# Patient Record
Sex: FEMALE | Race: WHITE | NOT HISPANIC OR LATINO | Employment: OTHER | URBAN - METROPOLITAN AREA
[De-identification: names, ages, dates, MRNs, and addresses within clinical notes are randomized per-mention and may not be internally consistent; named-entity substitution may affect disease eponyms.]

---

## 2018-07-19 ENCOUNTER — HOSPITAL ENCOUNTER (INPATIENT)
Facility: HOSPITAL | Age: 83
LOS: 9 days | Discharge: NURSING, REHAB OR LONG-TERM CARE | DRG: 602 | End: 2018-07-28
Attending: EMERGENCY MEDICINE | Admitting: INTERNAL MEDICINE
Payer: COMMERCIAL

## 2018-07-19 ENCOUNTER — APPOINTMENT (EMERGENCY)
Dept: NON INVASIVE DIAGNOSTICS | Facility: HOSPITAL | Age: 83
DRG: 602 | End: 2018-07-19
Payer: COMMERCIAL

## 2018-07-19 ENCOUNTER — APPOINTMENT (EMERGENCY)
Dept: RADIOLOGY | Facility: HOSPITAL | Age: 83
DRG: 602 | End: 2018-07-19
Payer: COMMERCIAL

## 2018-07-19 ENCOUNTER — HOSPITAL ENCOUNTER (EMERGENCY)
Facility: HOSPITAL | Age: 83
Discharge: HOME | DRG: 602 | End: 2018-07-19
Attending: EMERGENCY MEDICINE | Admitting: EMERGENCY MEDICINE
Payer: COMMERCIAL

## 2018-07-19 VITALS
DIASTOLIC BLOOD PRESSURE: 70 MMHG | HEART RATE: 72 BPM | RESPIRATION RATE: 16 BRPM | TEMPERATURE: 98.7 F | OXYGEN SATURATION: 96 % | SYSTOLIC BLOOD PRESSURE: 157 MMHG | WEIGHT: 265.21 LBS

## 2018-07-19 DIAGNOSIS — L03.90 CELLULITIS: Primary | ICD-10-CM

## 2018-07-19 DIAGNOSIS — R06.03 ACUTE RESPIRATORY DISTRESS: ICD-10-CM

## 2018-07-19 DIAGNOSIS — M16.9 DEGENERATIVE JOINT DISEASE (DJD) OF HIP: ICD-10-CM

## 2018-07-19 DIAGNOSIS — M17.12 ARTHRITIS OF LEFT KNEE: Primary | ICD-10-CM

## 2018-07-19 DIAGNOSIS — M16.12 ARTHRITIS OF LEFT HIP: ICD-10-CM

## 2018-07-19 DIAGNOSIS — M25.552 PAIN OF LEFT HIP JOINT: ICD-10-CM

## 2018-07-19 DIAGNOSIS — K59.00 CONSTIPATION: ICD-10-CM

## 2018-07-19 DIAGNOSIS — M17.11 ARTHRITIS OF RIGHT KNEE: ICD-10-CM

## 2018-07-19 DIAGNOSIS — G47.00 INSOMNIA: ICD-10-CM

## 2018-07-19 PROBLEM — M25.559 HIP PAIN: Status: ACTIVE | Noted: 2018-07-19

## 2018-07-19 PROBLEM — I10 HYPERTENSION: Status: ACTIVE | Noted: 2018-07-19

## 2018-07-19 LAB
ALBUMIN SERPL BCP-MCNC: 3.7 G/DL (ref 3.5–5)
ALP SERPL-CCNC: 70 U/L (ref 46–116)
ALT SERPL W P-5'-P-CCNC: 26 U/L (ref 12–78)
ANION GAP SERPL CALCULATED.3IONS-SCNC: 7 MMOL/L (ref 4–13)
APTT PPP: 26 SECONDS (ref 24–36)
AST SERPL W P-5'-P-CCNC: 24 U/L (ref 5–45)
BASOPHILS # BLD MANUAL: 0 THOUSAND/UL (ref 0–0.1)
BASOPHILS NFR MAR MANUAL: 0 % (ref 0–1)
BILIRUB SERPL-MCNC: 0.44 MG/DL (ref 0.2–1)
BUN SERPL-MCNC: 35 MG/DL (ref 5–25)
CALCIUM SERPL-MCNC: 9.6 MG/DL (ref 8.3–10.1)
CHLORIDE SERPL-SCNC: 101 MMOL/L (ref 100–108)
CO2 SERPL-SCNC: 29 MMOL/L (ref 21–32)
CREAT SERPL-MCNC: 1.23 MG/DL (ref 0.6–1.3)
EOSINOPHIL # BLD MANUAL: 0 THOUSAND/UL (ref 0–0.4)
EOSINOPHIL NFR BLD MANUAL: 0 % (ref 0–6)
ERYTHROCYTE [DISTWIDTH] IN BLOOD BY AUTOMATED COUNT: 12.8 % (ref 11.6–15.1)
GFR SERPL CREATININE-BSD FRML MDRD: 40 ML/MIN/1.73SQ M
GLUCOSE SERPL-MCNC: 114 MG/DL (ref 65–140)
HCT VFR BLD AUTO: 40.1 % (ref 34.8–46.1)
HGB BLD-MCNC: 13.3 G/DL (ref 11.5–15.4)
INR PPP: 1.01 (ref 0.86–1.17)
LACTATE SERPL-SCNC: 2 MMOL/L (ref 0.5–2)
LYMPHOCYTES # BLD AUTO: 0.51 THOUSAND/UL (ref 0.6–4.47)
LYMPHOCYTES # BLD AUTO: 4 % (ref 14–44)
MCH RBC QN AUTO: 31.7 PG (ref 26.8–34.3)
MCHC RBC AUTO-ENTMCNC: 33.2 G/DL (ref 31.4–37.4)
MCV RBC AUTO: 96 FL (ref 82–98)
MONOCYTES # BLD AUTO: 0.38 THOUSAND/UL (ref 0–1.22)
MONOCYTES NFR BLD: 3 % (ref 4–12)
NEUTROPHILS # BLD MANUAL: 11.74 THOUSAND/UL (ref 1.85–7.62)
NEUTS BAND NFR BLD MANUAL: 15 % (ref 0–8)
NEUTS SEG NFR BLD AUTO: 77 % (ref 43–75)
NRBC BLD AUTO-RTO: 0 /100 WBCS
PLATELET # BLD AUTO: 238 THOUSANDS/UL (ref 149–390)
PLATELET BLD QL SMEAR: ADEQUATE
PMV BLD AUTO: 9.5 FL (ref 8.9–12.7)
POTASSIUM SERPL-SCNC: 4.1 MMOL/L (ref 3.5–5.3)
PROT SERPL-MCNC: 6.9 G/DL (ref 6.4–8.2)
PROTHROMBIN TIME: 13.4 SECONDS (ref 11.8–14.2)
RBC # BLD AUTO: 4.2 MILLION/UL (ref 3.81–5.12)
RBC MORPH BLD: NORMAL
SODIUM SERPL-SCNC: 137 MMOL/L (ref 136–145)
TOTAL CELLS COUNTED SPEC: 100
VARIANT LYMPHS # BLD AUTO: 1 %
WBC # BLD AUTO: 12.76 THOUSAND/UL (ref 4.31–10.16)

## 2018-07-19 PROCEDURE — 85027 COMPLETE CBC AUTOMATED: CPT | Performed by: EMERGENCY MEDICINE

## 2018-07-19 PROCEDURE — 96374 THER/PROPH/DIAG INJ IV PUSH: CPT

## 2018-07-19 PROCEDURE — 36415 COLL VENOUS BLD VENIPUNCTURE: CPT | Performed by: EMERGENCY MEDICINE

## 2018-07-19 PROCEDURE — 99223 1ST HOSP IP/OBS HIGH 75: CPT | Performed by: PHYSICIAN ASSISTANT

## 2018-07-19 PROCEDURE — 73502 X-RAY EXAM HIP UNI 2-3 VIEWS: CPT

## 2018-07-19 PROCEDURE — 80053 COMPREHEN METABOLIC PANEL: CPT | Performed by: EMERGENCY MEDICINE

## 2018-07-19 PROCEDURE — 96361 HYDRATE IV INFUSION ADD-ON: CPT

## 2018-07-19 PROCEDURE — 96375 TX/PRO/DX INJ NEW DRUG ADDON: CPT

## 2018-07-19 PROCEDURE — 93971 EXTREMITY STUDY: CPT

## 2018-07-19 PROCEDURE — 99283 EMERGENCY DEPT VISIT LOW MDM: CPT

## 2018-07-19 PROCEDURE — 85730 THROMBOPLASTIN TIME PARTIAL: CPT | Performed by: EMERGENCY MEDICINE

## 2018-07-19 PROCEDURE — 71046 X-RAY EXAM CHEST 2 VIEWS: CPT

## 2018-07-19 PROCEDURE — 85007 BL SMEAR W/DIFF WBC COUNT: CPT | Performed by: EMERGENCY MEDICINE

## 2018-07-19 PROCEDURE — 93971 EXTREMITY STUDY: CPT | Performed by: SURGERY

## 2018-07-19 PROCEDURE — 85610 PROTHROMBIN TIME: CPT | Performed by: EMERGENCY MEDICINE

## 2018-07-19 PROCEDURE — 83605 ASSAY OF LACTIC ACID: CPT | Performed by: EMERGENCY MEDICINE

## 2018-07-19 PROCEDURE — 73560 X-RAY EXAM OF KNEE 1 OR 2: CPT

## 2018-07-19 PROCEDURE — 99285 EMERGENCY DEPT VISIT HI MDM: CPT

## 2018-07-19 RX ORDER — HYDROCODONE BITARTRATE AND ACETAMINOPHEN 5; 325 MG/1; MG/1
1 TABLET ORAL EVERY 6 HOURS PRN
Status: DISCONTINUED | OUTPATIENT
Start: 2018-07-19 | End: 2018-07-20

## 2018-07-19 RX ORDER — KETOROLAC TROMETHAMINE 30 MG/ML
15 INJECTION, SOLUTION INTRAMUSCULAR; INTRAVENOUS EVERY 6 HOURS PRN
Status: DISCONTINUED | OUTPATIENT
Start: 2018-07-19 | End: 2018-07-20

## 2018-07-19 RX ORDER — ONDANSETRON 2 MG/ML
4 INJECTION INTRAMUSCULAR; INTRAVENOUS EVERY 6 HOURS PRN
Status: DISCONTINUED | OUTPATIENT
Start: 2018-07-19 | End: 2018-07-28

## 2018-07-19 RX ORDER — HYDROCODONE BITARTRATE AND ACETAMINOPHEN 5; 325 MG/1; MG/1
1 TABLET ORAL EVERY 6 HOURS PRN
Qty: 8 TABLET | Refills: 0 | Status: SHIPPED | OUTPATIENT
Start: 2018-07-19 | End: 2018-07-28 | Stop reason: HOSPADM

## 2018-07-19 RX ORDER — LANOLIN ALCOHOL/MO/W.PET/CERES
3 CREAM (GRAM) TOPICAL
Status: DISCONTINUED | OUTPATIENT
Start: 2018-07-19 | End: 2018-07-28 | Stop reason: HOSPADM

## 2018-07-19 RX ORDER — LISINOPRIL AND HYDROCHLOROTHIAZIDE 20; 12.5 MG/1; MG/1
1 TABLET ORAL DAILY
COMMUNITY
End: 2018-08-08

## 2018-07-19 RX ORDER — ACETAMINOPHEN 325 MG/1
650 TABLET ORAL EVERY 6 HOURS PRN
Status: DISCONTINUED | OUTPATIENT
Start: 2018-07-19 | End: 2018-07-20

## 2018-07-19 RX ORDER — MAGNESIUM HYDROXIDE/ALUMINUM HYDROXICE/SIMETHICONE 120; 1200; 1200 MG/30ML; MG/30ML; MG/30ML
30 SUSPENSION ORAL EVERY 6 HOURS PRN
Status: DISCONTINUED | OUTPATIENT
Start: 2018-07-19 | End: 2018-07-28 | Stop reason: HOSPADM

## 2018-07-19 RX ORDER — ACETAMINOPHEN 325 MG/1
650 TABLET ORAL ONCE
Status: COMPLETED | OUTPATIENT
Start: 2018-07-19 | End: 2018-07-19

## 2018-07-19 RX ORDER — OXYCODONE HYDROCHLORIDE 5 MG/1
2.5 TABLET ORAL ONCE
Status: COMPLETED | OUTPATIENT
Start: 2018-07-19 | End: 2018-07-19

## 2018-07-19 RX ORDER — CLONIDINE HYDROCHLORIDE 0.2 MG/1
0.1 TABLET ORAL 3 TIMES DAILY
COMMUNITY
End: 2022-01-01 | Stop reason: HOSPADM

## 2018-07-19 RX ORDER — AMLODIPINE BESYLATE 5 MG/1
5 TABLET ORAL 2 TIMES DAILY
Status: DISCONTINUED | OUTPATIENT
Start: 2018-07-19 | End: 2018-07-22

## 2018-07-19 RX ORDER — LIDOCAINE 50 MG/G
2 PATCH TOPICAL DAILY
Status: DISCONTINUED | OUTPATIENT
Start: 2018-07-19 | End: 2018-07-28 | Stop reason: HOSPADM

## 2018-07-19 RX ORDER — MORPHINE SULFATE 4 MG/ML
4 INJECTION, SOLUTION INTRAMUSCULAR; INTRAVENOUS ONCE
Status: COMPLETED | OUTPATIENT
Start: 2018-07-19 | End: 2018-07-19

## 2018-07-19 RX ORDER — IBUPROFEN 400 MG/1
400 TABLET ORAL EVERY 6 HOURS PRN
Status: DISCONTINUED | OUTPATIENT
Start: 2018-07-19 | End: 2018-07-19

## 2018-07-19 RX ORDER — CLONIDINE HYDROCHLORIDE 0.1 MG/1
0.2 TABLET ORAL 3 TIMES DAILY
Status: DISCONTINUED | OUTPATIENT
Start: 2018-07-19 | End: 2018-07-28 | Stop reason: HOSPADM

## 2018-07-19 RX ORDER — AMLODIPINE BESYLATE 5 MG/1
5 TABLET ORAL EVERY 12 HOURS
COMMUNITY
End: 2018-09-27 | Stop reason: CLARIF

## 2018-07-19 RX ADMIN — MORPHINE SULFATE 4 MG: 4 INJECTION, SOLUTION INTRAMUSCULAR; INTRAVENOUS at 19:32

## 2018-07-19 RX ADMIN — CEFEPIME HYDROCHLORIDE 2000 MG: 2 INJECTION, POWDER, FOR SOLUTION INTRAVENOUS at 21:33

## 2018-07-19 RX ADMIN — OXYCODONE HYDROCHLORIDE 2.5 MG: 5 TABLET ORAL at 09:00

## 2018-07-19 RX ADMIN — LIDOCAINE 2 PATCH: 50 PATCH TOPICAL at 22:56

## 2018-07-19 RX ADMIN — SODIUM CHLORIDE 1000 ML: 0.9 INJECTION, SOLUTION INTRAVENOUS at 19:39

## 2018-07-19 RX ADMIN — HYDROMORPHONE HYDROCHLORIDE 0.5 MG: 1 INJECTION, SOLUTION INTRAMUSCULAR; INTRAVENOUS; SUBCUTANEOUS at 20:19

## 2018-07-19 RX ADMIN — VANCOMYCIN HYDROCHLORIDE 2000 MG: 1 INJECTION, POWDER, LYOPHILIZED, FOR SOLUTION INTRAVENOUS at 22:51

## 2018-07-19 RX ADMIN — CLONIDINE HYDROCHLORIDE 0.2 MG: 0.1 TABLET ORAL at 22:55

## 2018-07-19 RX ADMIN — SODIUM CHLORIDE 1000 ML: 0.9 INJECTION, SOLUTION INTRAVENOUS at 22:52

## 2018-07-19 RX ADMIN — ACETAMINOPHEN 650 MG: 325 TABLET, FILM COATED ORAL at 07:32

## 2018-07-19 RX ADMIN — MELATONIN TAB 3 MG 3 MG: 3 TAB at 22:56

## 2018-07-19 RX ADMIN — AMLODIPINE BESYLATE 5 MG: 5 TABLET ORAL at 22:53

## 2018-07-19 NOTE — ED PROVIDER NOTES
History  Chief Complaint   Patient presents with    Knee Pain     Pt c/o b/l knee pain x3 weeks worsening today  No injuries/falls  Pt states using walker to ambulate short distances and wheelchair for longer distances   Hip Pain     Pt c/o left hip x3 weeks  No injuries/fall  An 59-year-old female with past history of hyperlipidemia and hypertension; presents with bilateral knee and left hip pain which has gotten progressively worse for the past 3 weeks  Patient denies falling or inciting injury at the onset of pain  Patient states the pain reached its maximum overnight, which prompted ED evaluation  Patient has noticed swelling to the bilateral knees, and at baseline has swelling to her lower extremities  Patient did take 2 Aleve at 1:00 am this morning with minimal relief of symptoms  Patient was found to have bruising along the medial aspect of the knees, and states it is secondary to sleeping with a pillow between them  Patient otherwise denies fever, chills, chest pain, shortness of breath, abdominal pain, nausea, vomiting, diarrhea and rashes  Patient was seen by [address] [place] [address] Rheumatology for the symptoms approximately 1 month ago  Symptoms were felt to be secondary to arthritis at that time, patient did have steroid injections  Patient states she did not gain relief with the injections  Of note, at baseline patient uses a wheelchair to get around  Patient is able to ambulate with a walker for short distances  A/P:  Bilateral knee and left hip pain, atraumatic  Mild swelling appreciated to the bilateral knees  Full range of motion to the bilateral lower extremities, although patient does complain of pain  No overlying erythema or warmth to suggest septic arthritis  Suspect symptoms are secondary to osteoarthritis  Will obtain x-rays for further evaluation of occult fractures  Will give Tylenol for pain control          History provided by:  Patient and medical records  Knee Pain   Hip Pain None       Past Medical History:   Diagnosis Date    Hyperlipidemia     Hypertension        Past Surgical History:   Procedure Laterality Date    ANKLE FRACTURE SURGERY Left     CHOLECYSTECTOMY      REPLACEMENT TOTAL KNEE BILATERAL Bilateral        History reviewed  No pertinent family history  I have reviewed and agree with the history as documented  Social History   Substance Use Topics    Smoking status: Never Smoker    Smokeless tobacco: Never Used    Alcohol use No        Review of Systems   Cardiovascular: Positive for leg swelling ( bilateral)  Musculoskeletal: Positive for arthralgias ( bilateral knee and left hip) and joint swelling ( bilateral knee)  All other systems reviewed and are negative  Physical Exam  Physical Exam  General Appearance: alert and oriented, nad, non toxic appearing  Skin:  Warm, dry, intact  HEENT: atraumatic, normocephalic  Neck: Supple, trachea midline  Cardiac: RRR; no murmurs, rub, gallops  Pulmonary: lungs CTAB; no wheezes, rales, rhonchi  Gastrointestinal: abdomen soft, nontender, nondistended; no guarding or rebound tenderness; good bowel sounds, no mass or bruits  Extremities:  Swelling appreciated to the bilateral knees  Knees and hips are nontender  Full range of motion to the bilateral lower extremities, although patient complains of increased pain with passive range of motion  Ecchymosis appreciated to the medial knees bilaterally  No erythema or warmth appreciated  3+ pitting edema to the bilateral lower extremities    2+ pulses; no calf tenderness, no clubbing, no cyanosis  Neuro:  no focal motor or sensory deficits, CN 2-12 grossly intact  Psych:  Normal mood and affect, normal judgement and insight      Vital Signs  ED Triage Vitals [07/19/18 0721]   Temperature Pulse Respirations Blood Pressure SpO2   98 7 °F (37 1 °C) 72 18 (!) 177/92 96 %      Temp Source Heart Rate Source Patient Position - Orthostatic VS BP Location FiO2 (%)   Oral Monitor Lying Left arm --      Pain Score       Worst Possible Pain           Vitals:    07/19/18 0721 07/19/18 0832   BP: (!) 177/92 157/70   Pulse: 72    Patient Position - Orthostatic VS: Lying Sitting       Visual Acuity      ED Medications  Medications   acetaminophen (TYLENOL) tablet 650 mg (650 mg Oral Given 7/19/18 0732)   oxyCODONE (ROXICODONE) IR tablet 2 5 mg (2 5 mg Oral Given 7/19/18 0900)       Diagnostic Studies  Results Reviewed     None                 XR hip/pelv 2-3 vws left   ED Interpretation by Cynthia Llanes DO (07/19 0818)   Significant bilateral osteoarthritis      XR knee 1 or 2 vw left   ED Interpretation by Cynthia Llanes DO (07/19 9383)   No acute fracture or dislocation  Hardware in place from prior knee replacement, although with minimal joint space  XR knee 1 or 2 vw right   ED Interpretation by Cynthia Llanes DO (07/19 2792)   No acute fracture or dislocation  Hardware in place from prior knee replacement, although with minimal joint space  Procedures  Procedures       Phone Contacts  ED Phone Contact    ED Course  ED Course as of Jul 19 1005   Thu Jul 19, 2018   0840 Pt complains of persistent pain, regardless of tylenol  Given extent of patient's arthritis, will give low dose oxycodone with plans to discharge on vicodin    Reviewed [place] PMED, no recent prescriptions                                MDM  CritCare Time    Disposition  Final diagnoses:   Arthritis of left knee   Arthritis of right knee   Arthritis of left hip     Time reflects when diagnosis was documented in both MDM as applicable and the Disposition within this note     Time User Action Codes Description Comment    7/19/2018  9:03 AM Srinath Espinal Add [M17 12] Arthritis of left knee     7/19/2018  9:03 AM Miriam Espinal Add [M17 11] Arthritis of right knee     7/19/2018  9:04 AM Miriam Espinal Add [M16 12] Arthritis of left hip       ED Disposition     ED Disposition Condition Comment Discharge  Vickysreedhar Archuleta discharge to home/self care  Condition at discharge: Good        Follow-up Information     Follow up With Specialties Details Why [address], [address]  Internal Medicine, Nurse Practitioner Schedule an appointment as soon as possible for a visit in 1 day For re-evaluation [address] [facility] Schedule an appointment as soon as possible for a visit in 1 week For re-evaluation [place] [id number]  [phone]          Discharge Medication List as of 7/19/2018  9:04 AM      START taking these medications    Details   HYDROcodone-acetaminophen (NORCO) 5-325 mg per tablet Take 1 tablet by mouth every 6 (six) hours as needed for pain for up to 10 days Max Daily Amount: 4 tablets, Starting Thu 7/19/2018, Until Sun 7/29/2018, Print           No discharge procedures on file      ED Provider  Electronically Signed by           Dixie Matt DO  07/19/18 0545

## 2018-07-19 NOTE — ED RE-EVALUATION NOTE
Received phone call from patient's pharmacy stating pt received a prescription for vicodin, 60 tabs with instruction to take BID, on 6/27/2018  This prescription [place] not listed on [place] PMED when queried  I instructed the pharmacist not to fill the prescription as the patient should still have vicodin from recent prescription  Pharmacist to discard of prescription       [id number] Julio, DO  07/19/18 2531

## 2018-07-19 NOTE — DISCHARGE INSTRUCTIONS
Osteoarthritis   WHAT YOU NEED TO KNOW:   Osteoarthritis (OA) occurs when cartilage (tissue that cushions a joint) wears away slowly and causes the bones to rub together  OA is a long-term condition that often affects the hands, neck, lower back, knees, and hips  OA is also called arthrosis or degenerative joint disease  DISCHARGE INSTRUCTIONS:   Return to the emergency department if:   · You have severe pain  · You cannot move your joint  Contact your healthcare provider if:   · You have a fever  · Your joint is red and tender  · You have questions or concerns about your condition or care  Medicines:   · Acetaminophen  is used to decrease pain  It is available without a doctor's order  Ask how much to take and how often to take it  Follow directions  Acetaminophen can cause liver damage if not taken correctly  · NSAIDs , such as ibuprofen, help decrease swelling, pain, and fever  This medicine is available with or without a doctor's order  NSAIDs can cause stomach bleeding or kidney problems in certain people  If you take blood thinner medicine, always ask your healthcare provider if NSAIDs are safe for you  Always read the medicine label and follow directions  · Prescription pain medicine  may be given to decrease severe pain if other medicines do not work  Take the medicine as directed  Do not wait until the pain is severe before you take your medicine  · Take your medicine as directed  Contact your healthcare provider if you think your medicine is not helping or if you have side effects  Tell him or her if you are allergic to any medicine  Keep a list of the medicines, vitamins, and herbs you take  Include the amounts, and when and why you take them  Bring the list or the pill bottles to follow-up visits  Carry your medicine list with you in case of an emergency  Follow up with your healthcare provider as directed:  Write down your questions so you remember to ask them during your visits  Go to physical therapy as directed:  A physical therapist teaches you exercises to help improve movement and strength, and to decrease pain in your joints  The exercises also help lower your risk for loss of function  Manage your OA:   · Stay active  Physical activity may reduce your pain and improve your ability to do daily activities  Avoid activities that cause pain  Ask your healthcare provider what type of exercise would be best for you  · Maintain a healthy weight  This helps decrease the strain on the joints in your back, hips, knees, ankles, and feet  Ask your healthcare provider how much you should weigh  Ask him to help you create a weight loss plan if you are overweight  · Use heat or ice  on your joints as directed  Heat and ice help decrease pain, swelling, and muscle spasms  Use a heating pad on a low setting or take a warm bath  Use an ice pack, or put crushed ice in a plastic bag  Cover it with a towel  · Massage  the muscles around the joint to relieve pain and stiffness  · Use a cane, crutches, or a walker  to protect and relieve pressure on your ankle, knee, and hip joints  You may also be prescribed shoe inserts to decrease pressure in your joints  · Wear flat or low-heeled shoes  This will help decrease pain and reduce pressure on your ankle, knee, and hip joints  © 2017 [employer]  Information is for End User's use only and may not be sold, redistributed or otherwise used for commercial purposes  All illustrations and images included in CareNotes® are the copyrighted property of A D A M , Inc  or Julien Giles  The above information is an  only  It is not intended as medical advice for individual conditions or treatments  Talk to your doctor, nurse or pharmacist before following any medical regimen to see if it is safe and effective for you

## 2018-07-19 NOTE — ED PROVIDER NOTES
History  Chief Complaint   Patient presents with    Pain     patient transported via EMS from home; patient was seen as [facility] for bialteral hip and knee pain; XR were performed and patient was given script for Vicodin but script could not be filled due to patient currently having a prescription for Oxycodone     80year old female with a hx chronic hip and knee pain presents for evaluation of hip and knee pain but also L ankle pain  She states her hip and knee pain has been present for months  She was evaluated earlier today for the hip and knee pain but did not mention the ankle pain as those symptoms just started today  She was discharged with a rx for vicodin but could not get it filled because she had a 30 day supply filled 20 days ago per her pharmacy  I personally spoke with the patients nurse at her assisted living facility who stated she still has her remaining supply left but her BID dosing does not adequately control her pain and she has nothing for breakthrough  Patient states her L ankle pain started today and started to also become warm  Denies fevers, chills, vomiting, coughing, chest pain, SOB  Patient is scheduled to see pain management 8/10 for her chronic pain  Prior to Admission Medications   Prescriptions Last Dose Informant Patient Reported? Taking?    HYDROcodone-acetaminophen (NORCO) 5-325 mg per tablet   No No   Sig: Take 1 tablet by mouth every 6 (six) hours as needed for pain for up to 10 days Max Daily Amount: 4 tablets   amLODIPine (NORVASC) 5 mg tablet   Yes Yes   Sig: Take 5 mg by mouth 2 (two) times a day   cloNIDine (CATAPRES) 0 1 mg tablet   Yes Yes   Sig: Take 0 2 mg by mouth 3 (three) times a day   lisinopril-hydrochlorothiazide (PRINZIDE,ZESTORETIC) 20-12 5 MG per tablet   Yes Yes   Sig: Take 1 tablet by mouth daily      Facility-Administered Medications: None       Past Medical History:   Diagnosis Date    Hyperlipidemia     Hypertension        Past Surgical History: Procedure Laterality Date    ANKLE FRACTURE SURGERY Left     CHOLECYSTECTOMY      REPLACEMENT TOTAL KNEE BILATERAL Bilateral        History reviewed  No pertinent family history  I have reviewed and agree with the history as documented  Social History   Substance Use Topics    Smoking status: Never Smoker    Smokeless tobacco: Never Used    Alcohol use No        Review of Systems   Constitutional: Negative for chills, diaphoresis and fever  HENT: Negative for congestion and rhinorrhea  Eyes: Negative for pain and visual disturbance  Respiratory: Negative for cough, shortness of breath and wheezing  Cardiovascular: Negative for chest pain and leg swelling  Gastrointestinal: Negative for abdominal pain, diarrhea, nausea and vomiting  Genitourinary: Negative for difficulty urinating, dysuria, frequency and urgency  Musculoskeletal: Positive for arthralgias  Negative for back pain and neck pain  B/l hip and knee pain  L ankle pain  Skin: Positive for color change  Negative for rash  Neurological: Negative for syncope, numbness and headaches  All other systems reviewed and are negative  Physical Exam  ED Triage Vitals   Temperature Pulse Respirations Blood Pressure SpO2   07/19/18 1840 07/19/18 1840 07/19/18 1840 07/19/18 1840 07/19/18 1840   99 °F (37 2 °C) 100 16 115/58 94 %      Temp Source Heart Rate Source Patient Position - Orthostatic VS BP Location FiO2 (%)   07/19/18 1840 07/19/18 2139 07/19/18 2139 07/19/18 1840 --   Oral Monitor Lying Right arm       Pain Score       07/19/18 1932       Worst Possible Pain           Orthostatic Vital Signs  Vitals:    07/19/18 2139 07/19/18 2227 07/19/18 2253 07/19/18 2255   BP: 119/56 122/53 122/53 122/53   Pulse: 79 82     Patient Position - Orthostatic VS: Lying Lying         Physical Exam   Constitutional: She is oriented to person, place, and time  She appears well-developed and well-nourished  No distress     HENT:   Head: Normocephalic and atraumatic  Eyes: Conjunctivae and EOM are normal    Neck: Normal range of motion  Neck supple  Cardiovascular: Normal rate and regular rhythm  Pulmonary/Chest: Effort normal and breath sounds normal  No respiratory distress  She has no wheezes  She has no rales  Abdominal: Soft  Bowel sounds are normal  There is no tenderness  There is no guarding  Musculoskeletal: Normal range of motion  She exhibits edema and tenderness  Erythema and warmth over L ankle up to mid calf  Patient unable to flex at hip 2/2 pain  Limited ROM of b/l knees 2/2 pain  Neurological: She is alert and oriented to person, place, and time  No cranial nerve deficit  Skin: Skin is warm  She is not diaphoretic  No erythema  Psychiatric: She has a normal mood and affect  Her behavior is normal    Nursing note and vitals reviewed  ED Medications  Medications   melatonin tablet 3 mg (3 mg Oral Given 7/19/18 2256)   lidocaine (LIDODERM) 5 % patch 2 patch (2 patches Transdermal Medication Applied 7/19/18 2256)   ketorolac (TORADOL) injection 15 mg (not administered)   cefepime (MAXIPIME) 2,000 mg in dextrose 5 % 50 mL IVPB (not administered)   vancomycin (VANCOCIN) 1,250 mg in sodium chloride 0 9 % 250 mL IVPB (not administered)   ondansetron (ZOFRAN) injection 4 mg (not administered)   aluminum-magnesium hydroxide-simethicone (MYLANTA) 200-200-20 mg/5 mL oral suspension 30 mL (not administered)   enoxaparin (LOVENOX) subcutaneous injection 40 mg (not administered)   acetaminophen (TYLENOL) tablet 650 mg (not administered)   amLODIPine (NORVASC) tablet 5 mg (5 mg Oral Given 7/19/18 2253)   cloNIDine (CATAPRES) tablet 0 2 mg (0 2 mg Oral Given 7/19/18 2255)   HYDROcodone-acetaminophen (NORCO) 5-325 mg per tablet 1 tablet (not administered)   lisinopril-hydrochlorothiazide (PRINZIDE 20/12 5) combo dose (not administered)   sodium chloride 0 9 % bolus 1,000 mL (0 mL Intravenous Stopped 7/19/18 2136)   morphine (PF) 4 mg/mL injection 4 mg (4 mg Intravenous Given 7/19/18 1932)   HYDROmorphone (DILAUDID) injection 0 5 mg (0 5 mg Intravenous Given 7/19/18 2019)   cefepime (MAXIPIME) 2 g/50 mL dextrose IVPB (2,000 mg Intravenous New Bag 7/19/18 2133)   vancomycin (VANCOCIN) 2,000 mg in sodium chloride 0 9 % 500 mL IVPB (2,000 mg Intravenous New Bag 7/19/18 2251)   sodium chloride 0 9 % bolus 1,000 mL (1,000 mL Intravenous New Bag 7/19/18 2252)       Diagnostic Studies  Results Reviewed     Procedure Component Value Units Date/Time    Lactic Acid x2 [[id number]] Collected:  07/20/18 0131    Lab Status: In process Specimen:  Blood from Arm, Right Updated:  07/20/18 0136    CBC and differential [[id number]]  (Abnormal) Collected:  07/19/18 1929    Lab Status:  Final result Specimen:  Blood from Arm, Left Updated:  07/19/18 2120     WBC 12 76 (H) Thousand/uL      RBC 4 20 Million/uL      Hemoglobin 13 3 g/dL      Hematocrit 40 1 %      MCV 96 fL      MCH 31 7 pg      MCHC 33 2 g/dL      RDW 12 8 %      MPV 9 5 fL      Platelets 201 Thousands/uL      nRBC 0 /100 WBCs     Narrative: This is an appended report  These results have been appended to a previously verified report      Protime-INR [[id number]]  (Normal) Collected:  07/19/18 1929    Lab Status:  Final result Specimen:  Blood from Arm, Left Updated:  07/19/18 2104     Protime 13 4 seconds      INR 1 01    APTT [[id number]]  (Normal) Collected:  07/19/18 1929    Lab Status:  Final result Specimen:  Blood from Arm, Left Updated:  07/19/18 2104     PTT 26 seconds     Comprehensive metabolic panel [[id number]]  (Abnormal) Collected:  07/19/18 1929    Lab Status:  Final result Specimen:  Blood from Arm, Left Updated:  07/19/18 2100     Sodium 137 mmol/L      Potassium 4 1 mmol/L      Chloride 101 mmol/L      CO2 29 mmol/L      Anion Gap 7 mmol/L      BUN 35 (H) mg/dL      Creatinine 1 23 mg/dL      Glucose 114 mg/dL      Calcium 9 6 mg/dL      AST 24 U/L      ALT 26 U/L Alkaline Phosphatase 70 U/L      Total Protein 6 9 g/dL      Albumin 3 7 g/dL      Total Bilirubin 0 44 mg/dL      eGFR 40 ml/min/1 73sq m     Narrative:         National Kidney Disease Education Program recommendations are as follows:  GFR calculation is accurate only with a steady state creatinine  Chronic Kidney disease less than 60 ml/min/1 73 sq  meters  Kidney failure less than 15 ml/min/1 73 sq  meters  Lactic Acid x2 [[id number]]  (Normal) Collected:  07/19/18 1929    Lab Status:  Final result Specimen:  Blood from Arm, Left Updated:  07/19/18 2049     LACTIC ACID 2 0 mmol/L     Narrative:         Result may be elevated if tourniquet was used during collection  POCT urinalysis dipstick [[id number]]     Lab Status:  No result                  VAS lower limb venous duplex study, unilateral/limited   Final Result by Derrick Sol MD (07/19 2211)      XR chest 2 views   Final Result by Trice Cruz MD (07/19 2040)      No acute cardiopulmonary disease  Workstation performed: [id number]               Procedures  Procedures      Phone Consults  ED Phone Contact    ED Course  ED Course as of Jul 20 0155   u Jul 19, 2018 2032 WBC: (!) 12 76   2032 LE duplex negative for proximal DVT  Possible distal tibial clot but more likely narrowing per US tech  2049 Mid to distal calf LLE posterior tibial vein appeared narrow  Possible clot but more likely narrowing of vessel  2051 LACTIC ACID: 2 0   2129 Bands Relative: (!) 15                         Initial Sepsis Screening     Row Name 07/19/18 2051                Is the patient's history suggestive of a new or worsening infection?  (!)  Yes (Proceed)  -AM        Suspected source of infection soft tissue  -AM        Are two or more of the following signs & symptoms of infection both present and new to the patient?  --        Indicate SIRS criteria Tachycardia > 90 bpm;Leukocytosis (WBC > 07322 IJL)  -AM        If the answer is yes to both questions, suspicion of sepsis is present  --        If severe sepsis is present AND tissue hypoperfusion perists in the hour after fluid resuscitation or lactate > 4, the patient meets criteria for SEPTIC SHOCK  --        Are any of the following organ dysfunction criteria present within 6 hours of suspected infection and SIRS criteria that are NOT considered to be chronic conditions? (!)  Yes  -AM        Organ dysfunction Lactate > 2 0 mmol/L  -AM        Date of presentation of severe sepsis 07/19/18  -AM        Time of presentation of severe sepsis 2052  -AM        Tissue hypoperfusion persists in the hour after crystalloid fluid administration, evidenced, by either:  --        Was hypotension present within one hour of the conclusion of crystalloid fluid administration? No  -AM        Date of presentation of septic shock  --        Time of presentation of septic shock  --          User Key  (r) = Recorded By, (t) = Taken By, (c) = Cosigned By    [address] Name Provider Type    AM Oneda Roman, DO Resident                  MDM  Number of Diagnoses or Management Options  Cellulitis:   Diagnosis management comments: 80year old female presenting with pain of hips, knees and unilateral ankle/calf  Will obtain labs, sepsis evaluation  Pain control  LE doppler to assess for DVT  Likely cellulitis  Broad spec abx  Admit for further evaluation       CritCare Time    Disposition  Final diagnoses:   Cellulitis     Time reflects when diagnosis was documented in both MDM as applicable and the Disposition within this note     Time User Action Codes Description Comment    7/19/2018  9:20 PM [facility] Add [L03 90] Cellulitis     7/19/2018 10:09 PM Linda Greenberg Add [M16 9] Degenerative joint disease (DJD) of hip       ED Disposition     ED Disposition Condition Comment    Admit  Case was discussed with [facility] and the patient's admission status was agreed to be Admission Status: inpatient status to the service of   [facility]   Follow-up Information    None         Current Discharge Medication List      CONTINUE these medications which have NOT CHANGED    Details   amLODIPine (NORVASC) 5 mg tablet Take 5 mg by mouth 2 (two) times a day      cloNIDine (CATAPRES) 0 1 mg tablet Take 0 2 mg by mouth 3 (three) times a day      lisinopril-hydrochlorothiazide (PRINZIDE,ZESTORETIC) 20-12 5 MG per tablet Take 1 tablet by mouth daily      HYDROcodone-acetaminophen (NORCO) 5-325 mg per tablet Take 1 tablet by mouth every 6 (six) hours as needed for pain for up to 10 days Max Daily Amount: 4 tablets  Qty: 8 tablet, Refills: 0    Associated Diagnoses: Arthritis of left knee           No discharge procedures on file  ED Provider  Attending physically available and evaluated Frankie Alexander I managed the patient along with the ED Attending      Electronically Signed by         DO Shannon  07/20/18 0155

## 2018-07-19 NOTE — ED NOTES
Pt requesting pain medications at this time  Pt notified that she must be seen by a physician before medications  Dr Brittney Argueta at pt's bedside at this time        Nii Bliss RN  07/19/18 5612

## 2018-07-20 ENCOUNTER — APPOINTMENT (INPATIENT)
Dept: RADIOLOGY | Facility: HOSPITAL | Age: 83
DRG: 602 | End: 2018-07-20
Payer: COMMERCIAL

## 2018-07-20 PROBLEM — A41.9 SEPSIS (HCC): Status: ACTIVE | Noted: 2018-07-20

## 2018-07-20 LAB
ANION GAP SERPL CALCULATED.3IONS-SCNC: 6 MMOL/L (ref 4–13)
ATRIAL RATE: 74 BPM
BUN SERPL-MCNC: 38 MG/DL (ref 5–25)
CALCIUM SERPL-MCNC: 8.6 MG/DL (ref 8.3–10.1)
CHLORIDE SERPL-SCNC: 104 MMOL/L (ref 100–108)
CO2 SERPL-SCNC: 26 MMOL/L (ref 21–32)
CREAT SERPL-MCNC: 1.15 MG/DL (ref 0.6–1.3)
CRP SERPL QL: >90 MG/L
ERYTHROCYTE [DISTWIDTH] IN BLOOD BY AUTOMATED COUNT: 13 % (ref 11.6–15.1)
ERYTHROCYTE [SEDIMENTATION RATE] IN BLOOD: 15 MM/HOUR (ref 0–20)
GFR SERPL CREATININE-BSD FRML MDRD: 44 ML/MIN/1.73SQ M
GLUCOSE SERPL-MCNC: 140 MG/DL (ref 65–140)
HCT VFR BLD AUTO: 37.2 % (ref 34.8–46.1)
HGB BLD-MCNC: 12 G/DL (ref 11.5–15.4)
LACTATE SERPL-SCNC: 1.8 MMOL/L (ref 0.5–2)
LACTATE SERPL-SCNC: 2.1 MMOL/L (ref 0.5–2)
MCH RBC QN AUTO: 31 PG (ref 26.8–34.3)
MCHC RBC AUTO-ENTMCNC: 32.3 G/DL (ref 31.4–37.4)
MCV RBC AUTO: 96 FL (ref 82–98)
P AXIS: 35 DEGREES
PLATELET # BLD AUTO: 177 THOUSANDS/UL (ref 149–390)
PMV BLD AUTO: 9.1 FL (ref 8.9–12.7)
POTASSIUM SERPL-SCNC: 4.2 MMOL/L (ref 3.5–5.3)
PR INTERVAL: 202 MS
QRS AXIS: -25 DEGREES
QRSD INTERVAL: 84 MS
QT INTERVAL: 380 MS
QTC INTERVAL: 422 MS
RBC # BLD AUTO: 3.87 MILLION/UL (ref 3.81–5.12)
SODIUM SERPL-SCNC: 136 MMOL/L (ref 136–145)
T WAVE AXIS: 14 DEGREES
VENTRICULAR RATE: 74 BPM
WBC # BLD AUTO: 10.18 THOUSAND/UL (ref 4.31–10.16)

## 2018-07-20 PROCEDURE — 99222 1ST HOSP IP/OBS MODERATE 55: CPT | Performed by: ORTHOPAEDIC SURGERY

## 2018-07-20 PROCEDURE — 86140 C-REACTIVE PROTEIN: CPT | Performed by: INTERNAL MEDICINE

## 2018-07-20 PROCEDURE — 73630 X-RAY EXAM OF FOOT: CPT

## 2018-07-20 PROCEDURE — 93005 ELECTROCARDIOGRAM TRACING: CPT

## 2018-07-20 PROCEDURE — 83605 ASSAY OF LACTIC ACID: CPT | Performed by: EMERGENCY MEDICINE

## 2018-07-20 PROCEDURE — 80048 BASIC METABOLIC PNL TOTAL CA: CPT | Performed by: PHYSICIAN ASSISTANT

## 2018-07-20 PROCEDURE — 83605 ASSAY OF LACTIC ACID: CPT | Performed by: INTERNAL MEDICINE

## 2018-07-20 PROCEDURE — 93010 ELECTROCARDIOGRAM REPORT: CPT | Performed by: INTERNAL MEDICINE

## 2018-07-20 PROCEDURE — 85027 COMPLETE CBC AUTOMATED: CPT | Performed by: PHYSICIAN ASSISTANT

## 2018-07-20 PROCEDURE — 85652 RBC SED RATE AUTOMATED: CPT | Performed by: INTERNAL MEDICINE

## 2018-07-20 PROCEDURE — 99232 SBSQ HOSP IP/OBS MODERATE 35: CPT | Performed by: INTERNAL MEDICINE

## 2018-07-20 RX ORDER — HYDRALAZINE HYDROCHLORIDE 20 MG/ML
5 INJECTION INTRAMUSCULAR; INTRAVENOUS EVERY 6 HOURS PRN
Status: DISCONTINUED | OUTPATIENT
Start: 2018-07-20 | End: 2018-07-28

## 2018-07-20 RX ORDER — OXYCODONE HYDROCHLORIDE 5 MG/1
5 TABLET ORAL EVERY 6 HOURS PRN
Status: DISCONTINUED | OUTPATIENT
Start: 2018-07-20 | End: 2018-07-28 | Stop reason: HOSPADM

## 2018-07-20 RX ORDER — DOCUSATE SODIUM 100 MG/1
100 CAPSULE, LIQUID FILLED ORAL 2 TIMES DAILY PRN
Status: DISCONTINUED | OUTPATIENT
Start: 2018-07-20 | End: 2018-07-28 | Stop reason: HOSPADM

## 2018-07-20 RX ORDER — SODIUM CHLORIDE 9 MG/ML
75 INJECTION, SOLUTION INTRAVENOUS CONTINUOUS
Status: DISCONTINUED | OUTPATIENT
Start: 2018-07-20 | End: 2018-07-21

## 2018-07-20 RX ORDER — POLYETHYLENE GLYCOL 3350 17 G/17G
17 POWDER, FOR SOLUTION ORAL DAILY
Status: DISCONTINUED | OUTPATIENT
Start: 2018-07-20 | End: 2018-07-28 | Stop reason: HOSPADM

## 2018-07-20 RX ORDER — ACETAMINOPHEN 325 MG/1
650 TABLET ORAL EVERY 6 HOURS SCHEDULED
Status: DISCONTINUED | OUTPATIENT
Start: 2018-07-20 | End: 2018-07-23

## 2018-07-20 RX ADMIN — ACETAMINOPHEN 650 MG: 325 TABLET ORAL at 17:54

## 2018-07-20 RX ADMIN — ACETAMINOPHEN 650 MG: 325 TABLET, FILM COATED ORAL at 08:23

## 2018-07-20 RX ADMIN — POLYETHYLENE GLYCOL 3350 17 G: 17 POWDER, FOR SOLUTION ORAL at 10:32

## 2018-07-20 RX ADMIN — VANCOMYCIN HYDROCHLORIDE 1250 MG: 1 INJECTION, POWDER, LYOPHILIZED, FOR SOLUTION INTRAVENOUS at 21:01

## 2018-07-20 RX ADMIN — CLONIDINE HYDROCHLORIDE 0.2 MG: 0.1 TABLET ORAL at 21:14

## 2018-07-20 RX ADMIN — ENOXAPARIN SODIUM 40 MG: 40 INJECTION SUBCUTANEOUS at 08:22

## 2018-07-20 RX ADMIN — AMLODIPINE BESYLATE 5 MG: 5 TABLET ORAL at 08:22

## 2018-07-20 RX ADMIN — LIDOCAINE 2 PATCH: 50 PATCH TOPICAL at 08:26

## 2018-07-20 RX ADMIN — SODIUM CHLORIDE 75 ML/HR: 0.9 INJECTION, SOLUTION INTRAVENOUS at 10:31

## 2018-07-20 RX ADMIN — OXYCODONE HYDROCHLORIDE 5 MG: 5 TABLET ORAL at 14:12

## 2018-07-20 RX ADMIN — CLONIDINE HYDROCHLORIDE 0.2 MG: 0.1 TABLET ORAL at 08:22

## 2018-07-20 RX ADMIN — MELATONIN TAB 3 MG 3 MG: 3 TAB at 21:01

## 2018-07-20 RX ADMIN — SODIUM CHLORIDE 500 ML: 0.9 INJECTION, SOLUTION INTRAVENOUS at 11:39

## 2018-07-20 RX ADMIN — CEFEPIME HYDROCHLORIDE 2000 MG: 2 INJECTION, POWDER, FOR SOLUTION INTRAVENOUS at 08:30

## 2018-07-20 RX ADMIN — VANCOMYCIN HYDROCHLORIDE 1250 MG: 1 INJECTION, POWDER, LYOPHILIZED, FOR SOLUTION INTRAVENOUS at 09:48

## 2018-07-20 RX ADMIN — LISINOPRIL: 20 TABLET ORAL at 08:22

## 2018-07-20 NOTE — ED NOTES
Visitor of patient came to nurses stations and states "she is in a lot of pain and she is passing out from the pain"; provider notified at this time      Roland Van RN  07/19/18 2018

## 2018-07-20 NOTE — PROGRESS NOTES
Dr Winifred Hairston aware patient temperature 101 8 and bp dropped after her morning medications nss bolus infusing  Stat labs done will continue to monitor patient

## 2018-07-20 NOTE — CONSULTS
Consultation - Valentinonaliniwes Iveth Cooney 80 y o  female MRN: [id number]  Unit/Bed#: E5 -01 Encounter: [id number]      Assessment/Plan     Assessment:  L hip OA    Plan:  Patient not currently a candidate for a total hip replacement given age, BMI, cellulitis  Recommend conservative treatment at this time  Physical therapy  Pain control p r n  Can follow up in the office when needed, will consider a steroid injection at that time    History of Present Illness   Physician Requesting Consult: Monserrat Cano MD  Reason for Consult / Principal Problem:  Left hip pain  HPI: Lemuel Leon is a 80y o  year old female who presents with worsening left hip pain  Patient states she has had bilateral hip and knee arthritis for quite some time  Over the past few days she has noticed an increase in pain in her left hip  She denies any new falls or trauma  Most her pain is in her groin  She states that she has not seen an orthopedic doctor but has seen a rheumatologist   She recently received bilateral trochanteric bursa steroid injections without relief  Her pain is constant and she describes as dull and achy  Consults    ROS: see HPI, all other systems negative  Historical Information   Past Medical History:   Diagnosis Date    Hyperlipidemia     Hypertension      Past Surgical History:   Procedure Laterality Date    ANKLE FRACTURE SURGERY Left     CHOLECYSTECTOMY      REPLACEMENT TOTAL KNEE BILATERAL Bilateral      Social History   History   Alcohol Use No     History   Drug Use No     History   Smoking Status    Never Smoker   Smokeless Tobacco    Never Used     Family History: History reviewed  No pertinent family history      Meds/Allergies   Prescriptions Prior to Admission   Medication    amLODIPine (NORVASC) 5 mg tablet    cloNIDine (CATAPRES) 0 1 mg tablet    lisinopril-hydrochlorothiazide (PRINZIDE,ZESTORETIC) 20-12 5 MG per tablet    HYDROcodone-acetaminophen (NORCO) 5-325 mg per tablet     No Known Allergies    Objective   Vitals: Blood pressure 118/62, pulse 87, temperature (!) 101 5 °F (38 6 °C), temperature source Tympanic, resp  rate 18, height 5' 3" (1 6 m), weight 121 kg (267 lb 3 2 oz), SpO2 98 %  ,Body mass index is 47 33 kg/m²  Intake/Output Summary (Last 24 hours) at 07/20/18 0906  Last data filed at 07/19/18 2136   Gross per 24 hour   Intake             2000 ml   Output                0 ml   Net             2000 ml     I/O last 24 hours:   In: 2000 [IV Piggyback:2000]  Out: -     Invasive Devices          No matching active lines, drains, or airways          PE:  Gen:  Awake and alert  HEENT:  Hearing intact  Heart:  Regular rate  Lungs: no audible wheezing  GI: no abdominal distension    Ortho Exam   B/L hips:  Pain patches in place, no erythema, limited a ROM secondary to pain, pain with he ROM, for flexion 0 to 70, no internal rotation, external rotation 0-40, left hip more painful than right during range of motion  B/L LE:  EHL/AT/GS intact, sensation grossly intact L4, L5, S1, palpable pedal pulse      Lab Results:   CBC:   Lab Results   Component Value Date    WBC 10 18 (H) 07/20/2018    HGB 12 0 07/20/2018    HCT 37 2 07/20/2018    MCV 96 07/20/2018     07/20/2018    MCH 31 0 07/20/2018    MCHC 32 3 07/20/2018    RDW 13 0 07/20/2018    MPV 9 1 07/20/2018    NRBC 0 07/19/2018     CMP:   Lab Results   Component Value Date     07/20/2018     07/20/2018    CO2 26 07/20/2018    ANIONGAP 6 07/20/2018    BUN 38 (H) 07/20/2018    CREATININE 1 15 07/20/2018    GLUCOSE 140 07/20/2018    CALCIUM 8 6 07/20/2018    AST 24 07/19/2018    ALT 26 07/19/2018    ALKPHOS 70 07/19/2018    PROT 6 9 07/19/2018    BILITOT 0 44 07/19/2018    EGFR 44 07/20/2018     Imaging Studies: I have personally reviewed pertinent films in PACS   X-rays pelvis:  Severe bilateral hip DJD  X-ray left hip:  Severe DJD    Code Status: Level 1 - Full Code  Advance Directive and Living Will: Power of :    POLST:

## 2018-07-20 NOTE — SOCIAL WORK
Cm received a consult regarding discharge planning  Cm met with patient to complete a general SW assessment and discuss discharge needs  Patient reports living at [facility]  Patient identified her daughter Tedra Crigler) as her primary support system  Patient is independent with ADLs  Patient reports using her wheelchair  Patient's daughter is available to transport patient at discharge  Patient receives prescribed medication and received PCP services through [facility] identified as patient's daughter  No additional needs reported  Cm to follow as needed

## 2018-07-20 NOTE — MALNUTRITION/BMI
This medical record reflects one or more clinical indicators suggestive of malnutrition and/or morbid obesity  Malnutrition Findings:              BMI Findings:  BMI Classifications: Morbid Obesity 45-49 9     Body mass index is 47 33 kg/m²  See Nutrition note dated 7/20/18 for additional details  Completed nutrition assessment is viewable in the nutrition documentation

## 2018-07-20 NOTE — PLAN OF CARE
Problem: DISCHARGE PLANNING - CARE MANAGEMENT  Goal: Discharge to post-acute care or home with appropriate resources  INTERVENTIONS:  - Conduct assessment to determine patient/family and health care team treatment goals, and need for post-acute services based on payer coverage, community resources, and patient preferences, and barriers to discharge  - Address psychosocial, clinical, and financial barriers to discharge as identified in assessment in conjunction with the patient/family and health care team  - Arrange appropriate level of post-acute services according to patients   needs and preference and payer coverage in collaboration with the physician and health care team  - Communicate with and update the patient/family, physician, and health care team regarding progress on the discharge plan  - Arrange appropriate transportation to post-acute venues  Outcome: Progressing  Patient to be discharged with appropriate services when medically cleared by MD  No additional needs reported at present  Cm to follow as needed

## 2018-07-20 NOTE — H&P
History and Physical - [facility] Internal Medicine    Patient Information: Tu Morrison 80 y o  female MRN: [id number]  Unit/Bed#: E5 -01 Encounter: [id number]  Admitting Physician: Tejinder Martini PA-C  PCP: Nancy Gary  Date of Admission:  07/19/18    Assessment/Plan:    Hospital Problem List:     Principal Problem:    Cellulitis  Active Problems:    Hypertension    Hip pain      Plan for the Primary Problem(s):  · Cellulitis   · Admit to med/surg  Started on vanco/cefepime in ED, will continue  Duplex done in ED, official report pending  Wet read states narrowing in the posterior tibial vein but no obvious clot    Plan for Additional Problems:   · HTN- BP well controlled, continue home medcations  · Hip pain/severe DJD- chronic problem for years  Takes vicodin at assisted living PRN  Will continue home dose  Received IV narcotics in ED which were ineffective  Counseled patient and her daughter about risks of narcotic use for arthritis pain  Will order NSAID and lidoderm patch for now  Will consult orthopedic surgery  She did have troch injections by rheumatology last month with very little relief  She has known heart murmur  States she had a recent ECHO, awaiting report from her facility  Would need ECHO results prior to determining if she would be a candidate for THR  She understands that even if ECHO is OK that THR would likely not be considered in near future until cellulitis is cleared  She and her daughter are frustrated with her pain and immobility  Will need PT/OT/case management    VTE Prophylaxis: Enoxaparin (Lovenox)  / reason for no mechanical VTE prophylaxis cellulitis   Code Status: full code  POLST: There is no POLST form on file for this patient (pre-hospital)    Anticipated Length of Stay:  Patient will be admitted on an Inpatient basis with an anticipated length of stay of  Greater than 2 midnights     Justification for Hospital Stay: patient requires IV antibiotics    Total Time for Visit, including Counseling / Coordination of Care: 45 minutes  Greater than 50% of this total time spent on direct patient counseling and coordination of care  Chief Complaint:   Leg pain    History of Present Illness:    Shandra Elias is a 80 y o  female who presents with bilateral leg pain for months to years  This is her second visit to the ED today for the same  She was diagnosed with severe hip DJD earlier today  She denies any recent injuries  She is basically wheelchair bound and lives in assisted living  She lived here in the [facility] previously, moved to [place], then moved back 2 years ago  She had knee replacements at [facility] many years ago  She has not followed up with her surgeon since [place] out of the state  She also states she does not have a PCP but "someone comes to the assisted living to write me prescriptions"  She takes vicodin PRN at home  She was seen bu rheumatology in June and had bilateral troch cortisone injections with only minimal relief  Her xrays from earlier today show severe DJD  She is aware that nonoperative treatments are likely not going to provide adequate relief in her pain or improve her function  Per her rheumatology note, an ECHO was done recently to evaluate a murmur  She does get short of breath with any attempt to ambulate but denies chest pain  Will try to obtain results of the ECHO to see if she would be a candidate for surgery  She is aware that she would not be a candidate for surgery now with active cellulitis  Patient states she was hoping to "come into the ED for a quick fix"  Review of Systems:    Review of Systems   Constitutional: Positive for activity change  HENT: Negative  Eyes: Negative  Respiratory: Positive for shortness of breath  Cardiovascular: Positive for leg swelling  Gastrointestinal: Negative  Endocrine: Negative  Genitourinary: Negative  Musculoskeletal: Positive for arthralgias and gait problem  Skin: Positive for color change  Allergic/Immunologic: Negative  Hematological: Negative  Psychiatric/Behavioral: Negative  Past Medical and Surgical History:     Past Medical History:   Diagnosis Date    Hyperlipidemia     Hypertension        Past Surgical History:   Procedure Laterality Date    ANKLE FRACTURE SURGERY Left     CHOLECYSTECTOMY      REPLACEMENT TOTAL KNEE BILATERAL Bilateral        Meds/Allergies:    Prior to Admission medications    Medication Sig Start Date End Date Taking? Authorizing Provider   amLODIPine (NORVASC) 5 mg tablet Take 5 mg by mouth 2 (two) times a day   Yes Historical Provider, MD   cloNIDine (CATAPRES) 0 1 mg tablet Take 0 2 mg by mouth 3 (three) times a day   Yes Historical Provider, MD   lisinopril-hydrochlorothiazide (PRINZIDE,ZESTORETIC) 20-12 5 MG per tablet Take 1 tablet by mouth daily   Yes Historical Provider, MD   HYDROcodone-acetaminophen (NORCO) 5-325 mg per tablet Take 1 tablet by mouth every 6 (six) hours as needed for pain for up to 10 days Max Daily Amount: 4 tablets 7/19/18 7/29/18  [place], DO     I have reviewed home medications with patient family member  Allergies: No Known Allergies    Social History:     Marital Status:     Occupation: retired  Patient Pre-hospital Living Situation: assisted living  Patient Pre-hospital Level of Mobility: wheelchair  Patient Pre-hospital Diet Restrictions: none  Substance Use History:   History   Alcohol Use No     History   Smoking Status    Never Smoker   Smokeless Tobacco    Never Used     History   Drug Use No       Family History:    non-contributory    Physical Exam:     Vitals:   Blood Pressure: 122/53 (07/19/18 2227)  Pulse: 82 (07/19/18 2227)  Temperature: 99 2 °F (37 3 °C) (07/19/18 2227)  Temp Source: Temporal (07/19/18 2227)  Respirations: 18 (07/19/18 2227)  Height: 5' 3" (160 cm) (07/19/18 2227)  Weight - Scale: 121 kg (267 lb 3 2 oz) (07/19/18 2227)  SpO2: 95 % (07/19/18 2227)    Physical Exam   Constitutional: She is oriented to person, place, and time  She appears well-developed and well-nourished  No distress  HENT:   Head: Normocephalic and atraumatic  Eyes: Conjunctivae are normal  Pupils are equal, round, and reactive to light  Neck: Normal range of motion  Neck supple  No tracheal deviation present  No thyromegaly present  Cardiovascular: Normal rate and regular rhythm  Murmur heard  Pulmonary/Chest: Effort normal and breath sounds normal  No respiratory distress  She has no wheezes  Abdominal: Soft  Bowel sounds are normal  She exhibits no distension  There is no tenderness  Musculoskeletal: Normal range of motion  Left ankle swelling  Painful, restricted ROM bilateral hips  Well healed surgical scars bilateral knees   Neurological: She is alert and oriented to person, place, and time  Skin: She is not diaphoretic  Mild erythema anterior aspect of left lower leg and ankle  Callous noted on left 4th toe but no open wound or drainage   Psychiatric: She has a normal mood and affect  Her behavior is normal    Vitals reviewed  Additional Data:     Lab Results: I have personally reviewed pertinent reports  Results from last 7 days  Lab Units 07/19/18 1929   WBC Thousand/uL 12 76*   HEMOGLOBIN g/dL 13 3   HEMATOCRIT % 40 1   PLATELETS Thousands/uL 238   LYMPHO PCT % 4*   MONO PCT MAN % 3*   EOSINO PCT MANUAL % 0       Results from last 7 days  Lab Units 07/19/18 1929   SODIUM mmol/L 137   POTASSIUM mmol/L 4 1   CHLORIDE mmol/L 101   CO2 mmol/L 29   BUN mg/dL 35*   CREATININE mg/dL 1 23   CALCIUM mg/dL 9 6   TOTAL PROTEIN g/dL 6 9   BILIRUBIN TOTAL mg/dL 0 44   ALK PHOS U/L 70   ALT U/L 26   AST U/L 24   GLUCOSE RANDOM mg/dL 114       Results from last 7 days  Lab Units 07/19/18  1929   INR  1 01       Imaging: I have personally reviewed pertinent reports  Xr Chest 2 Views    Result Date: 7/19/2018  Narrative: CHEST INDICATION:   sepsis  "low oxygen level, no surg" COMPARISON:  None EXAM PERFORMED/VIEWS:  XR CHEST PA & LATERAL FINDINGS: Tortuous course of the thoracic aorta  Normal cardiac contours  The lungs are clear  No pneumothorax or pleural effusion  Osseous structures appear within normal limits for patient age  Impression: No acute cardiopulmonary disease  Workstation performed: [id number]     Xr Hip/pelv 2-3 Vws Left    Result Date: 7/19/2018  Narrative: LEFT HIP INDICATION:   Pain, atraumatic  COMPARISON:  None VIEWS:  XR HIP/PELV 2-3 VWS LEFT  W PELVIS IF PERFORMED FINDINGS: There is no acute fracture or dislocation  Advanced degenerative arthritic changes noted of both hips with flattening of the femoral heads  There are more extensive destructive changes at the left femoral head with underlying cystic formation  The bones are demineralized  Soft tissues are unremarkable  The visualized lumbar spine is unremarkable  Impression: No acute osseous abnormality  Advanced degenerative changes of both hips worse on the left  Workstation performed: [id number]     Xr Knee 1 Or 2 Vw Left    Result Date: 7/19/2018  Narrative: LEFT KNEE INDICATION:   Pain, atraumatic  COMPARISON:  None VIEWS:  XR KNEE 1 OR 2 VW LEFT FINDINGS: 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  There is no acute fracture or dislocation  There is no joint effusion  No significant degenerative changes  No lytic or blastic lesions are seen  Soft tissues are unremarkable  Impression: Prior total knee replacement  No acute osseous abnormality  Workstation performed: [id number]     Xr Knee 1 Or 2 Vw Right    Result Date: 7/19/2018  Narrative: RIGHT KNEE INDICATION:   Pain, atraumatic  COMPARISON:  None VIEWS:  XR KNEE 1 OR 2 VW RIGHT Images: 2 FINDINGS: 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  There is no acute fracture or dislocation  There is no joint effusion  No lytic or blastic lesions are seen  Soft tissues are unremarkable       Impression: Prior total knee replacement  No acute osseous abnormality  Workstation performed: [id number]     Vas Lower Limb Venous Duplex Study, Unilateral/limited    Result Date: 7/19/2018  Narrative:  [facility] REPORT CLINICAL: Indications: Patient presents with left lower extremity pain and hip pain x 2 weeks  Risk Factors: Patient denies history of injury, DVT and smoking  FINDINGS:  Left     Impression       CFV      Normal (Patent)     CONCLUSION:  Impression: RIGHT LOWER LIMB LIMITED: Patient refused images from this leg  LEFT LOWER LIMB: No evidence of acute or chronic deep vein thrombosis, however difficult obtaining color Doppler in one of the posterior tibial veins secondary to small lumen vs thrombus  Area was difficult to visualize due to the below tech note  No evidence of superficial thrombophlebitis noted  Doppler evaluation shows a normal response to augmentation maneuvers  Popliteal, posterior tibial and anterior tibial arterial Doppler waveforms are triphasic  TECH NOTE: Difficult/ limited exam secondary to patient in severe pain, unable to bend leg into proper positioning, movement, edema and body habitus  Unable to visualize peroneal veins  Technical findings were given to Dr Merrick Quijano at the time of the exam   SIGNATURE: Electronically Signed by: Ebonie Self MD, [address] on 2018-07-19 10:11:10 PM      EKG, Pathology, and Other Studies Reviewed on Admission:   · EKG: ordered    Allscripts / Epic Records Reviewed: Yes     ** Please Note: This note has been constructed using a voice recognition system   **

## 2018-07-20 NOTE — CASE MANAGEMENT
Initial Clinical Review    Admission: Date/Time/Statement: 7/19/18 @ 2121     Orders Placed This Encounter   Procedures    Inpatient Admission (expected length of stay for this patient is greater than two midnights)     Standing Status:   Standing     Number of Occurrences:   1     Order Specific Question:   Admitting Physician     Answer:   Tamiko Ochoa     Order Specific Question:   Level of Care     Answer:   Med Surg [16]     Order Specific Question:   Estimated length of stay     Answer:   More than 2 Midnights     Order Specific Question:   Certification     Answer:   I certify that inpatient services are medically necessary for this patient for a duration of greater than two midnights  See H&P and MD Progress Notes for additional information about the patient's course of treatment  ED: Date/Time/Mode of Arrival:   ED Arrival Information     Expected Arrival Acuity Means of Arrival Escorted By Service Admission Type    - 7/19/2018 18:37 Urgent Ambulance [facility] General Medicine Urgent    Arrival Complaint    Hip Pain          Chief Complaint:   Chief Complaint   Patient presents with    Pain     patient transported via EMS from home; patient was seen as [facility] for bialteral hip and knee pain; XR were performed and patient was given script for Vicodin but script could not be filled due to patient currently having a prescription for Oxycodone       History of Illness: Tripp To is a 80 y o  female who presents with bilateral leg pain for months to years  This is her second visit to the ED today for the same  She was diagnosed with severe hip DJD earlier today  She denies any recent injuries  She is basically wheelchair bound and lives in assisted living  She lived here in the [facility] previously, moved to [place], then moved back 2 years ago  She had knee replacements at [facility] many years ago  She has not followed up with her surgeon [place] the [place]   She also states she does not have a PCP but "someone comes to the assisted living to write me prescriptions"  She takes vicodin PRN at home  She was seen bu rheumatology in June and had bilateral troch cortisone injections with only minimal relief  Her xrays from earlier today show severe DJD  She is aware that nonoperative treatments are likely not going to provide adequate relief in her pain or improve her function  Per her rheumatology note, an ECHO was done recently to evaluate a murmur  She does get short of breath with any attempt to ambulate but denies chest pain  Will try to obtain results of the ECHO to see if she would be a candidate for surgery  She is aware that she would not be a candidate for surgery now with active cellulitis  Patient states she was hoping to "come into the ED for a quick fix"       ED Vital Signs:   ED Triage Vitals   Temperature Pulse Respirations Blood Pressure SpO2   07/19/18 1840 07/19/18 1840 07/19/18 1840 07/19/18 1840 07/19/18 1840   99 °F (37 2 °C) 100 16 115/58 94 %      Temp Source Heart Rate Source Patient Position - Orthostatic VS BP Location FiO2 (%)   07/19/18 1840 07/19/18 2139 07/19/18 2139 07/19/18 1840 --   Oral Monitor Lying Right arm       Pain Score       07/19/18 1932       Worst Possible Pain        Wt Readings from Last 1 Encounters:   07/19/18 121 kg (267 lb 3 2 oz)       Vital Signs (abnormal):   above    Abnormal Labs/Diagnostic Test Results:   Lactic  Acid  2 1  WBC   12 76  Bun 35  VAS  B/L  LE:   Pt  Ref   R  Leg  Images                           l Leg  No  Evidence  DVT  CXR:  NAD    ED Treatment:   Medication Administration from 07/19/2018 1837 to 07/19/2018 2207       Date/Time Order Dose Route Action Action by Comments     07/19/2018 2136 sodium chloride 0 9 % bolus 1,000 mL 0 mL Intravenous Stopped Zion Lacey RN      07/19/2018 1939 sodium chloride 0 9 % bolus 1,000 mL 1,000 mL Intravenous New Bag Dyan Duarte RN      07/19/2018 1932 morphine (PF) 4 mg/mL injection 4 mg 4 mg Intravenous Given Sofy Anna RN      07/19/2018 2019 HYDROmorphone (DILAUDID) injection 0 5 mg 0 5 mg Intravenous Given Sofy Anna RN      07/19/2018 2128 vancomycin (VANCOCIN) 2,500 mg in sodium chloride 0 9 % 500 mL IVPB   Intravenous Canceled Entry Mya Patricio RN      07/19/2018 2133 cefepime (MAXIPIME) 2 g/50 mL dextrose IVPB 2,000 mg Intravenous New Bag Mya Patricio RN           Past Medical/Surgical History: Active Ambulatory Problems     Diagnosis Date Noted    No Active Ambulatory Problems     Resolved Ambulatory Problems     Diagnosis Date Noted    No Resolved Ambulatory Problems     Past Medical History:   Diagnosis Date    Hyperlipidemia     Hypertension        Admitting Diagnosis: Cellulitis [L03 90]  Hip pain [M25 559]    Age/Sex: 80 y o  female    · Assessment/Plan: Cellulitis             ? Admit to med/surg  Started on vanco/cefepime in ED, will continue  Duplex done in ED, official report pending  Wet read states narrowing in the posterior tibial vein but no obvious clot    PHYSICAL EXAM:  Left ankle swelling  Painful, restricted ROM bilateral hips  Well healed surgical scars bilateral knees   Neurological: She is alert and oriented to person, place, and time  Skin: She is not diaphoretic  Mild erythema anterior aspect of left lower leg and ankle  Callous noted on left 4th toe but no open wound or drainage      Plan for Additional Problems:   · HTN- BP well controlled, continue home medcations  · Hip pain/severe DJD- chronic problem for years  Takes vicodin at assisted living PRN  Will continue home dose  Received IV narcotics in ED which were ineffective  Counseled patient and her daughter about risks of narcotic use for arthritis pain  Will order NSAID and lidoderm patch for now  Will consult orthopedic surgery  She did have troch injections by rheumatology last month with very little relief  She has known heart murmur   States she had a recent ECHO, awaiting report from her facility  Would need ECHO results prior to determining if she would be a candidate for THR  She understands that even if ECHO is OK that THR would likely not be considered in near future until cellulitis is cleared  She and her daughter are frustrated with her pain and immobility  Will need PT/OT/case management     VTE Prophylaxis: Enoxaparin (Lovenox)  / reason for no mechanical VTE prophylaxis cellulitis   Code Status: full code  POLST: There is no POLST form on file for this patient (pre-hospital)     Anticipated Length of Stay:  Patient will be admitted on an Inpatient basis with an anticipated length of stay of  Greater than 2 midnights  Justification for Hospital Stay: patient requires IV antibiotics    Admission Orders:   IP   7/19  @   2122  Scheduled Meds:   Current Facility-Administered Medications:  acetaminophen 650 mg Oral Q6H Tor Mccallum MD    aluminum-magnesium hydroxide-simethicone 30 mL Oral Q6H PRN Tita Frank PA-C    amLODIPine 5 mg Oral BID Tita Frank PA-C    cloNIDine 0 2 mg Oral TID Tita Frank PA-C    enoxaparin 40 mg Subcutaneous Daily Tita Frank PA-C    hydrALAZINE 5 mg Intravenous Q6H PRN Pinky Burrell MD    lidocaine 2 patch Transdermal Daily Tita Frank PA-C    lisinopril-hydrochlorothiazide (PRINZIDE 20/12  5) combo dose  Oral Daily Tita Frank PA-C    melatonin 3 mg Oral HS Tita Frank PA-C    ondansetron 4 mg Intravenous Q6H PRN Tita Frank PA-C    oxyCODONE 5 mg Oral Q6H PRN Pinky Burrell MD    polyethylene glycol 17 g Oral Daily Pinky Burrell MD    sodium chloride 75 mL/hr Intravenous Continuous Pinky Burrell MD Last Rate: 75 mL/hr (07/20/18 1031)   vancomycin 15 mg/kg (Adjusted) Intravenous Q12H Tita Frank PA-C Last Rate: 1,250 mg (07/20/18 0948)     Continuous Infusions:   sodium chloride 75 mL/hr Last Rate: 75 mL/hr (07/20/18 1031)     PRN Meds: aluminum-magnesium hydroxide-simethicone   hydrALAZINE    ondansetron    oxyCODONE     Cons ortho  Reg diet  PT/OT    Per  Ortho consult:  L hip OA    Ortho Exam   B/L hips:  Pain patches in place, no erythema, limited a ROM secondary to pain, pain with he ROM, for flexion 0 to 70, no internal rotation, external rotation 0-40, left hip more painful than right during range of motion  B/L LE:  EHL/AT/GS intact, sensation grossly intact L4, L5, S1, palpable pedal pulse     Plan:  Patient not currently a candidate for a total hip replacement given age, BMI, cellulitis  Recommend conservative treatment at this time  Physical therapy  Pain control p r n  Can follow up in the office when needed, will consider a steroid injection at that time    Thank you,  Jermaine Aqq  [facility] Utilization Review Department  Phone: [phone]; Fax [phone]  ATTENTION: The Network Utilization Review Department is now centralized for our 9 Facilities  Make a note that we have a new phone and fax numbers for our Department  Please call with any questions or concerns to [phone] and carefully follow the prompts so that you are directed to the right person  All voicemails are confidential  Fax any determinations, approvals, denials, and requests for initial or continue stay review clinical to [phone]   Due to HIGH CALL volume, it would be easier if you could please send faxed requests to expedite your requests and in part, help us provide discharge notifications faster

## 2018-07-20 NOTE — PROGRESS NOTES
[facility] Hospitalist Service - Internal Medicine Progress Note      PATIENT INFORMATION      Patient: Alexsander Cardona 80 y o  female   MRN: [id number]  PCP: JONO Simmons  Unit/Bed#: E5 -01 Encounter: [id number]  Date Of Visit: 07/20/18       ASSESSMENTS & PLAN        1  Sepsis on admission - Lactic acidosis  · Presented with leukocytosis/tachycardia with a developing high-grade fever 101 5° in under 24 hours of arrival and mild lactic acidosis (level of 2 1 overnight) and evidence of LLE cellulitis (see plan below)   · Note that blood cultures were not drawn in the ER prior to administration of antibiotics  · Monitor vital signs and maintain hemodynamic stability - continue IV fluid resuscitation now with normalized lactic acid level - check procalcitonin level to guide antibiotic therapy    2  Left lower extremity cellulitis  · C/w IV Vancomycin - check left fibula/tibia/foot XR to rule out possibility of underlying osteomyelitis  · Monitor procalcitonin - ESR/CRP pending - LLE venous doppler negative for underlying DVT   · See plan for sepsis on admission above     3  Bilateral osteoarthritis of hips  · Appreciate orthopedic surgery input - c/w conservative pain control with PT/OT as tolerated - can consider outpatient corticosteroid injection upon orthopedic followup per their recommendation  · Follow-up outpatient with acute pain management    4  Morbid obesity  · BMI 47 33  · Lifestyle/diet modifications encouraged - exercise will likely be difficult due to body habitus and chronic osteoarthritis unfortunately    5  Essential hypertension  · Low-sodium diet encouraged  · Continue home Norvasc/Catapres/Prinzide regimen - PRN IV Hydralazine on board for BP spikes  · Optimize pain control    6    Physical deconditioning  · Multifactorial secondary to ambulatory dysfunction due to chronic/progressive bilateral hip osteoarthritis coupled with morbid obesity (see plan for individual assessments)  · Await PT/OT evaluation      DVT Prophylaxis:  Lovenox       SUBJECTIVE     Seen/examined earlier this afternoon with family present at bedside  She is resting in bed and does complain of intermittent bilateral hip pain from osteoarthritis  She was seen by orthopedics this morning and understands why she is not a candidate for surgical intervention in regards hip replacement  She denies any chills but did complain of a fever throughout the day which she states is progressively improving  She denies any nausea/vomiting  She states that upon standing she does have pain which prohibits her from comfortably ambulating, but the pain is more in her hips and not in her left lower extremity  OBJECTIVE     Vitals:   Temp (24hrs), Av 8 °F (37 7 °C), Min:99 °F (37 2 °C), Max:101 5 °F (38 6 °C)    HR:  [] 80  Resp:  [16-40] 18  BP: ()/(53-62) 113/56  SpO2:  [92 %-98 %] 96 %  Body mass index is 47 33 kg/m²  Input and Output Summary (last 24 hours):        Intake/Output Summary (Last 24 hours) at 18 1617  Last data filed at 18 2136   Gross per 24 hour   Intake             2000 ml   Output                0 ml   Net             2000 ml       Physical Exam:     GENERAL:  Obese - intermittent distress due to chronic hip pain  HEAD:  Normocephalic - atraumatic  EYES: PERRL - EOMI   MOUTH:  Mucosa moist  NECK:  Supple - full range of motion  CARDIAC:  Regular rate/rhythm - S1/S2 positive  PULMONARY:  Clear breath sounds bilaterally - nonlabored respirations  ABDOMEN:  Soft - nontender/nondistended - active bowel sounds  MUSCULOSKELETAL:  Motor strength/range of motion markedly deconditioned secondary to chronic bilateral hip osteoarthritis   NEUROLOGIC:  Alert/oriented x 3  SKIN:  Distal LLE erythema with swelling/tenderness but no active discharge - chronic wrinkles/blemishes otherwise   PSYCHIATRIC:  Mood/affect stable      ADDITIONAL DATA     Labs & Recent Cultures: Results from last 7 days  Lab Units 07/20/18  0433 07/19/18 1929   WBC Thousand/uL 10 18* 12 76*   HEMOGLOBIN g/dL 12 0 13 3   HEMATOCRIT % 37 2 40 1   PLATELETS Thousands/uL 177 238   LYMPHO PCT %  --  4*   MONO PCT MAN %  --  3*   EOSINO PCT MANUAL %  --  0       Results from last 7 days  Lab Units 07/20/18  0433 07/19/18 1929   SODIUM mmol/L 136 137   POTASSIUM mmol/L 4 2 4 1   CHLORIDE mmol/L 104 101   CO2 mmol/L 26 29   BUN mg/dL 38* 35*   CREATININE mg/dL 1 15 1 23   CALCIUM mg/dL 8 6 9 6   TOTAL PROTEIN g/dL  --  6 9   BILIRUBIN TOTAL mg/dL  --  0 44   ALK PHOS U/L  --  70   ALT U/L  --  26   AST U/L  --  24   GLUCOSE RANDOM mg/dL 140 114       Results from last 7 days  Lab Units 07/19/18 1929   INR  1 01         Last 24 Hours Medication List:     Current Facility-Administered Medications:  acetaminophen 650 mg Oral Q6H [address] Leonie Mann MD    aluminum-magnesium hydroxide-simethicone 30 mL Oral Q6H PRN Lennie Aguilar PA-C    amLODIPine 5 mg Oral BID Lennie Aguilar PA-C    cloNIDine 0 2 mg Oral TID Lennie Aguilar PA-C    docusate sodium 100 mg Oral BID PRN Leonie Mann MD    enoxaparin 40 mg Subcutaneous Daily Lennie Aguilar PA-C    hydrALAZINE 5 mg Intravenous Q6H PRN Leonie Mann MD    lidocaine 2 patch Transdermal Daily Lennie Aguilar PA-C    lisinopril-hydrochlorothiazide (PRINZIDE 20/12  5) combo dose  Oral Daily Lennie Aguilar PA-C    melatonin 3 mg Oral HS Lennie Aguilar PA-C    ondansetron 4 mg Intravenous Q6H PRN Lennie Aguilar PA-C    oxyCODONE 5 mg Oral Q6H PRN Leonie Mann MD    polyethylene glycol 17 g Oral Daily Leonie Mann MD    sodium chloride 75 mL/hr Intravenous Continuous Leonie Mann MD Last Rate: 75 mL/hr (07/20/18 1031)   vancomycin 15 mg/kg (Adjusted) Intravenous Q12H Lennie Aguilar PA-C Last Rate: 1,250 mg (07/20/18 0948)          Time Spent for Care: 34 minutes    More than 50% of total time spent on counseling and coordination of care as described above       Current Length of Stay: 1 day(s)      Code Status: Level 1 - Full Code          ** Please Note: This note is constructed using a voice recognition dictation system   **

## 2018-07-21 LAB
BASOPHILS # BLD AUTO: 0.03 THOUSANDS/ΜL (ref 0–0.1)
BASOPHILS NFR BLD AUTO: 0 % (ref 0–1)
EOSINOPHIL # BLD AUTO: 0.03 THOUSAND/ΜL (ref 0–0.61)
EOSINOPHIL NFR BLD AUTO: 0 % (ref 0–6)
ERYTHROCYTE [DISTWIDTH] IN BLOOD BY AUTOMATED COUNT: 13.2 % (ref 11.6–15.1)
HCT VFR BLD AUTO: 35.9 % (ref 34.8–46.1)
HGB BLD-MCNC: 11.5 G/DL (ref 11.5–15.4)
LYMPHOCYTES # BLD AUTO: 0.66 THOUSANDS/ΜL (ref 0.6–4.47)
LYMPHOCYTES NFR BLD AUTO: 7 % (ref 14–44)
MCH RBC QN AUTO: 31 PG (ref 26.8–34.3)
MCHC RBC AUTO-ENTMCNC: 32 G/DL (ref 31.4–37.4)
MCV RBC AUTO: 97 FL (ref 82–98)
MONOCYTES # BLD AUTO: 0.66 THOUSAND/ΜL (ref 0.17–1.22)
MONOCYTES NFR BLD AUTO: 7 % (ref 4–12)
NEUTROPHILS # BLD AUTO: 8.43 THOUSANDS/ΜL (ref 1.85–7.62)
NEUTS SEG NFR BLD AUTO: 86 % (ref 43–75)
NRBC BLD AUTO-RTO: 0 /100 WBCS
PLATELET # BLD AUTO: 158 THOUSANDS/UL (ref 149–390)
PMV BLD AUTO: 9.5 FL (ref 8.9–12.7)
PROCALCITONIN SERPL-MCNC: 4.38 NG/ML
RBC # BLD AUTO: 3.71 MILLION/UL (ref 3.81–5.12)
WBC # BLD AUTO: 9.81 THOUSAND/UL (ref 4.31–10.16)

## 2018-07-21 PROCEDURE — 97530 THERAPEUTIC ACTIVITIES: CPT

## 2018-07-21 PROCEDURE — 97163 PT EVAL HIGH COMPLEX 45 MIN: CPT

## 2018-07-21 PROCEDURE — G8978 MOBILITY CURRENT STATUS: HCPCS

## 2018-07-21 PROCEDURE — 85025 COMPLETE CBC W/AUTO DIFF WBC: CPT | Performed by: INTERNAL MEDICINE

## 2018-07-21 PROCEDURE — G8987 SELF CARE CURRENT STATUS: HCPCS

## 2018-07-21 PROCEDURE — G8979 MOBILITY GOAL STATUS: HCPCS

## 2018-07-21 PROCEDURE — 99232 SBSQ HOSP IP/OBS MODERATE 35: CPT | Performed by: INTERNAL MEDICINE

## 2018-07-21 PROCEDURE — 97167 OT EVAL HIGH COMPLEX 60 MIN: CPT

## 2018-07-21 PROCEDURE — G8988 SELF CARE GOAL STATUS: HCPCS

## 2018-07-21 PROCEDURE — 84145 PROCALCITONIN (PCT): CPT | Performed by: INTERNAL MEDICINE

## 2018-07-21 RX ADMIN — CLONIDINE HYDROCHLORIDE 0.2 MG: 0.1 TABLET ORAL at 08:35

## 2018-07-21 RX ADMIN — LIDOCAINE 2 PATCH: 50 PATCH TOPICAL at 08:36

## 2018-07-21 RX ADMIN — CLONIDINE HYDROCHLORIDE 0.2 MG: 0.1 TABLET ORAL at 21:18

## 2018-07-21 RX ADMIN — ACETAMINOPHEN 650 MG: 325 TABLET ORAL at 00:15

## 2018-07-21 RX ADMIN — OXYCODONE HYDROCHLORIDE 5 MG: 5 TABLET ORAL at 18:12

## 2018-07-21 RX ADMIN — CEFAZOLIN SODIUM 1000 MG: 1 SOLUTION INTRAVENOUS at 22:42

## 2018-07-21 RX ADMIN — AMLODIPINE BESYLATE 5 MG: 5 TABLET ORAL at 08:35

## 2018-07-21 RX ADMIN — AMLODIPINE BESYLATE 5 MG: 5 TABLET ORAL at 18:12

## 2018-07-21 RX ADMIN — ENOXAPARIN SODIUM 40 MG: 40 INJECTION SUBCUTANEOUS at 08:35

## 2018-07-21 RX ADMIN — CEFAZOLIN SODIUM 1000 MG: 1 SOLUTION INTRAVENOUS at 15:34

## 2018-07-21 RX ADMIN — ACETAMINOPHEN 650 MG: 325 TABLET ORAL at 23:46

## 2018-07-21 RX ADMIN — CLONIDINE HYDROCHLORIDE 0.2 MG: 0.1 TABLET ORAL at 15:35

## 2018-07-21 RX ADMIN — VANCOMYCIN HYDROCHLORIDE 1250 MG: 1 INJECTION, POWDER, LYOPHILIZED, FOR SOLUTION INTRAVENOUS at 08:36

## 2018-07-21 RX ADMIN — ACETAMINOPHEN 650 MG: 325 TABLET ORAL at 18:12

## 2018-07-21 RX ADMIN — LISINOPRIL: 20 TABLET ORAL at 08:35

## 2018-07-21 RX ADMIN — ACETAMINOPHEN 650 MG: 325 TABLET ORAL at 12:45

## 2018-07-21 RX ADMIN — OXYCODONE HYDROCHLORIDE 5 MG: 5 TABLET ORAL at 12:45

## 2018-07-21 RX ADMIN — SODIUM CHLORIDE 75 ML/HR: 0.9 INJECTION, SOLUTION INTRAVENOUS at 04:09

## 2018-07-21 RX ADMIN — ACETAMINOPHEN 650 MG: 325 TABLET ORAL at 06:05

## 2018-07-21 RX ADMIN — MELATONIN TAB 3 MG 3 MG: 3 TAB at 21:17

## 2018-07-21 NOTE — PLAN OF CARE
Problem: OCCUPATIONAL THERAPY ADULT  Goal: Performs self-care activities at highest level of function for planned discharge setting  See evaluation for individualized goals  Treatment Interventions: ADL retraining, Functional transfer training, UE strengthening/ROM, Endurance training, Patient/family training, Equipment evaluation/education, Compensatory technique education, Continued evaluation, Energy conservation, Activityengagement          See flowsheet documentation for full assessment, interventions and recommendations  Outcome: Progressing  Limitation: Decreased ADL status, Decreased UE ROM, Decreased UE strength, Decreased endurance, Decreased self-care trans, Decreased high-level ADLs  Prognosis: Fair  Assessment: Pt is a 80 y o  female seen for OT evaluation s/p adm to [address] on 7/19/2018 w/ B/L leg pain for months to years and dx'd w/ Cellulitis  Comorbidities affecting pts functional performance include a significant PMH of HLD and HTN  Pt with active OT orders and activity orders for Activity as tolerated  Pt lives in a one level apt with elevator access at [facility]  In addition to facility staff, pt reports local dtr that is able to assist as needed  At baseline, pt was I w/ ADLs and functional mobility/transfers with use of RW for household distances and WC for community mobility, required assist w/ IADLs, (-) , and reports 1 fall PTA  Upon evaluation, pt currently requires Max A for LB ADLs, Max A for toileting, Min A for toileting, Max A of 2 for bed mobility, and Max A of 2 for functional mobility/transfers 2* the following deficits impacting occupational performance: weakness, decreased ROM, decreased strength, decreased balance, decreased tolerance and increased pain   These impairments, as well at pts difficulty performing ADLS and difficulty performing IADLS   limit pts ability to safely engage in all baseline areas of occupation, including grooming, bathing, dressing, toileting, functional mobility/transfers, community mobility, social participation  and leisure activities   Pt scored overall 40/100 on the Barthel Index  Based on the aforementioned OT evaluation, functional performance deficits, and assessments, pt has been identified as a high complexity evaluation  Pt to continue to benefit from continued acute OT services during hospital stay to address defined deficits and to maximize level of functional independence in the following Occupational Performance areas: grooming, bathing/shower, toilet hygiene, medication management, health maintenance, functional mobility, community mobility, clothing management, social participation and transfers to common household surfaces  From OT standpoint, recommend STR upon D/C   OT will continue to follow pt 3-5x/wk to address the following goals to  w/in 10-14 days:     OT Discharge Recommendation: Short Term Rehab  OT - OK to Discharge: Yes (when medically cleared to STR)

## 2018-07-21 NOTE — OCCUPATIONAL THERAPY NOTE
[address]  Evaluation     Patient Name: Tu Morrison  TKHJM'P Date: 7/21/2018  Problem List  Patient Active Problem List   Diagnosis    Cellulitis    Hypertension    Hip pain    Sepsis [facility])     Past Medical History  Past Medical History:   Diagnosis Date    Hyperlipidemia     Hypertension      Past Surgical History  Past Surgical History:   Procedure Laterality Date    ANKLE FRACTURE SURGERY Left     CHOLECYSTECTOMY      REPLACEMENT TOTAL KNEE BILATERAL Bilateral          07/21/18 1229   Note Type   Note type Eval/Treat   Restrictions/Precautions   Weight Bearing Precautions Per Order No   Other Precautions Multiple lines; Fall Risk;Pain;O2   Pain Assessment   Pain Assessment 0-10   Pain Score 7   Pain Type Acute pain;Chronic pain   Pain Location Hip   Pain Orientation Bilateral   Pain Descriptors Aching   Pain Frequency Constant/continuous   Pain Onset Ongoing   Clinical Progression Gradually worsening   Effect of Pain on Daily Activities Increased pain with activity   Patient's Stated Pain Goal No pain   Hospital Pain Intervention(s) Repositioned; Ambulation/increased activity; Emotional support   Response to Interventions Tolerated OT   Multiple Pain Sites No   Home Living   Type of Home Assisted living  [facility])   Home Layout One level;Ramped entrance; Other (Comment)   Bathroom Shower/Tub Walk-in shower   Bathroom Toilet Raised   Bathroom Equipment Grab bars in shower;Built-in shower seat;Grab bars around toilet   [address]   Additional Comments Pt lives in a one level apt with elevator access at [address]  In addition to facility staff, pt reports local dtr that is able to assist as needed  Prior Function   Level of [place] Independent with ADLs and functional mobility   Lives With Alone; Facility staff   Receives Help From Other (Comment); Family  (Facility staff)   ADL Assistance Independent   IADLs Needs assistance   Falls in the last 6 months 1 to 4  (1 fall per pt report)   Vocational Retired   Comments At baseline, pt was I w/ ADLs and functional mobility/transfers with use of RW for household distances and WC for community mobility, required assist w/ IADLs, (-) , and reports 1 fall PTA  Lifestyle   Autonomy At baseline, pt was I w/ ADLs and functional mobility/transfers with use of RW for household distances and WC for community mobility, required assist w/ IADLs, (-) , and reports 1 fall PTA  Reciprocal Relationships Lives alone; Supportive staff and family   Service to Others Retired   Intrinsic Gratification Watching TV   Psychosocial   Psychosocial (WDL) [address]  5  [address] [facility] 5  Supervision/Setup   [address] 4  Minimal Assistance   LB [address] 2  Maximal Assistance   [address] 4  Minimal Assistance    [facility] 2  Maximal [address]  2  Maximal Assistance   Bed Mobility   Supine to Sit 2  Maximal assistance   Additional items Assist x 2;HOB elevated; Bedrails; Increased time required;Verbal cues;LE management   Sit to Supine Unable to assess  (Pt seated OOB in chair at end of session)   Additional Comments Pt seated OOB in chair at end of session with call bell and phone within reach  All needs met and pt reports no further questions for OT at this time   Transfers   Sit to Stand 2  Maximal assistance   Additional items Assist x 2; Increased time required;Verbal cues   Stand to Sit 2  Maximal assistance   Additional items Assist x 2; Increased time required;Verbal cues   Mechanical lift 3  Moderate assistance   Additional items Assist x 2; Increased time required;Verbal cues  (Quick Move)   Additional Comments Cues for safe technique and hand placement   Functional Mobility   Functional Mobility 2  Maximal assistance   Additional Comments Assist x2 for approx   2-3 lateral steps towards HOB, however B/L knee buckling noticed and pt requested seated rest break   Additional items Rolling walker   Balance   Static Sitting Fair   Dynamic Sitting Fair -   Static Standing Poor   Dynamic Standing Poor -   Ambulatory Poor -   Activity Tolerance   Activity Tolerance Patient limited by fatigue;Patient limited by pain   Nurse Made Aware Pt appropriate to be seen per GEOVANNY Sierra   RUMARGOT Assessment   RUE Assessment WFL  (3/5 throughout)   RUE Overall AROM   R Shoulder Flexion WFL   R Elbow Flexion WFL   LUE Assessment   LUE Assessment X  (decreased shldr ROM (see below))   LUE Overall AROM   L Shoulder Flexion 70-80* AROM; PROM=WFL   L Elbow Flexion WFL   Hand Function   Gross Motor Coordination Functional   Fine Motor Coordination Functional   Sensation   Light Touch No apparent deficits   Sharp/Dull No apparent deficits   Proprioception   Proprioception No apparent deficits   Vision - Complex Assessment   Ocular Range of Motion WFL   Acuity Able to read employee name badge without difficulty   Perception   Inattention/Neglect Appears intact   Cognition   Overall Cognitive Status [facility]   Arousal/Participation Alert; Cooperative   Attention Within functional limits   Orientation Level Oriented X4   Memory Within functional limits   Following Commands Follows multistep commands without difficulty   Assessment   Limitation Decreased ADL status; Decreased UE ROM; Decreased UE strength;Decreased endurance;Decreased self-care trans;Decreased high-level ADLs   Prognosis Fair   Assessment Pt is a 80 y o  female seen for OT evaluation s/p adm to [facility] on 7/19/2018 w/ B/L leg pain for months to years and dx'd w/ Cellulitis  Comorbidities affecting pts functional performance include a significant PMH of HLD and HTN  Pt with active OT orders and activity orders for Activity as tolerated  Pt lives in a one level apt with elevator access at [facility]   In addition to facility staff, pt reports local dtr that is able to assist as needed  At baseline, pt was I w/ ADLs and functional mobility/transfers with use of RW for household distances and WC for community mobility, required assist w/ IADLs, (-) , and reports 1 fall PTA  Upon evaluation, pt currently requires Max A for LB ADLs, Max A for toileting, Min A for toileting, Max A of 2 for bed mobility, and Max A of 2 for functional mobility/transfers 2* the following deficits impacting occupational performance: weakness, decreased ROM, decreased strength, decreased balance, decreased tolerance and increased pain  These impairments, as well at pts difficulty performing ADLS and difficulty performing IADLS   limit pts ability to safely engage in all baseline areas of occupation, including grooming, bathing, dressing, toileting, functional mobility/transfers, community mobility, social participation  and leisure activities   Pt scored overall 40/100 on the Barthel Index  Based on the aforementioned OT evaluation, functional performance deficits, and assessments, pt has been identified as a high complexity evaluation  Pt to continue to benefit from continued acute OT services during hospital stay to address defined deficits and to maximize level of functional independence in the following Occupational Performance areas: grooming, bathing/shower, toilet hygiene, medication management, health maintenance, functional mobility, community mobility, clothing management, social participation and transfers to common household surfaces  From OT standpoint, recommend STR upon D/C  OT will continue to follow pt 3-5x/wk to address the following goals to  w/in 10-14 days:   Goals   Patient Goals to get better and walk again   LTG Time Frame 10-14   Long Term Goal Please refer to LTGs listed below   Plan   Treatment Interventions ADL retraining;Functional transfer training;UE strengthening/ROM; Endurance training;Patient/family training;Equipment evaluation/education; Compensatory technique education;Continued evaluation; Energy conservation; Activityengagement   Goal Expiration Date 08/04/18   Treatment Day 1   OT Frequency 3-5x/wk   Additional Treatment Session   Start Time 1813   End Time 1228   Treatment Assessment Pt seen for additional treatment session this PM focusing on functional activity tolerance and transfer trials with use of Quick Move  Pt alert and cooperative throughout session  Pt seated EOB at start of session  In initial evaluation, pt able to stand and take approx  2-3 lateral steps towards [facility] with Max A of 2, however increased B/L knee buckling noticed  In addition, decreased standing balance/tolerance demonstrated with pt requesting seated rest break  Due to above deficits, completed transfer with use of Quick Move  Educated pt on proper use of Quick Move and safe technique  Pt able to complete sit<>stand transfers with use of Quick Move with Mod A of 2 for EOB>Bedside Recliner  Assist required to initiate forward weight shift from surface for sit>stand and assist for controlled descent to surface for stand>sit  Pt seated OOB in bedside recliner at end of session with call bell and phone within reach  All needs met and pt reports no further questions for OT at this time  Continue to recommend STR upon D/C  Pt agreeable to recommendation  OT to continue to follow pt on caseload     Additional Treatment Day 1   Recommendation   OT Discharge Recommendation Short Term Rehab   OT - OK to Discharge Yes  (when medically cleared to STR)   Barthel Index   Feeding 10   Bathing 0   Grooming Score 5   Dressing Score 5   Bladder Score 5   Bowels Score 5   Toilet Use Score 5   Transfers (Bed/Chair) Score 5   Mobility (Level Surface) Score 0   Stairs Score 0   Barthel Index Score 40   Modified [place] Scale   Modified [place] Scale 4          GOALS    1) Pt will improve activity tolerance to G for min 30 min txment sessions    2) Pt will complete bed mobility at a Min A level w/ G balance/safety demonstrated     3) Pt will complete UB/LB dressing/self care w/ Supervision using adaptive device and DME as needed    4) Pt will complete bathing w/ Supervision w/ use of AE and DME as needed    5) Pt will complete toileting w/ Supervision w/ G hygiene/thoroughness using DME as needed    6) Pt will improve functional transfers to Min A on/off all surfaces using DME as needed w/ G balance/safety     7) Pt will improve functional mobility during ADL/IADL/leisure tasks to Min A using DME as needed w/ G balance/safety     8) Pt will engage in ongoing cognitive assessment w/ G participation w/ mod I to assist w/ safe d/c planning/recommendations    9) Pt will demonstrate G carryover of pt/caregiver education and training as appropriate w/ mod I w/o cues w/ good tolerance    10) Pt will demonstrate 100% carryover of energy conservation techniques w/ mod I t/o functional I/ADL/leisure tasks w/o cues s/p skilled education     11) Pt will increase UE strength by 1 MM grade to increase independence in ADLs and transfers      Grecia Diaz, OTR/L

## 2018-07-21 NOTE — PHYSICAL THERAPY NOTE
PHYSICAL THERAPY EVALUATION    Time in: 1155  Time out: 1215  Total time: 20 minutes    PT EVALUATION    80 y o     [id number]    Cellulitis [L03 90]  Hip pain [M25 559]    Past Medical History:   Diagnosis Date    Hyperlipidemia     Hypertension          Past Surgical History:   Procedure Laterality Date    ANKLE FRACTURE SURGERY Left     CHOLECYSTECTOMY      REPLACEMENT TOTAL KNEE BILATERAL Bilateral       07/21/18 1230   Note Type   Note type Eval/Treat   Pain Assessment   Pain Assessment 0-10   Pain Score 7   Pain Type Acute pain;Chronic pain   Pain Location Hip   Pain Orientation Bilateral   Response to Interventions Increased pain with activity   Home Living   Type of Home Assisted living  [facility])   Home Layout One level;Elevator;Ramped entrance  (approximatley 100ft to dining area)   Bathroom Shower/Tub Walk-in shower   Bathroom Toilet Raised   Bathroom Equipment Grab bars in shower;Built-in shower seat;Grab bars around toilet   [address]; Other (Comment)   Additional Comments (I) with functional mobility, past 2 months using w/c for long distances and RW within her room  Prior Function   Level of [place] Independent with ADLs and functional mobility   Lives With Alone   Receives Help From Family; Other (Comment)  (Staff can provide some assistance)   ADL Assistance Independent   IADLs Needs assistance   Falls in the last 6 months 1 to 4  (Reports fall in shower earlier this year)   Vocational Retired   Comments Using manual w/c which she propels with her feet for long distances and her RW for inside her room  Reports she was limited to short distances (approximatley 10 - 20ft) with RW in her room     Restrictions/Precautions   Weight Bearing Precautions Per Order No   Other Precautions Multiple lines;O2;Fall Risk;Pain   General   Additional Pertinent History PT consulted with orders: up with assistance  Patient supine in bed upon encounter, agreeable to therapy evaluation  Family/Caregiver Present No   Cognition   Overall Cognitive Status WFL   Arousal/Participation Alert   Orientation Level Oriented X4   Memory Within functional limits   Following Commands Follows multistep commands without difficulty   RUE Assessment   RUE Assessment WFL   LUE Assessment   LUE Assessment X  (Limitations compared to R UE, see OT notes for further detai)   RLE Assessment   RLE Assessment X   Strength RLE   R Hip Flexion 3-/5   R Knee Extension 3+/5   R Ankle Dorsiflexion 4-/5   R Ankle Plantar Flexion 4-/5   LLE Assessment   LLE Assessment X   Strength LLE   L Hip Flexion 2/5   L Knee Extension 3+/5   L Ankle Dorsiflexion 4-/5   L Ankle Plantar Flexion 4-/5   Coordination   Movements are Fluid and Coordinated 0   Coordination and Movement Description Gross motor recruitment deficits of proximal LE muscles   Sensation WFL   Light Touch   RLE Light Touch Grossly intact   LLE Light Touch Grossly intact   Bed Mobility   Supine to Sit 2  Maximal assistance   Additional items Assist x 2;HOB elevated; Bedrails; Increased time required;Verbal cues;LE management   Additional Comments verbal cues for UE use and sequencing   Transfers   Sit to Stand 2  Maximal assistance   Additional items Assist x 2; Increased time required;Verbal cues   Stand to Sit 2  Maximal assistance   Additional items Assist x 2; Increased time required;Verbal cues   Ambulation/Elevation   Gait pattern R Foot drag;L Foot drag;Improper Weight shift; Forward Flexion;Decreased foot clearance;Shuffling; Short stride; Excessively slow  (Buckling of R LE 2" suspected hip joint instability)   Gait Assistance 2  Maximal assist   Additional items Assist x 2;Verbal cues; Tactile cues  (3rd person assist present for safey and to assist with preve)   Assistive Device Rolling walker   Distance 2 lateral steps to her right with R foot [facility] and unable to advance L foot  3rd person assist present for safey and to assist with prevention of buckling and advancing LE  Stair Management Assistance Not tested   Balance   Static Sitting Fair   Dynamic Sitting Fair   Static Standing Poor   Dynamic Standing Poor -   Ambulatory Poor -   Endurance Deficit   Endurance Deficit Yes   Endurance Deficit Description Pain and fatigue  (WOOD, SPO2 95-96% on 2L O2  /82 seated eob )   Activity Tolerance   Activity Tolerance Patient limited by fatigue;Patient limited by pain   Nurse Made Aware RN, Carline Barrera ok to see patient   Assessment   Prognosis Fair   Problem List Decreased strength;Decreased range of motion;Decreased endurance; Impaired balance;Decreased mobility; Decreased coordination;Obesity; Decreased skin integrity;Pain  (Erythmea and significant edema to L ankle/lower leg)   Assessment Patient is an 84y  o female admitted to [address] [place] [address] on 7/19/18 with primary diagnosis of cellulitis to left ankle  Patient also with complaints of progressively worsening b/l hip pain  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  PTA patient living in [facility] and reports that the dining area is about 100ft from her room  PTA patient was (I) with ADLs and functional mobility but reporting that the past 2 months she has been more dependent on use of manual w/c for long distances and limited to short (10-20ft) ambulation distances with RW inside her room  Patient presents with decreased strength, balance, functional mobility, functional activity tolerance and increased pain and would benefit from skilled PT services to address these deficits prior to d/c to optimize her outcome  Patient requiring considerable assistance for functional mobility and transfers  Patient had considerable difficulty performing gait with R LE buckling and inability to advance L LE   Patient also with WOOD and mild wheezing during functional mobility though SPO2 remained [facility] on 2L O2  Will consider use of Quick Move for transferring patient oob  Recommend rehab when she is medically cleared for d/c to continue progressing towards IPPT goals and assisting to reach her plof  Barriers to Discharge Decreased caregiver support   Barriers to Discharge Comments Lives alone   Goals   Patient Goals to get better   LTG Expiration Date 07/31/18   Long Term Goal #1 Patient will perform bed mobility at mod (I) level in order to be able to get in and out of bed safely and without assistance at home  Patient will perform functional transfers with appropriate AD at mod (I) level in order to be able to transfer at home safely without assistance  Patient will ambulate 50ft with RW at mod I level so she can return to plof ambulating in her room without assistance  Patient will increase strength and balance by 1/2 grade in order to increase stability and decrease her risk of falling  Patient will increase functional activity tolerance to 45 minutes to be able to participate with functional tasks at plof  Treatment Day 1   Plan   Treatment/Interventions Functional transfer training;LE strengthening/ROM; Therapeutic exercise; Endurance training;Patient/family training;Equipment eval/education; Bed mobility;Gait training; Compensatory technique education;Spoke to nursing;OT   PT Frequency (4-5x/wk)   Recommendation   Recommendation Short-term skilled PT   Equipment Recommended Other (Comment)  (defer at this time)   PT - OK to Discharge Yes   Additional Comments Yes to rehab when medically cleared  No to home     Modified [place] Scale   Modified [place] Scale 4   Barthel Index   Feeding 10   Bathing 0   Grooming Score 5   Dressing Score 5   Bladder Score 5   Bowels Score 5   Toilet Use Score 5   Transfers (Bed/Chair) Score 5   Mobility (Level Surface) Score 0   Stairs Score 0   Barthel Index Score 40     History: co - morbidities, fall risk, use of assistive device, assist for iadl's, use of w/c, multiple lines, need for O2, Lives in [facility]  Exam: impairments in locomotion, musculoskeletal, balance,posture, joint integrity, skin integrity, cardiac, pulmonary, decreased strength, functional mobility and WOOD  Clinical: unstable/unpredictable (fall risk/history, need for O2, functioning below baseline, multiple lines, ongoing management of diagnosis Barthel 40)  Complexity:high    Time In:1215  Time Out:1230  Total Time: 15 minutes      S:  Patient agreeable to sit out of bed and to trial transfer with Quick Move  O:  Patient performs sit to stand transfer to [facility] with mod A x 2 and verbal cueing then performs stand to sit transfer with same assistance  Patient seated in recliner chair comfortably with call bell and phone in reach and no further questions for therapy at that time  Advised nursing of patient response to treatment including her WOOD, wheezing, recommendations and education on use of Quick Move for transfer  A:  Patient continues to require considerable assistance for functional transfers but able to tolerate use of Quick Move well  Continue to recommend rehab at d/c to optimize her outcomes  Informed nursing to page PT if assist for transfer and/or further education on use of Quick Move is needed  Will continue to follow patient while she is here to progress towards IPPT goals  P:  Continue with current POC      Jazmine Snow, SPT        Jazmine Snow            Patient Name: Santhosh Stands  NDMWN'M Date: 7/21/2018

## 2018-07-21 NOTE — PROGRESS NOTES
[facility] Progress Note  Patient: Cielo Keith 80 y o  female   MRN: [id number]  PCP: JONO Robertson  Unit/Bed#: E5 -01 Encounter: [id number]  Date Of Visit: 18    Assessment:    Principal Problem:    Cellulitis  Active Problems:    Hypertension    Hip pain    Sepsis ([address] )      Plan:    · Left lower extremity cellulitis improving will transition to IV cefazolin, monitor patient clinically  · Sepsis present on admission improved  · Lactic acidosis resolved  · Bilateral hip osteoarthritis orthopedic input noted, continue analgesics, outpatient orthopedic follow-up  · Morbidly obese  · Hypertension  · Ambulatory dysfunction, deconditioning physical therapy       VTE Pharmacologic Prophylaxis:   Pharmacologic: Enoxaparin (Lovenox)  Mechanical VTE Prophylaxis in Place: Yes    Patient Centered Rounds: I have performed bedside rounds with nursing staff today  Discussions with Specialists or Other Care Team Provider:     Education and Discussions with Family / Patient:  Discussed with the patient    Time Spent for Care: 30 minutes  More than 50% of total time spent on counseling and coordination of care as described above      Current Length of Stay: 2 day(s)    Current Patient Status: Inpatient   Certification Statement: The patient will continue to require additional inpatient hospital stay due to as mentioned    Discharge Plan / Estimated Discharge Date:  Likely discharge next 24-48 hours clinical and symptomatic improvement    Code Status: Level 1 - Full Code      Subjective:     Reports improving lower extremity pain  Patient is reluctant to be out of bed into a chair  Incentive spirometry encouraged she verbalized understanding  History chart labs medications reviewed    Objective:     Vitals:   Temp (24hrs), Av 8 °F (37 1 °C), Min:97 9 °F (36 6 °C), Max:100 °F (37 8 °C)    HR:  [58-80] 73  Resp:  [18] 18  BP: (102-136)/(54-63) 136/63  SpO2:  [93 %-96 %] 93 %  Body mass index is 47 33 kg/m²  Input and Output Summary (last 24 hours):     No intake or output data in the 24 hours ending 07/21/18 1453    Physical Exam:     Physical Exam    Obese  Short thick neck  Lungs diminished breath sounds bilaterally  Heart sounds S1 and S2 noted  Abdomen soft  Left lower extremity cellulitis noted improving  Pulses noted  Awake alert obeys simple commands    Additional Data:     Labs:      Results from last 7 days  Lab Units 07/21/18  0542   WBC Thousand/uL 9 81   HEMOGLOBIN g/dL 11 5   HEMATOCRIT % 35 9   PLATELETS Thousands/uL 158   NEUTROS PCT % 86*   LYMPHS PCT % 7*   MONOS PCT % 7   EOS PCT % 0       Results from last 7 days  Lab Units 07/20/18  0433 07/19/18  1929   SODIUM mmol/L 136 137   POTASSIUM mmol/L 4 2 4 1   CHLORIDE mmol/L 104 101   CO2 mmol/L 26 29   BUN mg/dL 38* 35*   CREATININE mg/dL 1 15 1 23   CALCIUM mg/dL 8 6 9 6   TOTAL PROTEIN g/dL  --  6 9   BILIRUBIN TOTAL mg/dL  --  0 44   ALK PHOS U/L  --  70   ALT U/L  --  26   AST U/L  --  24   GLUCOSE RANDOM mg/dL 140 114       Results from last 7 days  Lab Units 07/19/18  1929   INR  1 01       * I Have Reviewed All Lab Data Listed Above  * Additional Pertinent Lab Tests Reviewed:  [facility] Admission Reviewed    Imaging:    Imaging Reports Reviewed Today Include:  Imaging studies reviewed  Imaging Personally Reviewed by Myself Includes:     Recent Cultures (last 7 days):           Last 24 Hours Medication List:     Current Facility-Administered Medications:  acetaminophen 650 mg Oral Q6H [facility]term Melvin Holden MD   aluminum-magnesium hydroxide-simethicone 30 mL Oral Q6H PRN [facility], PA-C   amLODIPine 5 mg Oral BID [facility], PA-SERAFIN   cefazolin 1,000 mg Intravenous Q8H Carol Ricardo MD   cloNIDine 0 2 mg Oral TID [facility], PA-SERAFIN   docusate sodium 100 mg Oral BID PRN Melvin Holden MD   enoxaparin 40 mg Subcutaneous Daily [facility], PA-SERAFIN   hydrALAZINE 5 mg Intravenous Q6H PRN Lamonte Ibanez MD   lidocaine 2 patch Transdermal Daily Abdi Henson PA-C   lisinopril-hydrochlorothiazide (PRINZIDE 20/12  5) combo dose  Oral Daily Abdi Henson PA-C   melatonin 3 mg Oral HS Abdi Henson PA-C   ondansetron 4 mg Intravenous Q6H PRN Abdi Henson PA-C   oxyCODONE 5 mg Oral Q6H PRN Lamonte Ibanez MD   polyethylene glycol 17 g Oral Daily Lamonte Ibanez MD        Today, Patient Was Seen By: Mariely Dorantes MD    ** Please Note: This note has been constructed using a voice recognition system   **

## 2018-07-21 NOTE — PLAN OF CARE
Problem: PHYSICAL THERAPY ADULT  Goal: Performs mobility at highest level of function for planned discharge setting  See evaluation for individualized goals  Treatment/Interventions: Functional transfer training, Equipment eval/education, Compensatory technique education, Spoke to nursing, Family  Equipment Recommended: Other (Comment) (defer at this time)       See flowsheet documentation for full assessment, interventions and recommendations  Outcome: Progressing  Prognosis: Fair  Problem List: Decreased strength, Decreased range of motion, Decreased endurance, Impaired balance, Decreased mobility, Decreased coordination, Obesity, Decreased skin integrity, Pain (Erythmea and significant edema to L ankle/lower leg)  Assessment: PT called for assistance to transfer patient back to bed and to provide nursing with training in use of Quick Move  Patient seated in recliner chair reporting improved pain symptoms following medication and requesting to return to bed  Patient able to tolerate sitting oob for one hour seventeen minutes  Provided education and training to RN, Zev Gleason and CNA in use of Quick Move for transfering patient with nursing showing fairly good understanding of use and technical skills for use  Patient continues to require considerable assistance for transfers and functional mobility and recommendation remains rehab following d/c  Patient incontinent of bowel movement while in recliner chair and transfer  Patient supine in bed at end of treatment with nursing attending to pericare and no further needs for PT at that time  Spoke with daughter regarding patient's evaluation and for further clarification of plof  Daughter confirms patient was (I) with functional mobility limited to short (10-20ft) distance with RW with increased time to perform all mobility  Will continue to follow patient while she is here to progress towards her IPPT goals    Barriers to Discharge: Decreased caregiver support  Barriers to Discharge Comments: Lives alone  Recommendation: Short-term skilled PT     PT - OK to Discharge: Yes    See flowsheet documentation for full assessment

## 2018-07-22 ENCOUNTER — APPOINTMENT (INPATIENT)
Dept: RADIOLOGY | Facility: HOSPITAL | Age: 83
DRG: 602 | End: 2018-07-22
Payer: COMMERCIAL

## 2018-07-22 PROBLEM — E66.01 MORBID (SEVERE) OBESITY DUE TO EXCESS CALORIES (HCC): Chronic | Status: ACTIVE | Noted: 2018-07-22

## 2018-07-22 PROBLEM — R06.03 ACUTE RESPIRATORY DISTRESS: Status: ACTIVE | Noted: 2018-07-22

## 2018-07-22 PROCEDURE — 99232 SBSQ HOSP IP/OBS MODERATE 35: CPT | Performed by: INTERNAL MEDICINE

## 2018-07-22 PROCEDURE — 94640 AIRWAY INHALATION TREATMENT: CPT

## 2018-07-22 PROCEDURE — 71046 X-RAY EXAM CHEST 2 VIEWS: CPT

## 2018-07-22 PROCEDURE — 94760 N-INVAS EAR/PLS OXIMETRY 1: CPT

## 2018-07-22 RX ORDER — LEVALBUTEROL 1.25 MG/.5ML
SOLUTION, CONCENTRATE RESPIRATORY (INHALATION)
Status: DISPENSED
Start: 2018-07-22 | End: 2018-07-23

## 2018-07-22 RX ORDER — LEVALBUTEROL 1.25 MG/.5ML
1.25 SOLUTION, CONCENTRATE RESPIRATORY (INHALATION)
Status: DISCONTINUED | OUTPATIENT
Start: 2018-07-22 | End: 2018-07-28 | Stop reason: HOSPADM

## 2018-07-22 RX ORDER — LEVALBUTEROL 1.25 MG/.5ML
1.25 SOLUTION, CONCENTRATE RESPIRATORY (INHALATION) EVERY 8 HOURS PRN
Status: DISCONTINUED | OUTPATIENT
Start: 2018-07-22 | End: 2018-07-28 | Stop reason: HOSPADM

## 2018-07-22 RX ORDER — SODIUM CHLORIDE FOR INHALATION 0.9 %
VIAL, NEBULIZER (ML) INHALATION
Status: COMPLETED
Start: 2018-07-22 | End: 2018-07-22

## 2018-07-22 RX ORDER — IPRATROPIUM BROMIDE AND ALBUTEROL SULFATE 2.5; .5 MG/3ML; MG/3ML
3 SOLUTION RESPIRATORY (INHALATION) ONCE
Status: COMPLETED | OUTPATIENT
Start: 2018-07-22 | End: 2018-07-22

## 2018-07-22 RX ORDER — SODIUM CHLORIDE FOR INHALATION 0.9 %
3 VIAL, NEBULIZER (ML) INHALATION EVERY 8 HOURS PRN
Status: DISCONTINUED | OUTPATIENT
Start: 2018-07-22 | End: 2018-07-28 | Stop reason: HOSPADM

## 2018-07-22 RX ADMIN — CLONIDINE HYDROCHLORIDE 0.2 MG: 0.1 TABLET ORAL at 15:16

## 2018-07-22 RX ADMIN — IPRATROPIUM BROMIDE AND ALBUTEROL SULFATE 3 ML: .5; 3 SOLUTION RESPIRATORY (INHALATION) at 12:04

## 2018-07-22 RX ADMIN — CEFAZOLIN SODIUM 1000 MG: 1 SOLUTION INTRAVENOUS at 23:17

## 2018-07-22 RX ADMIN — CEFAZOLIN SODIUM 1000 MG: 1 SOLUTION INTRAVENOUS at 06:25

## 2018-07-22 RX ADMIN — OXYCODONE HYDROCHLORIDE 5 MG: 5 TABLET ORAL at 09:15

## 2018-07-22 RX ADMIN — ENOXAPARIN SODIUM 40 MG: 40 INJECTION SUBCUTANEOUS at 09:17

## 2018-07-22 RX ADMIN — ACETAMINOPHEN 650 MG: 325 TABLET ORAL at 23:17

## 2018-07-22 RX ADMIN — LIDOCAINE 2 PATCH: 50 PATCH TOPICAL at 09:17

## 2018-07-22 RX ADMIN — OXYCODONE HYDROCHLORIDE 5 MG: 5 TABLET ORAL at 15:17

## 2018-07-22 RX ADMIN — LISINOPRIL: 20 TABLET ORAL at 09:16

## 2018-07-22 RX ADMIN — CLONIDINE HYDROCHLORIDE 0.2 MG: 0.1 TABLET ORAL at 21:18

## 2018-07-22 RX ADMIN — LEVALBUTEROL HYDROCHLORIDE 1.25 MG: 1.25 SOLUTION, CONCENTRATE RESPIRATORY (INHALATION) at 23:07

## 2018-07-22 RX ADMIN — AMLODIPINE BESYLATE 5 MG: 5 TABLET ORAL at 09:16

## 2018-07-22 RX ADMIN — CEFAZOLIN SODIUM 1000 MG: 1 SOLUTION INTRAVENOUS at 14:59

## 2018-07-22 RX ADMIN — ACETAMINOPHEN 650 MG: 325 TABLET ORAL at 06:26

## 2018-07-22 RX ADMIN — Medication 3 ML: at 23:08

## 2018-07-22 RX ADMIN — MELATONIN TAB 3 MG 3 MG: 3 TAB at 21:18

## 2018-07-22 RX ADMIN — ISODIUM CHLORIDE 3 ML: 0.03 SOLUTION RESPIRATORY (INHALATION) at 23:08

## 2018-07-22 RX ADMIN — OXYCODONE HYDROCHLORIDE 5 MG: 5 TABLET ORAL at 01:01

## 2018-07-22 RX ADMIN — CLONIDINE HYDROCHLORIDE 0.2 MG: 0.1 TABLET ORAL at 09:16

## 2018-07-22 RX ADMIN — OXYCODONE HYDROCHLORIDE 5 MG: 5 TABLET ORAL at 21:17

## 2018-07-22 NOTE — PROGRESS NOTES
Progress Note - Tu Morrison 80 y o  female MRN: [id number]    Unit/Bed#: E5 -01 Encounter: [id number]    Assessment/Plan:    LLE cellulitis   slowly improving with IV Ancef, which will continue    Acute respiratory distress developing shortness of breath and wheezing, will order chest x-ray and DuoNeb  Use icentive spirometer continue oxygen    Morbid obesity   would benefit from weight loss     Hypertension   well controlled with current medication    Ambulatory dysfunction physical therapy ongoing, recommend short-term rehab    Osteoarthritis  continue orthopedic follow    Subjective:   Feels shortness of breath and wheezing denies chest pain nausea vomiting diarrhea no fevers chills appetite is okay    Objective:     Vitals: Blood pressure 121/58, pulse 66, temperature 98 4 °F (36 9 °C), temperature source Temporal, resp  rate 20, height 5' 3" (1 6 m), weight 121 kg (267 lb 3 2 oz), SpO2 92 %  ,Body mass index is 47 33 kg/m²  Results from last 7 days  Lab Units 07/21/18  0542  07/19/18  1929   WBC Thousand/uL 9 81  < > 12 76*   HEMOGLOBIN g/dL 11 5  < > 13 3   HEMATOCRIT % 35 9  < > 40 1   PLATELETS Thousands/uL 158  < > 238   INR   --   --  1 01   < > = values in this interval not displayed      Results from last 7 days  Lab Units 07/20/18  0433 07/19/18  1929   SODIUM mmol/L 136 137   POTASSIUM mmol/L 4 2 4 1   CHLORIDE mmol/L 104 101   CO2 mmol/L 26 29   BUN mg/dL 38* 35*   CREATININE mg/dL 1 15 1 23   CALCIUM mg/dL 8 6 9 6   TOTAL PROTEIN g/dL  --  6 9   BILIRUBIN TOTAL mg/dL  --  0 44   ALK PHOS U/L  --  70   ALT U/L  --  26   AST U/L  --  24   GLUCOSE RANDOM mg/dL 140 114       Scheduled Meds:    Current Facility-Administered Medications:  acetaminophen 650 mg Oral Q6H Scotty Webster MD    aluminum-magnesium hydroxide-simethicone 30 mL Oral Q6H PRN Tejinder Martini PA-C    cefazolin 1,000 mg Intravenous Q8H Mariely Dorantes MD Last Rate: 1,000 mg (07/22/18 0625)   cloNIDine 0 2 mg Oral TID Alan Funez PA-C    docusate sodium 100 mg Oral BID PRN Jacob Byrd MD    enoxaparin 40 mg Subcutaneous Daily Alan Funez PA-C    hydrALAZINE 5 mg Intravenous Q6H PRN Jacob Byrd MD    ipratropium-albuterol 3 mL Nebulization Once Chana Rodrigues DO    lidocaine 2 patch Transdermal Daily Alan Funez PA-C    lisinopril-hydrochlorothiazide (PRINZIDE 20/12  5) combo dose  Oral Daily Alan Funez PA-C    melatonin 3 mg Oral HS Alan Funez PA-C    ondansetron 4 mg Intravenous Q6H PRN Alan Funez PA-C    oxyCODONE 5 mg Oral Q6H PRN Jacob Byrd MD    polyethylene glycol 17 g Oral Daily Jacob Byrd MD        Continuous Infusions:     Physical exam:  General appearance:  Alert no distress interaction appropriate   Head/Eyes:  Nonicteric PERRL EOMI  Neck:  Supple  Lungs:  Decreased BS bilateral wheezing  Heart: normal S1 S2 regular  Abdomen:  Obese nontender with bowel sounds  Extremities:  Left greater than right edema erythema left leg no exudate  Skin: no rash    Invasive Devices     Peripheral Intravenous Line            Peripheral IV 07/22/18 Left Forearm less than 1 day                  VTE Pharmacologic Prophylaxis:  Lovenox         Counseling / Coordination of Care  Total floor / unit time spent today  30   minutes  Greater than 50% of total time was spent with the patient and / or family counseling and / or coordination of care    A description of the counseling / coordination of care:

## 2018-07-23 ENCOUNTER — APPOINTMENT (INPATIENT)
Dept: CT IMAGING | Facility: HOSPITAL | Age: 83
DRG: 602 | End: 2018-07-23
Payer: COMMERCIAL

## 2018-07-23 LAB
ANION GAP SERPL CALCULATED.3IONS-SCNC: 12 MMOL/L (ref 4–13)
BASOPHILS # BLD AUTO: 0.02 THOUSANDS/ΜL (ref 0–0.1)
BASOPHILS NFR BLD AUTO: 0 % (ref 0–1)
BUN SERPL-MCNC: 25 MG/DL (ref 5–25)
CALCIUM SERPL-MCNC: 9.3 MG/DL (ref 8.3–10.1)
CHLORIDE SERPL-SCNC: 103 MMOL/L (ref 100–108)
CO2 SERPL-SCNC: 24 MMOL/L (ref 21–32)
CREAT SERPL-MCNC: 0.87 MG/DL (ref 0.6–1.3)
CRP SERPL QL: >90 MG/L
EOSINOPHIL # BLD AUTO: 0.17 THOUSAND/ΜL (ref 0–0.61)
EOSINOPHIL NFR BLD AUTO: 2 % (ref 0–6)
ERYTHROCYTE [DISTWIDTH] IN BLOOD BY AUTOMATED COUNT: 12.7 % (ref 11.6–15.1)
ERYTHROCYTE [SEDIMENTATION RATE] IN BLOOD: 77 MM/HOUR (ref 0–20)
GFR SERPL CREATININE-BSD FRML MDRD: 61 ML/MIN/1.73SQ M
GLUCOSE SERPL-MCNC: 103 MG/DL (ref 65–140)
HCT VFR BLD AUTO: 35.3 % (ref 34.8–46.1)
HGB BLD-MCNC: 11.7 G/DL (ref 11.5–15.4)
L PNEUMO1 AG UR QL IA.RAPID: NEGATIVE
LYMPHOCYTES # BLD AUTO: 0.92 THOUSANDS/ΜL (ref 0.6–4.47)
LYMPHOCYTES NFR BLD AUTO: 11 % (ref 14–44)
MCH RBC QN AUTO: 31.5 PG (ref 26.8–34.3)
MCHC RBC AUTO-ENTMCNC: 33.1 G/DL (ref 31.4–37.4)
MCV RBC AUTO: 95 FL (ref 82–98)
MONOCYTES # BLD AUTO: 1.01 THOUSAND/ΜL (ref 0.17–1.22)
MONOCYTES NFR BLD AUTO: 12 % (ref 4–12)
NEUTROPHILS # BLD AUTO: 6.3 THOUSANDS/ΜL (ref 1.85–7.62)
NEUTS SEG NFR BLD AUTO: 75 % (ref 43–75)
NRBC BLD AUTO-RTO: 0 /100 WBCS
PLATELET # BLD AUTO: 187 THOUSANDS/UL (ref 149–390)
PMV BLD AUTO: 9.6 FL (ref 8.9–12.7)
POTASSIUM SERPL-SCNC: 3.3 MMOL/L (ref 3.5–5.3)
RBC # BLD AUTO: 3.72 MILLION/UL (ref 3.81–5.12)
SODIUM SERPL-SCNC: 139 MMOL/L (ref 136–145)
WBC # BLD AUTO: 8.42 THOUSAND/UL (ref 4.31–10.16)

## 2018-07-23 PROCEDURE — 94640 AIRWAY INHALATION TREATMENT: CPT

## 2018-07-23 PROCEDURE — 85652 RBC SED RATE AUTOMATED: CPT | Performed by: INTERNAL MEDICINE

## 2018-07-23 PROCEDURE — 85025 COMPLETE CBC W/AUTO DIFF WBC: CPT | Performed by: INTERNAL MEDICINE

## 2018-07-23 PROCEDURE — 99233 SBSQ HOSP IP/OBS HIGH 50: CPT | Performed by: INTERNAL MEDICINE

## 2018-07-23 PROCEDURE — 99223 1ST HOSP IP/OBS HIGH 75: CPT | Performed by: INTERNAL MEDICINE

## 2018-07-23 PROCEDURE — 71250 CT THORAX DX C-: CPT

## 2018-07-23 PROCEDURE — 86140 C-REACTIVE PROTEIN: CPT | Performed by: INTERNAL MEDICINE

## 2018-07-23 PROCEDURE — 87449 NOS EACH ORGANISM AG IA: CPT | Performed by: INTERNAL MEDICINE

## 2018-07-23 PROCEDURE — 80048 BASIC METABOLIC PNL TOTAL CA: CPT | Performed by: INTERNAL MEDICINE

## 2018-07-23 PROCEDURE — 94760 N-INVAS EAR/PLS OXIMETRY 1: CPT

## 2018-07-23 RX ORDER — ACETAMINOPHEN 160 MG/5ML
650 SUSPENSION, ORAL (FINAL DOSE FORM) ORAL EVERY 4 HOURS PRN
Status: DISCONTINUED | OUTPATIENT
Start: 2018-07-23 | End: 2018-07-28 | Stop reason: HOSPADM

## 2018-07-23 RX ORDER — GUAIFENESIN/DEXTROMETHORPHAN 100-10MG/5
10 SYRUP ORAL EVERY 4 HOURS PRN
Status: DISCONTINUED | OUTPATIENT
Start: 2018-07-23 | End: 2018-07-28

## 2018-07-23 RX ADMIN — OXYCODONE HYDROCHLORIDE 5 MG: 5 TABLET ORAL at 22:37

## 2018-07-23 RX ADMIN — ACETAMINOPHEN 650 MG: 325 TABLET ORAL at 06:18

## 2018-07-23 RX ADMIN — IPRATROPIUM BROMIDE 0.5 MG: 0.5 SOLUTION RESPIRATORY (INHALATION) at 07:41

## 2018-07-23 RX ADMIN — CLONIDINE HYDROCHLORIDE 0.2 MG: 0.1 TABLET ORAL at 20:51

## 2018-07-23 RX ADMIN — LEVALBUTEROL HYDROCHLORIDE 1.25 MG: 1.25 SOLUTION, CONCENTRATE RESPIRATORY (INHALATION) at 07:41

## 2018-07-23 RX ADMIN — LEVALBUTEROL HYDROCHLORIDE 1.25 MG: 1.25 SOLUTION, CONCENTRATE RESPIRATORY (INHALATION) at 13:03

## 2018-07-23 RX ADMIN — CEFAZOLIN SODIUM 1000 MG: 1 SOLUTION INTRAVENOUS at 06:19

## 2018-07-23 RX ADMIN — CLONIDINE HYDROCHLORIDE 0.2 MG: 0.1 TABLET ORAL at 09:17

## 2018-07-23 RX ADMIN — IPRATROPIUM BROMIDE 0.5 MG: 0.5 SOLUTION RESPIRATORY (INHALATION) at 18:54

## 2018-07-23 RX ADMIN — LEVALBUTEROL HYDROCHLORIDE 1.25 MG: 1.25 SOLUTION, CONCENTRATE RESPIRATORY (INHALATION) at 18:54

## 2018-07-23 RX ADMIN — OXYCODONE HYDROCHLORIDE 5 MG: 5 TABLET ORAL at 14:30

## 2018-07-23 RX ADMIN — IPRATROPIUM BROMIDE 0.5 MG: 0.5 SOLUTION RESPIRATORY (INHALATION) at 13:03

## 2018-07-23 RX ADMIN — LISINOPRIL: 20 TABLET ORAL at 09:18

## 2018-07-23 RX ADMIN — ENOXAPARIN SODIUM 40 MG: 40 INJECTION SUBCUTANEOUS at 09:18

## 2018-07-23 RX ADMIN — CEFAZOLIN SODIUM 1000 MG: 1 SOLUTION INTRAVENOUS at 14:30

## 2018-07-23 RX ADMIN — MELATONIN TAB 3 MG 3 MG: 3 TAB at 22:37

## 2018-07-23 RX ADMIN — LIDOCAINE 2 PATCH: 50 PATCH TOPICAL at 09:18

## 2018-07-23 RX ADMIN — OXYCODONE HYDROCHLORIDE 5 MG: 5 TABLET ORAL at 06:31

## 2018-07-23 RX ADMIN — CEFAZOLIN SODIUM 1000 MG: 1 SOLUTION INTRAVENOUS at 22:37

## 2018-07-23 NOTE — CONSULTS
Consultation - Infectious Disease   Jayleen Diaz 80 y o  female MRN: [id number]  Unit/Bed#: E5 -01 Encounter: [id number]      IMPRESSION & RECOMMENDATIONS:   Impression/Recommendations: This is a 80 y o  female, admitted 07/19 with left lower leg cellulitis  She was clinically improving but now has fever and worsening respiratory symptoms over last 24 hours  1   Left leg cellulitis  Overall, patient appears to be clinically improved  She has less tenderness in the leg  Temperature had been down  WBC decreasing, now normalized  She remains systemically well  Continue IV cefazolin for now  Serial leg exams  Monitor temperature/WBC  2   Dyspnea, with cough and wheezing  Together with low-grade fever, we need to consider the possibility of nosocomial pneumonia  CXR today is of poor quality but no obvious consolidations  Chest CT would provide more helpful information  No change in above antibiotic regimen for now  Check chest CT  3   Low-grade fever  Doubt that persistent cellulitis is source of recurrent fever, given clinical improvement  Rule out respiratory infection above  Rule out atelectasis  Patient remains clinically and systemically well  WBC remains in normal range  No change antibiotic regimen above for now  Monitor temperature  Monitor WBC  4    Elevated procalcitonin earlier in the hospitalization  This was most likely secondary to cellulitis  Will recheck and monitor  Antibiotic plan as in above  Recheck procalcitonin today  5   Left hip pain  This is most likely secondary to advanced DJD  No clinical cellulitis here  Hospitalization records reviewed in detail  Discussed with patient in detail regarding the above plan    Thank you for this consultation  We will follow along with you  HISTORY OF PRESENT ILLNESS:  Reason for Consult:  Left leg cellulitis  Recurrent fever      HPI: Jayleen Diaz is a 80 y o  female, with severe OA and chronic leg pain, came to the ER on  with worsening left ankle/lower leg and left hip pain  On presentation, patient had mild leukocytosis but no fever  She had evidence of left lower leg cellulitis  Patient was admitted and although she was clinically and systemically well, started on vancomycin/cefepime  She subsequently developed high fever  Antibiotic was subsequently de escalated to IV cefazolin  No blood cultures were ever obtained  Patient was slowly improving until last night, when she had low-grade fever again  Together with fever, patient had worsening dyspnea and a mild cough  For all these reasons, we are asked to evaluate the patient  Of note, patient's procalcitonin from 2 days ago was elevated  At present, patient's biggest complaint is left hip pain  This is followed by dyspnea and a mild cough  Left lower leg pain is actually mild and improving from admission  REVIEW OF SYSTEMS:  A complete 12 point system-based review was done  Except for what is noted in HPI above, ROS of systems is otherwise negative  PAST MEDICAL HISTORY:  Past Medical History:   Diagnosis Date    Hyperlipidemia     Hypertension      Past Surgical History:   Procedure Laterality Date    ANKLE FRACTURE SURGERY Left     CHOLECYSTECTOMY      REPLACEMENT TOTAL KNEE BILATERAL Bilateral      Problem list reviewed  FAMILY HISTORY:  Non-contributory    SOCIAL HISTORY:  History   Alcohol Use No     History   Drug Use No     History   Smoking Status    Never Smoker   Smokeless Tobacco    Never Used       ALLERGIES:  No Known Allergies    MEDICATIONS:  All current active medications have been reviewed  Patient is currently on IV cefazolin      PHYSICAL EXAM:  Vitals:  HR:  [71-78] 78  Resp:  [18-20] 18  BP: (129-144)/(59-78) 138/67  SpO2:  [91 %-95 %] 95 %  Temp (24hrs), Av 7 °F (37 6 °C), Min:98 8 °F (37 1 °C), Max:100 8 °F (38 2 °C)  Current: Temperature: 99 5 °F (37 5 °C)     Physical Exam:  General:  Morbidly obese, comfortable, nontoxic, in no acute distress  Awake, alert and oriented x 3  Eyes:  Conjunctive clear with no hemorrhages or effusions  Oropharynx:  No ulcers, no lesions, pharynx benign, no tonsillitis  Neck:  Supple, no lymphadenopathy, no mass, nontender  Lungs:  Expansion symmetric, bibasilar rhonchi but no rales, mild diffuse wheezing, no accessory muscle use  Cardiac:  Regular rate and rhythm, normal S1, normal S2, no murmurs  Abdomen:  Soft, nondistended, non-tender, no HSM  Extremities:  1+ leg edema  Mild left lower leg and ankle erythema/warmth  Mild tenderness  Ankle with full ROM in no effusion  Skin:  No rashes, no ulcers  Neurological:  Moves all four extremities spontaneously, sensation grossly intact    LABS, IMAGING, & OTHER STUDIES:  Lab Results:  I have personally reviewed pertinent labs  Results from last 7 days  Lab Units 07/23/18 0441 07/20/18  0433 07/19/18  1929   SODIUM mmol/L 139 136 137   POTASSIUM mmol/L 3 3* 4 2 4 1   CHLORIDE mmol/L 103 104 101   CO2 mmol/L 24 26 29   ANION GAP mmol/L 12 6 7   BUN mg/dL 25 38* 35*   CREATININE mg/dL 0 87 1 15 1 23   EGFR ml/min/1 73sq m 61 44 40   GLUCOSE RANDOM mg/dL 103 140 114   CALCIUM mg/dL 9 3 8 6 9 6   AST U/L  --   --  24   ALT U/L  --   --  26   ALK PHOS U/L  --   --  70   TOTAL PROTEIN g/dL  --   --  6 9   BILIRUBIN TOTAL mg/dL  --   --  0 44       Results from last 7 days  Lab Units 07/23/18 0441 07/21/18  0542 07/20/18  0433   WBC Thousand/uL 8 42 9 81 10 18*   HEMOGLOBIN g/dL 11 7 11 5 12 0   PLATELETS Thousands/uL 187 158 177           Imaging Studies:   I have personally reviewed pertinent imaging study reports and images in PACS  Today CXR reviewed personally  No obvious consolidations  7/20 left foot x-ray reviewed personally  No bony abnormalities  7/19 left hip x-ray reviewed personally  Advanced DJD      EKG, Pathology, and Other Studies:   I have personally reviewed pertinent reports

## 2018-07-23 NOTE — PROGRESS NOTES
Progress Note - Lidia Carter 80 y o  female MRN: [id number]    Unit/Bed#: E5 -01 Encounter: [id number]    Assessment/Plan:    LLE cellulitis  Continues to show improvement  Able to ambulate problems will continue another day of IV antibiotics possible DC in a m  Acute respiratory distress/currently resolved  Hypertension   well controlled with current medication    Ambulatory dysfunction physical therapy ongoing, recommend short-term rehab    Osteoarthritis  continue orthopedic follow    Disposition: If clinical course allows plan to DC any M  Subjective:   Feels shortness of breath and wheezing denies chest pain nausea vomiting diarrhea no fevers chills appetite is okay    Objective:     Vitals: Blood pressure 119/76, pulse 90, temperature 99 °F (37 2 °C), temperature source Tympanic, resp  rate 22, height 5' 3" (1 6 m), weight 121 kg (267 lb 3 2 oz), SpO2 95 %  ,Body mass index is 47 33 kg/m²  Results from last 7 days  Lab Units 07/23/18  0441  07/19/18  1929   WBC Thousand/uL 8 42  < > 12 76*   HEMOGLOBIN g/dL 11 7  < > 13 3   HEMATOCRIT % 35 3  < > 40 1   PLATELETS Thousands/uL 187  < > 238   INR   --   --  1 01   < > = values in this interval not displayed  Results from last 7 days  Lab Units 07/23/18  0441  07/19/18  1929   SODIUM mmol/L 139  < > 137   POTASSIUM mmol/L 3 3*  < > 4 1   CHLORIDE mmol/L 103  < > 101   CO2 mmol/L 24  < > 29   BUN mg/dL 25  < > 35*   CREATININE mg/dL 0 87  < > 1 23   CALCIUM mg/dL 9 3  < > 9 6   TOTAL PROTEIN g/dL  --   --  6 9   BILIRUBIN TOTAL mg/dL  --   --  0 44   ALK PHOS U/L  --   --  70   ALT U/L  --   --  26   AST U/L  --   --  24   GLUCOSE RANDOM mg/dL 103  < > 114   < > = values in this interval not displayed      Scheduled Meds:    Current Facility-Administered Medications:  acetaminophen 650 mg Oral Q4H PRN Beck Carranza    aluminum-magnesium hydroxide-simethicone 30 mL Oral Q6H PRN Sophie Leo PA-C    cefazolin 1,000 mg Intravenous Q8H Wilson Mckeon MD Last Rate: Stopped (07/23/18 1501)   cloNIDine 0 2 mg Oral TID Kushal Yeh PA-C    dextromethorphan-guaiFENesin 10 mL Oral Q4H PRN Elia Carranza    docusate sodium 100 mg Oral BID PRN Vallorie Fabry, MD    enoxaparin 40 mg Subcutaneous Daily Kushal Yeh PA-C    hydrALAZINE 5 mg Intravenous Q6H PRN Vallorie Fabry, MD    ipratropium 0 5 mg Nebulization TID [place] Pa, CRNP    levalbuterol 1 25 mg Nebulization TID Minnette Pa, CRNP    levalbuterol 1 25 mg Nebulization Q8H PRN JONO Gold    lidocaine 2 patch Transdermal Daily Kushal Yeh PA-C    lisinopril-hydrochlorothiazide (PRINZIDE 20/12  5) combo dose  Oral Daily Kushal Yeh PA-C    melatonin 3 mg Oral HS Kushal Yeh PA-C    ondansetron 4 mg Intravenous Q6H PRN Kushal Yeh PA-C    oxyCODONE 5 mg Oral Q6H PRN Vallorie Fabry, MD    polyethylene glycol 17 g Oral Daily Vallorie Fabry, MD    sodium chloride 3 mL Nebulization Q8H PRN Facundo Simona DO        Continuous Infusions:     Physical exam:  General appearance:  Alert no distress interaction appropriate   Head/Eyes:  Nonicteric PERRL EOMI  Neck:  Supple  Lungs:  Decreased BS bilateral wheezing  Heart: normal S1 S2 regular  Abdomen:  Obese nontender with bowel sounds  Extremities:  Left greater than right edema erythema left leg no exudate  Skin: no rash    Invasive Devices          No matching active lines, drains, or airways            VTE Pharmacologic Prophylaxis:  Lovenox         Counseling / Coordination of Care  Total floor / unit time spent today  30   minutes  Greater than 50% of total time was spent with the patient and / or family counseling and / or coordination of care    A description of the counseling / coordination of care:

## 2018-07-23 NOTE — PHYSICAL THERAPY NOTE
PHYSICAL THERAPY NOTE    Attempted to see patient this afternoon however patient is not in room and nursing reports she is having CT of chest performed  Will continue to follow patient if appropriate pending results of imaging      RONNIE Alexandra        Patient Name: Sherita Woods  [id number]'[id number] Date: 7/23/2018

## 2018-07-24 LAB
ANION GAP SERPL CALCULATED.3IONS-SCNC: 7 MMOL/L (ref 4–13)
ANION GAP SERPL CALCULATED.3IONS-SCNC: 8 MMOL/L (ref 4–13)
BASOPHILS # BLD AUTO: 0.03 THOUSANDS/ΜL (ref 0–0.1)
BASOPHILS NFR BLD AUTO: 0 % (ref 0–1)
BUN SERPL-MCNC: 18 MG/DL (ref 5–25)
BUN SERPL-MCNC: 18 MG/DL (ref 5–25)
CALCIUM SERPL-MCNC: 9.5 MG/DL (ref 8.3–10.1)
CALCIUM SERPL-MCNC: 9.7 MG/DL (ref 8.3–10.1)
CHLORIDE SERPL-SCNC: 102 MMOL/L (ref 100–108)
CHLORIDE SERPL-SCNC: 102 MMOL/L (ref 100–108)
CO2 SERPL-SCNC: 30 MMOL/L (ref 21–32)
CO2 SERPL-SCNC: 31 MMOL/L (ref 21–32)
CREAT SERPL-MCNC: 0.7 MG/DL (ref 0.6–1.3)
CREAT SERPL-MCNC: 0.77 MG/DL (ref 0.6–1.3)
EOSINOPHIL # BLD AUTO: 0.3 THOUSAND/ΜL (ref 0–0.61)
EOSINOPHIL NFR BLD AUTO: 4 % (ref 0–6)
ERYTHROCYTE [DISTWIDTH] IN BLOOD BY AUTOMATED COUNT: 12.6 % (ref 11.6–15.1)
GFR SERPL CREATININE-BSD FRML MDRD: 71 ML/MIN/1.73SQ M
GFR SERPL CREATININE-BSD FRML MDRD: 80 ML/MIN/1.73SQ M
GLUCOSE SERPL-MCNC: 132 MG/DL (ref 65–140)
GLUCOSE SERPL-MCNC: 134 MG/DL (ref 65–140)
HCT VFR BLD AUTO: 35.4 % (ref 34.8–46.1)
HGB BLD-MCNC: 11.8 G/DL (ref 11.5–15.4)
LYMPHOCYTES # BLD AUTO: 1.06 THOUSANDS/ΜL (ref 0.6–4.47)
LYMPHOCYTES NFR BLD AUTO: 13 % (ref 14–44)
MCH RBC QN AUTO: 31.4 PG (ref 26.8–34.3)
MCHC RBC AUTO-ENTMCNC: 33.3 G/DL (ref 31.4–37.4)
MCV RBC AUTO: 94 FL (ref 82–98)
MONOCYTES # BLD AUTO: 0.75 THOUSAND/ΜL (ref 0.17–1.22)
MONOCYTES NFR BLD AUTO: 9 % (ref 4–12)
NEUTROPHILS # BLD AUTO: 6.09 THOUSANDS/ΜL (ref 1.85–7.62)
NEUTS SEG NFR BLD AUTO: 74 % (ref 43–75)
NRBC BLD AUTO-RTO: 0 /100 WBCS
PLATELET # BLD AUTO: 212 THOUSANDS/UL (ref 149–390)
PMV BLD AUTO: 9.3 FL (ref 8.9–12.7)
POTASSIUM SERPL-SCNC: 3.2 MMOL/L (ref 3.5–5.3)
POTASSIUM SERPL-SCNC: 3.2 MMOL/L (ref 3.5–5.3)
PROCALCITONIN SERPL-MCNC: 0.67 NG/ML
RBC # BLD AUTO: 3.76 MILLION/UL (ref 3.81–5.12)
SODIUM SERPL-SCNC: 140 MMOL/L (ref 136–145)
SODIUM SERPL-SCNC: 140 MMOL/L (ref 136–145)
WBC # BLD AUTO: 8.23 THOUSAND/UL (ref 4.31–10.16)

## 2018-07-24 PROCEDURE — 84145 PROCALCITONIN (PCT): CPT | Performed by: INTERNAL MEDICINE

## 2018-07-24 PROCEDURE — 94760 N-INVAS EAR/PLS OXIMETRY 1: CPT

## 2018-07-24 PROCEDURE — 99233 SBSQ HOSP IP/OBS HIGH 50: CPT | Performed by: INTERNAL MEDICINE

## 2018-07-24 PROCEDURE — 94640 AIRWAY INHALATION TREATMENT: CPT

## 2018-07-24 PROCEDURE — 85025 COMPLETE CBC W/AUTO DIFF WBC: CPT | Performed by: INTERNAL MEDICINE

## 2018-07-24 PROCEDURE — 80048 BASIC METABOLIC PNL TOTAL CA: CPT | Performed by: INTERNAL MEDICINE

## 2018-07-24 RX ORDER — POTASSIUM CHLORIDE 14.9 MG/ML
40 INJECTION INTRAVENOUS
Status: DISCONTINUED | OUTPATIENT
Start: 2018-07-24 | End: 2018-07-24

## 2018-07-24 RX ORDER — POTASSIUM CHLORIDE 20 MEQ/1
40 TABLET, EXTENDED RELEASE ORAL ONCE
Status: COMPLETED | OUTPATIENT
Start: 2018-07-24 | End: 2018-07-24

## 2018-07-24 RX ORDER — POTASSIUM CHLORIDE 14.9 MG/ML
20 INJECTION INTRAVENOUS
Status: DISPENSED | OUTPATIENT
Start: 2018-07-24 | End: 2018-07-24

## 2018-07-24 RX ORDER — POTASSIUM CHLORIDE 14.9 MG/ML
20 INJECTION INTRAVENOUS ONCE
Status: COMPLETED | OUTPATIENT
Start: 2018-07-24 | End: 2018-07-24

## 2018-07-24 RX ADMIN — ENOXAPARIN SODIUM 40 MG: 40 INJECTION SUBCUTANEOUS at 08:37

## 2018-07-24 RX ADMIN — LEVALBUTEROL HYDROCHLORIDE 1.25 MG: 1.25 SOLUTION, CONCENTRATE RESPIRATORY (INHALATION) at 07:25

## 2018-07-24 RX ADMIN — POTASSIUM CHLORIDE 20 MEQ: 200 INJECTION, SOLUTION INTRAVENOUS at 13:03

## 2018-07-24 RX ADMIN — IPRATROPIUM BROMIDE 0.5 MG: 0.5 SOLUTION RESPIRATORY (INHALATION) at 19:41

## 2018-07-24 RX ADMIN — OXYCODONE HYDROCHLORIDE 5 MG: 5 TABLET ORAL at 18:35

## 2018-07-24 RX ADMIN — LISINOPRIL: 20 TABLET ORAL at 08:37

## 2018-07-24 RX ADMIN — CLONIDINE HYDROCHLORIDE 0.2 MG: 0.1 TABLET ORAL at 08:37

## 2018-07-24 RX ADMIN — LIDOCAINE 2 PATCH: 50 PATCH TOPICAL at 08:38

## 2018-07-24 RX ADMIN — LEVALBUTEROL HYDROCHLORIDE 1.25 MG: 1.25 SOLUTION, CONCENTRATE RESPIRATORY (INHALATION) at 12:53

## 2018-07-24 RX ADMIN — IPRATROPIUM BROMIDE 0.5 MG: 0.5 SOLUTION RESPIRATORY (INHALATION) at 12:53

## 2018-07-24 RX ADMIN — OXYCODONE HYDROCHLORIDE 5 MG: 5 TABLET ORAL at 05:09

## 2018-07-24 RX ADMIN — CEFEPIME HYDROCHLORIDE 1000 MG: 1 INJECTION, SOLUTION INTRAVENOUS at 11:36

## 2018-07-24 RX ADMIN — MELATONIN TAB 3 MG 3 MG: 3 TAB at 21:58

## 2018-07-24 RX ADMIN — LEVALBUTEROL HYDROCHLORIDE 1.25 MG: 1.25 SOLUTION, CONCENTRATE RESPIRATORY (INHALATION) at 19:41

## 2018-07-24 RX ADMIN — OXYCODONE HYDROCHLORIDE 5 MG: 5 TABLET ORAL at 11:37

## 2018-07-24 RX ADMIN — CEFAZOLIN SODIUM 1000 MG: 1 SOLUTION INTRAVENOUS at 06:17

## 2018-07-24 RX ADMIN — CLONIDINE HYDROCHLORIDE 0.2 MG: 0.1 TABLET ORAL at 21:58

## 2018-07-24 RX ADMIN — CEFEPIME HYDROCHLORIDE 1000 MG: 1 INJECTION, SOLUTION INTRAVENOUS at 21:58

## 2018-07-24 RX ADMIN — CLONIDINE HYDROCHLORIDE 0.2 MG: 0.1 TABLET ORAL at 15:44

## 2018-07-24 RX ADMIN — POTASSIUM CHLORIDE 40 MEQ: 1500 TABLET, EXTENDED RELEASE ORAL at 10:10

## 2018-07-24 RX ADMIN — POTASSIUM CHLORIDE 20 MEQ: 200 INJECTION, SOLUTION INTRAVENOUS at 15:54

## 2018-07-24 RX ADMIN — IPRATROPIUM BROMIDE 0.5 MG: 0.5 SOLUTION RESPIRATORY (INHALATION) at 07:25

## 2018-07-24 NOTE — PROGRESS NOTES
Progress Note - Kang Balloon 80 y o  female MRN: [id number]    Unit/Bed#: E5 -01 Encounter: [id number]    Assessment/Plan:    LLE cellulitis  Continues to show improvement  Able to ambulate problems will continue another day of IV antibiotics possible DC in a m  Acute respiratory distress/currently resolved  Hypertension   well controlled with current medication    Ambulatory dysfunction physical therapy ongoing, recommend short-term rehab    Osteoarthritis  continue orthopedic follow    Disposition: If clinical course allows plan to DC [facility] short-term rehab if/When accepted  Medically for stable for discharge 24-48 hours  Subjective:   Feels shortness of breath and wheezing denies chest pain nausea vomiting diarrhea no fevers chills appetite is okay    Objective:     Vitals: Blood pressure 143/64, pulse 90, temperature 98 6 °F (37 °C), temperature source Temporal, resp  rate 20, height 5' 3" (1 6 m), weight 121 kg (267 lb 3 2 oz), SpO2 95 %  ,Body mass index is 47 33 kg/m²  Results from last 7 days  Lab Units 07/24/18  0514  07/19/18  1929   WBC Thousand/uL 8 23  < > 12 76*   HEMOGLOBIN g/dL 11 8  < > 13 3   HEMATOCRIT % 35 4  < > 40 1   PLATELETS Thousands/uL 212  < > 238   INR   --   --  1 01   < > = values in this interval not displayed  Results from last 7 days  Lab Units 07/24/18  0520  07/19/18  1929   SODIUM mmol/L 140  < > 137   POTASSIUM mmol/L 3 2*  < > 4 1   CHLORIDE mmol/L 102  < > 101   CO2 mmol/L 30  < > 29   BUN mg/dL 18  < > 35*   CREATININE mg/dL 0 70  < > 1 23   CALCIUM mg/dL 9 7  < > 9 6   TOTAL PROTEIN g/dL  --   --  6 9   BILIRUBIN TOTAL mg/dL  --   --  0 44   ALK PHOS U/L  --   --  70   ALT U/L  --   --  26   AST U/L  --   --  24   GLUCOSE RANDOM mg/dL 134  < > 114   < > = values in this interval not displayed      Scheduled Meds:    Current Facility-Administered Medications:  acetaminophen 650 mg Oral Q4H PRN Beck Carranza    aluminum-magnesium hydroxide-simethicone 30 mL Oral Q6H PRN Oris Push, PA-C    cefepime 1,000 mg Intravenous Q12H Rosa Carcamo MD Last Rate: 1,000 mg (07/24/18 1136)   cloNIDine 0 2 mg Oral TID Oris Push, PA-C    dextromethorphan-guaiFENesin 10 mL Oral Q4H PRN Candice Carranza    docusate sodium 100 mg Oral BID PRN Corin Mcmanus MD    enoxaparin 40 mg Subcutaneous Daily Oris Push, MORIS    hydrALAZINE 5 mg Intravenous Q6H PRN Corin Mcmanus MD    ipratropium 0 5 mg Nebulization TID JONO Trammell    levalbuterol 1 25 mg Nebulization TID JONO Trammell    levalbuterol 1 25 mg Nebulization Q8H PRN JONO Trammell    lidocaine 2 patch Transdermal Daily Oris Push, PA-SERAFIN    lisinopril-hydrochlorothiazide (PRINZIDE 20/12  5) combo dose  Oral Daily Oris Push, PA-C    melatonin 3 mg Oral HS Oris Push, PAEliecerC    ondansetron 4 mg Intravenous Q6H PRN Oris Push, PADEAN    oxyCODONE 5 mg Oral Q6H PRN Corin Mcmanus MD    polyethylene glycol 17 g Oral Daily Corin Mcmanus MD    sodium chloride 3 mL Nebulization Q8H PRN Karyn Lat, DO        Continuous Infusions:     Physical exam:  General appearance:  Alert no distress interaction appropriate   Head/Eyes:  Nonicteric PERRL EOMI  Neck:  Supple  Lungs:  Decreased BS bilateral wheezing  Heart: normal S1 S2 regular  Abdomen:  Obese nontender with bowel sounds  Extremities:  Left greater than right edema erythema left leg no exudate  Skin: no rash    Invasive Devices     Peripheral Intravenous Line            Peripheral IV 07/23/18 Right;Ventral (anterior) Forearm 1 day                  VTE Pharmacologic Prophylaxis:  Lovenox         Counseling / Coordination of Care  Total floor / unit time spent today  30   minutes  Greater than 50% of total time was spent with the patient and / or family counseling and / or coordination of care    A description of the counseling / coordination of care:

## 2018-07-24 NOTE — PROGRESS NOTES
Progress Note - Infectious Disease   Lidia Carter 80 y o  female MRN: [id number]  Unit/Bed#: E5 -01 Encounter: [id number]      Impression/Recommendations:  1  Multifocal pneumonia  This is most likely nosocomial pneumonia, developing after hospitalization  Patient is at risk for nosocomial GNR  Respiratory symptoms stable, without improvement  Will broaden antibiotic regimen  Change antibiotic to IV cefepime  Monitor respiratory symptoms  2  Left leg cellulitis  Patient is clinically improved  No evidence of septic joint on exam   She remains systemically well  Antibiotic changes in above  Serial leg exams  Monitor temperature/WBC      3  Low-grade fever  Source is most likely nosocomial pneumonia  Patient remains systemically well  Will monitor temperature with antibiotic change above  Antibiotic changes in above  Monitor temperature  Monitor WBC      4    Elevated procalcitonin earlier in the hospitalization  This was most likely secondary to cellulitis  New development pneumonia may contribute  Will recheck and monitor  Antibiotic plan as in above  Procalcitonin was not done yesterday  Will recheck today      5   Left hip pain  This is most likely secondary to advanced DJD  No clinical cellulitis here      Discussed with patient in detail regarding the above plan  Discussed with Dr Ryan Hernandez from [facility] service  Antibiotics:  Cefazolin  Antibiotic # 5    Subjective:  Patient has persistent dyspnea and cough  Left leg pain continues to improve  Temperature low-grade  No further chills  She is tolerating antibiotic well  No nausea, vomiting or diarrhea      Objective:  Vitals:  HR:  [89-93] 89  Resp:  [20-22] 20  BP: (119-144)/(63-76) 142/74  SpO2:  [93 %-95 %] 93 %  Temp (24hrs), Av 1 °F (37 3 °C), Min:98 7 °F (37 1 °C), Max:99 8 °F (37 7 °C)  Current: Temperature: 98 7 °F (37 1 °C)    Physical Exam:     General: Awake, alert, cooperative, no distress  Neck:  Supple  No mass  No lymphadenopathy  Lungs: Expansion symmetric, R>L basilar rales, no wheezing, respirations unlabored  Heart:  Mildly tachycardic with regular rhythm, S1 and S2 normal, no murmur  Abdomen: Soft, nondistended, non-tender, bowel sounds active all four quadrants,        no masses, no organomegaly  Extremities: Stable leg edema  Improved left leg erythema/warmth  No ulcer  Improved tenderness  Ankle with good ROM  Skin:  No rash  Neuro: Moves all extremities  Invasive Devices     Peripheral Intravenous Line            Peripheral IV 07/23/18 Right;Ventral (anterior) Forearm less than 1 day                Labs studies:   I have personally reviewed pertinent labs  Results from last 7 days  Lab Units 07/24/18  0520 07/24/18  0514 07/23/18 0441  07/19/18  1929   SODIUM mmol/L 140 140 139  < > 137   POTASSIUM mmol/L 3 2* 3 2* 3 3*  < > 4 1   CHLORIDE mmol/L 102 102 103  < > 101   CO2 mmol/L 30 31 24  < > 29   ANION GAP mmol/L 8 7 12  < > 7   BUN mg/dL 18 18 25  < > 35*   CREATININE mg/dL 0 70 0 77 0 87  < > 1 23   EGFR ml/min/1 73sq m 80 71 61  < > 40   GLUCOSE RANDOM mg/dL 134 132 103  < > 114   CALCIUM mg/dL 9 7 9 5 9 3  < > 9 6   AST U/L  --   --   --   --  24   ALT U/L  --   --   --   --  26   ALK PHOS U/L  --   --   --   --  70   TOTAL PROTEIN g/dL  --   --   --   --  6 9   BILIRUBIN TOTAL mg/dL  --   --   --   --  0 44   < > = values in this interval not displayed  Results from last 7 days  Lab Units 07/24/18  0514 07/23/18 0441 07/21/18  0542   WBC Thousand/uL 8 23 8 42 9 81   HEMOGLOBIN g/dL 11 8 11 7 11 5   PLATELETS Thousands/uL 212 187 158       Results from last 7 days  Lab Units 07/23/18  2046   LEGIONELLA URINARY ANTIGEN  Negative       Imaging Studies:   I have personally reviewed pertinent imaging study reports and images in PACS  Chest CT reviewed personally  RUL and RLL infiltrates  Trace right pleural effusion      EKG, Pathology, and Other Studies:   I have personally reviewed pertinent reports

## 2018-07-25 LAB
ANION GAP SERPL CALCULATED.3IONS-SCNC: 5 MMOL/L (ref 4–13)
ANION GAP SERPL CALCULATED.3IONS-SCNC: 7 MMOL/L (ref 4–13)
BASOPHILS # BLD MANUAL: 0.18 THOUSAND/UL (ref 0–0.1)
BASOPHILS NFR MAR MANUAL: 2 % (ref 0–1)
BUN SERPL-MCNC: 18 MG/DL (ref 5–25)
BUN SERPL-MCNC: 19 MG/DL (ref 5–25)
CALCIUM SERPL-MCNC: 9.3 MG/DL (ref 8.3–10.1)
CALCIUM SERPL-MCNC: 9.5 MG/DL (ref 8.3–10.1)
CHLORIDE SERPL-SCNC: 101 MMOL/L (ref 100–108)
CHLORIDE SERPL-SCNC: 101 MMOL/L (ref 100–108)
CO2 SERPL-SCNC: 28 MMOL/L (ref 21–32)
CO2 SERPL-SCNC: 33 MMOL/L (ref 21–32)
CREAT SERPL-MCNC: 0.68 MG/DL (ref 0.6–1.3)
CREAT SERPL-MCNC: 0.75 MG/DL (ref 0.6–1.3)
EOSINOPHIL # BLD MANUAL: 0.44 THOUSAND/UL (ref 0–0.4)
EOSINOPHIL NFR BLD MANUAL: 5 % (ref 0–6)
ERYTHROCYTE [DISTWIDTH] IN BLOOD BY AUTOMATED COUNT: 12.7 % (ref 11.6–15.1)
GFR SERPL CREATININE-BSD FRML MDRD: 73 ML/MIN/1.73SQ M
GFR SERPL CREATININE-BSD FRML MDRD: 81 ML/MIN/1.73SQ M
GLUCOSE SERPL-MCNC: 110 MG/DL (ref 65–140)
GLUCOSE SERPL-MCNC: 118 MG/DL (ref 65–140)
HCT VFR BLD AUTO: 37.1 % (ref 34.8–46.1)
HGB BLD-MCNC: 12 G/DL (ref 11.5–15.4)
LYMPHOCYTES # BLD AUTO: 1.14 THOUSAND/UL (ref 0.6–4.47)
LYMPHOCYTES # BLD AUTO: 13 % (ref 14–44)
MCH RBC QN AUTO: 30.9 PG (ref 26.8–34.3)
MCHC RBC AUTO-ENTMCNC: 32.3 G/DL (ref 31.4–37.4)
MCV RBC AUTO: 96 FL (ref 82–98)
MONOCYTES # BLD AUTO: 1.41 THOUSAND/UL (ref 0–1.22)
MONOCYTES NFR BLD: 16 % (ref 4–12)
NEUTROPHILS # BLD MANUAL: 5.54 THOUSAND/UL (ref 1.85–7.62)
NEUTS BAND NFR BLD MANUAL: 3 % (ref 0–8)
NEUTS SEG NFR BLD AUTO: 60 % (ref 43–75)
NRBC BLD AUTO-RTO: 0 /100 WBCS
PLATELET # BLD AUTO: 240 THOUSANDS/UL (ref 149–390)
PLATELET BLD QL SMEAR: ADEQUATE
PMV BLD AUTO: 9.3 FL (ref 8.9–12.7)
POTASSIUM SERPL-SCNC: 4.3 MMOL/L (ref 3.5–5.3)
POTASSIUM SERPL-SCNC: 5.5 MMOL/L (ref 3.5–5.3)
RBC # BLD AUTO: 3.88 MILLION/UL (ref 3.81–5.12)
RBC MORPH BLD: NORMAL
SODIUM SERPL-SCNC: 134 MMOL/L (ref 136–145)
SODIUM SERPL-SCNC: 141 MMOL/L (ref 136–145)
TOTAL CELLS COUNTED SPEC: 100
VARIANT LYMPHS # BLD AUTO: 1 %
WBC # BLD AUTO: 8.79 THOUSAND/UL (ref 4.31–10.16)

## 2018-07-25 PROCEDURE — 94760 N-INVAS EAR/PLS OXIMETRY 1: CPT

## 2018-07-25 PROCEDURE — 80048 BASIC METABOLIC PNL TOTAL CA: CPT | Performed by: INTERNAL MEDICINE

## 2018-07-25 PROCEDURE — 99232 SBSQ HOSP IP/OBS MODERATE 35: CPT | Performed by: INTERNAL MEDICINE

## 2018-07-25 PROCEDURE — 85007 BL SMEAR W/DIFF WBC COUNT: CPT | Performed by: INTERNAL MEDICINE

## 2018-07-25 PROCEDURE — 94640 AIRWAY INHALATION TREATMENT: CPT

## 2018-07-25 PROCEDURE — 85027 COMPLETE CBC AUTOMATED: CPT | Performed by: INTERNAL MEDICINE

## 2018-07-25 RX ADMIN — CEFEPIME HYDROCHLORIDE 1000 MG: 1 INJECTION, SOLUTION INTRAVENOUS at 20:57

## 2018-07-25 RX ADMIN — CLONIDINE HYDROCHLORIDE 0.2 MG: 0.1 TABLET ORAL at 16:00

## 2018-07-25 RX ADMIN — IPRATROPIUM BROMIDE 0.5 MG: 0.5 SOLUTION RESPIRATORY (INHALATION) at 19:52

## 2018-07-25 RX ADMIN — LIDOCAINE 2 PATCH: 50 PATCH TOPICAL at 08:13

## 2018-07-25 RX ADMIN — OXYCODONE HYDROCHLORIDE 5 MG: 5 TABLET ORAL at 07:07

## 2018-07-25 RX ADMIN — IPRATROPIUM BROMIDE 0.5 MG: 0.5 SOLUTION RESPIRATORY (INHALATION) at 14:36

## 2018-07-25 RX ADMIN — OXYCODONE HYDROCHLORIDE 5 MG: 5 TABLET ORAL at 20:54

## 2018-07-25 RX ADMIN — CEFEPIME HYDROCHLORIDE 1000 MG: 1 INJECTION, SOLUTION INTRAVENOUS at 10:18

## 2018-07-25 RX ADMIN — MELATONIN TAB 3 MG 3 MG: 3 TAB at 21:02

## 2018-07-25 RX ADMIN — LEVALBUTEROL HYDROCHLORIDE 1.25 MG: 1.25 SOLUTION, CONCENTRATE RESPIRATORY (INHALATION) at 14:36

## 2018-07-25 RX ADMIN — OXYCODONE HYDROCHLORIDE 5 MG: 5 TABLET ORAL at 13:38

## 2018-07-25 RX ADMIN — ENOXAPARIN SODIUM 40 MG: 40 INJECTION SUBCUTANEOUS at 08:13

## 2018-07-25 RX ADMIN — CLONIDINE HYDROCHLORIDE 0.2 MG: 0.1 TABLET ORAL at 08:12

## 2018-07-25 RX ADMIN — LISINOPRIL: 20 TABLET ORAL at 08:13

## 2018-07-25 RX ADMIN — LEVALBUTEROL HYDROCHLORIDE 1.25 MG: 1.25 SOLUTION, CONCENTRATE RESPIRATORY (INHALATION) at 19:52

## 2018-07-25 NOTE — CASE MANAGEMENT
Continued Stay Review    Date: 07/24/18    Vital Signs: /73 (BP Location: Left arm)   Pulse 93   Temp 98 4 °F (36 9 °C) (Temporal)   Resp 19   Ht 5' 3" (1 6 m)   Wt 121 kg (267 lb 3 2 oz)   SpO2 94%   BMI 47 33 kg/m²     Medications:   Scheduled Meds:   Current Facility-Administered Medications:  acetaminophen 650 mg Oral Q4H PRN   aluminum-magnesium hydroxide-simethicone 30 mL Oral Q6H PRN   cefepime 1,000 mg Intravenous Q12H   cloNIDine 0 2 mg Oral TID   dextromethorphan-guaiFENesin 10 mL Oral Q4H PRN   docusate sodium 100 mg Oral BID PRN   enoxaparin 40 mg Subcutaneous Daily   hydrALAZINE 5 mg Intravenous Q6H PRN   ipratropium 0 5 mg Nebulization TID   levalbuterol 1 25 mg Nebulization TID   levalbuterol 1 25 mg Nebulization Q8H PRN   lidocaine 2 patch Transdermal Daily   lisinopril-hydrochlorothiazide (PRINZIDE 20/12  5) combo dose  Oral Daily   melatonin 3 mg Oral HS   ondansetron 4 mg Intravenous Q6H PRN   oxyCODONE 5 mg Oral Q6H PRN   polyethylene glycol 17 g Oral Daily   sodium chloride 3 mL Nebulization Q8H PRN     Continuous Infusions:    PRN Meds:   acetaminophen    aluminum-magnesium hydroxide-simethicone    dextromethorphan-guaiFENesin    docusate sodium    hydrALAZINE    levalbuterol    ondansetron    oxyCODONE    sodium chloride    Abnormal Labs/Diagnostic Results:   POTASSIUM 3 2*     Age/Sex: 80 y o  female     Assessment/Plan:  LLE cellulitis              Continues to show improvement  Able to ambulate problems will continue another day of IV antibiotics possible DC in a m      Acute respiratory distress/currently resolved         Hypertension                         well controlled with current medication     Ambulatory dysfunction      physical therapy ongoing, recommend short-term rehab     Osteoarthritis                        continue orthopedic follow     Disposition: If clinical course allows plan to DC [facility] short-term rehab if/When accepted   Medically for stable for discharge 24-48 hours  Thank you,  Jermaine Figueroa  [facility] Utilization Review Department  Phone: [phone]; Fax [phone]  ATTENTION: The Network Utilization Review Department is now centralized for our 9 Facilities  Make a note that we have a new phone and fax numbers for our Department  Please call with any questions or concerns to [phone] and carefully follow the prompts so that you are directed to the right person  All voicemails are confidential  Fax any determinations, approvals, denials, and requests for initial or continue stay review clinical to [phone]  Due to HIGH CALL volume, it would be easier if you could please send faxed requests to expedite your requests and in part, help us provide discharge notifications faster

## 2018-07-25 NOTE — PHYSICAL THERAPY NOTE
Physical Therapy Cancellation Note  Attempted to see pt  This PM, however currently refusing therapeutic intervention at this time  Will cancel for today and continue to follow as appropriate       Aniyah Soto, ARMANDO

## 2018-07-25 NOTE — SOCIAL WORK
Spoke with pt's daughter regarding discharge planning  Pt's daughter would like pt to go to [facility] acute rehab  Informed daughter that referral will be made, however, if in the event pt is not approved for acute rehab that it would also be best to refer pt to a STR as a back up plan  Daughter chose [facility] for STR if pt can not go to [facility] acute rehab  Referrals were made  Pt refused PT/OT today and will need updated therapy note for auth to a rehab  Will continue to follow-up

## 2018-07-25 NOTE — PROGRESS NOTES
Progress Note - Infectious Disease   Merlinda Nail 80 y o  female MRN: [id number]  Unit/Bed#: E5 -01 Encounter: [id number]      Impression/Recommendations:  1    Multifocal pneumonia  This is most likely nosocomial pneumonia, developing after hospitalization  Patient is at risk for nosocomial GNR  Respiratory symptoms stable, without improvement  antibiotic broaden to cefepime  Patient is clinically much improved  Continue IV cefepime  Monitor respiratory symptoms  Change antibiotic to p o  Levaquin (500 mg daily) at discharge  Treat x7 days total, another 5 days      2  Left leg cellulitis  Patient is clinically improved  No evidence of septic joint on exam   She remains systemically well  Antibiotic as in above  Serial leg exams  Monitor temperature/WBC  When antibiotic is changed to p o  Levaquin as in above, will need to add p o  Keflex  Treat x 10 days total, another 4 days      3   Low-grade fever  Source is most likely nosocomial pneumonia  Patient remains systemically well  Fever resolved with antibiotic change  Antibiotic changes in above  Monitor temperature  Monitor WBC      4    Elevated procalcitonin earlier in the hospitalization   This was most likely secondary to cellulitis   New development pneumonia may contribute  procalcitonin decreased, near normalized     Antibiotic plan as in above      5   Left hip pain   This is most likely secondary to advanced DJD   No clinical cellulitis here      Discussed with patient in detail regarding the above plan      Antibiotics:  Cefepime # 2  Antibiotic # 6     Subjective:  Patient's dyspnea and cough improved  Left leg pain continues to improve  Temperature is down  No further chills  She is tolerating antibiotic well  No nausea, vomiting or diarrhea      Objective:  Vitals:  HR:  [90-94] 93  Resp:  [18-20] 19  BP: (134-149)/(64-73) 149/73  SpO2:  [93 %-95 %] 94 %  Temp (24hrs), Av 5 °F (36 9 °C), Min:98 4 °F (36 9 °C), Max:98 6 °F (37 °C)  Current: Temperature: 98 4 °F (36 9 °C)    Physical Exam:     General: Awake, alert, cooperative, no distress  Neck:  Supple  No mass  No lymphadenopathy  Lungs: Expansion symmetric, no rales, no wheezing, respirations unlabored  Heart:  Regular rate and rhythm, S1 and S2 normal, no murmur  Abdomen: Soft, nondistended, non-tender, bowel sounds active all four quadrants,        no masses, no organomegaly  Extremities: Improved leg edema  Improved erythema/warmth  No ulcer  Improved tenderness  Ankle remains without effusion and with good ROM  Skin:  No rash  Neuro: Moves all extremities  Invasive Devices     Peripheral Intravenous Line            Peripheral IV 07/23/18 Right;Ventral (anterior) Forearm 1 day                Labs studies:   I have personally reviewed pertinent labs  Results from last 7 days  Lab Units 07/25/18  0519 07/25/18  0014 07/24/18  0520  07/19/18  1929   SODIUM mmol/L 141 134* 140  < > 137   POTASSIUM mmol/L 4 3 5 5* 3 2*  < > 4 1   CHLORIDE mmol/L 101 101 102  < > 101   CO2 mmol/L 33* 28 30  < > 29   ANION GAP mmol/L 7 5 8  < > 7   BUN mg/dL 18 19 18  < > 35*   CREATININE mg/dL 0 75 0 68 0 70  < > 1 23   EGFR ml/min/1 73sq m 73 81 80  < > 40   GLUCOSE RANDOM mg/dL 110 118 134  < > 114   CALCIUM mg/dL 9 5 9 3 9 7  < > 9 6   AST U/L  --   --   --   --  24   ALT U/L  --   --   --   --  26   ALK PHOS U/L  --   --   --   --  70   TOTAL PROTEIN g/dL  --   --   --   --  6 9   BILIRUBIN TOTAL mg/dL  --   --   --   --  0 44   < > = values in this interval not displayed      Results from last 7 days  Lab Units 07/25/18  0519 07/24/18  0514 07/23/18  0441   WBC Thousand/uL 8 79 8 23 8 42   HEMOGLOBIN g/dL 12 0 11 8 11 7   PLATELETS Thousands/uL 240 212 187       Results from last 7 days  Lab Units 07/23/18  2046   LEGIONELLA URINARY ANTIGEN  Negative       Imaging Studies:   I have personally reviewed pertinent imaging study reports and images in PACS     EKG, Pathology, and Other Studies:   I have personally reviewed pertinent reports

## 2018-07-25 NOTE — OCCUPATIONAL THERAPY NOTE
Occupational Therapy Cancellation Note:      Patient Name: Merlinda Nail  GTDRH'D Date: 7/25/2018     Attempted to see pt for OT treatment session this PM, however pt declined at this time  Pt stated, "Not now  I just got comfortable  I just want to be left alone " Educated pt on purpose and benefit of OT treatment session, however pt continued to decline  Therefore, pt cx'd this PM  OT to follow pt on caseload as able to A w/ safe d/c planning/recommendations       RODNEY Tristan/GAGANDEEP

## 2018-07-26 LAB
ANION GAP SERPL CALCULATED.3IONS-SCNC: 6 MMOL/L (ref 4–13)
BASOPHILS # BLD MANUAL: 0 THOUSAND/UL (ref 0–0.1)
BASOPHILS NFR MAR MANUAL: 0 % (ref 0–1)
BUN SERPL-MCNC: 19 MG/DL (ref 5–25)
CALCIUM SERPL-MCNC: 9.4 MG/DL (ref 8.3–10.1)
CHLORIDE SERPL-SCNC: 99 MMOL/L (ref 100–108)
CO2 SERPL-SCNC: 33 MMOL/L (ref 21–32)
CREAT SERPL-MCNC: 0.75 MG/DL (ref 0.6–1.3)
EOSINOPHIL # BLD MANUAL: 0.39 THOUSAND/UL (ref 0–0.4)
EOSINOPHIL NFR BLD MANUAL: 4 % (ref 0–6)
ERYTHROCYTE [DISTWIDTH] IN BLOOD BY AUTOMATED COUNT: 12.7 % (ref 11.6–15.1)
GFR SERPL CREATININE-BSD FRML MDRD: 73 ML/MIN/1.73SQ M
GLUCOSE SERPL-MCNC: 104 MG/DL (ref 65–140)
HCT VFR BLD AUTO: 38 % (ref 34.8–46.1)
HGB BLD-MCNC: 12.3 G/DL (ref 11.5–15.4)
LYMPHOCYTES # BLD AUTO: 1.45 THOUSAND/UL (ref 0.6–4.47)
LYMPHOCYTES # BLD AUTO: 15 % (ref 14–44)
MCH RBC QN AUTO: 31.1 PG (ref 26.8–34.3)
MCHC RBC AUTO-ENTMCNC: 32.4 G/DL (ref 31.4–37.4)
MCV RBC AUTO: 96 FL (ref 82–98)
MONOCYTES # BLD AUTO: 0.58 THOUSAND/UL (ref 0–1.22)
MONOCYTES NFR BLD: 6 % (ref 4–12)
NEUTROPHILS # BLD MANUAL: 7.25 THOUSAND/UL (ref 1.85–7.62)
NEUTS BAND NFR BLD MANUAL: 1 % (ref 0–8)
NEUTS SEG NFR BLD AUTO: 74 % (ref 43–75)
NRBC BLD AUTO-RTO: 0 /100 WBCS
PLATELET # BLD AUTO: 318 THOUSANDS/UL (ref 149–390)
PLATELET BLD QL SMEAR: ADEQUATE
PMV BLD AUTO: 9.3 FL (ref 8.9–12.7)
POTASSIUM SERPL-SCNC: 4 MMOL/L (ref 3.5–5.3)
RBC # BLD AUTO: 3.95 MILLION/UL (ref 3.81–5.12)
RBC MORPH BLD: NORMAL
SODIUM SERPL-SCNC: 138 MMOL/L (ref 136–145)
TOTAL CELLS COUNTED SPEC: 100
WBC # BLD AUTO: 9.67 THOUSAND/UL (ref 4.31–10.16)

## 2018-07-26 PROCEDURE — 99233 SBSQ HOSP IP/OBS HIGH 50: CPT | Performed by: INTERNAL MEDICINE

## 2018-07-26 PROCEDURE — 97530 THERAPEUTIC ACTIVITIES: CPT

## 2018-07-26 PROCEDURE — 85027 COMPLETE CBC AUTOMATED: CPT | Performed by: INTERNAL MEDICINE

## 2018-07-26 PROCEDURE — 80048 BASIC METABOLIC PNL TOTAL CA: CPT | Performed by: INTERNAL MEDICINE

## 2018-07-26 PROCEDURE — 99232 SBSQ HOSP IP/OBS MODERATE 35: CPT | Performed by: INTERNAL MEDICINE

## 2018-07-26 PROCEDURE — 94640 AIRWAY INHALATION TREATMENT: CPT

## 2018-07-26 PROCEDURE — 97110 THERAPEUTIC EXERCISES: CPT

## 2018-07-26 PROCEDURE — 85007 BL SMEAR W/DIFF WBC COUNT: CPT | Performed by: INTERNAL MEDICINE

## 2018-07-26 PROCEDURE — 97535 SELF CARE MNGMENT TRAINING: CPT

## 2018-07-26 PROCEDURE — 94760 N-INVAS EAR/PLS OXIMETRY 1: CPT

## 2018-07-26 RX ADMIN — LIDOCAINE 2 PATCH: 50 PATCH TOPICAL at 08:58

## 2018-07-26 RX ADMIN — OXYCODONE HYDROCHLORIDE 5 MG: 5 TABLET ORAL at 22:07

## 2018-07-26 RX ADMIN — LEVALBUTEROL HYDROCHLORIDE 1.25 MG: 1.25 SOLUTION, CONCENTRATE RESPIRATORY (INHALATION) at 19:49

## 2018-07-26 RX ADMIN — IPRATROPIUM BROMIDE 0.5 MG: 0.5 SOLUTION RESPIRATORY (INHALATION) at 14:08

## 2018-07-26 RX ADMIN — OXYCODONE HYDROCHLORIDE 5 MG: 5 TABLET ORAL at 03:25

## 2018-07-26 RX ADMIN — CLONIDINE HYDROCHLORIDE 0.2 MG: 0.1 TABLET ORAL at 15:51

## 2018-07-26 RX ADMIN — OXYCODONE HYDROCHLORIDE 5 MG: 5 TABLET ORAL at 08:57

## 2018-07-26 RX ADMIN — ENOXAPARIN SODIUM 40 MG: 40 INJECTION SUBCUTANEOUS at 08:57

## 2018-07-26 RX ADMIN — CEFEPIME HYDROCHLORIDE 1000 MG: 1 INJECTION, SOLUTION INTRAVENOUS at 09:11

## 2018-07-26 RX ADMIN — LEVALBUTEROL HYDROCHLORIDE 1.25 MG: 1.25 SOLUTION, CONCENTRATE RESPIRATORY (INHALATION) at 14:08

## 2018-07-26 RX ADMIN — CEFEPIME HYDROCHLORIDE 1000 MG: 1 INJECTION, SOLUTION INTRAVENOUS at 22:11

## 2018-07-26 RX ADMIN — CLONIDINE HYDROCHLORIDE 0.2 MG: 0.1 TABLET ORAL at 08:57

## 2018-07-26 RX ADMIN — OXYCODONE HYDROCHLORIDE 5 MG: 5 TABLET ORAL at 15:51

## 2018-07-26 RX ADMIN — CLONIDINE HYDROCHLORIDE 0.2 MG: 0.1 TABLET ORAL at 22:08

## 2018-07-26 RX ADMIN — MELATONIN TAB 3 MG 3 MG: 3 TAB at 22:07

## 2018-07-26 RX ADMIN — LISINOPRIL: 20 TABLET ORAL at 08:57

## 2018-07-26 RX ADMIN — IPRATROPIUM BROMIDE 0.5 MG: 0.5 SOLUTION RESPIRATORY (INHALATION) at 19:49

## 2018-07-26 NOTE — OCCUPATIONAL THERAPY NOTE
[address] Progress Note     Patient Name: Tripp To  NREAD'G Date: 7/26/2018  Problem List  Patient Active Problem List   Diagnosis    Cellulitis    Hypertension    Hip pain    [facility]epsis [facility] [address] [facility]    Morbid (severe) obesity due to excess calories ([facility] [address] )    Acute respiratory distress         07/26/18 1118   Restrictions/Precautions   Weight Bearing Precautions Per Order No   Other Precautions Chair Alarm;O2;Fall Risk;Pain;Multiple lines   Lifestyle   Autonomy At baseline, pt was I w/ ADLs and functional mobility/transfers with use of RW for household distances and WC for community mobility, required assist w/ IADLs, (-) , and reports 1 fall PTA  Reciprocal Relationships Lives alone; Supportive staff and family   Service to Others Retired   Intrinsic Gratification Watching TV   Pain Assessment   Pain Assessment 0-10   Pain Score Worst Possible Pain   Pain Location Hip   Pain Orientation Bilateral   Pain Descriptors Aching   Pain Frequency Constant/continuous   Pain Onset Ongoing   Clinical Progression Not changed   Effect of Pain on Daily Activities Increased pain with activity   Patient's Stated Pain Goal No pain   Hospital Pain Intervention(s) Repositioned; Ambulation/increased activity; Emotional support   Response to Interventions Tolerated OT   ADL   Where Assessed Chair   Grooming Assistance 5  Supervision/Setup   Grooming Deficit Setup;Supervision/safety; Increased time to complete   Grooming Comments Light grooming tasks completed with Supervision 2* setup required and increased time to complete  UB Dressing Assistance 4  Minimal Assistance   UB Dressing Deficit Setup;Verbal cueing;Supervision/safety; Increased time to complete;Pull around back;Pull down in back   UB Dressing Comments UB dressing completed with Min A  Pt able to thread BUEs into hospital gown with supervision and cues for sequencing, however assist required to bring gown down/around in back     LB Dressing Assistance 1 Total Assistance   LB Dressing Deficit Setup;Don/doff R sock; Don/doff L sock   LB Dressing Comments LB dressing completed with Total A to don/doff B/L socks 2* decreased functional reach and inability to cross one leg over the other  Toileting Assistance  1  Total Assistance   Toileting Deficit Setup;Verbal cueing;Supervison/safety; Increased time to complete;Perineal hygiene   Toileting Comments Total A required for perineal hygiene at this time 2* pt requiring BUE support on Quick Move when standing for task  Functional Standing Tolerance   Time 2 mins   Activity Transfers, ADL tasks    Comments No LOB noted   Bed Mobility   Supine to Sit 2  Maximal assistance   Additional items Assist x 2;Bedrails;HOB elevated; Increased time required;Verbal cues;LE management   Sit to Supine Unable to assess  (Pt seated OOB in chair at end of session)   Additional Comments Pt seated OOB in chair at end of session with call bell and phone within reach  Chair alarm activated  All needs met and pt reports no further questions for OT at this time  Transfers   Sit to Stand 3  Moderate assistance   Additional items Assist x 2;Armrests; Increased time required;Verbal cues  (Quick Move)   Stand to Sit 3  Moderate assistance   Additional items Assist x 2;Armrests; Increased time required;Verbal cues  (Quick Move)   Mechanical lift 3  Moderate assistance   Additional items Assist x 2;Armrests; Increased time required;Verbal cues  (Quick Move)   Additional Comments Cues for safe technique and hand placement   Cognition   Overall Cognitive Status [facility]   Arousal/Participation Alert; Cooperative   Attention Within functional limits   Orientation Level Oriented X4   Memory Within functional limits   Following Commands Follows all commands and directions without difficulty   Activity Tolerance   Activity Tolerance Patient limited by fatigue;Patient limited by pain   Medical Staff Made Aware Pt appropriate to be seen per GEOVANNY Jeff   Assessment Assessment Pt seen for OT treatment session this AM focusing on functional activity tolerance, bed mobility, ADLs, and transfers with use of Quick Move  Pt alert and cooperative throughout session  Pt lying supine at start of session, requiring Max A of 2 for supine>sit 2* assist for LE management and trunk control  Transfers (sit<>stand with use of Quick Move) completed with Mod A of 2 2* assist to initiate forward weight shift for sit>stand and assist for controlled descent to surface for stand>sit  Cues required for technique and hand placement during transfers to increase safety of pt  Mod A of 2 required for use of Quick Move during transfers for safety of pt  Pt noted to be incontinent of urine, therefore requiring additional sit<>stand trials with use of Quick Move for perineal hygiene  Total A required for perineal hygiene at this time 2* pt requiring BUE support on Quick Move when standing for task  ADLs completed while seated OOB in bedside recliner  Light grooming tasks completed with Supervision 2* setup required and increased time to complete  UB dressing completed with Min A  Pt able to thread BUEs into hospital gown with supervision and cues for sequencing, however assist required to bring gown down/around in back  LB dressing completed with Total A to don/doff B/L socks 2* decreased functional reach and inability to cross one leg over the other  Pt seated OOB in chair at end of session with call bell and phone within reach  Chair alarm activated  All needs met and pt reports no further questions for OT at this time  Continue to recommend STR upon D/C  OT to follow pt on caseload  Plan   Treatment Interventions ADL retraining;Functional transfer training;UE strengthening/ROM; Endurance training;Patient/family training;Equipment evaluation/education; Compensatory technique education;Continued evaluation; Activityengagement; Energy conservation   Goal Expiration Date 08/04/18   Treatment Day 2   OT Frequency 3-5x/wk   Recommendation   OT Discharge Recommendation Short Term Rehab   OT - OK to Discharge Yes  (when medically cleared to STR)   Barthel Index   Feeding 10   Bathing 0   Grooming Score 5   Dressing Score 5   Bladder Score 5   Bowels Score 5   Toilet Use Score 5   Transfers (Bed/Chair) Score 5   Mobility (Level Surface) Score 0   Stairs Score 0   Barthel Index Score 40   Modified Inman Scale   Modified Inman Scale 4       Tena Guy, OTR/L

## 2018-07-26 NOTE — PLAN OF CARE
Problem: PHYSICAL THERAPY ADULT  Goal: Performs mobility at highest level of function for planned discharge setting  See evaluation for individualized goals  Treatment/Interventions: Functional transfer training, Equipment eval/education, Compensatory technique education, Spoke to nursing, Family  Equipment Recommended: Other (Comment) (defer at this time)       See flowsheet documentation for full assessment, interventions and recommendations  Outcome: Progressing  Prognosis: Fair  Problem List: Decreased strength, Decreased range of motion, Decreased endurance, Impaired balance, Decreased mobility, Decreased skin integrity, Obesity, Pain  Assessment: Pt found supine in bed and willing to participate in PT  Pt continues to require increased assistance with bed mobility requiring Max x2 and verbal cues for proper hand placement and bed mobility techniques  Pt continues to demonstrate incontinence and requires A for pericare  Pt able to transfer sit<>stand with use of quick move and Mod A x2 in order to get patient on and off quick move  Pt able to perform seated HEP with verbal cues for proper exercise technique and completion of exercises  Pt was repositioned in bedside chair with call bell in reach, with chair alarm active at end of treatment session  Continue to recommend rehab at this time to increase functional mobility and maximize independence  Barriers to Discharge: Decreased caregiver support  Barriers to Discharge Comments: Lives alone  Recommendation: Short-term skilled PT     PT - OK to Discharge: Yes (to rehab once medically stable)    See flowsheet documentation for full assessment

## 2018-07-26 NOTE — PROGRESS NOTES
Progress Note - Infectious Disease   Kera Ferguson 80 y o  female MRN: [id number]  Unit/Bed#: E5 -01 Encounter: [id number]      Impression/Recommendations: 1    Multifocal pneumonia   This is most likely nosocomial pneumonia, developing after hospitalization   Patient is at risk for nosocomial GNR   Respiratory symptoms stable, without improvement    antibiotic broaden to cefepime  Patient is clinically much improved  Continue IV cefepime  Monitor respiratory symptoms  Change antibiotic to p o  Levaquin (500 mg daily) at discharge  Treat x7 days total, another 4 days      2  Left leg cellulitis   Patient is clinically improved   No evidence of septic joint on exam   She remains systemically well  Antibiotic as in above  Serial leg exams  Monitor temperature/WBC  When antibiotic is changed to p o  Levaquin as in above, will need to add p o  Keflex  Treat x 10 days total, another 3 days      3   Low-grade fever   Source is most likely nosocomial pneumonia   Patient remains systemically well   Fever resolved with antibiotic change  Antibiotic changes in above  Monitor temperature  Monitor WBC      4    Elevated procalcitonin earlier in the hospitalization   This was most likely secondary to cellulitis   New development pneumonia may contribute    procalcitonin decreased, near normalized     Antibiotic plan as in above      5   Left hip pain   This is most likely secondary to advanced DJD   No clinical cellulitis here      Discussed with patient in detail regarding the above plan  Discussed with Dr Angelo Cordova from [facility] service      Antibiotics:  Cefepime # 3  Antibiotic # 7     Subjective:  Patient is comfortable  Dyspnea and cough improved  Left leg pain mild now  Temperature is down   No further chills  She is tolerating antibiotic well   No nausea, vomiting or diarrhea      Objective:  Vitals:  HR:  [82-88] 82  Resp:  [18] 18  BP: (110-144)/(66-81) 144/81  SpO2:  [94 %-96 %] 96 %  Temp (24hrs), Av 7 °F (37 1 °C), Min:97 7 °F (36 5 °C), Max:99 6 °F (37 6 °C)  Current: Temperature: 97 7 °F (36 5 °C)    Physical Exam:     General: Awake, alert, cooperative, no distress  Neck:  Supple  No mass  No lymphadenopathy  Lungs: Expansion symmetric, sparse basilar rales, no wheezing, respirations unlabored  Heart:  Regular rate and rhythm, S1 and S2 normal, no murmur  Abdomen: Soft, nondistended, non-tender, bowel sounds active all four quadrants,        no masses, no organomegaly  Extremities: Improved left leg edema, erythema, warmth  Improved tenderness  Ankle with full ROM  Skin:  No rash  Neuro: Moves all extremities  Invasive Devices     Peripheral Intravenous Line            Peripheral IV 18 Right;Ventral (anterior) Forearm 2 days                Labs studies:   I have personally reviewed pertinent labs  Results from last 7 days  Lab Units 18  0525 18  0519 18  0014  18  1929   SODIUM mmol/L 138 141 134*  < > 137   POTASSIUM mmol/L 4 0 4 3 5 5*  < > 4 1   CHLORIDE mmol/L 99* 101 101  < > 101   CO2 mmol/L 33* 33* 28  < > 29   ANION GAP mmol/L 6 7 5  < > 7   BUN mg/dL 19 18 19  < > 35*   CREATININE mg/dL 0 75 0 75 0 68  < > 1 23   EGFR ml/min/1 73sq m 73 73 81  < > 40   GLUCOSE RANDOM mg/dL 104 110 118  < > 114   CALCIUM mg/dL 9 4 9 5 9 3  < > 9 6   AST U/L  --   --   --   --  24   ALT U/L  --   --   --   --  26   ALK PHOS U/L  --   --   --   --  70   TOTAL PROTEIN g/dL  --   --   --   --  6 9   BILIRUBIN TOTAL mg/dL  --   --   --   --  0 44   < > = values in this interval not displayed      Results from last 7 days  Lab Units 18  0525 18  0519 18  0514   WBC Thousand/uL 9 67 8 79 8 23   HEMOGLOBIN g/dL 12 3 12 0 11 8   PLATELETS Thousands/uL 318 240 212       Results from last 7 days  Lab Units 18  2046   LEGIONELLA URINARY ANTIGEN  Negative       Imaging Studies:   I have personally reviewed pertinent imaging study reports and images in PACS  EKG, Pathology, and Other Studies:   I have personally reviewed pertinent reports

## 2018-07-26 NOTE — PROGRESS NOTES
Progress Note - Rushie Fothergill 80 y o  female MRN: [id number]    Unit/Bed#: E5 -01 Encounter: [id number]    Assessment/Plan:    LLE cellulitis  Continues to show improvement  Able to ambulate problems will continue another day of IV antibiotics possible DC in a m  Acute respiratory distress/currently resolved  Hypertension   well controlled with current medication    Ambulatory dysfunction physical therapy ongoing, recommend short-term rehab    Osteoarthritis  continue orthopedic follow    Disposition: If clinical course allows plan to DC [facility] short-term rehab if/When accepted  Encouraged ongoing motivation especially participation with PT/OT case discussed with patient's daughter via phone conference in detail and patient expressed her desire to [facility] get better and will do everything to get better [facility] for stable for discharge   for stable for discharge  Subjective:   Feels shortness of breath and wheezing denies chest pain nausea vomiting diarrhea no fevers chills appetite is okay    Objective:     Vitals: Blood pressure 144/81, pulse 82, temperature 97 7 °F (36 5 °C), temperature source Temporal, resp  rate 18, height 5' 3" (1 6 m), weight 121 kg (267 lb 3 2 oz), SpO2 94 %  ,Body mass index is 47 33 kg/m²  Results from last 7 days  Lab Units 07/26/18  0525  07/19/18  1929   WBC Thousand/uL 9 67  < > 12 76*   HEMOGLOBIN g/dL 12 3  < > 13 3   HEMATOCRIT % 38 0  < > 40 1   PLATELETS Thousands/uL 318  < > 238   INR   --   --  1 01   < > = values in this interval not displayed      Results from last 7 days  Lab Units 07/26/18  0525  07/19/18 1929   SODIUM mmol/L 138  < > 137   POTASSIUM mmol/L 4 0  < > 4 1   CHLORIDE mmol/L 99*  < > 101   CO2 mmol/L 33*  < > 29   BUN mg/dL 19  < > 35*   CREATININE mg/dL 0 75  < > 1 23   CALCIUM mg/dL 9 4  < > 9 6   TOTAL PROTEIN g/dL  --   --  6 9   BILIRUBIN TOTAL mg/dL  --   --  0 44   ALK PHOS U/L  --   --  70   ALT U/L  --   --  26   AST U/L  --   --  24   GLUCOSE RANDOM mg/dL 104  < > 114   < > = values in this interval not displayed  Scheduled Meds:    Current Facility-Administered Medications:  acetaminophen 650 mg Oral Q4H PRN Beck Carranza    aluminum-magnesium hydroxide-simethicone 30 mL Oral Q6H PRN Criss Mccormack PA-C    cefepime 1,000 mg Intravenous Q12H Keith Parra MD Last Rate: 1,000 mg (07/26/18 0911)   cloNIDine 0 2 mg Oral TID Criss Mccormack PA-C    dextromethorphan-guaiFENesin 10 mL Oral Q4H PRN Ehsan Carranza    docusate sodium 100 mg Oral BID PRN Sapphire Calle MD    enoxaparin 40 mg Subcutaneous Daily Criss Mccormack PA-C    hydrALAZINE 5 mg Intravenous Q6H PRN Sapphire Calle MD    ipratropium 0 5 mg Nebulization TID Madevn Corns, CRNP    levalbuterol 1 25 mg Nebulization TID Madolyn Corns, CRNP    levalbuterol 1 25 mg Nebulization Q8H PRN Madevn Corns, CRNP    lidocaine 2 patch Transdermal Daily Criss Mccormack PA-C    lisinopril-hydrochlorothiazide (PRINZIDE 20/12  5) combo dose  Oral Daily Criss Mccormack PA-C    melatonin 3 mg Oral HS Criss Mccormack PA-C    ondansetron 4 mg Intravenous Q6H PRN Criss Mccormack PA-C    oxyCODONE 5 mg Oral Q6H PRN Sapphire Calle MD    polyethylene glycol 17 g Oral Daily Sapphire Calle MD    sodium chloride 3 mL Nebulization Q8H PRN Angel Ramon DO        Continuous Infusions:     Physical exam:  General appearance:  Alert no distress interaction appropriate   Head/Eyes:  Nonicteric PERRL EOMI  Neck:  Supple  Lungs:  Decreased BS bilateral wheezing  Heart: normal S1 S2 regular  Abdomen:  Obese nontender with bowel sounds  Extremities:  Left greater than right edema erythema left leg no exudate  Skin: no rash    Invasive Devices     Peripheral Intravenous Line            Peripheral IV 07/23/18 Right;Ventral (anterior) Forearm 2 days                  VTE Pharmacologic Prophylaxis:  Lovenox         Counseling / Coordination of Care  Total floor / unit time spent today  30   minutes    Greater than 50% of total time was spent with the patient and / or family counseling and / or coordination of care    A description of the counseling / coordination of care:

## 2018-07-26 NOTE — PLAN OF CARE
Problem: OCCUPATIONAL THERAPY ADULT  Goal: Performs self-care activities at highest level of function for planned discharge setting  See evaluation for individualized goals  Treatment Interventions: ADL retraining, Functional transfer training, UE strengthening/ROM, Endurance training, Patient/family training, Equipment evaluation/education, Compensatory technique education, Continued evaluation, Energy conservation, Activityengagement          See flowsheet documentation for full assessment, interventions and recommendations  Outcome: Progressing  Limitation: Decreased ADL status, Decreased UE ROM, Decreased UE strength, Decreased endurance, Decreased self-care trans, Decreased high-level ADLs  Prognosis: Fair  Assessment: Pt seen for OT treatment session this AM focusing on functional activity tolerance, bed mobility, ADLs, and transfers with use of Quick Move  Pt alert and cooperative throughout session  Pt lying supine at start of session, requiring Max A of 2 for supine>sit 2* assist for LE management and trunk control  Transfers (sit<>stand with use of Quick Move) completed with Mod A of 2 2* assist to initiate forward weight shift for sit>stand and assist for controlled descent to surface for stand>sit  Cues required for technique and hand placement during transfers to increase safety of pt  Mod A of 2 required for use of Quick Move during transfers for safety of pt  Pt noted to be incontinent of urine, therefore requiring additional sit<>stand trials with use of Quick Move for perineal hygiene  Total A required for perineal hygiene at this time 2* pt requiring BUE support on Quick Move when standing for task  ADLs completed while seated OOB in bedside recliner  Light grooming tasks completed with Supervision 2* setup required and increased time to complete  UB dressing completed with Min A   Pt able to thread BUEs into hospital gown with supervision and cues for sequencing, however assist required to bring gown down/around in back  LB dressing completed with Total A to don/doff B/L socks 2* decreased functional reach and inability to cross one leg over the other  Pt seated OOB in chair at end of session with call bell and phone within reach  Chair alarm activated  All needs met and pt reports no further questions for OT at this time  Continue to recommend STR upon D/C  OT to follow pt on caseload        OT Discharge Recommendation: Short Term Rehab  OT - OK to Discharge: Yes (when medically cleared to STR)

## 2018-07-26 NOTE — PHYSICAL THERAPY NOTE
Physical Therapy Progress Note     07/26/18 1119   Pain Assessment   Pain Assessment 0-10   Pain Score Worst Possible Pain   Pain Location Hip   Pain Orientation Bilateral   Effect of Pain on Daily Activities Increased with activity   Hospital Pain Intervention(s) Repositioned; Ambulation/increased activity; Rest   Response to Interventions Tolerated   Restrictions/Precautions   Other Precautions O2;Fall Risk;Pain;Multiple lines; Chair Alarm   General   Chart Reviewed Yes   Response to Previous Treatment Patient with no complaints from previous session  Family/Caregiver Present No   Cognition   Overall Cognitive Status WFL   Arousal/Participation Alert; Responsive; Cooperative   Attention Within functional limits   Orientation Level Oriented X4   Memory Within functional limits   Following Commands Follows all commands and directions without difficulty   Subjective   Subjective Pt willing to participate in PT   Bed Mobility   Supine to Sit 2  Maximal assistance   Additional items Assist x 2;HOB elevated; Bedrails; Increased time required;Verbal cues;LE management   Transfers   Sit to Stand 3  Moderate assistance   Additional items Assist x 2; Increased time required;Verbal cues  (quick move)   Stand to Sit 3  Moderate assistance   Additional items Assist x 2; Increased time required;Verbal cues  (quick move)   Mechanical lift 3  Moderate assistance   Additional items Assist x 2; Increased time required;Verbal cues  (with use of quick move)   Ambulation/Elevation   Gait pattern Not appropriate   Balance   Static Sitting Fair   Dynamic Sitting Fair -   Static Standing Poor +   Dynamic Standing Poor   Endurance Deficit   Endurance Deficit Yes   Endurance Deficit Description pain, weakness, and fatigue   Activity Tolerance   Activity Tolerance Patient limited by fatigue;Patient limited by pain   Nurse [address] [facility],   [address], RN   Exercises   TKR Sitting;10 reps;AROM; Bilateral   Assessment   Prognosis Fair   Problem List Decreased strength;Decreased range of motion;Decreased endurance; Impaired balance;Decreased mobility; Decreased skin integrity;Obesity;Pain   Assessment Pt found supine in bed and willing to participate in PT  Pt continues to require increased assistance with bed mobility requiring Max x2 and verbal cues for proper hand placement and bed mobility techniques  Pt continues to demonstrate incontinence and requires A for pericare  Pt able to transfer sit<>stand with use of quick move and Mod A x2 in order to get patient on and off quick move  Pt able to perform seated HEP with verbal cues for proper exercise technique and completion of exercises  Pt was repositioned in bedside chair with call bell in reach, with chair alarm active at end of treatment session  Continue to recommend rehab at this time to increase functional mobility and maximize independence  Barriers to Discharge Decreased caregiver support   Barriers to Discharge Comments Lives alone   Goals   Patient Goals To go to rehab   LTG Expiration Date 07/31/18   Treatment Day 3   Plan   Treatment/Interventions Functional transfer training;LE strengthening/ROM; Therapeutic exercise; Endurance training;Patient/family training;Equipment eval/education; Bed mobility;Gait training;Spoke to nursing;OT;Spoke to case management   Progress Slow progress, decreased activity tolerance   PT Frequency Other (Comment)  (4-5x/wk)   Recommendation   Recommendation Other (Comment)  (rehab )   PT - OK to Discharge Yes  (to rehab once medically stable)     Ariana Montes De Oca, PTA Student

## 2018-07-26 NOTE — SOCIAL WORK
Due to pt refusing therapy previously, updated therapy notes were needed to proceed with auth  Pt seen by therapy this am and notes were sent over to [facility]  [facility], however, [facility] will still need to be approved in order for pt to go there  Otherwise, back up plan in [place] for Subacute rehab  Pt's daughter informed

## 2018-07-26 NOTE — PHYSICAL THERAPY NOTE
Physical Therapy Progress Note     07/26/18 1119   Pain Assessment   Pain Assessment 0-10   Pain Score Worst Possible Pain   Pain Location Hip   Pain Orientation Bilateral   Effect of Pain on Daily Activities Increased with activity   Hospital Pain Intervention(s) Repositioned; Ambulation/increased activity; Rest   Response to Interventions Tolerated   Restrictions/Precautions   Other Precautions O2;Fall Risk;Pain;Multiple lines; Chair Alarm   General   Chart Reviewed Yes   Response to Previous Treatment Patient with no complaints from previous session  Family/Caregiver Present No   Cognition   Overall Cognitive Status WFL   Arousal/Participation Alert; Responsive; Cooperative   Attention Within functional limits   Orientation Level Oriented X4   Memory Within functional limits   Following Commands Follows all commands and directions without difficulty   Subjective   Subjective Pt willing to participate in PT   Bed Mobility   Supine to Sit 2  Maximal assistance   Additional items Assist x 2;HOB elevated; Bedrails; Increased time required;Verbal cues;LE management   Transfers   Sit to Stand 3  Moderate assistance   Additional items Assist x 2; Increased time required;Verbal cues  (quick move)   Stand to Sit 3  Moderate assistance   Additional items Assist x 2; Increased time required;Verbal cues  (quick move)   Mechanical lift 3  Moderate assistance   Additional items Assist x 2; Increased time required;Verbal cues  (with use of quick move)   Ambulation/Elevation   Gait pattern Not appropriate   Balance   Static Sitting Fair   Dynamic Sitting Fair -   Static Standing Poor +   Dynamic Standing Poor   Endurance Deficit   Endurance Deficit Yes   Endurance Deficit Description pain, weakness, and fatigue   Activity Tolerance   Activity Tolerance Patient limited by fatigue;Patient limited by pain   Nurse [address] [facility],   [address], RN   Exercises   TKR Sitting;10 reps;AROM; Bilateral   Assessment   Prognosis Fair   Problem List Decreased strength;Decreased range of motion;Decreased endurance; Impaired balance;Decreased mobility; Decreased skin integrity;Obesity;Pain   Assessment Pt found supine in bed and willing to participate in PT  Pt continues to require increased assistance with bed mobility requiring Max x2 and verbal cues for proper hand placement and bed mobility techniques  Pt continues to demonstrate incontinence and requires A for pericare  Pt able to transfer sit<>stand with use of quick move and Mod A x2 in order to get patient on and off quick move  Pt able to perform seated HEP with verbal cues for proper exercise technique and completion of exercises  Pt was repositioned in bedside chair with call bell in reach, with chair alarm active at end of treatment session  Continue to recommend rehab at this time to increase functional mobility and maximize independence  Barriers to Discharge Decreased caregiver support   Barriers to Discharge Comments Lives alone   Goals   Patient Goals To go to rehab   LTG Expiration Date 07/31/18   Treatment Day 3   Plan   Treatment/Interventions Functional transfer training;LE strengthening/ROM; Therapeutic exercise; Endurance training;Patient/family training;Equipment eval/education; Bed mobility;Gait training;Spoke to nursing;OT;Spoke to case management   Progress Slow progress, decreased activity tolerance   PT Frequency Other (Comment)  (4-5x/wk)   Recommendation   Recommendation Short-term skilled PT   PT - OK to Discharge Yes  (to rehab once medically stable)     Altagracia Adan PTA Student

## 2018-07-27 LAB
ANION GAP SERPL CALCULATED.3IONS-SCNC: 5 MMOL/L (ref 4–13)
BUN SERPL-MCNC: 24 MG/DL (ref 5–25)
CALCIUM SERPL-MCNC: 9.2 MG/DL (ref 8.3–10.1)
CHLORIDE SERPL-SCNC: 99 MMOL/L (ref 100–108)
CO2 SERPL-SCNC: 34 MMOL/L (ref 21–32)
CREAT SERPL-MCNC: 0.79 MG/DL (ref 0.6–1.3)
GFR SERPL CREATININE-BSD FRML MDRD: 69 ML/MIN/1.73SQ M
GLUCOSE SERPL-MCNC: 112 MG/DL (ref 65–140)
POTASSIUM SERPL-SCNC: 3.8 MMOL/L (ref 3.5–5.3)
SODIUM SERPL-SCNC: 138 MMOL/L (ref 136–145)

## 2018-07-27 PROCEDURE — 80048 BASIC METABOLIC PNL TOTAL CA: CPT | Performed by: INTERNAL MEDICINE

## 2018-07-27 PROCEDURE — 94760 N-INVAS EAR/PLS OXIMETRY 1: CPT

## 2018-07-27 PROCEDURE — 99233 SBSQ HOSP IP/OBS HIGH 50: CPT | Performed by: INTERNAL MEDICINE

## 2018-07-27 PROCEDURE — 94640 AIRWAY INHALATION TREATMENT: CPT

## 2018-07-27 RX ORDER — CEPHALEXIN 500 MG/1
500 CAPSULE ORAL EVERY 6 HOURS SCHEDULED
Status: DISCONTINUED | OUTPATIENT
Start: 2018-07-27 | End: 2018-07-28 | Stop reason: HOSPADM

## 2018-07-27 RX ORDER — LEVOFLOXACIN 500 MG/1
500 TABLET, FILM COATED ORAL EVERY 24 HOURS
Status: DISCONTINUED | OUTPATIENT
Start: 2018-07-27 | End: 2018-07-28 | Stop reason: HOSPADM

## 2018-07-27 RX ADMIN — OXYCODONE HYDROCHLORIDE 5 MG: 5 TABLET ORAL at 04:17

## 2018-07-27 RX ADMIN — CEFEPIME HYDROCHLORIDE 1000 MG: 1 INJECTION, SOLUTION INTRAVENOUS at 08:41

## 2018-07-27 RX ADMIN — ENOXAPARIN SODIUM 40 MG: 40 INJECTION SUBCUTANEOUS at 08:41

## 2018-07-27 RX ADMIN — CEPHALEXIN 500 MG: 500 CAPSULE ORAL at 23:02

## 2018-07-27 RX ADMIN — LISINOPRIL: 20 TABLET ORAL at 08:41

## 2018-07-27 RX ADMIN — CLONIDINE HYDROCHLORIDE 0.2 MG: 0.1 TABLET ORAL at 15:34

## 2018-07-27 RX ADMIN — IPRATROPIUM BROMIDE 0.5 MG: 0.5 SOLUTION RESPIRATORY (INHALATION) at 20:02

## 2018-07-27 RX ADMIN — OXYCODONE HYDROCHLORIDE 5 MG: 5 TABLET ORAL at 11:14

## 2018-07-27 RX ADMIN — OXYCODONE HYDROCHLORIDE 5 MG: 5 TABLET ORAL at 16:56

## 2018-07-27 RX ADMIN — LEVALBUTEROL HYDROCHLORIDE 1.25 MG: 1.25 SOLUTION, CONCENTRATE RESPIRATORY (INHALATION) at 07:08

## 2018-07-27 RX ADMIN — OXYCODONE HYDROCHLORIDE 5 MG: 5 TABLET ORAL at 22:51

## 2018-07-27 RX ADMIN — LEVALBUTEROL HYDROCHLORIDE 1.25 MG: 1.25 SOLUTION, CONCENTRATE RESPIRATORY (INHALATION) at 20:02

## 2018-07-27 RX ADMIN — IPRATROPIUM BROMIDE 0.5 MG: 0.5 SOLUTION RESPIRATORY (INHALATION) at 13:05

## 2018-07-27 RX ADMIN — LEVALBUTEROL HYDROCHLORIDE 1.25 MG: 1.25 SOLUTION, CONCENTRATE RESPIRATORY (INHALATION) at 13:06

## 2018-07-27 RX ADMIN — CEPHALEXIN 500 MG: 500 CAPSULE ORAL at 16:57

## 2018-07-27 RX ADMIN — LIDOCAINE 2 PATCH: 50 PATCH TOPICAL at 08:41

## 2018-07-27 RX ADMIN — MELATONIN TAB 3 MG 3 MG: 3 TAB at 22:51

## 2018-07-27 RX ADMIN — LEVOFLOXACIN 500 MG: 500 TABLET, FILM COATED ORAL at 11:14

## 2018-07-27 RX ADMIN — CLONIDINE HYDROCHLORIDE 0.2 MG: 0.1 TABLET ORAL at 08:41

## 2018-07-27 RX ADMIN — IPRATROPIUM BROMIDE 0.5 MG: 0.5 SOLUTION RESPIRATORY (INHALATION) at 07:08

## 2018-07-27 RX ADMIN — CLONIDINE HYDROCHLORIDE 0.2 MG: 0.1 TABLET ORAL at 22:52

## 2018-07-27 NOTE — PROGRESS NOTES
Progress Note - Infectious Disease   Fiona Harrell 80 y o  female MRN: [id number]  Unit/Bed#: E5 -01 Encounter: [id number]      Impression/Recommendations: 1    Multifocal pneumonia   This is most likely nosocomial pneumonia, developing after hospitalization   Patient is clinically much improved on IV cefepime   Respiratory symptoms minimal now  Change antibiotic to p o  Levaquin  Monitor respiratory symptoms  Treat x7 days total, another 3 days      2  Left leg cellulitis   Patient is clinically improved   No evidence of septic joint on exam   She remains systemically well  Antibiotic as in above  Add Keflex  Serial leg exams  Monitor temperature/WBC  Continue Keflex x 10 days total antibiotic for cellulitis, another 2 days      3   Low-grade fever   Source is most likely nosocomial pneumonia   Patient remains systemically well   Fever resolved with antibiotic change  Antibiotic changes in above  Monitor temperature  Monitor WBC      4    Elevated procalcitonin earlier in the hospitalization   This was most likely secondary to cellulitis   New development pneumonia may contribute    procalcitonin decreased, near normalized     Antibiotic plan as in above      5   Left hip pain   This is most likely secondary to advanced DJD   No clinical cellulitis here      Discussed with patient in detail regarding the above plan  Discussed with Dr Haider Becerra from [facility] for discharge from ID viewpoint      Antibiotics:  Cefepime # 4  Antibiotic # 8     Subjective:  Patient is comfortable  Dyspnea and cough continue to improve  Left leg pain mild  Temperature stays down   No further chills  She is tolerating antibiotic well   No nausea, vomiting or diarrhea      Objective:  Vitals:  HR:  [74-86] 86  Resp:  [18] 18  BP: (119-134)/(63-66) 134/63  SpO2:  [94 %-96 %] 95 %  Temp (24hrs), Av °F (37 2 °C), Min:98 5 °F (36 9 °C), Max:99 4 °F (37 4 °C)  Current: Temperature: 98 5 °F (36 9 °C)    Physical Exam:     General: Awake, alert, cooperative, no distress  Neck:  Supple  No mass  No lymphadenopathy  Lungs: Expansion symmetric, no rales, no wheezing, respirations unlabored  Heart:  Regular rate and rhythm, S1 and S2 normal, no murmur  Abdomen: Soft, nondistended, non-tender, bowel sounds active all four quadrants,        no masses, no organomegaly  Extremities: Stable leg edema  Improving erythema/warmth  No ulcer  Mild tenderness  Ankle with full ROM  Skin:  No rash  Neuro: Moves all extremities  Invasive Devices     Peripheral Intravenous Line            Peripheral IV 07/23/18 Right;Ventral (anterior) Forearm 3 days                Labs studies:   I have personally reviewed pertinent labs  Results from last 7 days  Lab Units 07/27/18  0454 07/26/18  0525 07/25/18  0519   SODIUM mmol/L 138 138 141   POTASSIUM mmol/L 3 8 4 0 4 3   CHLORIDE mmol/L 99* 99* 101   CO2 mmol/L 34* 33* 33*   ANION GAP mmol/L 5 6 7   BUN mg/dL 24 19 18   CREATININE mg/dL 0 79 0 75 0 75   EGFR ml/min/1 73sq m 69 73 73   GLUCOSE RANDOM mg/dL 112 104 110   CALCIUM mg/dL 9 2 9 4 9 5       Results from last 7 days  Lab Units 07/26/18  0525 07/25/18  0519 07/24/18  0514   WBC Thousand/uL 9 67 8 79 8 23   HEMOGLOBIN g/dL 12 3 12 0 11 8   PLATELETS Thousands/uL 318 240 212       Results from last 7 days  Lab Units 07/23/18  2046   LEGIONELLA URINARY ANTIGEN  Negative       Imaging Studies:   I have personally reviewed pertinent imaging study reports and images in PACS  EKG, Pathology, and Other Studies:   I have personally reviewed pertinent reports

## 2018-07-27 NOTE — CASE MANAGEMENT
Continued Stay Review    Date: 07/27/18    Vital Signs: /63 (BP Location: Left arm)   Pulse 86   Temp 98 5 °F (36 9 °C) (Temporal)   Resp 18   Ht 5' 3" (1 6 m)   Wt 121 kg (267 lb 3 2 oz)   SpO2 95%   BMI 47 33 kg/m²     Medications:   Scheduled Meds:   Current Facility-Administered Medications:  acetaminophen 650 mg Oral Q4H PRN   aluminum-magnesium hydroxide-simethicone 30 mL Oral Q6H PRN   cephalexin 500 mg Oral Q6H ERICK   cloNIDine 0 2 mg Oral TID   dextromethorphan-guaiFENesin 10 mL Oral Q4H PRN   docusate sodium 100 mg Oral BID PRN   enoxaparin 40 mg Subcutaneous Daily   hydrALAZINE 5 mg Intravenous Q6H PRN   ipratropium 0 5 mg Nebulization TID   levalbuterol 1 25 mg Nebulization TID   levalbuterol 1 25 mg Nebulization Q8H PRN   levofloxacin 500 mg Oral Q24H   lidocaine 2 patch Transdermal Daily   lisinopril-hydrochlorothiazide (PRINZIDE 20/12  5) combo dose  Oral Daily   melatonin 3 mg Oral HS   ondansetron 4 mg Intravenous Q6H PRN   oxyCODONE 5 mg Oral Q6H PRN   polyethylene glycol 17 g Oral Daily   sodium chloride 3 mL Nebulization Q8H PRN     Continuous Infusions:    PRN Meds:   acetaminophen    aluminum-magnesium hydroxide-simethicone    dextromethorphan-guaiFENesin    docusate sodium    hydrALAZINE    levalbuterol    ondansetron    oxyCODONE    sodium chloride    Abnormal Labs/Diagnostic Results:     Lab 07/27/18  0454   CHLORIDE 99*   CO2 34*     Age/Sex: 80 y o  female       Impression/Recommendations: 1    Multifocal pneumonia   This is most likely nosocomial pneumonia, developing after hospitalization   Patient is clinically much improved on IV cefepime   Respiratory symptoms minimal now  Change antibiotic to p o  Levaquin  Monitor respiratory symptoms  Treat x7 days total, another 3 days      2  Left leg cellulitis   Patient is clinically improved   No evidence of septic joint on exam   She remains systemically well  Antibiotic as in above  Add Keflex    Serial leg exams   Monitor temperature/WBC  Continue Keflex x 10 days total antibiotic for cellulitis, another 2 days      3   Low-grade fever   Source is most likely nosocomial pneumonia   Patient remains systemically well   Fever resolved with antibiotic change  Antibiotic changes in above  Monitor temperature  Monitor WBC      4    Elevated procalcitonin earlier in the hospitalization   This was most likely secondary to cellulitis   New development pneumonia may contribute    procalcitonin decreased, near normalized     Antibiotic plan as in above      5   Left hip pain   This is most likely secondary to advanced DJD   No clinical cellulitis here      Discussed with patient in detail regarding the above plan  Discussed with Dr Julia Flynn from [facility] service  Discharge Plan: TBD      Thank you,  [id number]  Network Utilization Review Department  Phone: [phone]; Fax [phone]  ATTENTION: The Network Utilization Review Department is now centralized for our 9 Facilities  Make a note that we have a new phone and fax numbers for our Department  Please call with any questions or concerns to [phone] and carefully follow the prompts so that you are directed to the right person  All voicemails are confidential  Fax any determinations, approvals, denials, and requests for initial or continue stay review clinical to [phone]  Due to HIGH CALL volume, it would be easier if you could please send faxed requests to expedite your requests and in part, help us provide discharge notifications faster

## 2018-07-27 NOTE — CASE MANAGEMENT
Continued Stay Review    Date: 07/26/18    Vital Signs: /63 (BP Location: Left arm)   Pulse 86   Temp 98 5 °F (36 9 °C) (Temporal)   Resp 18   Ht 5' 3" (1 6 m)   Wt 121 kg (267 lb 3 2 oz)   SpO2 95%   BMI 47 33 kg/m²     Medications:   Scheduled Meds:   Current Facility-Administered Medications:  acetaminophen 650 mg Oral Q4H PRN   aluminum-magnesium hydroxide-simethicone 30 mL Oral Q6H PRN   cephalexin 500 mg Oral Q6H ERICK   cloNIDine 0 2 mg Oral TID   dextromethorphan-guaiFENesin 10 mL Oral Q4H PRN   docusate sodium 100 mg Oral BID PRN   enoxaparin 40 mg Subcutaneous Daily   hydrALAZINE 5 mg Intravenous Q6H PRN   ipratropium 0 5 mg Nebulization TID   levalbuterol 1 25 mg Nebulization TID   levalbuterol 1 25 mg Nebulization Q8H PRN   levofloxacin 500 mg Oral Q24H   lidocaine 2 patch Transdermal Daily   lisinopril-hydrochlorothiazide (PRINZIDE 20/12  5) combo dose  Oral Daily   melatonin 3 mg Oral HS   ondansetron 4 mg Intravenous Q6H PRN   oxyCODONE 5 mg Oral Q6H PRN   polyethylene glycol 17 g Oral Daily   sodium chloride 3 mL Nebulization Q8H PRN     Continuous Infusions:    PRN Meds:   acetaminophen    aluminum-magnesium hydroxide-simethicone    dextromethorphan-guaiFENesin    docusate sodium    hydrALAZINE    levalbuterol    ondansetron    oxyCODONE    sodium chloride    Abnormal Labs/Diagnostic Results:   Lab 07/26/18  0525   CHLORIDE 99*   CO2 33*       Age/Sex: 80 y o  female     Assessment/Plan:     LLE cellulitis              Continues to show improvement    Able to ambulate problems will continue another day of IV antibiotics possible DC in a m      Acute respiratory distress/currently resolved         Hypertension                         well controlled with current medication     Ambulatory dysfunction      physical therapy ongoing, recommend short-term rehab     Osteoarthritis                        continue orthopedic follow     Disposition: If clinical course allows plan to DC [facility] short-term rehab if/When accepted  Encouraged ongoing motivation especially participation with PT/OT case discussed with patient's daughter via phone conference in detail and patient expressed her desire to [facility] get better and will do everything to get better Medically for stable for discharge "     Thank you,  Jermaine Figueroa  [address] Utilization Review Department  Phone: [phone]; Fax [phone]  ATTENTION: The Network Utilization Review Department is now centralized for our 9 Facilities  Make a note that we have a new phone and fax numbers for our Department  Please call with any questions or concerns to [phone] and carefully follow the prompts so that you are directed to the right person  All voicemails are confidential  Fax any determinations, approvals, denials, and requests for initial or continue stay review clinical to [phone]  Due to HIGH CALL volume, it would be easier if you could please send faxed requests to expedite your requests and in part, help us provide discharge notifications faster

## 2018-07-27 NOTE — SOCIAL WORK
Call received after 5pm from [facility] SERVICES liaison that pt was denied auth for acute rehab at [facility] but will approve STR  Informed daughter and she does not want to appeal and is agreeable to STR at [facility]  Spoke with Chipper Lennox [phone] from [facility] and informed [facility] would be the choice for STR  Auth# was obtained  Auth# [id number]  approved 5 days  Update with Quinn Mariee [phone] fax [phone]  Contacted [facility] but CM was unable to reach anyone in their admissions office  Message left and auth# provided  Endorsed to weekend CM to follow-up with [facility] regarding bed availability tomorrow  Wheelchair transport tentatively setup for 1:30pm pick-up tomorrow to [facility]  Daughter informed of noted information and also aware of costs for transport $53

## 2018-07-27 NOTE — PROGRESS NOTES
Progress Note - Kera Ferguson 80 y o  female MRN: [id number]    Unit/Bed#: E5 -01 Encounter: [id number]    Assessment/Plan:    LLE cellulitis    Continues to show improvement  Able to ambulate problems will continue another day of IV antibiotics possible DC in a m  Keflex x2 more days    Acute respiratory distress/currently resolved  Likely due to pneumonia  Continue levofloxacin x3 more days    Hypertension     well controlled with current medication    Ambulatory dysfunction   physical therapy ongoing, recommend short-term rehab    Osteoarthritis    continue orthopedic follow up on outpatient basis  Morbid obesity  Extensive consultation has been given to the patient in regards to appropriate lifestyle to refer modifications  Disposition: If clinical course allows plan to DC [facility] short-term rehab if/When accepted  Encouraged ongoing motivation especially participation with PT/OT case discussed with patient's daughter via phone conference in detail and patient expressed her desire to [facility] get better and will do everything to get better [facility] for stable for discharge   for stable for discharge  Case discussed with case management awaiting to hear from Jaspreet Nolanerd       Subjective:   I am finally feeling better     Objective:     Vitals: Blood pressure 132/61, pulse 92, temperature 98 3 °F (36 8 °C), temperature source Temporal, resp  rate 18, height 5' 3" (1 6 m), weight 121 kg (267 lb 3 2 oz), SpO2 95 %  ,Body mass index is 47 33 kg/m²  Constitutional:  Well developed, well nourished, no acute distress, non-toxic appearance   Eyes:  PERRL, conjunctiva normal   HENT:  Atraumatic, external ears normal, nose normal, oropharynx moist, no pharyngeal exudates     Neck- normal range of motion, no tenderness, supple   Respiratory:  No respiratory distress, normal breath sounds, no rales, no wheezing   Cardiovascular:  Normal rate, normal rhythm, no murmurs, no gallops, no rubs   GI:  Soft, nondistended, normal bowel sounds, nontender, no organomegaly, no mass, no rebound, no guarding   :  No costovertebral angle tenderness   Musculoskeletal:  Mild nonpitting edema in bilateral lower extremity, no tenderness, no deformities  Back- no tenderness  Integument:  Well hydrated, no rash, left lower extremity redness at baseline, left knee completely resolved  Lymphatic:  No lymphadenopathy noted   Neurologic:  Alert & oriented x 3, CN 2-12 normal, normal motor function, normal sensory function, no focal deficits noted   Psychiatric:  Speech and behavior appropriate               Results from last 7 days  Lab Units 07/26/18  0525   WBC Thousand/uL 9 67   HEMOGLOBIN g/dL 12 3   HEMATOCRIT % 38 0   PLATELETS Thousands/uL 318       Results from last 7 days  Lab Units 07/27/18  0454   SODIUM mmol/L 138   POTASSIUM mmol/L 3 8   CHLORIDE mmol/L 99*   CO2 mmol/L 34*   BUN mg/dL 24   CREATININE mg/dL 0 79   CALCIUM mg/dL 9 2   GLUCOSE RANDOM mg/dL 112       Scheduled Meds:    Current Facility-Administered Medications:  acetaminophen 650 mg Oral Q4H PRN Beck Carranza   aluminum-magnesium hydroxide-simethicone 30 mL Oral Q6H PRN Angélica Andersen PA-C   cephalexin 500 mg Oral Q6H [address] Ayo Quintero MD   cloNIDine 0 2 mg Oral TID Angélica Andersen PA-C   dextromethorphan-guaiFENesin 10 mL Oral Q4H PRN Lizy Carranza   docusate sodium 100 mg Oral BID PRN Sean Otero MD   enoxaparin 40 mg Subcutaneous Daily Angélica Andersen PA-C   hydrALAZINE 5 mg Intravenous Q6H PRN Sean Otero MD   ipratropium 0 5 mg Nebulization TID Mandeep Shove, CRNP   levalbuterol 1 25 mg Nebulization TID Mandeep Shove, CRNP   levalbuterol 1 25 mg Nebulization Q8H PRN Mandeep Shove, CRNP   levofloxacin 500 mg Oral Q24H Ayo Quintero MD   lidocaine 2 patch Transdermal Daily Angélica Andersen PA-C   lisinopril-hydrochlorothiazide (PRINZIDE 20/12  5) combo dose  Oral Daily Angélica Andersen PA-C   melatonin 3 mg Oral HS Angélica Andersen PA-C ondansetron 4 mg Intravenous Q6H PRN Vanda Archuleta PA-C   oxyCODONE 5 mg Oral Q6H PRN Wilma Jimenez MD   polyethylene glycol 17 g Oral Daily Wilma Jimenez MD   sodium chloride 3 mL Nebulization Q8H PRN Isabella Nickerson DO       Continuous Infusions:      Invasive Devices     Peripheral Intravenous Line            Peripheral IV 07/23/18 Right;Ventral (anterior) Forearm 3 days                  VTE Pharmacologic Prophylaxis:  Lovenox         Counseling / Coordination of Care  Total floor / unit time spent today  30   minutes  Greater than 50% of total time was spent with the patient and / or family counseling and / or coordination of care    A description of the counseling / coordination of care:

## 2018-07-28 VITALS
SYSTOLIC BLOOD PRESSURE: 99 MMHG | OXYGEN SATURATION: 96 % | DIASTOLIC BLOOD PRESSURE: 60 MMHG | WEIGHT: 267.2 LBS | HEART RATE: 86 BPM | RESPIRATION RATE: 18 BRPM | HEIGHT: 63 IN | BODY MASS INDEX: 47.34 KG/M2 | TEMPERATURE: 97 F

## 2018-07-28 PROBLEM — R06.03 ACUTE RESPIRATORY DISTRESS: Status: RESOLVED | Noted: 2018-07-22 | Resolved: 2018-07-28

## 2018-07-28 PROBLEM — A41.9 SEPSIS (HCC): Status: RESOLVED | Noted: 2018-07-20 | Resolved: 2018-07-28

## 2018-07-28 PROBLEM — E78.5 HYPERLIPIDEMIA: Status: ACTIVE | Noted: 2018-07-28

## 2018-07-28 PROCEDURE — 94640 AIRWAY INHALATION TREATMENT: CPT

## 2018-07-28 PROCEDURE — 94760 N-INVAS EAR/PLS OXIMETRY 1: CPT

## 2018-07-28 PROCEDURE — 99239 HOSP IP/OBS DSCHRG MGMT >30: CPT | Performed by: INTERNAL MEDICINE

## 2018-07-28 RX ORDER — LEVOFLOXACIN 500 MG/1
500 TABLET, FILM COATED ORAL EVERY 24 HOURS
Refills: 0
Start: 2018-07-28 | End: 2018-07-30

## 2018-07-28 RX ORDER — POLYETHYLENE GLYCOL 3350 17 G/17G
17 POWDER, FOR SOLUTION ORAL DAILY
Qty: 14 EACH | Refills: 0
Start: 2018-07-29 | End: 2018-09-27 | Stop reason: SDUPTHER

## 2018-07-28 RX ORDER — CEPHALEXIN 500 MG/1
500 CAPSULE ORAL EVERY 6 HOURS SCHEDULED
Refills: 0
Start: 2018-07-28 | End: 2018-07-31

## 2018-07-28 RX ORDER — LIDOCAINE 50 MG/G
2 PATCH TOPICAL DAILY
Qty: 30 PATCH | Refills: 0
Start: 2018-07-29 | End: 2018-09-27 | Stop reason: CLARIF

## 2018-07-28 RX ORDER — DOCUSATE SODIUM 100 MG/1
100 CAPSULE, LIQUID FILLED ORAL 2 TIMES DAILY PRN
Qty: 10 CAPSULE | Refills: 0
Start: 2018-07-28 | End: 2018-09-27

## 2018-07-28 RX ORDER — LEVALBUTEROL 1.25 MG/.5ML
1.25 SOLUTION, CONCENTRATE RESPIRATORY (INHALATION)
Qty: 1 EACH | Refills: 0
Start: 2018-07-28 | End: 2018-09-27 | Stop reason: CLARIF

## 2018-07-28 RX ORDER — LANOLIN ALCOHOL/MO/W.PET/CERES
3 CREAM (GRAM) TOPICAL
Refills: 0
Start: 2018-07-28 | End: 2018-09-27 | Stop reason: CLARIF

## 2018-07-28 RX ADMIN — LIDOCAINE 2 PATCH: 50 PATCH TOPICAL at 08:51

## 2018-07-28 RX ADMIN — IPRATROPIUM BROMIDE 0.5 MG: 0.5 SOLUTION RESPIRATORY (INHALATION) at 07:50

## 2018-07-28 RX ADMIN — CEPHALEXIN 500 MG: 500 CAPSULE ORAL at 11:44

## 2018-07-28 RX ADMIN — LEVALBUTEROL HYDROCHLORIDE 1.25 MG: 1.25 SOLUTION, CONCENTRATE RESPIRATORY (INHALATION) at 07:50

## 2018-07-28 RX ADMIN — LEVOFLOXACIN 500 MG: 500 TABLET, FILM COATED ORAL at 10:47

## 2018-07-28 RX ADMIN — CEPHALEXIN 500 MG: 500 CAPSULE ORAL at 05:54

## 2018-07-28 RX ADMIN — OXYCODONE HYDROCHLORIDE 5 MG: 5 TABLET ORAL at 12:35

## 2018-07-28 RX ADMIN — ENOXAPARIN SODIUM 40 MG: 40 INJECTION SUBCUTANEOUS at 08:50

## 2018-07-28 RX ADMIN — OXYCODONE HYDROCHLORIDE 5 MG: 5 TABLET ORAL at 06:11

## 2018-07-28 NOTE — DISCHARGE SUMMARY
Discharge Summary - Jayleen Diaz, 1933, [id number]        Admission Date: 7/19/2018  Discharge Date: 7/28/2018    Admitting Diagnosis: Cellulitis [L03 90]  Hip pain [M25 559]    Discharge Diagnosis:   1  Left leg cellulitis  2  Pneumonia  3  Respiratory distress secondary to 2   4   Hypertension  5  Morbid obesity  6  Osteoarthritis  7  Ambulatory dysfunction    Consulting Physicians:  1  Dr Greg Schulte, orthopedic surgery  2  Dr Jamar Hyatt, infectious Disease    Procedures Performed:   None    HPI:  The patient is an 49-year-old woman who came to the emergency room because of bilateral leg pain  This had been ongoing for months to years  The patient was found to have severe osteoarthritis of the hips  She was discharged from the emergency room earlier on the day of admission but returned because of continued pain  The patient was also noted to have left leg cellulitis was admitted for treatment of this  Hospital Course: The patient was admitted to the hospital in appropriate cultures were obtained  She was started on intravenous antibiotics for cellulitis  She was seen in consultation by Infectious Disease with respect to this  The patient did improve over the next several days with antibiotic treatment  Thereafter, she was converted to oral antibiotics and continued to do well  The patient was evaluated by Dr Greg Schulte of Orthopedic surgery because of her osteoarthritis  The patient is noted to have severe hip osteoarthritis  At this time, she is not considered a suitable operative candidate because of her cellulitis  Her BMI and age are also thought to represent relative contraindications to surgery  During the patient's stay she developed respiratory distress  She underwent CT scanning of her chest which suggested a multifocal pneumonia  She was treated with cefepime and later Levaquin for presumed nosocomial pneumonia  With this, she did improve significantly      The patient's functional status was impaired at the time of admission  She has mostly been in a wheelchair at her assisted living  With her acute illness, she deteriorated further and short-term rehabilitation was recommended  On the day of discharge the patient was feeling well  Vital signs were stable  Lungs were clear  Cardiac exam revealed regular rhythm  There was a systolic murmur noted  The abdomen was soft with active bowel sounds  There was no mass, tenderness, or organomegaly  There was mild redness of the left lower leg  Disposition:  The patient was transferred to the [address] [place] [address] for additional care on July 28th  She will continue diet and activity as tolerated  She will be under the care of the physicians at her facility until her return to assisted living  Discharge instructions/Information to patient and family:   See after visit summary for information provided to patient and family  Provisions for Follow-Up Care:  See after visit summary for information related to follow-up care and any pertinent home health orders  Planned Readmission: No    Discharge Statement   I spent 40 minutes discharging the patient  This time was spent on the day of discharge  I had direct contact with the patient on the day of discharge  Discharge Medications:  See after visit summary for reconciled discharge medications provided to patient and family

## 2018-07-28 NOTE — SOCIAL WORK
CM called [facility] and spoke with Susana Adkins who confirmed facility has a bed for patient for today and provided this CM with number for report  CM made RN aware of same  No other needs at present  CM to follow as needed

## 2018-08-08 ENCOUNTER — APPOINTMENT (EMERGENCY)
Dept: RADIOLOGY | Facility: HOSPITAL | Age: 83
DRG: 871 | End: 2018-08-08
Payer: COMMERCIAL

## 2018-08-08 ENCOUNTER — HOSPITAL ENCOUNTER (INPATIENT)
Facility: HOSPITAL | Age: 83
LOS: 8 days | DRG: 871 | End: 2018-08-17
Attending: EMERGENCY MEDICINE | Admitting: INTERNAL MEDICINE
Payer: COMMERCIAL

## 2018-08-08 DIAGNOSIS — J96.01 ACUTE RESPIRATORY FAILURE WITH HYPOXIA (HCC): ICD-10-CM

## 2018-08-08 DIAGNOSIS — R94.31 ABNORMAL FINDING ON EKG: ICD-10-CM

## 2018-08-08 DIAGNOSIS — J18.9 PNEUMONIA: ICD-10-CM

## 2018-08-08 DIAGNOSIS — L03.116 CELLULITIS OF LEFT LOWER EXTREMITY: ICD-10-CM

## 2018-08-08 DIAGNOSIS — J98.01 ACUTE BRONCHOSPASM: ICD-10-CM

## 2018-08-08 DIAGNOSIS — A41.9 SEPSIS (HCC): ICD-10-CM

## 2018-08-08 DIAGNOSIS — E66.01 MORBID (SEVERE) OBESITY DUE TO EXCESS CALORIES (HCC): Chronic | ICD-10-CM

## 2018-08-08 DIAGNOSIS — L03.116 CELLULITIS OF LEFT LEG: Primary | ICD-10-CM

## 2018-08-08 DIAGNOSIS — M16.0 PRIMARY OSTEOARTHRITIS OF BOTH HIPS: Chronic | ICD-10-CM

## 2018-08-08 DIAGNOSIS — I50.33 ACUTE ON CHRONIC DIASTOLIC (CONGESTIVE) HEART FAILURE (HCC): ICD-10-CM

## 2018-08-08 LAB
ALBUMIN SERPL BCP-MCNC: 2.4 G/DL (ref 3.5–5)
ALP SERPL-CCNC: 95 U/L (ref 46–116)
ALT SERPL W P-5'-P-CCNC: 111 U/L (ref 12–78)
ANION GAP SERPL CALCULATED.3IONS-SCNC: 8 MMOL/L (ref 4–13)
AST SERPL W P-5'-P-CCNC: 75 U/L (ref 5–45)
BACTERIA UR QL AUTO: ABNORMAL /HPF
BASOPHILS # BLD AUTO: 0.02 THOUSANDS/ΜL (ref 0–0.1)
BASOPHILS NFR BLD AUTO: 0 % (ref 0–1)
BILIRUB SERPL-MCNC: 0.26 MG/DL (ref 0.2–1)
BILIRUB UR QL STRIP: NEGATIVE
BUN SERPL-MCNC: 24 MG/DL (ref 5–25)
CALCIUM SERPL-MCNC: 8.6 MG/DL (ref 8.3–10.1)
CAOX CRY URNS QL MICRO: ABNORMAL /HPF
CHLORIDE SERPL-SCNC: 98 MMOL/L (ref 100–108)
CLARITY UR: CLEAR
CO2 SERPL-SCNC: 26 MMOL/L (ref 21–32)
COLOR UR: YELLOW
COLOR, POC: YELLOW
CREAT SERPL-MCNC: 1.25 MG/DL (ref 0.6–1.3)
EOSINOPHIL # BLD AUTO: 0.4 THOUSAND/ΜL (ref 0–0.61)
EOSINOPHIL NFR BLD AUTO: 7 % (ref 0–6)
ERYTHROCYTE [DISTWIDTH] IN BLOOD BY AUTOMATED COUNT: 13.2 % (ref 11.6–15.1)
GFR SERPL CREATININE-BSD FRML MDRD: 40 ML/MIN/1.73SQ M
GLUCOSE SERPL-MCNC: 109 MG/DL (ref 65–140)
GLUCOSE UR STRIP-MCNC: NEGATIVE MG/DL
HCT VFR BLD AUTO: 33.4 % (ref 34.8–46.1)
HGB BLD-MCNC: 10.8 G/DL (ref 11.5–15.4)
HGB UR QL STRIP.AUTO: ABNORMAL
KETONES UR STRIP-MCNC: NEGATIVE MG/DL
LACTATE SERPL-SCNC: 0.9 MMOL/L (ref 0.5–2)
LEUKOCYTE ESTERASE UR QL STRIP: NEGATIVE
LYMPHOCYTES # BLD AUTO: 0.52 THOUSANDS/ΜL (ref 0.6–4.47)
LYMPHOCYTES NFR BLD AUTO: 9 % (ref 14–44)
MCH RBC QN AUTO: 30.6 PG (ref 26.8–34.3)
MCHC RBC AUTO-ENTMCNC: 32.3 G/DL (ref 31.4–37.4)
MCV RBC AUTO: 95 FL (ref 82–98)
MONOCYTES # BLD AUTO: 0.48 THOUSAND/ΜL (ref 0.17–1.22)
MONOCYTES NFR BLD AUTO: 9 % (ref 4–12)
NEUTROPHILS # BLD AUTO: 4.11 THOUSANDS/ΜL (ref 1.85–7.62)
NEUTS SEG NFR BLD AUTO: 74 % (ref 43–75)
NITRITE UR QL STRIP: NEGATIVE
NON-SQ EPI CELLS URNS QL MICRO: ABNORMAL /HPF
NRBC BLD AUTO-RTO: 0 /100 WBCS
OTHER STN SPEC: ABNORMAL
PH UR STRIP.AUTO: 6 [PH] (ref 4.5–8)
PLATELET # BLD AUTO: 302 THOUSANDS/UL (ref 149–390)
PMV BLD AUTO: 8.9 FL (ref 8.9–12.7)
POTASSIUM SERPL-SCNC: 4.5 MMOL/L (ref 3.5–5.3)
PROT SERPL-MCNC: 7 G/DL (ref 6.4–8.2)
PROT UR STRIP-MCNC: ABNORMAL MG/DL
RBC # BLD AUTO: 3.53 MILLION/UL (ref 3.81–5.12)
RBC #/AREA URNS AUTO: ABNORMAL /HPF
SODIUM SERPL-SCNC: 132 MMOL/L (ref 136–145)
SP GR UR STRIP.AUTO: 1.02 (ref 1–1.03)
UROBILINOGEN UR QL STRIP.AUTO: 0.2 E.U./DL
WBC # BLD AUTO: 5.53 THOUSAND/UL (ref 4.31–10.16)
WBC #/AREA URNS AUTO: ABNORMAL /HPF

## 2018-08-08 PROCEDURE — 71046 X-RAY EXAM CHEST 2 VIEWS: CPT

## 2018-08-08 PROCEDURE — 85025 COMPLETE CBC W/AUTO DIFF WBC: CPT | Performed by: EMERGENCY MEDICINE

## 2018-08-08 PROCEDURE — 96375 TX/PRO/DX INJ NEW DRUG ADDON: CPT

## 2018-08-08 PROCEDURE — 96365 THER/PROPH/DIAG IV INF INIT: CPT

## 2018-08-08 PROCEDURE — 87086 URINE CULTURE/COLONY COUNT: CPT | Performed by: EMERGENCY MEDICINE

## 2018-08-08 PROCEDURE — 83605 ASSAY OF LACTIC ACID: CPT | Performed by: EMERGENCY MEDICINE

## 2018-08-08 PROCEDURE — 81002 URINALYSIS NONAUTO W/O SCOPE: CPT | Performed by: EMERGENCY MEDICINE

## 2018-08-08 PROCEDURE — 87040 BLOOD CULTURE FOR BACTERIA: CPT | Performed by: EMERGENCY MEDICINE

## 2018-08-08 PROCEDURE — 81001 URINALYSIS AUTO W/SCOPE: CPT

## 2018-08-08 PROCEDURE — 80053 COMPREHEN METABOLIC PANEL: CPT | Performed by: EMERGENCY MEDICINE

## 2018-08-08 PROCEDURE — 36415 COLL VENOUS BLD VENIPUNCTURE: CPT

## 2018-08-08 PROCEDURE — 96367 TX/PROPH/DG ADDL SEQ IV INF: CPT

## 2018-08-08 PROCEDURE — 96361 HYDRATE IV INFUSION ADD-ON: CPT

## 2018-08-08 RX ORDER — NYSTATIN 100000 [USP'U]/G
POWDER TOPICAL 3 TIMES DAILY
COMMUNITY
End: 2018-09-27 | Stop reason: CLARIF

## 2018-08-08 RX ORDER — MAG HYDROX/ALUMINUM HYD/SIMETH 400-400-40
1 SUSPENSION, ORAL (FINAL DOSE FORM) ORAL AS NEEDED
COMMUNITY
End: 2018-09-27 | Stop reason: CLARIF

## 2018-08-08 RX ORDER — FENTANYL CITRATE 50 UG/ML
25 INJECTION, SOLUTION INTRAMUSCULAR; INTRAVENOUS ONCE
Status: DISCONTINUED | OUTPATIENT
Start: 2018-08-08 | End: 2018-08-17 | Stop reason: HOSPADM

## 2018-08-08 RX ORDER — SACCHAROMYCES BOULARDII 250 MG
250 CAPSULE ORAL 2 TIMES DAILY
COMMUNITY
Start: 2018-07-30 | End: 2018-08-17 | Stop reason: HOSPADM

## 2018-08-08 RX ORDER — NYSTATIN 100000 [USP'U]/G
POWDER TOPICAL
COMMUNITY
End: 2018-09-27 | Stop reason: CLARIF

## 2018-08-08 RX ORDER — ACETAMINOPHEN 325 MG/1
650 TABLET ORAL ONCE
Status: DISCONTINUED | OUTPATIENT
Start: 2018-08-08 | End: 2018-08-08

## 2018-08-08 RX ORDER — BISACODYL 10 MG
1 SUPPOSITORY, RECTAL RECTAL DAILY PRN
COMMUNITY
End: 2018-09-27 | Stop reason: CLARIF

## 2018-08-08 RX ORDER — HYDROCODONE BITARTRATE AND ACETAMINOPHEN 5; 300 MG/1; MG/1
1 TABLET ORAL EVERY 12 HOURS PRN
Status: ON HOLD | COMMUNITY
End: 2018-08-17

## 2018-08-08 RX ORDER — ACETAMINOPHEN 325 MG/1
650 TABLET ORAL EVERY 8 HOURS PRN
COMMUNITY
End: 2018-09-27 | Stop reason: CLARIF

## 2018-08-08 RX ADMIN — SODIUM CHLORIDE 1000 ML: 0.9 INJECTION, SOLUTION INTRAVENOUS at 21:02

## 2018-08-08 RX ADMIN — VANCOMYCIN HYDROCHLORIDE 1250 MG: 1 INJECTION, POWDER, LYOPHILIZED, FOR SOLUTION INTRAVENOUS at 23:56

## 2018-08-08 RX ADMIN — HYDROMORPHONE HYDROCHLORIDE 0.5 MG: 1 INJECTION, SOLUTION INTRAMUSCULAR; INTRAVENOUS; SUBCUTANEOUS at 23:43

## 2018-08-08 RX ADMIN — CEFEPIME HYDROCHLORIDE 2000 MG: 2 INJECTION, POWDER, FOR SOLUTION INTRAVENOUS at 23:11

## 2018-08-09 ENCOUNTER — APPOINTMENT (INPATIENT)
Dept: ULTRASOUND IMAGING | Facility: HOSPITAL | Age: 83
DRG: 871 | End: 2018-08-09
Payer: COMMERCIAL

## 2018-08-09 PROBLEM — R65.20 SEVERE SEPSIS (HCC): Status: ACTIVE | Noted: 2018-07-20

## 2018-08-09 PROBLEM — R74.01 TRANSAMINITIS: Status: ACTIVE | Noted: 2018-08-09

## 2018-08-09 PROBLEM — M16.0 OSTEOARTHRITIS OF BOTH HIPS: Chronic | Status: ACTIVE | Noted: 2018-08-09

## 2018-08-09 PROBLEM — J96.01 ACUTE RESPIRATORY FAILURE WITH HYPOXIA (HCC): Status: ACTIVE | Noted: 2018-08-09

## 2018-08-09 PROBLEM — L03.116 CELLULITIS OF LEFT LOWER EXTREMITY: Status: ACTIVE | Noted: 2018-08-09

## 2018-08-09 PROBLEM — B35.4 TINEA CORPORIS: Status: ACTIVE | Noted: 2018-08-09

## 2018-08-09 PROBLEM — N17.9 AKI (ACUTE KIDNEY INJURY) (HCC): Status: ACTIVE | Noted: 2018-08-09

## 2018-08-09 PROBLEM — E87.1 HYPONATREMIA: Status: ACTIVE | Noted: 2018-08-09

## 2018-08-09 PROBLEM — R33.8 ACUTE RETENTION OF URINE: Status: ACTIVE | Noted: 2018-08-09

## 2018-08-09 LAB
ALBUMIN SERPL BCP-MCNC: 2.4 G/DL (ref 3.5–5)
ALP SERPL-CCNC: 89 U/L (ref 46–116)
ALT SERPL W P-5'-P-CCNC: 100 U/L (ref 12–78)
ANION GAP SERPL CALCULATED.3IONS-SCNC: 6 MMOL/L (ref 4–13)
AST SERPL W P-5'-P-CCNC: 60 U/L (ref 5–45)
BASOPHILS # BLD AUTO: 0.02 THOUSANDS/ΜL (ref 0–0.1)
BASOPHILS NFR BLD AUTO: 0 % (ref 0–1)
BILIRUB SERPL-MCNC: 0.28 MG/DL (ref 0.2–1)
BUN SERPL-MCNC: 23 MG/DL (ref 5–25)
CALCIUM SERPL-MCNC: 8.3 MG/DL (ref 8.3–10.1)
CHLORIDE SERPL-SCNC: 101 MMOL/L (ref 100–108)
CO2 SERPL-SCNC: 25 MMOL/L (ref 21–32)
CREAT SERPL-MCNC: 1.01 MG/DL (ref 0.6–1.3)
EOSINOPHIL # BLD AUTO: 0.58 THOUSAND/ΜL (ref 0–0.61)
EOSINOPHIL NFR BLD AUTO: 11 % (ref 0–6)
ERYTHROCYTE [DISTWIDTH] IN BLOOD BY AUTOMATED COUNT: 13.3 % (ref 11.6–15.1)
GFR SERPL CREATININE-BSD FRML MDRD: 51 ML/MIN/1.73SQ M
GLUCOSE SERPL-MCNC: 97 MG/DL (ref 65–140)
HAV IGM SER QL: NORMAL
HBV CORE IGM SER QL: NORMAL
HBV SURFACE AG SER QL: NORMAL
HCT VFR BLD AUTO: 32.7 % (ref 34.8–46.1)
HCV AB SER QL: NORMAL
HGB BLD-MCNC: 10.4 G/DL (ref 11.5–15.4)
LYMPHOCYTES # BLD AUTO: 0.71 THOUSANDS/ΜL (ref 0.6–4.47)
LYMPHOCYTES NFR BLD AUTO: 14 % (ref 14–44)
MCH RBC QN AUTO: 30.1 PG (ref 26.8–34.3)
MCHC RBC AUTO-ENTMCNC: 31.8 G/DL (ref 31.4–37.4)
MCV RBC AUTO: 95 FL (ref 82–98)
MONOCYTES # BLD AUTO: 0.46 THOUSAND/ΜL (ref 0.17–1.22)
MONOCYTES NFR BLD AUTO: 9 % (ref 4–12)
NEUTROPHILS # BLD AUTO: 3.38 THOUSANDS/ΜL (ref 1.85–7.62)
NEUTS SEG NFR BLD AUTO: 66 % (ref 43–75)
NRBC BLD AUTO-RTO: 0 /100 WBCS
PLATELET # BLD AUTO: 245 THOUSANDS/UL (ref 149–390)
PMV BLD AUTO: 8.5 FL (ref 8.9–12.7)
POTASSIUM SERPL-SCNC: 4.2 MMOL/L (ref 3.5–5.3)
PROCALCITONIN SERPL-MCNC: 0.14 NG/ML
PROT SERPL-MCNC: 6.6 G/DL (ref 6.4–8.2)
RBC # BLD AUTO: 3.46 MILLION/UL (ref 3.81–5.12)
SODIUM SERPL-SCNC: 132 MMOL/L (ref 136–145)
WBC # BLD AUTO: 5.15 THOUSAND/UL (ref 4.31–10.16)

## 2018-08-09 PROCEDURE — 94640 AIRWAY INHALATION TREATMENT: CPT

## 2018-08-09 PROCEDURE — 80074 ACUTE HEPATITIS PANEL: CPT | Performed by: NURSE PRACTITIONER

## 2018-08-09 PROCEDURE — 94760 N-INVAS EAR/PLS OXIMETRY 1: CPT

## 2018-08-09 PROCEDURE — 85025 COMPLETE CBC W/AUTO DIFF WBC: CPT | Performed by: NURSE PRACTITIONER

## 2018-08-09 PROCEDURE — 99222 1ST HOSP IP/OBS MODERATE 55: CPT | Performed by: INTERNAL MEDICINE

## 2018-08-09 PROCEDURE — 99233 SBSQ HOSP IP/OBS HIGH 50: CPT | Performed by: FAMILY MEDICINE

## 2018-08-09 PROCEDURE — 99285 EMERGENCY DEPT VISIT HI MDM: CPT

## 2018-08-09 PROCEDURE — 84145 PROCALCITONIN (PCT): CPT | Performed by: NURSE PRACTITIONER

## 2018-08-09 PROCEDURE — 80053 COMPREHEN METABOLIC PANEL: CPT | Performed by: NURSE PRACTITIONER

## 2018-08-09 PROCEDURE — 76705 ECHO EXAM OF ABDOMEN: CPT

## 2018-08-09 PROCEDURE — 99223 1ST HOSP IP/OBS HIGH 75: CPT | Performed by: FAMILY MEDICINE

## 2018-08-09 RX ORDER — NYSTATIN 100000 [USP'U]/G
POWDER TOPICAL 3 TIMES DAILY
Status: DISCONTINUED | OUTPATIENT
Start: 2018-08-09 | End: 2018-08-17 | Stop reason: HOSPADM

## 2018-08-09 RX ORDER — SULFAMETHOXAZOLE AND TRIMETHOPRIM 800; 160 MG/1; MG/1
1 TABLET ORAL EVERY 12 HOURS SCHEDULED
COMMUNITY
Start: 2018-08-03 | End: 2018-08-17 | Stop reason: HOSPADM

## 2018-08-09 RX ORDER — OXYCODONE HYDROCHLORIDE 5 MG/1
2.5 TABLET ORAL EVERY 4 HOURS PRN
Status: DISCONTINUED | OUTPATIENT
Start: 2018-08-09 | End: 2018-08-09

## 2018-08-09 RX ORDER — LIDOCAINE 50 MG/G
2 PATCH TOPICAL DAILY
Status: DISCONTINUED | OUTPATIENT
Start: 2018-08-09 | End: 2018-08-17 | Stop reason: HOSPADM

## 2018-08-09 RX ORDER — LANOLIN ALCOHOL/MO/W.PET/CERES
3 CREAM (GRAM) TOPICAL
Status: DISCONTINUED | OUTPATIENT
Start: 2018-08-09 | End: 2018-08-17 | Stop reason: HOSPADM

## 2018-08-09 RX ORDER — POLYETHYLENE GLYCOL 3350 17 G/17G
17 POWDER, FOR SOLUTION ORAL DAILY
Status: DISCONTINUED | OUTPATIENT
Start: 2018-08-09 | End: 2018-08-17 | Stop reason: HOSPADM

## 2018-08-09 RX ORDER — SODIUM CHLORIDE 9 MG/ML
75 INJECTION, SOLUTION INTRAVENOUS CONTINUOUS
Status: DISCONTINUED | OUTPATIENT
Start: 2018-08-09 | End: 2018-08-10

## 2018-08-09 RX ORDER — LEVALBUTEROL 1.25 MG/.5ML
1.25 SOLUTION, CONCENTRATE RESPIRATORY (INHALATION)
Status: DISCONTINUED | OUTPATIENT
Start: 2018-08-09 | End: 2018-08-10

## 2018-08-09 RX ORDER — DOCUSATE SODIUM 100 MG/1
100 CAPSULE, LIQUID FILLED ORAL 2 TIMES DAILY
Status: DISCONTINUED | OUTPATIENT
Start: 2018-08-09 | End: 2018-08-17 | Stop reason: HOSPADM

## 2018-08-09 RX ORDER — DOCUSATE SODIUM 100 MG/1
100 CAPSULE, LIQUID FILLED ORAL 2 TIMES DAILY PRN
Status: DISCONTINUED | OUTPATIENT
Start: 2018-08-09 | End: 2018-08-09

## 2018-08-09 RX ORDER — CLONIDINE HYDROCHLORIDE 0.1 MG/1
0.2 TABLET ORAL 3 TIMES DAILY
Status: DISCONTINUED | OUTPATIENT
Start: 2018-08-09 | End: 2018-08-10

## 2018-08-09 RX ORDER — NYSTATIN 100000 [USP'U]/G
POWDER TOPICAL 2 TIMES DAILY
Status: DISCONTINUED | OUTPATIENT
Start: 2018-08-09 | End: 2018-08-10 | Stop reason: SDUPTHER

## 2018-08-09 RX ORDER — SACCHAROMYCES BOULARDII 250 MG
250 CAPSULE ORAL 2 TIMES DAILY
Status: COMPLETED | OUTPATIENT
Start: 2018-08-09 | End: 2018-08-13

## 2018-08-09 RX ORDER — SENNOSIDES 8.6 MG
2 TABLET ORAL DAILY
Status: DISCONTINUED | OUTPATIENT
Start: 2018-08-09 | End: 2018-08-17 | Stop reason: HOSPADM

## 2018-08-09 RX ORDER — BENZONATATE 100 MG/1
100 CAPSULE ORAL 3 TIMES DAILY PRN
Status: DISCONTINUED | OUTPATIENT
Start: 2018-08-09 | End: 2018-08-17 | Stop reason: HOSPADM

## 2018-08-09 RX ORDER — BISACODYL 10 MG
10 SUPPOSITORY, RECTAL RECTAL DAILY PRN
Status: DISCONTINUED | OUTPATIENT
Start: 2018-08-09 | End: 2018-08-17 | Stop reason: HOSPADM

## 2018-08-09 RX ORDER — ONDANSETRON 2 MG/ML
4 INJECTION INTRAMUSCULAR; INTRAVENOUS EVERY 6 HOURS PRN
Status: DISCONTINUED | OUTPATIENT
Start: 2018-08-09 | End: 2018-08-17 | Stop reason: HOSPADM

## 2018-08-09 RX ORDER — AMLODIPINE BESYLATE 5 MG/1
5 TABLET ORAL EVERY 12 HOURS SCHEDULED
Status: DISCONTINUED | OUTPATIENT
Start: 2018-08-09 | End: 2018-08-17 | Stop reason: HOSPADM

## 2018-08-09 RX ORDER — HYDROCODONE BITARTRATE AND ACETAMINOPHEN 5; 325 MG/1; MG/1
1 TABLET ORAL EVERY 12 HOURS
Status: DISCONTINUED | OUTPATIENT
Start: 2018-08-09 | End: 2018-08-09

## 2018-08-09 RX ADMIN — LEVALBUTEROL HYDROCHLORIDE 1.25 MG: 1.25 SOLUTION, CONCENTRATE RESPIRATORY (INHALATION) at 13:45

## 2018-08-09 RX ADMIN — SENNOSIDES 17.2 MG: 8.6 TABLET, FILM COATED ORAL at 08:48

## 2018-08-09 RX ADMIN — IPRATROPIUM BROMIDE 0.5 MG: 0.5 SOLUTION RESPIRATORY (INHALATION) at 08:05

## 2018-08-09 RX ADMIN — AMLODIPINE BESYLATE 5 MG: 5 TABLET ORAL at 08:49

## 2018-08-09 RX ADMIN — DOCUSATE SODIUM 100 MG: 100 CAPSULE, LIQUID FILLED ORAL at 17:33

## 2018-08-09 RX ADMIN — IPRATROPIUM BROMIDE 0.5 MG: 0.5 SOLUTION RESPIRATORY (INHALATION) at 19:08

## 2018-08-09 RX ADMIN — NYSTATIN: 100000 POWDER TOPICAL at 20:27

## 2018-08-09 RX ADMIN — MELATONIN TAB 3 MG 3 MG: 3 TAB at 22:28

## 2018-08-09 RX ADMIN — LEVALBUTEROL HYDROCHLORIDE 1.25 MG: 1.25 SOLUTION, CONCENTRATE RESPIRATORY (INHALATION) at 08:05

## 2018-08-09 RX ADMIN — SODIUM CHLORIDE 75 ML/HR: 0.9 INJECTION, SOLUTION INTRAVENOUS at 05:10

## 2018-08-09 RX ADMIN — IPRATROPIUM BROMIDE 0.5 MG: 0.5 SOLUTION RESPIRATORY (INHALATION) at 13:45

## 2018-08-09 RX ADMIN — CLONIDINE HYDROCHLORIDE 0.2 MG: 0.1 TABLET ORAL at 20:23

## 2018-08-09 RX ADMIN — CEFAZOLIN SODIUM 1000 MG: 1 SOLUTION INTRAVENOUS at 20:23

## 2018-08-09 RX ADMIN — CLONIDINE HYDROCHLORIDE 0.2 MG: 0.1 TABLET ORAL at 17:33

## 2018-08-09 RX ADMIN — NYSTATIN: 100000 POWDER TOPICAL at 17:34

## 2018-08-09 RX ADMIN — Medication 250 MG: at 17:33

## 2018-08-09 RX ADMIN — DOCUSATE SODIUM 100 MG: 100 CAPSULE, LIQUID FILLED ORAL at 08:49

## 2018-08-09 RX ADMIN — POLYETHYLENE GLYCOL (3350) 17 G: 17 POWDER, FOR SOLUTION ORAL at 08:48

## 2018-08-09 RX ADMIN — VANCOMYCIN HYDROCHLORIDE 1250 MG: 1 INJECTION, POWDER, LYOPHILIZED, FOR SOLUTION INTRAVENOUS at 12:56

## 2018-08-09 RX ADMIN — HYDROMORPHONE HYDROCHLORIDE 0.2 MG: 1 INJECTION, SOLUTION INTRAMUSCULAR; INTRAVENOUS; SUBCUTANEOUS at 14:40

## 2018-08-09 RX ADMIN — NYSTATIN: 100000 POWDER TOPICAL at 08:50

## 2018-08-09 RX ADMIN — ENOXAPARIN SODIUM 40 MG: 40 INJECTION SUBCUTANEOUS at 08:49

## 2018-08-09 RX ADMIN — Medication 250 MG: at 08:50

## 2018-08-09 RX ADMIN — CEFAZOLIN SODIUM 1000 MG: 1 SOLUTION INTRAVENOUS at 11:02

## 2018-08-09 RX ADMIN — AMLODIPINE BESYLATE 5 MG: 5 TABLET ORAL at 20:23

## 2018-08-09 RX ADMIN — LEVALBUTEROL HYDROCHLORIDE 1.25 MG: 1.25 SOLUTION, CONCENTRATE RESPIRATORY (INHALATION) at 19:08

## 2018-08-09 RX ADMIN — CLONIDINE HYDROCHLORIDE 0.2 MG: 0.1 TABLET ORAL at 08:49

## 2018-08-09 RX ADMIN — SODIUM CHLORIDE 75 ML/HR: 0.9 INJECTION, SOLUTION INTRAVENOUS at 21:10

## 2018-08-09 NOTE — ASSESSMENT & PLAN NOTE
· Per nursing facility records, patient had urine retention and ANSELMO, creat 1 4    Barnett placed, creatinine improved to 0 87  · Urine retention protocol  · Intake and output

## 2018-08-09 NOTE — ASSESSMENT & PLAN NOTE
· Creat 1 2, baseline 0 6-0 8  · Gentle IV hydration  · Monitor renal function  · Avoid hypotension, NSAIDs and nephrotoxins

## 2018-08-09 NOTE — ASSESSMENT & PLAN NOTE
· Meets severe sepsis criteria with hyperthermia, tachypnea, worsening infection, hypoxia requiring 2 L N/C and ANSELMO  · R/o Hcap vs UTI vs cellulitis  · CXR:  Lower lobe consolidations; final report pending  Consider aspiration PNA  SLP consult ordered    · Patient admitted with Barnett catheter, rule out UTI   · Procalcitonin ordered  · Blood culture and urine culture IP  · Will start Ancef and Vanco  · ID consult (failed outpt tx for cellulitis) was on extended course of Keflex followed by Bactrim in [address] [place] [address]

## 2018-08-09 NOTE — ASSESSMENT & PLAN NOTE
· AST/ALT 75/ 111, alk phos and bilirubin normal  · Acute hepatitis panel ordered  · RUQ U/S  · Avoid hepatotoxins

## 2018-08-09 NOTE — ED NOTES
Patient transported to xray       Essie Okeefe RN  08/08/18 2451       Essie Okeefe RN  08/08/18 1440

## 2018-08-09 NOTE — ASSESSMENT & PLAN NOTE
she met severe sepsis criteria with hypothermia, tachypnea, worsening lower extremity cellulitis bilaterally which failed outpatient treatment, and hypoxia requiring 2 L of nasal cannula oxygen as well as acute kidney injury  Healthcare associated pneumonia and UTI has been ruled out by imaging and microbiologic studies    Will however continue her on intravenous Ancef and vancomycin, await procalcitonin levels, await blood cultures and urine cultures, and follow up Infectious Disease consult and recommendations

## 2018-08-09 NOTE — CASE MANAGEMENT
Initial Clinical Review    Admission: Date/Time/Statement: 8/9/18 @ 0020     Orders Placed This Encounter   Procedures    Inpatient Admission (expected length of stay for this patient is greater than two midnights)     Standing Status:   Standing     Number of Occurrences:   1     Order Specific Question:   Admitting Physician     Answer:   Jayleen Celaya [[id number]]     Order Specific Question:   Level of Care     Answer:   Med Surg [[id number]]     Order Specific Question:   Estimated length of stay     Answer:   More than 2 Midnights     Order Specific Question:   Certification     Answer:   I certify that inpatient services are medically necessary for this patient for a duration of greater than two midnights  See H&P and MD Progress Notes for additional information about the patient's course of treatment  ED: Date/Time/Mode of Arrival:   ED Arrival Information     Expected Arrival Acuity Means of Arrival Escorted By Service Admission Type    8/8/2018 18:43 8/8/2018 20:45 Urgent Ambulance [address] [facility] Urgent    Arrival Complaint    FEVER          Chief Complaint:   Chief Complaint   Patient presents with    Fever - 76 years or older     patient transported via EMS from [facility]; per staff ivettn had a 102 temp and has an celluilitis of bilateral legs; patient was given tyelnol 1 hour PTA; History of Illness: Jacki Archuleta is a 80 y o  female who presents with c/o fever, shortness of breath, cough productive of green sputum, nausea, feels sick to her stomach, loss of appetite, bilateral hip pain and constipation  Facility records reviewed, sent from SNF for fever of 102 4, B/L LE cellulitis-R leg improved, LLE did not improve and has formed a blister, failed outpatient treatment, was on extended Keflex followed by Bactrim; patient noted to have nausea, dry heaving and decreased appetite    Also found to have urine retention and ANSELMO, creatinine 1 4, Barnett placed, creatinine improved to 0 87  · Recent admission 7/19-7/28 for cellulitis, PNA and respiratory distress   Treated with IV antibiotics and discharged on po Keflex, d/c to nursing facility        ED Vital Signs:   ED Triage Vitals   Temperature Pulse Respirations Blood Pressure SpO2   08/08/18 2049 08/08/18 2049 08/08/18 2049 08/08/18 2049 08/08/18 2049   99 °F (37 2 °C) 79 20 114/63 94 %      Temp Source Heart Rate Source Patient Position - Orthostatic VS BP Location FiO2 (%)   08/08/18 2049 08/08/18 2049 08/08/18 2049 08/08/18 2049 --   Oral Monitor Lying Right arm       Pain Score       08/08/18 2343       Worst Possible Pain        Wt Readings from Last 1 Encounters:   08/08/18 112 kg (248 lb)       Vital Signs (abnormal): above    Abnormal Labs/Diagnostic Test Results:   Na  132  Chloride   98  ALT  111  AST    75  Albumin   2 4  H/H  10 8/33 4  CXR:  NAD     CM    ED Treatment:   Medication Administration from 08/08/2018 1843 to 08/09/2018 0200       Date/Time Order Dose Route Action Action by Comments     08/08/2018 2225 sodium chloride 0 9 % bolus 1,000 mL 0 mL Intravenous Stopped Annia Manley RN      08/08/2018 2102 sodium chloride 0 9 % bolus 1,000 mL 1,000 mL Intravenous [address] Dax Chowdhury RN      08/08/2018 2158 acetaminophen (TYLENOL) tablet 650 mg 650 mg Oral Not Given Annia Manley RN Last pt dose was given around 2000 08/08/2018 2345 cefepime (MAXIPIME) 2 g/50 mL dextrose IVPB 0 mg Intravenous Stopped David Zhu RN      08/08/2018 2343 cefepime (MAXIPIME) 2 g/50 mL dextrose IVPB 0 mg Intravenous Stopped David Zhu RN      08/08/2018 2311 cefepime (MAXIPIME) 2 g/50 mL dextrose IVPB 2,000 mg Intravenous [address] David Zhu RN      08/08/2018 2356 vancomycin (VANCOCIN) 1,250 mg in sodium chloride 0 9 % 250 mL IVPB 1,250 mg Intravenous [address] David Zhu RN      08/08/2018 2342 fentanyl citrate (PF) 100 MCG/2ML 25 mcg 25 mcg Intravenous Not Given David Zhu, RN verbal order to give dilaudid at this time per swank     08/08/2018 5937 HYDROmorphone (DILAUDID) injection 0 5 mg 0 5 mg Intravenous Given Gail Marte RN           Past Medical/Surgical History: Active Ambulatory Problems     Diagnosis Date Noted    Cellulitis 07/19/2018    Hypertension 07/19/2018    Hip pain 07/19/2018    Severe sepsis ([facility] [address] ) 07/20/2018    Morbid (severe) obesity due to excess calories ([facility] ) 07/22/2018    Hyperlipidemia 07/28/2018     Resolved Ambulatory Problems     Diagnosis Date Noted    Acute respiratory distress 07/22/2018     Past Medical History:   Diagnosis Date    Hyperlipidemia     Hypertension        Admitting Diagnosis: Pneumonia [J18 9]  Fever [R50 9]  Cellulitis of left leg [L03 116]    Age/Sex: 80 y o  female    Assessment/Plan:   ANSELMO (acute kidney injury) ([facility] )   Assessment & Plan     · Creat 1 2, baseline 0 6-0 8  · Gentle IV hydration  · Monitor renal function  · Avoid hypotension, NSAIDs and nephrotoxins          Acute respiratory failure with hypoxia (HCC)   Assessment & Plan     · Per ED, nasal cannula applied for O2 sat of 88%  · On my assessment pt wearing nasal cannula, O2 sat improved to 94%  · On Xopenex and Atrovent t i d , continue   · Monitor O2 sat, wean off O2  · Incentive spirometer           * Severe sepsis (HCC)   Assessment & Plan     · Meets severe sepsis criteria with hyperthermia, tachypnea, worsening infection, hypoxia requiring 2 L N/C and ANSELMO  · R/o Hcap vs UTI vs cellulitis  · CXR:  Lower lobe consolidations; final report pending  Consider aspiration PNA  SLP consult ordered    · Patient admitted with Barnett catheter, rule out UTI   · Procalcitonin ordered  · Blood culture and urine culture IP  · Will start Ancef and Vanco  · ID consult (failed outpt tx for cellulitis) was on extended course of Keflex followed by Bactrim in [facility]     Acute retention of urine   Assessment & Plan     · Per nursing facility records, patient had urine retention and ANSELMO, creat 1 4  Barnett placed, creatinine improved to 0 87  · Urine retention protocol  · Intake and output           Osteoarthritis of both hips   Assessment & Plan     · Patient complains of chronic bilateral hip pain, seen by Orthopedics last month and not considered a candidate for surgery due to cellulitis, BMI and age  · Fentanyl patch applied in ED, continue x72h and re-evaluate   · Pain control          Cellulitis of left lower extremity   Assessment & Plan     · See above plan sepsis            Transaminitis   Assessment & Plan     · AST/ALT 75/ 111, alk phos and bilirubin normal  · Acute hepatitis panel ordered  · RUQ U/S  · Avoid hepatotoxins          Tinea corporis   Assessment & Plan     · Nystatin b i d   · Daily hygienic care          Hyponatremia   Assessment & Plan     · Na 132  · Gentle IV hydration  · Monitor serum Na          Hypertension   Assessment & Plan     · Continue amlodipine and clonidine   Anticipated Length of Stay:  Patient will be admitted on an Inpatient basis with an anticipated length of stay of  > 2 midnights     Justification for Hospital Stay:  Sepsis           Admission Orders:   IP     8/9  @   0020  Scheduled Meds:   Current Facility-Administered Medications:  [facility]    benzonatate 100 mg Oral TID PRN JONO Beal    bisacodyl 10 mg Rectal Daily PRN JONO Beal    cefazolin 1,000 mg Intravenous Q8H JONO Beal Last Rate: 1,000 mg (08/09/18 1102)   cloNIDine 0 2 mg Oral TID JONO Beal    docusate sodium 100 mg Oral BID JONO Beal    enoxaparin 40 mg Subcutaneous Daily JONO Beal    fentanyl citrate (PF) 25 mcg Intravenous Once Rossi Salinas MD    HYDROmorphone 0 2 mg Intravenous Q6H PRN JONO Beal    ipratropium 0 5 mg Nebulization TID JONO Beal    levalbuterol 1 25 mg Nebulization TID JONO Beal lidocaine 2 patch Transdermal Daily Priya Betancourt, CRNP    melatonin 3 mg Oral HS Priya Betancourt, CRNP    nystatin  Topical BID Priya Betancourt, CRNP    nystatin  Topical TID Priya Betancourt, CRNP    ondansetron 4 mg Intravenous Q6H PRN Priya Betancourt, CRNP    polyethylene glycol 17 g Oral Daily Priya Betancourt, CRNP    saccharomyces boulardii 250 mg Oral BID Priya Betancourt, CRNP    senna 2 tablet Oral Daily Priya Betancourt, CRNP    sodium chloride 75 mL/hr Intravenous Continuous Priya Betancourt, CRNP Last Rate: 75 mL/hr (08/09/18 0510)   vancomycin 1,250 mg Intravenous Q12H Priya Betancourt, CRNP      Continuous Infusions:   sodium chloride 75 mL/hr Last Rate: 75 mL/hr (08/09/18 0510)     PRN Meds: benzonatate    bisacodyl    HYDROmorphone    ondansetron      Cardiac  Diet  Cons  ID  Cons speech    PROGRESS  NOTE  8/9  * Severe sepsis (HCC)   Assessment & Plan     she met severe sepsis criteria with hypothermia, tachypnea, worsening lower extremity cellulitis bilaterally which failed outpatient treatment, and hypoxia requiring 2 L of nasal cannula oxygen as well as acute kidney injury   Healthcare associated pneumonia and UTI has been ruled out by imaging and microbiologic studies  [place] however continue her on intravenous Ancef and vancomycin, await procalcitonin levels, await blood cultures and urine cultures, and follow up Infectious Disease consult and recommendations          Acute respiratory failure with hypoxia ([facility] [address] )   Assessment & Plan     she had oxygen administration for O2 sat of 88% on room air and currently a O2 saturations have improved to 93-94% on 2 L  continue Xopenex and Atrovent t i d  and plan to wean off O2   Continue incentive spirometry          Cellulitis of left lower extremity   Assessment & Plan     which is worsening due to failed outpatient treatment with oral Keflex and Bactrim   Continue intravenous Ancef and vancomycin, await blood cultures, follow up Infectious Disease recommendations  Thank you,  Jermaine Figueroa  [address] Utilization Review Department  Phone: [phone]; Fax [phone]  ATTENTION: The Network Utilization Review Department is now centralized for our 9 Facilities  Make a note that we have a new phone and fax numbers for our Department  Please call with any questions or concerns to [phone] and carefully follow the prompts so that you are directed to the right person  All voicemails are confidential  Fax any determinations, approvals, denials, and requests for initial or continue stay review clinical to [phone]  Due to HIGH CALL volume, it would be easier if you could please send faxed requests to expedite your requests and in part, help us provide discharge notifications faster

## 2018-08-09 NOTE — ED PROVIDER NOTES
History  Chief Complaint   Patient presents with    Fever - 76 years or older     patient transported via EMS from [place]; per staff destini had a 102 temp and has an celluilitis of bilateral legs; patient was given tyelnol 1 hour PTA;      HPI  A 3year-old woman comes in for evaluation of fever  She comes from a nursing facility where she was staying after discharge weeks ago with cellulitis and pneumonia  Tonight, patient temperature the 102     She continues to have cellulitis  Denies being on oxygen at home, but is on 2 L now to maintain her O2 saturation at 92-94%  Patient endorses pain in her bilateral hips that is chronic in nature  Denies chest pain or shortness of breath abdominal pain  She did have some nausea today without vomiting, denies diarrhea  Prior to Admission Medications   Prescriptions Last Dose Informant Patient Reported? Taking? HYDROcodone-acetaminophen (XODOL) 5-300 MG per tablet   Yes Yes   Sig: Take 1 tablet by mouth every 12 (twelve) hours as needed for moderate pain   Magnesium Hydroxide (MILK OF MAGNESIA PO)   Yes Yes   Sig: Take by mouth Give 30 mL by mouth as needed for constipation  acetaminophen (TYLENOL) 325 mg tablet   Yes Yes   Sig: Take 650 mg by mouth every 8 (eight) hours as needed for mild pain   amLODIPine (NORVASC) 5 mg tablet   Yes Yes   Sig: Take 5 mg by mouth every 12 (twelve) hours     bisacodyl (DULCOLAX) 10 mg suppository   Yes Yes   Sig: Insert 1 suppository into the rectum daily as needed Insert one suppository rectally as needed       cloNIDine (CATAPRES) 0 2 mg tablet   Yes Yes   Sig: Take 0 2 mg by mouth 3 (three) times a day   docusate sodium (COLACE) 100 mg capsule   No Yes   Sig: Take 1 capsule (100 mg total) by mouth 2 (two) times a day as needed for constipation   glycerin adult 2 g suppository   Yes Yes   Sig: Insert 1 suppository into the rectum as needed for constipation   ipratropium (ATROVENT) 0 02 % nebulizer solution   No Yes   Sig: Take 1 vial (0 5 mg total) by nebulization 3 (three) times a day   levalbuterol (XOPENEX) 1 25 mg/0 5 mL nebulizer solution   No Yes   Sig: Take 0 5 mL (1 25 mg total) by nebulization 3 (three) times a day   lidocaine (LIDODERM) 5 %   No Yes   Sig: Place 2 patches on the skin daily Remove & Discard patch within 12 hours or as directed by MD   melatonin 3 mg   No Yes   Sig: Take 1 tablet (3 mg total) by mouth daily at bedtime   nystatin (MYCOSTATIN) powder   Yes Yes   Sig: Apply topically Apply silvadene to skin folds, dust with nystatin on every day and evening shift for 30 days  nystatin (MYCOSTATIN) powder   Yes Yes   Sig: Apply topically 3 (three) times a day Apply topically to abdomin folds and groin every shift   polyethylene glycol (MIRALAX) 17 g packet   No Yes   Sig: Take 17 g by mouth daily   saccharomyces boulardii (FLORASTOR) 250 mg capsule   Yes Yes   Sig: Take 250 mg by mouth 2 (two) times a day   silver sulfadiazine (SILVADENE,SSD) 1 % cream   Yes Yes   Sig: Apply topically daily   sulfamethoxazole-trimethoprim (BACTRIM DS) 800-160 mg per tablet   Yes Yes   Sig: Take 1 tablet by mouth every 12 (twelve) hours      Facility-Administered Medications: None       Past Medical History:   Diagnosis Date    Hyperlipidemia     Hypertension        Past Surgical History:   Procedure Laterality Date    ANKLE FRACTURE SURGERY Left     CHOLECYSTECTOMY      REPLACEMENT TOTAL KNEE BILATERAL Bilateral        History reviewed  No pertinent family history  I have reviewed and agree with the history as documented  Social History   Substance Use Topics    Smoking status: Never Smoker    Smokeless tobacco: Never Used    Alcohol use No        Review of Systems   Constitutional: Negative  HENT: Negative  Eyes: Negative  Respiratory: Negative  Cardiovascular: Negative  Gastrointestinal: Positive for nausea  Endocrine: Negative  Genitourinary: Negative  Musculoskeletal: Negative      Skin: Positive for rash and wound  Allergic/Immunologic: Negative  Neurological: Negative  Hematological: Negative  Psychiatric/Behavioral: Negative  Physical Exam  ED Triage Vitals   Temperature Pulse Respirations Blood Pressure SpO2   08/08/18 2049 08/08/18 2049 08/08/18 2049 08/08/18 2049 08/08/18 2049   99 °F (37 2 °C) 79 20 114/63 94 %      Temp Source Heart Rate Source Patient Position - Orthostatic VS BP Location FiO2 (%)   08/08/18 2049 08/08/18 2049 08/08/18 2049 08/08/18 2049 --   Oral Monitor Lying Right arm       Pain Score       08/08/18 2343       Worst Possible Pain           Orthostatic Vital Signs  Vitals:    08/09/18 2023 08/09/18 2253 08/10/18 0743 08/10/18 1218   BP: 130/71 129/72 155/92 126/71   Pulse:  90 94 91   Patient Position - Orthostatic VS:  Lying Sitting Lying       Physical Exam   Constitutional: She is oriented to person, place, and time  She appears well-developed and well-nourished  No distress  HENT:   Head: Normocephalic and atraumatic  Right Ear: External ear normal    Left Ear: External ear normal    Mouth/Throat: Oropharynx is clear and moist    Eyes: Conjunctivae and EOM are normal  Pupils are equal, round, and reactive to light  Right eye exhibits no discharge  Left eye exhibits no discharge  No scleral icterus  Neck: Normal range of motion  Neck supple  No tracheal deviation present  No thyromegaly present  Cardiovascular: Normal rate, regular rhythm and intact distal pulses  Exam reveals no gallop and no friction rub  No murmur heard  Pulmonary/Chest: Effort normal  No stridor  No respiratory distress  She has wheezes  She has no rales  Abdominal: Soft  Bowel sounds are normal  She exhibits no distension  There is no tenderness  There is no rebound and no guarding  Musculoskeletal: Normal range of motion  She exhibits no edema or deformity  Neurological: She is alert and oriented to person, place, and time  No cranial nerve deficit     Skin: Skin is warm and dry  No rash noted  She is not diaphoretic  No erythema  Wound left leg, redness has expanded beyond the line that was there from the previous admission  A new perimeter line Was drawn around the knee redness  Psychiatric: She has a normal mood and affect  Her behavior is normal  Thought content normal    Nursing note and vitals reviewed                ED Medications  Medications   fentanyl citrate (PF) 100 MCG/2ML 25 mcg (25 mcg Intravenous Not Given 8/8/18 2342)   amLODIPine (NORVASC) tablet 5 mg (5 mg Oral Given 8/10/18 0814)   lidocaine (LIDODERM) 5 % patch 2 patch (0 patches Transdermal Hold 8/10/18 0815)   melatonin tablet 3 mg (3 mg Oral Given 8/9/18 2228)   nystatin (MYCOSTATIN) powder ( Topical Given 8/10/18 0814)   polyethylene glycol (MIRALAX) packet 17 g (17 g Oral Given 8/10/18 0815)   saccharomyces boulardii (FLORASTOR) capsule 250 mg (250 mg Oral Given 8/10/18 0814)   bisacodyl (DULCOLAX) rectal suppository 10 mg (not administered)   ondansetron (ZOFRAN) injection 4 mg (not administered)   enoxaparin (LOVENOX) subcutaneous injection 40 mg (40 mg Subcutaneous Given 8/10/18 0814)   ceFAZolin (ANCEF) IVPB (premix) 1,000 mg (1,000 mg Intravenous New Bag 8/10/18 1220)   HYDROmorphone (DILAUDID) injection 0 2 mg (0 2 mg Intravenous Given 8/10/18 0534)   benzonatate (TESSALON PERLES) capsule 100 mg (not administered)   senna (SENOKOT) tablet 17 2 mg (17 2 mg Oral Given 8/10/18 0814)   docusate sodium (COLACE) capsule 100 mg (100 mg Oral Given 8/10/18 0814)   levalbuterol (XOPENEX) inhalation solution 1 25 mg (1 25 mg Nebulization Given 8/10/18 0232)   sodium chloride 0 9 % inhalation solution 3 mL (3 mL Nebulization Given 8/10/18 0232)   metoprolol succinate (TOPROL-XL) 24 hr tablet 25 mg (25 mg Oral Given 8/10/18 1108)   cloNIDine (CATAPRES) tablet 0 1 mg (not administered)   sodium chloride 0 9 % bolus 1,000 mL (0 mL Intravenous Stopped 8/8/18 5765)   vancomycin (VANCOCIN) 1,250 mg in sodium chloride 0 9 % 250 mL IVPB (0 mg Intravenous Stopped 8/9/18 0848)   HYDROmorphone (DILAUDID) injection 0 5 mg (0 5 mg Intravenous Given 8/8/18 2343)   furosemide (LASIX) injection 40 mg (40 mg Intravenous Given 8/10/18 0913)       Diagnostic Studies  Results Reviewed     Procedure Component Value Units Date/Time    Urine culture [[id number]]  (Abnormal) Collected:  08/08/18 2309    Lab Status:  Final result Specimen:  Urine from Urine, Indwelling Barnett Catheter Updated:  08/10/18 0902     Urine Culture <10,000 cfu/ml Candida sp  presumptively albicans (A)    Blood culture #1 [[id number]] Collected:  08/08/18 2102    Lab Status:  Preliminary result Specimen:  Blood from Arm, Right Updated:  08/09/18 2302     Blood Culture No Growth at 24 hrs  Blood culture #2 [[id number]] Collected:  08/08/18 2102    Lab Status:  Preliminary result Specimen:  Blood from Arm, Left Updated:  08/09/18 2302     Blood Culture No Growth at 24 hrs      Urine Microscopic [[id number]]  (Abnormal) Collected:  08/08/18 2314    Lab Status:  Final result Specimen:  Urine from Urine, Indwelling Barnett Catheter Updated:  08/08/18 2338     RBC, UA Innumerable (A) /hpf      WBC, UA 2-4 (A) /hpf      Epithelial Cells Occasional /hpf      Bacteria, UA Moderate (A) /hpf      Ca Oxalate Carrie, UA Innumerable (A) /hpf      OTHER OBSERVATIONS Yeast Cells Present    POCT urinalysis dipstick [[id number]]  (Abnormal) Resulted:  08/08/18 2307    Lab Status:  Final result Specimen:  Urine Updated:  08/08/18 2308     Color, UA yellow    ED Urine Macroscopic [[id number]]  (Abnormal) Collected:  08/08/18 2314    Lab Status:  Final result Specimen:  Urine Updated:  08/08/18 2306     Color, UA Yellow     Clarity, UA Clear     pH, UA 6 0     Leukocytes, UA Negative     Nitrite, UA Negative     Protein,  (2+) (A) mg/dl      Glucose, UA Negative mg/dl      Ketones, UA Negative mg/dl      Urobilinogen, UA 0 2 E U /dl      Bilirubin, UA Negative     Blood, UA Large (A) Specific [place], UA 1 025    Narrative:       CLINITEK RESULT    Lactic acid x2 [[id number]]  (Normal) Collected:  08/08/18 2102    Lab Status:  Final result Specimen:  Blood from Arm, Left Updated:  08/08/18 2146     LACTIC ACID 0 9 mmol/L     Narrative:         Result may be elevated if tourniquet was used during collection  Comprehensive metabolic panel [[id number]]  (Abnormal) Collected:  08/08/18 2102    Lab Status:  Final result Specimen:  Blood from Arm, Left Updated:  08/08/18 2143     Sodium 132 (L) mmol/L      Potassium 4 5 mmol/L      Chloride 98 (L) mmol/L      CO2 26 mmol/L      Anion Gap 8 mmol/L      BUN 24 mg/dL      Creatinine 1 25 mg/dL      Glucose 109 mg/dL      Calcium 8 6 mg/dL      AST 75 (H) U/L       (H) U/L      Alkaline Phosphatase 95 U/L      Total Protein 7 0 g/dL      Albumin 2 4 (L) g/dL      Total Bilirubin 0 26 mg/dL      eGFR 40 ml/min/1 73sq m     Narrative:         National Kidney Disease Education Program recommendations are as follows:  GFR calculation is accurate only with a steady state creatinine  Chronic Kidney disease less than 60 ml/min/1 73 sq  meters  Kidney failure less than 15 ml/min/1 73 sq  meters      CBC and differential [[id number]]  (Abnormal) Collected:  08/08/18 2102    Lab Status:  Final result Specimen:  Blood from Arm, Left Updated:  08/08/18 2119     WBC 5 53 Thousand/uL      RBC 3 53 (L) Million/uL      Hemoglobin 10 8 (L) g/dL      Hematocrit 33 4 (L) %      MCV 95 fL      MCH 30 6 pg      MCHC 32 3 g/dL      RDW 13 2 %      MPV 8 9 fL      Platelets 214 Thousands/uL      nRBC 0 /100 WBCs      Neutrophils Relative 74 %      Lymphocytes Relative 9 (L) %      Monocytes Relative 9 %      Eosinophils Relative 7 (H) %      Basophils Relative 0 %      Neutrophils Absolute 4 11 Thousands/µL      Lymphocytes Absolute 0 52 (L) Thousands/µL      Monocytes Absolute 0 48 Thousand/µL      Eosinophils Absolute 0 40 Thousand/µL      Basophils Absolute 0 02 Thousands/µL                  XR chest portable   Final Result by Ky Ballesteros DO (08/10 0446)      Limited study  Suspected infiltrate right midlung  Mild reticular interstitial prominence  Follow-up PA and lateral views recommended  Workstation performed: [id number]         US right upper quadrant   Final Result by Mason Ross MD (08/09 1737)      No biliary dilation seen      Increased echogenicity of the liver suggests fatty infiltration      3 4 x 2 9 x 3 centimeters Septated  cyst within the lower pole of the right kidney with mild nodularity  This can be evaluated with the MRI performed on nonemergent basis for further characterization   The study was marked in EPIC for significant notification  Workstation performed: [id number]         XR chest 2 views   ED Interpretation by Barbara Abreu MD (08/08 2248)   Patient is a consolidation the lower lobes with the on the lateral      Final Result by Shakeel Pate MD (08/09 1641)      No acute cardiopulmonary disease  Cardiomegaly  Workstation performed: [id number]         VAS lower limb venous duplex study, complete bilateral    (Results Pending)         Procedures  Procedures      Phone Consults  ED Phone Contact    ED Course                         Initial Sepsis Screening     [address] Name 08/09/18 [phone] 08/09/18 6814             Is the patient's history suggestive of a new or worsening infection?  (!)  Yes (Proceed)  -MH       Suspected source of infection   soft tissue  -MH       Are two or more of the following signs & symptoms of infection both present and new to the patient?  (!)  Yes (Proceed)  -MH       Indicate SIRS criteria   Hyperthemia > 38 3C (100 9F); Tachypnea > 20 resp per min  -MH       If the answer is yes to both questions, suspicion of sepsis is present  [de-identified]         If severe sepsis is present AND tissue hypoperfusion perists in the hour after fluid resuscitation or lactate > 4, the patient meets criteria for SEPTIC SHOCK           Are any of the following organ dysfunction criteria present within 6 hours of suspected infection and SIRS criteria that are NOT considered to be chronic conditions?  (!)  Yes  -       Organ dysfunction Creatinine > 0 5 mg/dl ABOVE BASELINE  - Acute respiratory failure (new need for invasive or non-invasive mechanical ventilation)  -       Date of presentation of severe sepsis   08/08/18  -       Time of presentation of severe sepsis           Tissue hypoperfusion persists in the hour after crystalloid fluid administration, evidenced, by either:           Was hypotension present within one hour of the conclusion of crystalloid fluid administration?  No  -       Date of presentation of septic shock           Time of presentation of septic shock             User Key  (r) = Recorded By, (t) = Taken By, (c) = Cosigned By    [address] Name Provider Type    [address] [place] [address] Nurse Practitioner                  MDM  Number of Diagnoses or Management Options  Cellulitis of left leg:   Pneumonia:   Diagnosis management comments: 80-year-old woman comes to the emergency department from her facility with concern of fevers  Redness has expanded in her left leg, concerning for worsening of her cellulitis  She is also on 2 L now to maintain her O2 saturations  Will evaluate with a septic workup to include chest x-ray    Will start antibiotics admit to the hospital     [facility] Time    Disposition  Final diagnoses:   Cellulitis of left leg   Pneumonia     Time reflects when diagnosis was documented in both MDM as applicable and the Disposition within this note     Time User Action Codes Description Comment    8/8/2018 11:33 PM Lawrence Bruno Add [C66 192] Cellulitis of left leg     8/8/2018 11:33 PM Lawrence Bruno Add [J18 9] Pneumonia     8/9/2018  4:15 AM Judith La Add [A41 9] Sepsis ([facility] [address] )     8/10/2018  8:02 AM Danitza Caraballo Add [R94 31] Abnormal finding on EKG     8/10/2018  8:25 AM KhadravalentinolibanHomeflorencio Add [J96 01] Acute respiratory failure with hypoxia ([address] )     8/10/2018  8:25 AM Danitzawes Caraballo Add [J98 01] Acute bronchospasm       ED Disposition     ED Disposition Condition Comment    Admit  [facility], admission, dr Illa Homans    None         Current Discharge Medication List      CONTINUE these medications which have NOT CHANGED    Details   acetaminophen (TYLENOL) 325 mg tablet Take 650 mg by mouth every 8 (eight) hours as needed for mild pain      amLODIPine (NORVASC) 5 mg tablet Take 5 mg by mouth every 12 (twelve) hours        bisacodyl (DULCOLAX) 10 mg suppository Insert 1 suppository into the rectum daily as needed Insert one suppository rectally as needed          cloNIDine (CATAPRES) 0 2 mg tablet Take 0 2 mg by mouth 3 (three) times a day      docusate sodium (COLACE) 100 mg capsule Take 1 capsule (100 mg total) by mouth 2 (two) times a day as needed for constipation  Qty: 10 capsule, Refills: 0    Associated Diagnoses: Constipation      glycerin adult 2 g suppository Insert 1 suppository into the rectum as needed for constipation      HYDROcodone-acetaminophen (XODOL) 5-300 MG per tablet Take 1 tablet by mouth every 12 (twelve) hours as needed for moderate pain      ipratropium (ATROVENT) 0 02 % nebulizer solution Take 1 vial (0 5 mg total) by nebulization 3 (three) times a day  Refills: 0    Associated Diagnoses: Acute respiratory distress      levalbuterol (XOPENEX) 1 25 mg/0 5 mL nebulizer solution Take 0 5 mL (1 25 mg total) by nebulization 3 (three) times a day  Qty: 1 each, Refills: 0    Associated Diagnoses: Acute respiratory distress      lidocaine (LIDODERM) 5 % Place 2 patches on the skin daily Remove & Discard patch within 12 hours or as directed by MD  Qty: 30 patch, Refills: 0    Associated Diagnoses: Pain of left hip joint      Magnesium Hydroxide (MILK OF MAGNESIA PO) Take by mouth Give 30 mL by mouth as needed for constipation  melatonin 3 mg Take 1 tablet (3 mg total) by mouth daily at bedtime  Refills: 0    Associated Diagnoses: Insomnia      !! nystatin (MYCOSTATIN) powder Apply topically Apply silvadene to skin folds, dust with nystatin on every day and evening shift for 30 days  !! nystatin (MYCOSTATIN) powder Apply topically 3 (three) times a day Apply topically to abdomin folds and groin every shift      polyethylene glycol (MIRALAX) 17 g packet Take 17 g by mouth daily  Qty: 14 each, Refills: 0    Associated Diagnoses: Constipation      saccharomyces boulardii (FLORASTOR) 250 mg capsule Take 250 mg by mouth 2 (two) times a day      silver sulfadiazine (SILVADENE,SSD) 1 % cream Apply topically daily      sulfamethoxazole-trimethoprim (BACTRIM DS) 800-160 mg per tablet Take 1 tablet by mouth every 12 (twelve) hours       ! ! - Potential duplicate medications found  Please discuss with provider  No discharge procedures on file  ED Provider  Attending physically available and evaluated Alexsander [place]  I managed the patient along with the ED Attending      Electronically Signed by         Concepcion Dodson MD  08/10/18 5398

## 2018-08-09 NOTE — SPEECH THERAPY NOTE
Speech Language/Pathology    Pt NPO for ultrasound today  Spoke to pt, daughter, and pt's nurse  Swallow eval to be completed tomorrow if needed

## 2018-08-09 NOTE — ASSESSMENT & PLAN NOTE
baseline creatinine 0 6-0 8 was elevated to 1 2 yesterday has responded to initiation of IV fluids and improved  Currently creatinine today is 1 01 and  ANSELMO has resolved  Continue fluids, avoid nephrotoxins, follow renal function daily

## 2018-08-09 NOTE — ASSESSMENT & PLAN NOTE
she had oxygen administration for O2 sat of 88% on room air and currently a O2 saturations have improved to 93-94% on 2 L  continue Xopenex and Atrovent t i d  and plan to wean off O2    Continue incentive spirometry

## 2018-08-09 NOTE — ASSESSMENT & PLAN NOTE
which is worsening due to failed outpatient treatment with oral Keflex and Bactrim  Continue intravenous Ancef and vancomycin, await blood cultures, follow up Infectious Disease recommendations

## 2018-08-09 NOTE — SOCIAL WORK
CM met with patient to complete a general SW assessment and discuss discharge planning  Patient is a 30 day readmission and transferred from [address] [place] [address]  Patient declined any interest in returning to [address] [place] [address] (or any other rehab) at discharge; prefers to return home  Patient resides in [facility]  Patient identifies her daughter Lissa Oliver) as their primary support system  Patient reports needing assistance with mobility to/from her wheelchair, however is independent with maneuvering once seated and with ADLs  Patient will need transportation scheduled at discharge  Patient receives prescribed medication and medical attention as needed through [facility]  CM informed patient of their access to [address] at discharge  Patient denied history with VNA services; declined the need for services at discharge  No additional needs at present  Cm to follow as needed

## 2018-08-09 NOTE — ASSESSMENT & PLAN NOTE
AST/ALT has 60/100 from 75/111 yesterday  Awaiting results of hepatitis panel and official read of right upper quadrant ultrasound

## 2018-08-09 NOTE — ASSESSMENT & PLAN NOTE
· Patient complains of chronic bilateral hip pain, seen by Orthopedics last month and not considered a candidate for surgery due to cellulitis, BMI and age  · Fentanyl patch applied in ED, continue x72h and re-evaluate   · Pain control

## 2018-08-09 NOTE — PLAN OF CARE
Problem: DISCHARGE PLANNING - CARE MANAGEMENT  Goal: Discharge to post-acute care or home with appropriate resources  INTERVENTIONS:  - Conduct assessment to determine patient/family and health care team treatment goals, and need for post-acute services based on payer coverage, community resources, and patient preferences, and barriers to discharge  - Address psychosocial, clinical, and financial barriers to discharge as identified in assessment in conjunction with the patient/family and health care team  - Arrange appropriate level of post-acute services according to patients   needs and preference and payer coverage in collaboration with the physician and health care team  - Communicate with and update the patient/family, physician, and health care team regarding progress on the discharge plan  - Arrange appropriate transportation to post-acute venues  Outcome: Progressing  Patient to be discharged with appropriate services when medically cleared by MD MARQUEZ to follow as needed

## 2018-08-09 NOTE — H&P
H&P- Madeleinekhadar ns 1933, 80 y o  female MRN: [id number]    Unit/Bed#: E5 -01 Encounter: [id number]    Primary Care Provider: JONO Pizarro   Date and time admitted to hospital: 8/8/2018  8:46 PM      ANSELMO (acute kidney injury) ([facility] [address] )   Assessment & Plan    · Creat 1 2, baseline 0 6-0 8  · Gentle IV hydration  · Monitor renal function  · Avoid hypotension, NSAIDs and nephrotoxins        Acute respiratory failure with hypoxia (HCC)   Assessment & Plan    · Per ED, nasal cannula applied for O2 sat of 88%  · On my assessment pt wearing nasal cannula, O2 sat improved to 94%  · On Xopenex and Atrovent t i d , continue   · Monitor O2 sat, wean off O2  · Incentive spirometer         * Severe sepsis (HCC)   Assessment & Plan    · Meets severe sepsis criteria with hyperthermia, tachypnea, worsening infection, hypoxia requiring 2 L N/C and ANSELMO  · R/o Hcap vs UTI vs cellulitis  · CXR:  Lower lobe consolidations; final report pending  Consider aspiration PNA  SLP consult ordered  · Patient admitted with Barnett catheter, rule out UTI   · Procalcitonin ordered  · Blood culture and urine culture IP  · Will start Ancef and Vanco  · ID consult (failed outpt tx for cellulitis) was on extended course of Keflex followed by Bactrim in [address] [place] [address] facility        Acute retention of urine   Assessment & Plan    · Per nursing facility records, patient had urine retention and ANSELMO, creat 1 4    Barnett placed, creatinine improved to 0 87  · Urine retention protocol  · Intake and output         Osteoarthritis of both hips   Assessment & Plan    · Patient complains of chronic bilateral hip pain, seen by Orthopedics last month and not considered a candidate for surgery due to cellulitis, BMI and age  · Fentanyl patch applied in ED, continue x72h and re-evaluate   · Pain control        Cellulitis of left lower extremity   Assessment & Plan    · See above plan sepsis        Transaminitis   Assessment & Plan    · AST/ALT 75/ 111, alk phos and bilirubin normal  · Acute hepatitis panel ordered  · RUQ U/S  · Avoid hepatotoxins        Tinea corporis   Assessment & Plan    · Nystatin b i d   · Daily hygienic care        Hyponatremia   Assessment & Plan    · Na 132  · Gentle IV hydration  · Monitor serum Na        Hypertension   Assessment & Plan    · Continue amlodipine and clonidine            VTE Prophylaxis: Enoxaparin (Lovenox)  / reason for no mechanical VTE prophylaxis Cellulitis   Code Status: FC  POLST: POLST form is on file already (pre-hospital)  Discussion with family:     Anticipated Length of Stay:  Patient will be admitted on an Inpatient basis with an anticipated length of stay of  > 2 midnights  Justification for Hospital Stay:  Sepsis    Total Time for Visit, including Counseling / Coordination of Care: 1 hour  Greater than 50% of this total time spent on direct patient counseling and coordination of care  Chief Complaint:   Fever, shortness of breath, nausea    History of Present Illness:    Jovon Guzman is a 80 y o  female who presents with c/o fever, shortness of breath, cough productive of green sputum, nausea, feels sick to her stomach, loss of appetite, bilateral hip pain and constipation  Facility records reviewed, sent from SNF for fever of 102 4, B/L LE cellulitis-R leg improved, LLE did not improve and has formed a blister, failed outpatient treatment, was on extended Keflex followed by Bactrim; patient noted to have nausea, dry heaving and decreased appetite  Also found to have urine retention and ANSELMO, creatinine 1 4, Barnett placed, creatinine improved to 0 87  · Recent admission 7/19-7/28 for cellulitis, PNA and respiratory distress  Treated with IV antibiotics and discharged on po Keflex, d/c to nursing facility  Review of Systems:    Review of Systems   Constitutional: Positive for appetite change and fever  Loss of appetite   HENT: Negative  Eyes: Negative      Respiratory: Positive for cough and shortness of breath  Green sputum   Cardiovascular: Negative  Gastrointestinal: Positive for constipation and nausea  Negative for diarrhea and vomiting  Musculoskeletal: Positive for arthralgias  Neurological: Negative  Psychiatric/Behavioral: Negative  Past Medical and Surgical History:     Past Medical History:   Diagnosis Date    Hyperlipidemia     Hypertension        Past Surgical History:   Procedure Laterality Date    ANKLE FRACTURE SURGERY Left     CHOLECYSTECTOMY      REPLACEMENT TOTAL KNEE BILATERAL Bilateral        Meds/Allergies:    Prior to Admission medications    Medication Sig Start Date End Date Taking? Authorizing Provider   acetaminophen (TYLENOL) 325 mg tablet Take 650 mg by mouth every 8 (eight) hours as needed for mild pain   Yes Historical Provider, MD   amLODIPine (NORVASC) 5 mg tablet Take 5 mg by mouth every 12 (twelve) hours     Yes Historical Provider, MD   Bisacodyl (DULCOLAX RE) Insert into the rectum Insert one suppository rectally as needed     Yes Historical Provider, MD   cloNIDine (CATAPRES) 0 2 mg tablet Take 0 2 mg by mouth 3 (three) times a day   Yes Historical Provider, MD   docusate sodium (COLACE) 100 mg capsule Take 1 capsule (100 mg total) by mouth 2 (two) times a day as needed for constipation 7/28/18  Yes Eden Mcmahon MD   glycerin adult 2 g suppository Insert 1 suppository into the rectum as needed for constipation   Yes Historical Provider, MD   HYDROcodone-acetaminophen (XODOL) 5-300 MG per tablet Take 1 tablet by mouth every 12 (twelve) hours as needed for moderate pain   Yes Historical Provider, MD   ipratropium (ATROVENT) 0 02 % nebulizer solution Take 1 vial (0 5 mg total) by nebulization 3 (three) times a day 7/28/18  Yes Eden Mcmahon MD   levalbuterol (XOPENEX) 1 25 mg/0 5 mL nebulizer solution Take 0 5 mL (1 25 mg total) by nebulization 3 (three) times a day 7/28/18  Yes Eden Mcmahon MD   lidocaine (LIDODERM) 5 % Place 2 patches on the skin daily Remove & Discard patch within 12 hours or as directed by MD 7/29/18  Yes Jayashree Dhillon MD   Magnesium Hydroxide (MILK OF MAGNESIA PO) Take by mouth Give 30 mL by mouth as needed for constipation  Yes Historical Provider, MD   melatonin 3 mg Take 1 tablet (3 mg total) by mouth daily at bedtime 7/28/18  Yes Jayashree Dhillon MD   nystatin (MYCOSTATIN) powder Apply topically Apply silvadene to skin folds, dust with nystatin on every day and evening shift for 30 days  Yes Historical Provider, MD   nystatin (MYCOSTATIN) powder Apply topically 3 (three) times a day Apply topically to abdomin folds and groin every shift   Yes Historical Provider, MD   polyethylene glycol (MIRALAX) 17 g packet Take 17 g by mouth daily 7/29/18  Yes Jayashree Dhillon MD   saccharomyces boulardii (FLORASTOR) 250 mg capsule Take 250 mg by mouth 2 (two) times a day 7/30/18 8/14/18 Yes Historical Provider, MD   silver sulfadiazine (SILVADENE,SSD) 1 % cream Apply topically daily   Yes Historical Provider, MD     I have reveiwed home medications using records provided by [facility]  Allergies: No Known Allergies    Social History:     Marital Status:    Occupation: retired  Patient Pre-hospital Living Situation:  Son rehab  Patient Pre-hospital Level of Mobility:   Patient Pre-hospital Diet Restrictions: low Na  Substance Use History:   History   Alcohol Use No     History   Smoking Status    Never Smoker   Smokeless Tobacco    Never Used     History   Drug Use No       Family History:    History reviewed  No pertinent family history  Physical Exam:     Vitals:   Blood Pressure: 130/64 (08/09/18 0300)  Pulse: 80 (08/09/18 0300)  Temperature: 100 4 °F (38 °C) (08/08/18 2130)  Temp Source: Rectal (08/08/18 2130)  Respirations: 20 (08/09/18 0300)  Weight - Scale: 112 kg (248 lb) (08/08/18 2049)  SpO2: 94 % (08/09/18 0300)    Physical Exam   Constitutional: She is oriented to person, place, and time   No distress  Morbid obesity   HENT:   Head: Normocephalic and atraumatic  Eyes: Conjunctivae and EOM are normal  Right eye exhibits no discharge  Left eye exhibits no discharge  No scleral icterus  Neck: Neck supple  Cardiovascular: Normal rate, regular rhythm and intact distal pulses  Murmur heard  Pulmonary/Chest: Effort normal  No respiratory distress  She has no wheezes  She exhibits no tenderness  Right rhonchi   Abdominal: Soft  Bowel sounds are normal  She exhibits no distension and no mass  There is no tenderness  There is no rebound and no guarding  Musculoskeletal: She exhibits edema  LLE cellulitis w blister L medial malleolus   Neurological: She is alert and oriented to person, place, and time  Skin: She is not diaphoretic  No pallor  LLE warm and erythematous   Psychiatric: She has a normal mood and affect  Her behavior is normal  Judgment and thought content normal      Additional Data:     Lab Results: I have personally reviewed pertinent reports  Results from last 7 days  Lab Units 08/08/18  2102   WBC Thousand/uL 5 53   HEMOGLOBIN g/dL 10 8*   HEMATOCRIT % 33 4*   PLATELETS Thousands/uL 302   NEUTROS PCT % 74   LYMPHS PCT % 9*   MONOS PCT % 9   EOS PCT % 7*       Results from last 7 days  Lab Units 08/08/18  2102   SODIUM mmol/L 132*   POTASSIUM mmol/L 4 5   CHLORIDE mmol/L 98*   CO2 mmol/L 26   BUN mg/dL 24   CREATININE mg/dL 1 25   CALCIUM mg/dL 8 6   TOTAL PROTEIN g/dL 7 0   BILIRUBIN TOTAL mg/dL 0 26   ALK PHOS U/L 95   ALT U/L 111*   AST U/L 75*   GLUCOSE RANDOM mg/dL 109                   Imaging: I have personally reviewed pertinent reports        XR chest 2 views   ED Interpretation by Jossue Vega MD (08/08 2248)   Patient is a consolidation the lower lobes with the on the lateral      US right upper quadrant    (Results Pending)       EKG, Pathology, and Other Studies Reviewed on Admission:   · EKG: CT CXR    Allscripts / Epic Records Reviewed: Yes     ** Please Note: This note has been constructed using a voice recognition system   **

## 2018-08-09 NOTE — PROGRESS NOTES
Progress Note - Fabiano Dalton 1933, 80 y o  female MRN: [id number]    Unit/Bed#: E5 -01 Encounter: [id number]    Primary Care Provider: JONO Pizarro   Date and time admitted to hospital: 8/8/2018  8:46 PM        * Severe sepsis [facility])   Assessment & Plan    she met severe sepsis criteria with hypothermia, tachypnea, worsening lower extremity cellulitis bilaterally which failed outpatient treatment, and hypoxia requiring 2 L of nasal cannula oxygen as well as acute kidney injury  Healthcare associated pneumonia and UTI has been ruled out by imaging and microbiologic studies  Will however continue her on intravenous Ancef and vancomycin, await procalcitonin levels, await blood cultures and urine cultures, and follow up Infectious Disease consult and recommendations        Acute respiratory failure with hypoxia (HCC)   Assessment & Plan    she had oxygen administration for O2 sat of 88% on room air and currently a O2 saturations have improved to 93-94% on 2 L  continue Xopenex and Atrovent t i d  and plan to wean off O2  Continue incentive spirometry        Cellulitis of left lower extremity   Assessment & Plan    which is worsening due to failed outpatient treatment with oral Keflex and Bactrim  Continue intravenous Ancef and vancomycin, await blood cultures, follow up Infectious Disease recommendations  ANSELMO (acute kidney injury) ([facility] [address] )   Assessment & Plan    baseline creatinine 0 6-0 8 was elevated to 1 2 yesterday has responded to initiation of IV fluids and improved  Currently creatinine today is 1 01 and  ANSELMO has resolved  Continue fluids, avoid nephrotoxins, follow renal function daily  Transaminitis   Assessment & Plan    AST/ALT has 60/100 from 75/111 yesterday  Awaiting results of hepatitis panel and official read of right upper quadrant ultrasound          Acute retention of urine   Assessment & Plan    attempt voiding trial and measure postvoid residual  Hyponatremia   Assessment & Plan    with a sodium level of 132 which is most likely due to dehydration from ANSELMO  Continue fluids  Hypertension   Assessment & Plan    · Continue amlodipine and clonidine        Tinea corporis   Assessment & Plan    continue nystatin b i d  Osteoarthritis of both hips   Assessment & Plan    continue fentanyl patch  VTE Pharmacologic Prophylaxis:   Pharmacologic: Enoxaparin (Lovenox)  Mechanical VTE Prophylaxis in Place: No    Patient Centered Rounds: I have performed bedside rounds with nursing staff today  Discussions with Specialists or Other Care Team Provider:   Natalia [facility] Team Provider:    Education and Discussions with Family / Patient:   Briana Zepeda / Patient:    Time Spent for Care: 30 minutes  More than 50% of total time spent on counseling and coordination of care as described above  Current Length of Stay: 0 day(s)    Current Patient Status: Inpatient   Certification Statement: The patient will continue to require additional inpatient hospital stay due to Severe sepsis with acute respiratory failure with hypoxia, cellulitis, acute kidney injury, transaminitis, and acute urinary retention  Discharge Plan:  When medically stable    Code Status: Level 1 - Full Code      Subjective:   Patient has no real complaints today only will complains of mild shortness of breath but no wheezing or no cough  Objective:     Vitals:   Temp (24hrs), Av 9 °F (37 2 °C), Min:97 4 °F (36 3 °C), Max:100 4 °F (38 °C)    HR:  [74-86] 86  Resp:  [18-22] 20  BP: (108-169)/(61-98) 169/98  SpO2:  [93 %-97 %] 94 %  Body mass index is 43 93 kg/m²  Input and Output Summary (last 24 hours):        Intake/Output Summary (Last 24 hours) at 18 0926  Last data filed at 18 2225   Gross per 24 hour   Intake             1000 ml   Output                0 ml   Net             1000 ml       Physical Exam:     Physical Exam   Constitutional: She is oriented to person, place, and time  She appears well-developed and well-nourished  No distress  HENT:   Head: Normocephalic and atraumatic  Eyes: Pupils are equal, round, and reactive to light  Neck: Normal range of motion  Neck supple  Cardiovascular: Normal rate, regular rhythm and normal heart sounds  Pulmonary/Chest: Effort normal  No respiratory distress  She has wheezes  Abdominal: Soft  Bowel sounds are normal    Musculoskeletal: Normal range of motion  She exhibits tenderness  Neurological: She is alert and oriented to person, place, and time  Skin: She is not diaphoretic  There is erythema  Bilateral lower extremity erythema with left greater than right tenderness and erythema  Psychiatric: She has a normal mood and affect  Her behavior is normal        Additional Data:     Labs:      Results from last 7 days  Lab Units 08/09/18  0519   WBC Thousand/uL 5 15   HEMOGLOBIN g/dL 10 4*   HEMATOCRIT % 32 7*   PLATELETS Thousands/uL 245   NEUTROS PCT % 66   LYMPHS PCT % 14   MONOS PCT % 9   EOS PCT % 11*       Results from last 7 days  Lab Units 08/09/18  0520   SODIUM mmol/L 132*   POTASSIUM mmol/L 4 2   CHLORIDE mmol/L 101   CO2 mmol/L 25   BUN mg/dL 23   CREATININE mg/dL 1 01   CALCIUM mg/dL 8 3   TOTAL PROTEIN g/dL 6 6   BILIRUBIN TOTAL mg/dL 0 28   ALK PHOS U/L 89   ALT U/L 100*   AST U/L 60*   GLUCOSE RANDOM mg/dL 97                     * I Have Reviewed All Lab Data Listed Above  * Additional Pertinent Lab Tests Reviewed:  [facility]    Imaging:    Imaging Reports Reviewed Today Include:  Chest x-ray PA and lateral  Imaging Personally Reviewed by Myself Includes:  None    Recent Cultures (last 7 days):           Last 24 Hours Medication List:     Current Facility-Administered Medications:  amLODIPine 5 mg Oral Q12H [address] [facility], NP    benzonatate 100 mg Oral TID PRN JONO Paiz    bisacodyl 10 mg Rectal Daily PRN Serenity Martin Kwaku Jones, JONO    cefazolin 1,000 mg Intravenous Q8H [facility], CRNP    cloNIDine 0 2 mg Oral TID [facility], CRNP    docusate sodium 100 mg Oral BID [facility], CRNP    enoxaparin 40 mg Subcutaneous Daily [facility], CRNP    fentanyl citrate (PF) 25 mcg Intravenous Once Nena Holcomb MD    HYDROmorphone 0 2 mg Intravenous Q6H PRN [facility], CRNP    ipratropium 0 5 mg Nebulization TID [facility], CRNP    levalbuterol 1 25 mg Nebulization TID [facility], CRNP    lidocaine 2 patch Transdermal Daily [facility], CRNP    melatonin 3 mg Oral HS [facility], CRNP    nystatin  Topical BID [facility], CRNP    nystatin  Topical TID [facility], CRNP    ondansetron 4 mg Intravenous Q6H PRN [facility], CRNP    polyethylene glycol 17 g Oral Daily [facility], CRNP    saccharomyces boulardii 250 mg Oral BID [facility], CRNP    senna 2 tablet Oral Daily [facility], CRNP    sodium chloride 75 mL/hr Intravenous Continuous [facility], CRNP Last Rate: 75 mL/hr (08/09/18 0510)   vancomycin 1,250 mg Intravenous Q12H [facility], CRNP         Today, Patient Was Seen By: Juventino Falk MD    ** Please Note: Dictation voice to text software may have been used in the creation of this document   **

## 2018-08-09 NOTE — CONSULTS
Consultation - Infectious Disease   Fabiano Krystle 80 y o  female MRN: [id number]  Unit/Bed#: E5 -01 Encounter: [id number]      IMPRESSION & RECOMMENDATIONS:   Impression/Recommendations:   1  Fever  Patient developed a fever of 102 at nursing home yesterday  T-max here is 100 4  No associated leukocytosis  Procalcitonin is 0 14  She is hemodynamically stable, nontoxic  Unclear at this point if patient has a real acute infection  She does have some residual left lower extremity erythema but this may be secondary to resolving cellulitis , as well as persistent edema  Acute UTI is also consideration as patient acutely developed urinary retention a few days ago requiring placement of Barnett catheter  Urinalysis is positive  No clinical or radiographic evidence of pneumonia  As patient did have a significant fever, will continue empiric antibiotic for now pending further workup     -  Continue IV cefazolin for now  - Discontinue vancomycin as low suspicion for MRSA infection  -  Monitor temperatures closely  - Check CBC in a m   - monitor hemodynamics  - follow up pending blood cultures  - followup urine culture     2  Left lower extremity cellulitis  Patient recently completed a prolonged course of antibiotic for left lower extremity cellulitis  She  Still has mild erythema and warmth, but this may be secondary to edema  Will continue on cefazolin for now in the setting of acute onset high fevers and monitor for improvement  -  Antibiotic plan as above  - frequent leg elevation  - aggressive edema control  - fluid management per primary team     3  Acute hypoxic respiratory failure  No clinical or radiographic evidence to suggest pneumonia  May be a component of atelectasis as patient is relatively immobile  -  Wean oxygen as tolerated  - incentive spirometry   - improved mobility as able     4  Acute urinary retention with abnormal urinalysis     Status post recent placement of indwelling Barnett catheter at nursing home  Difficult to assess for urinary symptoms at this time  Urinalysis is positive  -  Antibiotic plan as above  -Will await further culture data to add management  5  Bilateral hip pain  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  This appears to be a chronic ongoing process which is severely limiting patient's mobility  Antibiotics:   vancomycin/ cefazolin     Discussed above plan with patient and her family at bedside  Discussed above plan with Dr Leida Taylor  Thank you for this consultation  We will follow along with you  HISTORY OF PRESENT ILLNESS:  Reason for Consult:  sepsis    HPI: Sherita Woods is a 80 y o  female with history of severe OA, chronic leg pain, worsening mobility, recent hospitalization at the end of July 2018 for left lower extremity cellulitis treated with 10 day course of cefazolin/Keflex  Patient also developed multifocal pneumonia during recent hospitalization for which she completed a 7 day course of antibiotic  Patient clinically improved and was discharged to [facility]  She has not been able to persists appeared in physical therapy very well  Yesterday, she developed fever up to 102  for which she was sent to hospital  Of note, 5 days ago, she had acute onset urinary retention for which a Barnett catheter was placed and remains in place  On arrival, patient was noted to have mild hypoxia which improved with oxygen by nasal cannula  She currently denies any chest pain, shortness of breath, nausea, vomiting, diarrhea  She does complain of bilateral lower extremity pain, worse on the left  She also developed a blister on her left leg which has since ruptured with some serous drainage  REVIEW OF SYSTEMS:    A complete system-based review of systems is otherwise negative      PAST MEDICAL HISTORY:  Past Medical History:   Diagnosis Date    Hyperlipidemia     Hypertension      Past Surgical History:   Procedure Laterality Date    ANKLE FRACTURE SURGERY Left     CHOLECYSTECTOMY      REPLACEMENT TOTAL KNEE BILATERAL Bilateral        FAMILY HISTORY:  Non-contributory    SOCIAL HISTORY:  History   Alcohol Use No     History   Drug Use No     History   Smoking Status    Never Smoker   Smokeless Tobacco    Never Used       ALLERGIES:  No Known Allergies    MEDICATIONS:  All current active medications have been reviewed  PHYSICAL EXAM:  Vitals:  HR:  [74-86] 86  Resp:  [18-22] 20  BP: (108-169)/(61-98) 169/98  SpO2:  [93 %-97 %] 94 %  Temp (24hrs), Av 9 °F (37 2 °C), Min:97 4 °F (36 3 °C), Max:100 4 °F (38 °C)  Current: Temperature: (!) 97 4 °F (36 3 °C)     Physical Exam:  General:  No acute distress,  elderly  Eyes:  Conjunctive clear with no hemorrhages or effusions  Oropharynx:  No ulcers, no lesions  Neck:  Supple, no lymphadenopathy  Lungs:  Clear to auscultation bilaterally, no accessory muscle use  Cardiac:  Regular rate and rhythm, no murmurs  Abdomen:  Soft, non-tender, non-distended , Barnett in place with dark yellow urine and some sediment  Extremities:   Bilateral lower extremity edema, left greater than right  Mild left lower extremity erythema and warmth  No purulent drainage  No fluctuance  Skin:  No rashes, no ulcers  Neurological:  Moves all four extremities spontaneously, sensation grossly intact      LABS, IMAGING, & OTHER STUDIES:  Lab Results:  I have personally reviewed pertinent labs      Results from last 7 days  Lab Units 18  0520 18  2102   SODIUM mmol/L 132* 132*   POTASSIUM mmol/L 4 2 4 5   CHLORIDE mmol/L 101 98*   CO2 mmol/L 25 26   ANION GAP mmol/L 6 8   BUN mg/dL 23 24   CREATININE mg/dL 1 01 1 25   EGFR ml/min/1 73sq m 51 40   GLUCOSE RANDOM mg/dL 97 109   CALCIUM mg/dL 8 3 8 6   AST U/L 60* 75*   ALT U/L 100* 111*   ALK PHOS U/L 89 95   TOTAL PROTEIN g/dL 6 6 7 0   BILIRUBIN TOTAL mg/dL 0 28 0 26       Results from last 7 days  Lab Units 18  0519 18  2102   WBC Thousand/uL 5 15 5  53   HEMOGLOBIN g/dL 10 4* 10 8*   PLATELETS Thousands/uL 245 302         Imaging Studies:   I have personally reviewed pertinent imaging study reports and images in PACS  Chest x-ray shows no acute cardiopulmonary disease  EKG, Pathology, and Other Studies:   I have personally reviewed pertinent reports

## 2018-08-09 NOTE — ASSESSMENT & PLAN NOTE
· Per ED, nasal cannula applied for O2 sat of 88%  · On my assessment pt wearing nasal cannula, O2 sat improved to 94%  · On Xopenex and Atrovent t i d , continue   · Monitor O2 sat, wean off O2  · Incentive spirometer

## 2018-08-10 ENCOUNTER — APPOINTMENT (INPATIENT)
Dept: RADIOLOGY | Facility: HOSPITAL | Age: 83
DRG: 871 | End: 2018-08-10
Payer: COMMERCIAL

## 2018-08-10 ENCOUNTER — APPOINTMENT (INPATIENT)
Dept: NON INVASIVE DIAGNOSTICS | Facility: HOSPITAL | Age: 83
DRG: 871 | End: 2018-08-10
Payer: COMMERCIAL

## 2018-08-10 PROBLEM — J98.01 ACUTE BRONCHOSPASM: Status: ACTIVE | Noted: 2018-08-10

## 2018-08-10 PROBLEM — R94.31 ABNORMAL FINDING ON EKG: Status: ACTIVE | Noted: 2018-08-10

## 2018-08-10 LAB
ANION GAP SERPL CALCULATED.3IONS-SCNC: 6 MMOL/L (ref 4–13)
BACTERIA UR CULT: ABNORMAL
BASE EXCESS BLDA CALC-SCNC: 0 MMOL/L (ref -2–3)
BUN SERPL-MCNC: 12 MG/DL (ref 5–25)
CA-I BLD-SCNC: 1.19 MMOL/L (ref 1.12–1.32)
CALCIUM SERPL-MCNC: 8.5 MG/DL (ref 8.3–10.1)
CHLORIDE SERPL-SCNC: 99 MMOL/L (ref 100–108)
CO2 SERPL-SCNC: 26 MMOL/L (ref 21–32)
CREAT SERPL-MCNC: 0.79 MG/DL (ref 0.6–1.3)
DEPRECATED D DIMER PPP: 5900 NG/ML (FEU) (ref 0–424)
FIO2 GAS DIL.REBREATH: 44 L
GFR SERPL CREATININE-BSD FRML MDRD: 68 ML/MIN/1.73SQ M
GLUCOSE SERPL-MCNC: 107 MG/DL (ref 65–140)
GLUCOSE SERPL-MCNC: 108 MG/DL (ref 65–140)
HCO3 BLDA-SCNC: 25 MMOL/L (ref 24–30)
HCT VFR BLD CALC: 33 % (ref 34.8–46.1)
HGB BLDA-MCNC: 11.2 G/DL (ref 11.5–15.4)
LACTATE SERPL-SCNC: 0.9 MMOL/L (ref 0.5–2)
NT-PROBNP SERPL-MCNC: 1087 PG/ML
PCO2 BLD: 26 MMOL/L (ref 21–32)
PCO2 BLD: 43.3 MM HG (ref 42–50)
PH BLD: 7.37 [PH] (ref 7.3–7.4)
PO2 BLD: 53 MM HG (ref 35–45)
POTASSIUM BLD-SCNC: 4.3 MMOL/L (ref 3.5–5.3)
POTASSIUM SERPL-SCNC: 4.5 MMOL/L (ref 3.5–5.3)
QRS AXIS: 0 DEGREES
QRSD INTERVAL: 82 MS
QT INTERVAL: 314 MS
QTC INTERVAL: 400 MS
SAO2 % BLD FROM PO2: 86 % (ref 95–98)
SODIUM BLD-SCNC: 132 MMOL/L (ref 136–145)
SODIUM SERPL-SCNC: 131 MMOL/L (ref 136–145)
SPECIMEN SOURCE: ABNORMAL
T WAVE AXIS: 32 DEGREES
VENTRICULAR RATE: 98 BPM

## 2018-08-10 PROCEDURE — 93970 EXTREMITY STUDY: CPT

## 2018-08-10 PROCEDURE — 94760 N-INVAS EAR/PLS OXIMETRY 1: CPT

## 2018-08-10 PROCEDURE — 99222 1ST HOSP IP/OBS MODERATE 55: CPT | Performed by: INTERNAL MEDICINE

## 2018-08-10 PROCEDURE — 99223 1ST HOSP IP/OBS HIGH 75: CPT | Performed by: INTERNAL MEDICINE

## 2018-08-10 PROCEDURE — 93970 EXTREMITY STUDY: CPT | Performed by: SURGERY

## 2018-08-10 PROCEDURE — 83605 ASSAY OF LACTIC ACID: CPT | Performed by: FAMILY MEDICINE

## 2018-08-10 PROCEDURE — 93010 ELECTROCARDIOGRAM REPORT: CPT | Performed by: INTERNAL MEDICINE

## 2018-08-10 PROCEDURE — 85014 HEMATOCRIT: CPT

## 2018-08-10 PROCEDURE — 84132 ASSAY OF SERUM POTASSIUM: CPT

## 2018-08-10 PROCEDURE — 92610 EVALUATE SWALLOWING FUNCTION: CPT

## 2018-08-10 PROCEDURE — 85379 FIBRIN DEGRADATION QUANT: CPT | Performed by: FAMILY MEDICINE

## 2018-08-10 PROCEDURE — 80048 BASIC METABOLIC PNL TOTAL CA: CPT | Performed by: FAMILY MEDICINE

## 2018-08-10 PROCEDURE — 82330 ASSAY OF CALCIUM: CPT

## 2018-08-10 PROCEDURE — 94640 AIRWAY INHALATION TREATMENT: CPT

## 2018-08-10 PROCEDURE — 99233 SBSQ HOSP IP/OBS HIGH 50: CPT | Performed by: FAMILY MEDICINE

## 2018-08-10 PROCEDURE — 93306 TTE W/DOPPLER COMPLETE: CPT

## 2018-08-10 PROCEDURE — 71045 X-RAY EXAM CHEST 1 VIEW: CPT

## 2018-08-10 PROCEDURE — 99232 SBSQ HOSP IP/OBS MODERATE 35: CPT | Performed by: INTERNAL MEDICINE

## 2018-08-10 PROCEDURE — 36600 WITHDRAWAL OF ARTERIAL BLOOD: CPT

## 2018-08-10 PROCEDURE — 83880 ASSAY OF NATRIURETIC PEPTIDE: CPT | Performed by: FAMILY MEDICINE

## 2018-08-10 PROCEDURE — 93005 ELECTROCARDIOGRAM TRACING: CPT

## 2018-08-10 PROCEDURE — 84295 ASSAY OF SERUM SODIUM: CPT

## 2018-08-10 PROCEDURE — 82803 BLOOD GASES ANY COMBINATION: CPT

## 2018-08-10 PROCEDURE — 82947 ASSAY GLUCOSE BLOOD QUANT: CPT

## 2018-08-10 RX ORDER — LEVALBUTEROL 1.25 MG/.5ML
1.25 SOLUTION, CONCENTRATE RESPIRATORY (INHALATION) EVERY 6 HOURS PRN
Status: DISCONTINUED | OUTPATIENT
Start: 2018-08-10 | End: 2018-08-16

## 2018-08-10 RX ORDER — SODIUM CHLORIDE FOR INHALATION 0.9 %
VIAL, NEBULIZER (ML) INHALATION
Status: COMPLETED
Start: 2018-08-10 | End: 2018-08-10

## 2018-08-10 RX ORDER — METHYLPREDNISOLONE SODIUM SUCCINATE 125 MG/2ML
60 INJECTION, POWDER, LYOPHILIZED, FOR SOLUTION INTRAMUSCULAR; INTRAVENOUS EVERY 6 HOURS SCHEDULED
Status: DISCONTINUED | OUTPATIENT
Start: 2018-08-10 | End: 2018-08-10

## 2018-08-10 RX ORDER — SODIUM CHLORIDE FOR INHALATION 0.9 %
3 VIAL, NEBULIZER (ML) INHALATION EVERY 6 HOURS PRN
Status: DISCONTINUED | OUTPATIENT
Start: 2018-08-10 | End: 2018-08-16

## 2018-08-10 RX ORDER — METOPROLOL SUCCINATE 25 MG/1
25 TABLET, EXTENDED RELEASE ORAL EVERY 12 HOURS
Status: DISCONTINUED | OUTPATIENT
Start: 2018-08-10 | End: 2018-08-17 | Stop reason: HOSPADM

## 2018-08-10 RX ORDER — FUROSEMIDE 10 MG/ML
40 INJECTION INTRAMUSCULAR; INTRAVENOUS
Status: DISCONTINUED | OUTPATIENT
Start: 2018-08-10 | End: 2018-08-16

## 2018-08-10 RX ORDER — FUROSEMIDE 10 MG/ML
40 INJECTION INTRAMUSCULAR; INTRAVENOUS ONCE
Status: COMPLETED | OUTPATIENT
Start: 2018-08-10 | End: 2018-08-10

## 2018-08-10 RX ORDER — CLONIDINE HYDROCHLORIDE 0.1 MG/1
0.1 TABLET ORAL EVERY 12 HOURS SCHEDULED
Status: DISCONTINUED | OUTPATIENT
Start: 2018-08-10 | End: 2018-08-17 | Stop reason: HOSPADM

## 2018-08-10 RX ADMIN — DOCUSATE SODIUM 100 MG: 100 CAPSULE, LIQUID FILLED ORAL at 17:14

## 2018-08-10 RX ADMIN — POLYETHYLENE GLYCOL (3350) 17 G: 17 POWDER, FOR SOLUTION ORAL at 08:15

## 2018-08-10 RX ADMIN — CEFAZOLIN SODIUM 1000 MG: 1 SOLUTION INTRAVENOUS at 04:56

## 2018-08-10 RX ADMIN — MELATONIN TAB 3 MG 3 MG: 3 TAB at 21:11

## 2018-08-10 RX ADMIN — CLONIDINE HYDROCHLORIDE 0.2 MG: 0.1 TABLET ORAL at 08:14

## 2018-08-10 RX ADMIN — METOPROLOL SUCCINATE 25 MG: 25 TABLET, EXTENDED RELEASE ORAL at 11:08

## 2018-08-10 RX ADMIN — LEVALBUTEROL HYDROCHLORIDE 1.25 MG: 1.25 SOLUTION, CONCENTRATE RESPIRATORY (INHALATION) at 02:32

## 2018-08-10 RX ADMIN — NYSTATIN: 100000 POWDER TOPICAL at 15:18

## 2018-08-10 RX ADMIN — Medication 250 MG: at 08:14

## 2018-08-10 RX ADMIN — NYSTATIN: 100000 POWDER TOPICAL at 21:11

## 2018-08-10 RX ADMIN — ENOXAPARIN SODIUM 40 MG: 40 INJECTION SUBCUTANEOUS at 08:14

## 2018-08-10 RX ADMIN — AMLODIPINE BESYLATE 5 MG: 5 TABLET ORAL at 08:14

## 2018-08-10 RX ADMIN — HYDROMORPHONE HYDROCHLORIDE 0.2 MG: 1 INJECTION, SOLUTION INTRAMUSCULAR; INTRAVENOUS; SUBCUTANEOUS at 05:34

## 2018-08-10 RX ADMIN — LEVALBUTEROL HYDROCHLORIDE 1.25 MG: 1.25 SOLUTION, CONCENTRATE RESPIRATORY (INHALATION) at 07:26

## 2018-08-10 RX ADMIN — AMLODIPINE BESYLATE 5 MG: 5 TABLET ORAL at 21:11

## 2018-08-10 RX ADMIN — Medication 250 MG: at 17:14

## 2018-08-10 RX ADMIN — APIXABAN 5 MG: 5 TABLET, FILM COATED ORAL at 18:27

## 2018-08-10 RX ADMIN — FUROSEMIDE 40 MG: 10 INJECTION, SOLUTION INTRAMUSCULAR; INTRAVENOUS at 18:27

## 2018-08-10 RX ADMIN — CEFAZOLIN SODIUM 1000 MG: 1 SOLUTION INTRAVENOUS at 12:20

## 2018-08-10 RX ADMIN — ISODIUM CHLORIDE 3 ML: 0.03 SOLUTION RESPIRATORY (INHALATION) at 02:32

## 2018-08-10 RX ADMIN — SENNOSIDES 17.2 MG: 8.6 TABLET, FILM COATED ORAL at 08:14

## 2018-08-10 RX ADMIN — CEFAZOLIN SODIUM 1000 MG: 1 SOLUTION INTRAVENOUS at 20:11

## 2018-08-10 RX ADMIN — CLONIDINE HYDROCHLORIDE 0.1 MG: 0.1 TABLET ORAL at 21:11

## 2018-08-10 RX ADMIN — METHYLPREDNISOLONE SODIUM SUCCINATE 60 MG: 125 INJECTION, POWDER, FOR SOLUTION INTRAMUSCULAR; INTRAVENOUS at 08:13

## 2018-08-10 RX ADMIN — IPRATROPIUM BROMIDE 0.5 MG: 0.5 SOLUTION RESPIRATORY (INHALATION) at 07:26

## 2018-08-10 RX ADMIN — NYSTATIN: 100000 POWDER TOPICAL at 08:14

## 2018-08-10 RX ADMIN — DOCUSATE SODIUM 100 MG: 100 CAPSULE, LIQUID FILLED ORAL at 08:14

## 2018-08-10 RX ADMIN — FUROSEMIDE 40 MG: 10 INJECTION, SOLUTION INTRAMUSCULAR; INTRAVENOUS at 09:13

## 2018-08-10 NOTE — PROGRESS NOTES
Progress Note - Fiona Harrell 1933, 80 y o  female MRN: [id number]    Unit/Bed#: E5 -01 Encounter: [id number]    Primary Care Provider: JONO Perez   Date and time admitted to hospital: 8/8/2018  8:46 PM        * Severe sepsis [facility])   Assessment & Plan    she met severe sepsis criteria with hypothermia, tachypnea, worsening lower extremity cellulitis bilaterally which failed outpatient treatment, and hypoxia requiring 2 L of nasal cannula oxygen as well as acute kidney injury  Based on ID recommendations they did not think that patient has pneumonia although they do think she may have a UTI necessitating the continuation of the IV Ancef  Patient did have an episode of acute urinary tension at the nursing home requiring placement of a Barnett catheter which may be why she may possibly have a UTI  She also has lower extremity erythema which is much better than previously but which may be contributing to her overall picture  She however does not have any evidence of MRSA infection so vancomycin was discontinued  Acute respiratory failure with hypoxia (HCC)   Assessment & Plan    Continue breathing treatments, IV Solu-Medrol added, O2 by nasal cannula to maintain oxygen saturations above 92%  Pulmonology consultation obtained  Cellulitis of left lower extremity   Assessment & Plan    which is worsening due to failed outpatient treatment with oral Keflex and Bactrim  Continue intravenous Ancef, await blood cultures, follow up Infectious Disease recommendations  ANSELMO (acute kidney injury) ([facility] [address] )   Assessment & Plan    baseline creatinine 0 6-0 8 was elevated to 1 2 yesterday has responded to initiation of IV fluids and improved  Currently creatinine today is 1 01 and  ANSELMO has resolved  Continue fluids, avoid nephrotoxins, follow renal function daily  Transaminitis   Assessment & Plan    AST/ALT has 60/100 from 75/111 yesterday     Right upper quadrant ultrasound from yesterday shows no biliary dilatation and also shows fatty infiltration of liver  Acute retention of urine   Assessment & Plan    attempt voiding trial and measure postvoid residual         Hyponatremia   Assessment & Plan    Most likely due to dehydration from acute kidney injury  IV fluids discontinued due to acute respiratory failure  Hypertension   Assessment & Plan    · Continue amlodipine and clonidine        Tinea corporis   Assessment & Plan    continue nystatin b i d         Acute bronchospasm   Assessment & Plan    According to patient's nurse patient developed acute bronchospasm overnight that was treated with nebulized inhalers but she did not respond to to the breathing treatments  Was paged this morning because patient was having difficulty breathing with oxygen saturations 95% on 4 L of O2 and respirations at 36 per minute  Evaluated patient on on auscultation she was found to have significant expiratory wheezing bilaterally in all lung fields  IV Solu-Medrol ordered, pro BNP, D-dimer, lactate, and arterial blood gas was ordered as well as portable chest x-ray  IV fluids discontinued  Twelve lead EKG revealed atrial fibrillation with a rate of 98 per minute  Will obtain cardiology consultation as well as pulmonology consultation in this patient  Abnormal finding on EKG   Assessment & Plan    Atrial fibrillation noticed on 12 lead EKG, will consult Cardiology and keep patient on telemetry  VTE Pharmacologic Prophylaxis:   Pharmacologic: Enoxaparin (Lovenox)  Mechanical VTE Prophylaxis in Place: No    Patient Centered Rounds: I have performed bedside rounds with nursing staff today  Discussions with Specialists or Other Care Team Provider:   Natalia An Team Provider:    Education and Discussions with Family / Patient:   Britneyалександр Shaw / Patient:    Time Spent for Care: 45 minutes    More than 50% of total time spent on counseling and coordination of care as described above  Current Length of Stay: 1 day(s)    Current Patient Status: Inpatient   Certification Statement: The patient will continue to require additional inpatient hospital stay due to Acute respiratory failure with hypoxia, acute bronchospasm, atrial fibrillation, severe sepsis  Discharge Plan:   When medically stable    Code Status: Level 1 - Full Code      Subjective:   Patient has been complaining of difficulty breathing overnight with bronchospasm that did not respond to initial treatment with nebulized breathing treatment  Objective:     Vitals:   Temp (24hrs), Av 6 °F (37 °C), Min:98 1 °F (36 7 °C), Max:99 2 °F (37 3 °C)    HR:  [90-95] 94  Resp:  [17-30] 30  BP: (129-155)/(71-92) 155/92  SpO2:  [92 %-97 %] 97 %  Body mass index is 43 93 kg/m²  Input and Output Summary (last 24 hours): Intake/Output Summary (Last 24 hours) at 08/10/18 0827  Last data filed at 08/10/18 1464   Gross per 24 hour   Intake           1187 5 ml   Output             1700 ml   Net           -512 5 ml       Physical Exam:     Physical Exam   Constitutional: She is oriented to person, place, and time  She appears well-developed and well-nourished  No distress  HENT:   Head: Normocephalic and atraumatic  Eyes: EOM are normal  Pupils are equal, round, and reactive to light  Neck: Normal range of motion  Neck supple  Cardiovascular: Normal rate and normal heart sounds  Irregular rhythm   Pulmonary/Chest: She is in respiratory distress  She has wheezes  Abdominal: Soft  Bowel sounds are normal    Musculoskeletal: She exhibits no edema  Neurological: She is alert and oriented to person, place, and time  Skin: Skin is warm and dry  She is not diaphoretic  Psychiatric: She has a normal mood and affect   Her behavior is normal      Additional Data:     Labs:      Results from last 7 days  Lab Units 08/10/18  0816 18  0519   WBC Thousand/uL  --  5 15   HEMOGLOBIN g/dL --  10 4*   I STAT HEMOGLOBIN g/dl 11 2*  --    HEMATOCRIT %  --  32 7*   PLATELETS Thousands/uL  --  245   NEUTROS PCT %  --  66   LYMPHS PCT %  --  14   MONOS PCT %  --  9   EOS PCT %  --  11*       Results from last 7 days  Lab Units 08/10/18  0816 08/09/18  0520   SODIUM mmol/L  --  132*   POTASSIUM mmol/L  --  4 2   CHLORIDE mmol/L  --  101   CO2 mmol/L  --  25   BUN mg/dL  --  23   CREATININE mg/dL  --  1 01   CALCIUM mg/dL  --  8 3   TOTAL PROTEIN g/dL  --  6 6   BILIRUBIN TOTAL mg/dL  --  0 28   ALK PHOS U/L  --  89   ALT U/L  --  100*   AST U/L  --  60*   GLUCOSE RANDOM mg/dL  --  97   GLUCOSE, ISTAT mg/dl 107  --                      * I Have Reviewed All Lab Data Listed Above  * [facility]ertinent [facility] Tests [facility]    Imaging:    Imaging Reports Reviewed Today Include:  12 lead EKG  Imaging Personally Reviewed by Myself Includes:  None    Recent Cultures (last 7 days):       Results from last 7 days  Lab Units 08/08/18  2102   BLOOD CULTURE  No Growth at 24 hrs  No Growth at 24 hrs         Last 24 Hours Medication List:     Current Facility-Administered Medications:  [facility]OD[facility]ine 5 [facility]12H [facility]    benzonatate 100 mg Oral TID PRN JONO Gilbert    bisacodyl 10 mg Rectal Daily PRN JONO Gilbert    cefazolin 1,000 mg Intravenous Q8H JOON Gilbert Last Rate: 1,000 mg (08/10/18 0456)   cloNIDine 0 2 mg Oral TID JOON Gilbert    docusate sodium 100 mg Oral BID JONO Gilbert    enoxaparin 40 mg Subcutaneous Daily JONO Gilbert    fentanyl citrate (PF) 25 mcg Intravenous Once Jossue Vega MD    HYDROmorphone 0 2 mg Intravenous Q6H PRN JONO Gilbert    ipratropium 0 5 mg Nebulization TID JONO Gilbert    levalbuterol 1 25 mg Nebulization TID JONO Gilbert    levalbuterol 1 25 mg Nebulization Q6H PRN Nimo Huddleston MD    lidocaine 2 patch Transdermal Daily Chester Stover, JONO    melatonin 3 mg Oral HS Chester Stover, JONO    methylPREDNISolone sodium succinate 60 mg Intravenous Q6H [facility] Mando Santacruz MD    nystatin  Topical TID Chester Stover, JONO    ondansetron 4 mg Intravenous Q6H PRN Chester Stover, CRNP    polyethylene glycol 17 g Oral Daily Chester Stover, JONO    saccharomyces boulardii 250 mg Oral BID Chester Stover, GAGENP    senna 2 tablet Oral Daily Chester Stover, JONO    sodium chloride 3 mL Nebulization Q6H PRN Mando Santacruz MD         Today, Patient Was Seen By: Mando Santacruz MD    ** Please Note: Dictation voice to text software may have been used in the creation of this document   **

## 2018-08-10 NOTE — ASSESSMENT & PLAN NOTE
According to patient's nurse patient developed acute bronchospasm overnight that was treated with nebulized inhalers but she did not respond to to the breathing treatments  Was paged this morning because patient was having difficulty breathing with oxygen saturations 95% on 4 L of O2 and respirations at 36 per minute  Evaluated patient on on auscultation she was found to have significant expiratory wheezing bilaterally in all lung fields  IV Solu-Medrol ordered, pro BNP, D-dimer, lactate, and arterial blood gas was ordered as well as portable chest x-ray  IV fluids discontinued  Twelve lead EKG revealed atrial fibrillation with a rate of 98 per minute  Will obtain cardiology consultation as well as pulmonology consultation in this patient

## 2018-08-10 NOTE — PLAN OF CARE
Problem: SLP ADULT - SWALLOWING, IMPAIRED  Goal: Initial SLP swallow eval performed  Outcome: Completed Date Met: 08/10/18

## 2018-08-10 NOTE — PROGRESS NOTES
Patient still complaining of SOB and now saying "it feels like my stomach is pushing up on my lungs and I cant take a full breath " Sats are 95 on 4L O2 with respirations 36  Patient states the nebulizers from earlier did not work  Vale Ji NP on call, who advised me to give the dilaudid PRN and have patient cough and deep breath because he thinks this is related to atelectasis  Also educated patient on incentive spirometer which she could get up to 250  Will monitor patient to see if any relief

## 2018-08-10 NOTE — CONSULTS
Consult - Cardiology   Mohit Bui 80 y o  female MRN: [id number]  Unit/Bed#: E5 -01 Encounter: [id number]        Reason For Consult:  Dysrhythmia               Assessment and Plan:     1  Atrial fibrillation:     Paroxysmal   New diagnosis  With patient's inability to perceive her heart rate & rhythm it is unknown if she has had this before    Currently rate controlled (90s) though not optimized ~~> will initiate AV blocking Rx (metoprolol succinate)  Follow response with ongoing telemetry   Anticoagulation is indicated with with chads Vasc score of at least 4 (female, age greater than 76, hypertension)  This was discussed with the patient who expressed a preference for DOAC (direct oral anticoagulant)  ~~> will begin Xarelto 20/d  2  Possible CHF:   Some component of acute decompensated CHF is suspected in the setting of acute tachyarrhythmia ~~> agree with initiation of IV Lasix  Follow response  3   Valvular heart disease:     By examination I suspect this patient has severe AS and some component of MR ~~> check echo for quantification, etc   4   Hypertension:   Currently controlled on a regimen of Catapres and Norvasc ~~> Assuming Catapres is taken for hypertension would prefer reduction/exclusion with initiation of beta-blocker  WIll decrease catapress dose from 0 2 to 0 1, and tid to bid at this time  History Of Present Illness:  Ms Cooney is an 27-year-old who had return to our local area proximally to use ago at which time she moved into a nursing facility  She denies having an outside PCP and is never been cared for by cardiologist   She presented to the hospital on 8/8 with reports of fever and persistent or worsening left lower extremity cellulitis  She was subsequently diagnosed with left lower extremity cellulitis, sepsis, hypoxic respiratory failure (88% on room air), urinary retention, and ANSELMO    During the overnight hours and this morning the patient had offered some complaints of dyspnea  With evaluation she was subsequently noted to have an irregular heart rhythm with ECG confirmation of atrial fibrillation  Telemetry is been applied noting ventricular rate trend in the 90s  She has no prior history of the same and is personally unaware of her heart rate rhythm  Past Medical History:        Past Medical History:   Diagnosis Date    Hyperlipidemia     Hypertension     Past Surgical History:   Procedure Laterality Date    ANKLE FRACTURE SURGERY Left     CHOLECYSTECTOMY      REPLACEMENT TOTAL KNEE BILATERAL Bilateral         Allergy:        No Known Allergies    Medications:       Prior to Admission medications    Medication Sig Start Date End Date Taking? Authorizing Provider   acetaminophen (TYLENOL) 325 mg tablet Take 650 mg by mouth every 8 (eight) hours as needed for mild pain   Yes Historical Provider, MD   amLODIPine (NORVASC) 5 mg tablet Take 5 mg by mouth every 12 (twelve) hours     Yes Historical Provider, MD   bisacodyl (DULCOLAX) 10 mg suppository Insert 1 suppository into the rectum daily as needed Insert one suppository rectally as needed       Yes Historical Provider, MD   cloNIDine (CATAPRES) 0 2 mg tablet Take 0 2 mg by mouth 3 (three) times a day   Yes Historical Provider, MD   docusate sodium (COLACE) 100 mg capsule Take 1 capsule (100 mg total) by mouth 2 (two) times a day as needed for constipation 7/28/18  Yes Jamar Bobby MD   glycerin adult 2 g suppository Insert 1 suppository into the rectum as needed for constipation   Yes Historical Provider, MD   HYDROcodone-acetaminophen (XODOL) 5-300 MG per tablet Take 1 tablet by mouth every 12 (twelve) hours as needed for moderate pain   Yes Historical Provider, MD   ipratropium (ATROVENT) 0 02 % nebulizer solution Take 1 vial (0 5 mg total) by nebulization 3 (three) times a day 7/28/18  Yes Jamar Bobby MD   levalbuterol (XOPENEX) 1 25 mg/0 5 mL nebulizer solution Take 0 5 mL (1 25 mg total) by nebulization 3 (three) times a day 7/28/18  Yes Mary Leiva MD   lidocaine (LIDODERM) 5 % Place 2 patches on the skin daily Remove & Discard patch within 12 hours or as directed by MD 7/29/18  Yes Mary Leiva MD   Magnesium Hydroxide (MILK OF MAGNESIA PO) Take by mouth Give 30 mL by mouth as needed for constipation  Yes Historical Provider, MD   melatonin 3 mg Take 1 tablet (3 mg total) by mouth daily at bedtime 7/28/18  Yes Mary Leiva MD   nystatin (MYCOSTATIN) powder Apply topically Apply silvadene to skin folds, dust with nystatin on every day and evening shift for 30 days  Yes Historical Provider, MD   nystatin (MYCOSTATIN) powder Apply topically 3 (three) times a day Apply topically to abdomin folds and groin every shift   Yes Historical Provider, MD   polyethylene glycol (MIRALAX) 17 g packet Take 17 g by mouth daily 7/29/18  Yes Mary Leiva MD   saccharomyces boulardii (FLORASTOR) 250 mg capsule Take 250 mg by mouth 2 (two) times a day 7/30/18 8/14/18 Yes Historical Provider, MD   silver sulfadiazine (SILVADENE,SSD) 1 % cream Apply topically daily   Yes Historical Provider, MD   sulfamethoxazole-trimethoprim (BACTRIM DS) 800-160 mg per tablet Take 1 tablet by mouth every 12 (twelve) hours 8/3/18 8/10/18 Yes Historical Provider, MD       Family History:     History reviewed  No pertinent family history  Social History:       Social History     Social History    Marital status:       Spouse name: N/A    Number of children: N/A    Years of education: N/A     Social History Main Topics    Smoking status: Never Smoker    Smokeless tobacco: Never Used    Alcohol use No    Drug use: No    Sexual activity: Not Asked     Other Topics Concern    None     Social History Narrative    None       ROS:  Symptoms per HPI  Chronic bilateral hip pain  For the most part patient is immobile - uses a wheelchair and requires assist for transfers  Unaware of heart rate rhythm  Sense of mild abdominal bloating, dyspnea, and increased respiratory effort this morning  Remainder review of systems is negative    Exam:  General:  Alert, normally conversant, comfortable-appearing with BMI that visually appears consistent with obesity  Head: Normocephalic, atraumatic  Eyes:  EOMI  Pupils - equal, round, reactive to accomodation  No icterus  Normal Conjunctiva  Oropharynx:  Moist without lesion  Neck:  Cardiac murmur transmitted to the neck  No appreciable thyromegaly or lymphadenopathy  Heart:  Currently regular with slightly increased ventricular rate  Grade 3 basilar JOSE with absent S2, somewhat distant apical JOSE-likely at least grade 2-3  Lungs: Moderate excursion air exchange with few scattered end-expiratory wheezes and mild basilar rales  Abdomen:  Obese, soft, nontender, no appreciable mass organomegaly  Lower Limbs:  Left lower extremity with what appears to be reduced rubor (cellulitis)  Trace edema  Pulses[de-identified]  RLE - DP:  1-2 +                 LLE - DP:  1-2 +  Musculoskeletal: Independent movement of limbs observed, Formal ROM and strength eval not performed  Neurologic:    Oriented to: person , place, situation  Cranial Nerves: grossly intact - vision, smell, taste, and hearing  were not tested  Motor function:grossly normal, symmetric   Sensation: Was not tested    DATA:      Telemetry:  Atrial fibrillation with ventricular rate approximately 90 beats per minute          Echocardiogram:  Pending         Weights: Wt Readings from Last 3 Encounters:   08/08/18 112 kg (248 lb)   07/19/18 121 kg (267 lb 3 2 oz)   07/19/18 120 kg (265 lb 3 4 oz)   , Body mass index is 43 93 kg/m²           Lab Studies:               Results from last 7 days  Lab Units 08/10/18  0816 08/09/18  0519 08/08/18  2102   WBC Thousand/uL  --  5 15 5 53   HEMOGLOBIN g/dL  --  10 4* 10 8*   I STAT HEMOGLOBIN g/dl 11 2*  --   --    HEMATOCRIT %  --  32 7* 33 4*   PLATELETS Thousands/uL  --  245 302   ,   Results from last 7 days  Lab Units 08/10/18  0858 08/10/18  0816 08/09/18  0520 08/08/18 2108   SODIUM mmol/L 131*  --  132* 132*   POTASSIUM mmol/L 4 5  --  4 2 4 5   CHLORIDE mmol/L 99*  --  101 98*   CO2 mmol/L 26  --  25 26   BUN mg/dL 12  --  23 24   CREATININE mg/dL 0 79  --  1 01 1 25   CALCIUM mg/dL 8 5  --  8 3 8 6   TOTAL PROTEIN g/dL  --   --  6 6 7 0   BILIRUBIN TOTAL mg/dL  --   --  0 28 0 26   ALK PHOS U/L  --   --  89 95   ALT U/L  --   --  100* 111*   AST U/L  --   --  60* 75*   GLUCOSE RANDOM mg/dL 108  --  97 109   GLUCOSE, ISTAT mg/dl  --  107  --   --

## 2018-08-10 NOTE — ASSESSMENT & PLAN NOTE
AST/ALT has 60/100 from 75/111 yesterday  Right upper quadrant ultrasound from yesterday shows no biliary dilatation and also shows fatty infiltration of liver

## 2018-08-10 NOTE — ASSESSMENT & PLAN NOTE
Most likely due to dehydration from acute kidney injury  IV fluids discontinued due to acute respiratory failure

## 2018-08-10 NOTE — ASSESSMENT & PLAN NOTE
she met severe sepsis criteria with hypothermia, tachypnea, worsening lower extremity cellulitis bilaterally which failed outpatient treatment, and hypoxia requiring 2 L of nasal cannula oxygen as well as acute kidney injury  Based on ID recommendations they did not think that patient has pneumonia although they do think she may have a UTI necessitating the continuation of the IV Ancef  Patient did have an episode of acute urinary tension at the nursing home requiring placement of a Barnett catheter which may be why she may possibly have a UTI  She also has lower extremity erythema which is much better than previously but which may be contributing to her overall picture  She however does not have any evidence of MRSA infection so vancomycin was discontinued

## 2018-08-10 NOTE — PROGRESS NOTES
Received report from nightshift GEOVANNY Mohr patient SOB overnight paged Dr Zuri Diaz come see patient expiratory wheezing and sob resting o2 6l nasal cannula received nebulizer 96% stat xray,labs,ekg done lasix and solumedrol given  Telemetry placed afib cardiology into see patient  Barnett patent draining yellow urine  LLE edema and redness improving  Patient vitals stable call bell in reach  Will continue to monitor

## 2018-08-10 NOTE — ASSESSMENT & PLAN NOTE
Continue breathing treatments, IV Solu-Medrol added, O2 by nasal cannula to maintain oxygen saturations above 92%  Pulmonology consultation obtained

## 2018-08-10 NOTE — CONSULTS
Pulmonary Consultation   Tanya Vieyra 80 y o  female MRN: [id number]  Unit/Bed#: E5 -01 Encounter: [id number]      Reason for consultation: Acute respiratory failure, hypoxia, acute bronchospasm    Requesting physician: JUNAID Hernandez MD    Impressions/Plan:   1  Acute hypoxic respiratory failure        *  Multifactorial and related to below        *  Titrate supplemental oxygen as able to keep saturations greater than or equal to 88%        *  Incentive spirometry Q1hr, OOB as able, increase activity as able        *  D/C solumedrol as pt without known lung disease and a never smoker  *  Changes nebs to prn        *  Speech evaluation pending  2  Volume overload with possible acute CHF        *  IVF stopped  *  Start IV lasix and monitor I/Os and daily weights        *  Echo pending        *  Recheck CXR after appropriate diuresis  3  Sepsis POA secondary to LLE cellulitis        *  Antibiotics per ID        *  LE dopplers pending  4  New Afib        *  Cardiology now following        *  Metoprolol & Xarelto started    ~Discussed with Dr Cara Hernandez    History of Present Illness    HPI:  Tanya Vieyra is a 80 y o  female who presented to the emergency room at [facility] on August 8th with fever of 102  Of note, she was hospitalized at the end of July for left lower extremity cellulitis and treated with a 10 day course of and staff that was converted to Keflex  She also developed a multifocal pneumonia during this admission that was treated with cefepime which was transitioned to Levaquin at her time of discharge  Also, approximately a week prior to this admission she had an acute onset of urinary retention for which Barnett catheter was placed  At her time of arrival here it was still in  In the emergency room, she was found to be acutely hypoxic with room air saturations of 88%  Per notes, she did not wear supplemental oxygen at the nursing home where she resides  She was placed on nasal cannula with improvement in her oxygenation  She denied any chest pain, shortness of breath, nausea, vomiting or diarrhea  She denied cough or bronchospasm on admission  She did complain of lower extremity pain which is chronic  She has been seen by infectious disease and placed on IV antibiotics  Early this morning, she began to complain of increased shortness of breath and restlessness  She was stable on 3 L at 94% with a very low-grade fever at 99 4  She was given nebulizer without any relief as well as Dilaudid  She then developed acute bronchospasm and oxygen was increased to 6 L  She was also noted to have an irregular heart rhythm with EKG showing new atrial fibrillation  Blood work was ordered which showed elevated BNP and elevated D-dimer  Portable chest x-ray also confirmed volume overload  IV fluids have been stopped  She also was given Solu-Medrol and placed on a standing order for that  Despite the above treatment, she continues to complain of being short of breath and has 3-4 word conversational dyspnea  She denies any underlying lung disease  She is a never smoker and has no known occupational exposures  She has never required bronchodilators at home  She does not use supplemental oxygen or noninvasive ventilation at night  She has never been seen by a pulmonologist and never undergone breathing test   Due to her acute bronchospasm and worsening hypoxia, a pulmonary consultation has been requested  Review of systems:  12 point review of systems was completed and was otherwise negative except as listed in HPI  Historical Information   Past Medical History:   Diagnosis Date    Hyperlipidemia     Hypertension      Past Surgical History:   Procedure Laterality Date    ANKLE FRACTURE SURGERY Left     CHOLECYSTECTOMY      REPLACEMENT TOTAL KNEE BILATERAL Bilateral      History reviewed  No pertinent family history      Occupational history: Denies any known occupational exposures    She is a retired     Tobacco history:  She is a never smoker    Meds/Allergies   Current Facility-Administered Medications   Medication Dose Route Frequency    amLODIPine (NORVASC) tablet 5 mg  5 mg Oral Q12H [facility]    benzonatate (TESSALON PERLES) capsule 100 mg  100 mg Oral TID PRN    bisacodyl (DULCOLAX) rectal suppository 10 mg  10 mg Rectal Daily PRN    ceFAZolin (ANCEF) IVPB (premix) 1,000 mg  1,000 mg Intravenous Q8H    cloNIDine (CATAPRES) tablet 0 2 mg  0 2 mg Oral TID    docusate sodium (COLACE) capsule 100 mg  100 mg Oral BID    enoxaparin (LOVENOX) subcutaneous injection 40 mg  40 mg Subcutaneous Daily    fentanyl citrate (PF) 100 MCG/2ML 25 mcg  25 mcg Intravenous Once    HYDROmorphone (DILAUDID) injection 0 2 mg  0 2 mg Intravenous Q6H PRN    ipratropium (ATROVENT) 0 02 % inhalation solution 0 5 mg  0 5 mg Nebulization TID    levalbuterol (XOPENEX) inhalation solution 1 25 mg  1 25 mg Nebulization TID    levalbuterol (XOPENEX) inhalation solution 1 25 mg  1 25 mg Nebulization Q6H PRN    lidocaine (LIDODERM) 5 % patch 2 patch  2 patch Transdermal Daily    melatonin tablet 3 mg  3 mg Oral HS    methylPREDNISolone sodium succinate (Solu-MEDROL) injection 60 mg  60 mg Intravenous Q6H [facility]    nystatin (MYCOSTATIN) powder   Topical TID    ondansetron (ZOFRAN) injection 4 mg  4 mg Intravenous Q6H PRN    polyethylene glycol (MIRALAX) packet 17 g  17 g Oral Daily    saccharomyces boulardii (FLORASTOR) capsule 250 mg  250 mg Oral BID    senna (SENOKOT) tablet 17 2 mg  2 tablet Oral Daily    sodium chloride 0 9 % inhalation solution 3 mL  3 mL Nebulization Q6H PRN     Prescriptions Prior to Admission   Medication    acetaminophen (TYLENOL) 325 mg tablet    amLODIPine (NORVASC) 5 mg tablet    bisacodyl (DULCOLAX) 10 mg suppository    cloNIDine (CATAPRES) 0 2 mg tablet    docusate sodium (COLACE) 100 mg capsule    glycerin adult 2 g suppository    HYDROcodone-acetaminophen (XODOL) 5-300 MG per tablet    ipratropium (ATROVENT) 0 02 % nebulizer solution    levalbuterol (XOPENEX) 1 25 mg/0 5 mL nebulizer solution    lidocaine (LIDODERM) 5 %    Magnesium Hydroxide (MILK OF MAGNESIA PO)    melatonin 3 mg    nystatin (MYCOSTATIN) powder    nystatin (MYCOSTATIN) powder    polyethylene glycol (MIRALAX) 17 g packet    saccharomyces boulardii (FLORASTOR) 250 mg capsule    silver sulfadiazine (SILVADENE,SSD) 1 % cream    sulfamethoxazole-trimethoprim (BACTRIM DS) 800-160 mg per tablet     No Known Allergies    Vitals: Blood pressure 155/92, pulse 94, temperature 98 5 °F (36 9 °C), temperature source Tympanic, resp  rate (!) 30, weight 112 kg (248 lb), SpO2 97 %  , 6LNC, Body mass index is 43 93 kg/m²  Intake/Output Summary (Last 24 hours) at 08/10/18 0951  Last data filed at 08/10/18 8430   Gross per 24 hour   Intake           1187 5 ml   Output             1700 ml   Net           -512 5 ml       Physical exam:    General Appearance:    Alert, cooperative, 3-4 word conversational dyspnea noted   Head/eyes:    Normocephalic, without obvious abnormality, atraumatic,         PERRL, extraocular muscles intact, no scleral icterus    Nose:   Nares normal, septum midline, mucosa normal, no drainage    or sinus tenderness, wearing O2 via nasal cannula   Throat:   Moist mucous membranes, no thrush   Neck:   Supple, trachea midline, no adenopathy; no carotid    bruit or JVD   Lungs:     Decreased breath sounds throughout bilaterally with bibasilar rales and few scattered expiratory wheezes throughout  No rhonchi noted  Chest Wall:    No tenderness or deformity    Heart:    Regular rate and rhythm at present, but tachycardic, S1 and S2 normal, 2/6 JOSE noted, rub   or gallop   Abdomen:     Soft, obese, non-tender, bowel sounds active all four quadrants, no masses, no organomegaly   Extremities:   Extremities normal, atraumatic, no cyanosis    Trace lower extremity edema bilaterally  Left lower extremity is erythematous   Skin:   Warm, dry, turgor normal, no rashes or lesions   Lymph nodes:   Cervical and supraclavicular nodes normal   Neurologic:   CNII-XII intact, non-focal         Labs: I have personally reviewed pertinent lab results  , ABG: No results found for: PHART, ZVY8HTB, PO2ART, OXM5YUE, Q0WUULPO, BEART, SOURCE, BNP: No results found for: BNP, CBC:   Lab Results   Component Value Date    HGB 11 2 (L) 08/10/2018   , CMP:   Lab Results   Component Value Date     (L) 08/10/2018    K 4 5 08/10/2018    CL 99 (L) 08/10/2018    CO2 26 08/10/2018    ANIONGAP 6 08/10/2018    BUN 12 08/10/2018    CREATININE 0 79 08/10/2018    GLUCOSE 108 08/10/2018    GLUCOSE 107 08/10/2018    CALCIUM 8 5 08/10/2018    EGFR 68 08/10/2018   , PT/INR: No results found for: PT, INR, Troponin: No results found for: TROPONINI     Lactic acid normal    D-dimer elevated at 5900    ProBNP 1087    Procalcitonin normal    Blood culture showing no growth after 24 hours    Imaging and other studies: I have personally reviewed pertinent films in PACS     [facility] 8/10/18  Increased vascular congestion noted  Pulmonary function testing: No PFTs to be reviewed    EKG, Pathology, and Other Studies: I have personally reviewed pertinent reports         Echocardiogram pending    Lower extremity Dopplers pending    Code Status: Level 1 - Full Code      Alanna SalazarrMORIS

## 2018-08-10 NOTE — SPEECH THERAPY NOTE
Speech Language/Pathology  Speech/Language Pathology  Assessment    Patient Name: Lizandro Gomez  NVWAK'E Date: 8/10/2018     Problem List  Patient Active Problem List   Diagnosis    Cellulitis    Hypertension    Hip pain    Severe sepsis ([address]    Morbid (severe) obesity due to excess calories ([address]    Hyperlipidemia    Transaminitis    Cellulitis of left lower extremity    Hyponatremia    Osteoarthritis of both hips    Acute respiratory failure with hypoxia ([address]    Tinea corporis    Acute retention of urine    ANSELMO (acute kidney injury) ([address]    Abnormal finding on EKG    Acute bronchospasm     Past Medical History  Past Medical History:   Diagnosis Date    Hyperlipidemia     Hypertension      Past Surgical History  Past Surgical History:   Procedure Laterality Date    ANKLE FRACTURE SURGERY Left     CHOLECYSTECTOMY      REPLACEMENT TOTAL KNEE BILATERAL Bilateral      Attestation signed by Leida Dey MD at 8/9/2018 9:20 AM       Split/Shared Statement  I saw/examined the patient  I agree with the Advanced Practitioner's note with the following additions/exceptions:  58-year-old female patient with past medical history of hypertension and hyperlipidemia who presented from [address] [place] [address] with fever, shortness of breath, cough productive of greenish sputum, nausea, feeling sick to her stomach, loss of appetite, bilateral hip pain, and constipation  She was in severe sepsis with fever of 102 4° F, tachypnea, bilateral lower extremity cellulitis left greater than right with a blister as she failed outpatient treatment, she had been on extended Keflex followed by Bactrim  Patient also noted to have urine retention and acute kidney injury with a creatinine of 1 4, Barnett was placed and creatinine improved to 0 87  Of note patient had a recent admission on from July 19 to July 28, 2018 for cellulitis pneumonia and respiratory distress    During that time she was treated with intravenous antibiotics and discharged on oral Keflex to the nursing facility  During her admission yesterday she was thought to have aspiration pneumonia however a chest x-ray PA and lateral was completely negative  Also urinalysis was negative for any UTI  She will however have evaluation by speech and language pathology  She is currently on IV Ancef and vancomycin  Plan is as follows  1  Severe sepsis-she met severe sepsis criteria with hypothermia, tachypnea, worsening lower extremity cellulitis bilaterally which failed outpatient treatment, and hypoxia requiring 2 L of nasal cannula oxygen as well as acute kidney injury  Healthcare associated pneumonia and UTI has been ruled out by imaging and microbiologic studies  Will however continue her on intravenous Ancef and vancomycin, await procalcitonin levels, await blood cultures and urine cultures, and follow up Infectious Disease consult and recommendations  2   Acute respiratory failure with hypoxia-she had oxygen administration for O2 sat of 88% on room air and currently a O2 saturations have improved to 93-94% on 2 L  continue Xopenex and Atrovent t i d  and plan to wean off O2  Continue incentive spirometry  3  Cellulitis of bilateral lower extremities-which is worsening due to failed outpatient treatment with oral Keflex and Bactrim  Continue intravenous Ancef and vancomycin, await blood cultures, follow up Infectious Disease recommendations  4   Acute kidney injury-baseline creatinine 0 6-0 8 was elevated to 1 2 yesterday has responded to initiation of IV fluids and improved  Currently creatinine today is 1 01 and  ANSELMO has resolved  Continue fluids, avoid nephrotoxins, follow renal function daily  5   Transaminitis-AST/ALT has 60/100 from 75/111 yesterday  Awaiting results of hepatitis panel and official read of right upper quadrant ultrasound    6   Acute urinary retention-attempt voiding trial and measure postvoid residual   7  Hyponatremia-with a sodium level of 132 which is most likely due to dehydration from ANSELMO  Continue fluids  8   Hypertension-continue amlodipine and clonidine  9   Tinea corporis-continue nystatin b i d  [address]   Osteoarthritis of bilateral hips-continue fentanyl patch  Chief Complaint:   Fever, shortness of breath, nausea  History of Present Illness:  Rupinder Wheeler is a 80 y o  female who presents with c/o fever, shortness of breath, cough productive of green sputum, nausea, feels sick to her stomach, loss of appetite, bilateral hip pain and constipation  Facility records reviewed, sent from SNF for fever of 102 4, B/L LE cellulitis-R leg improved, LLE did not improve and has formed a blister, failed outpatient treatment, was on extended Keflex followed by Bactrim; patient noted to have nausea, dry heaving and decreased appetite  Also found to have urine retention and ANSELMO, creatinine 1 4, Barnett placed, creatinine improved to 0 87  · Recent admission 7/19-7/28 for cellulitis, PNA and respiratory distress  Treated with IV antibiotics and discharged on po Keflex, d/c to nursing facility  8/10 am note:  Received report from nightshift GEOVANNY Mohr patient SOB overnight paged Dr Jeanine Wang come see patient expiratory wheezing and sob resting o2 6l nasal cannula received nebulizer 96% stat xray,labs,ekg done lasix and solumedrol given  Telemetry placed afib cardiology into see patient  Barnett patent draining yellow urine  LLE edema and redness improving  Patient vitals stable call bell in reach  Will continue to monitor  Summary:  Pt presents w/ no overt s/s aspiration  No oral/pharyngeal difficulties  States she has no appetite, the food is terrible, and she wants salt  She picked at all of the items on her tray and drank the ensure  ? Simran Lockekaleb  Family state she would likely eat a [employer] hot dog  Daughter brought in jo tomatoes from her garden that pt was eating       Recommendations:  Diet: regular (? If cardiac diet can be d/c'd  I did explain that she was retaining fluid  Pt and daughter would like it d/c'd)  Liquid: thin  Meds: as tolerated/desired  Supervision:distant  Positioning:Upright  Strategies: Pt to take PO/Meds only when fully alert and upright  Oral care  Reflux precautions    Eval only, No f/u tx indicated  Patient's goal: wants better food    Consider consult w/:  Nutrition    Reason for consult:  R/o aspiration  Determine safest and least restrictive diet  respiratory compromise  current pna  poor intake  Current diet:  Regular, cardiac  Premorbid diet[de-identified]  regular  Previous VBS:  none  O2 requirement:  6L but pt had it off when I walked in  After a few minutes she was agreeable to putting it back on  Voice/Speech:  wnl  Social:  AL at [employer] commands:  yes                   Cognitive Status:  Fully alert  [facility]  Full symmetry  Items administered:  Noted above  Liquids were taken by straw  Oral stage:  wnl  Pharyngeal stage:   WNL  Swallow promptness:prompt  Laryngeal rise:wnl  Wet voice:no  Throat clear:no  Cough:no  Secondary swallows:no  Audible swallows:no  No s/s aspiration    Esophageal stage:  No s/s reported

## 2018-08-10 NOTE — PROGRESS NOTES
Patient rang call bell saying she was SOB, overheated, and restless  Took vitals- /74, HR 68, O2 94% on 3L O2, temperature 99 4, and respirations 36  Patient has no PRN nebs, only scheduled  Called respiratory and they said they would look into it  Patient's Hob elevated 30-45 degress, on 4L O2, and in site from nurses stations

## 2018-08-10 NOTE — ASSESSMENT & PLAN NOTE
which is worsening due to failed outpatient treatment with oral Keflex and Bactrim  Continue intravenous Ancef, await blood cultures, follow up Infectious Disease recommendations

## 2018-08-10 NOTE — PROGRESS NOTES
Progress Note - Infectious Disease   Sherita Woods 80 y o  female MRN: [id number]  Unit/Bed#: E5 -01 Encounter: [id number]      Impression/Recommendations:   1  Fever  Patient developed a fever of 102 at nursing home  T-max here is 100 4  No associated leukocytosis  Procalcitonin is 0 14  She is hemodynamically stable, nontoxic  Unclear at this point if patient has a real acute infection  She does have some residual left lower extremity erythema but this may be secondary to resolving cellulitis , as well as persistent edema  Acute UTI is also consideration as patient acutely developed urinary retention a few days ago requiring placement of Barnett catheter  Although doubtful as urine culture is negative  No clinical or radiographic evidence of pneumonia  As patient did have a significant fever, will continue empiric antibiotic for now pending further workup  Blood cultures negative thus far      -  Antibiotic as below  -  Monitor temperatures closely  - Check CBC in a m   - monitor hemodynamics  - follow up pending blood cultures  - followup urine culture      2  Left lower extremity cellulitis  Patient recently completed a prolonged course of antibiotic for left lower extremity cellulitis  She still has mild erythema and warmth, but this may be secondary to edema  Will continue on cefazolin for now in the setting of acute onset high fevers and monitor for improvement      -   continue IV cefazolin  Would give for 5 days total as minimal ongoing cellulitis, through 8/12   - frequent leg elevation  - aggressive edema control  - fluid management per primary team      3  Acute hypoxic respiratory failure  No clinical or radiographic evidence to suggest pneumonia  May be a component of atelectasis as patient is relatively immobile  Also possible mild volume overload  Pulmonology evaluation noted  Patient started on IV diuresis today    Again, nothing clinically to suggest developing pneumonia      -  Diuresis per pulmonology and primary team  -  Wean oxygen as tolerated  - incentive spirometry   - improved mobility as able      4  Acute urinary retention with abnormal urinalysis  Status post recent placement of indwelling Barnett catheter at nursing home  Difficult to assess for urinary symptoms at this time  Urinalysis is positive  Urine culture only reveals low colony count of Candida albicans, which is likely a colonizer  No antibiotic indicated for this        5  Bilateral hip pain  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  This appears to be a chronic ongoing process which is severely limiting patient's mobility      Antibiotics:  cefazolin      Will plan to see again on   Please call with any new questions in the interim  Subjective:    Patient states she is feeling much better today  No shortness of breath or cough  Her leg pain is better  Denies fevers, chills, or sweats  Denies nausea, vomiting, or diarrhea  Objective:  Vitals:  HR:  [90-95] 91  Resp:  [17-30] 20  BP: (126-155)/(71-92) 126/71  SpO2:  [92 %-97 %] 93 %  Temp (24hrs), Av 6 °F (37 °C), Min:98 1 °F (36 7 °C), Max:99 2 °F (37 3 °C)  Current: Temperature: 98 5 °F (36 9 °C)    Physical Exam:   General:  No acute distress , elderly  Psychiatric:  Awake and alert  Pulmonary:  Normal respiratory excursion without accessory muscle use  Abdomen:  Soft, nontender, Barnett in place with yellow urine  Extremities:   Bilateral lower extremity edema  Mild left lower extremity erythema, warmth  No purulent drainage or fluctuance  Skin:  No rashes    Lab Results:  I have personally reviewed pertinent labs      Results from last 7 days  Lab Units 08/10/18  0858 08/10/18  0816 18  0520 18  2102   SODIUM mmol/L 131*  --  132* 132*   POTASSIUM mmol/L 4 5  --  4 2 4 5   CHLORIDE mmol/L 99*  --  101 98*   CO2 mmol/L 26  --  25 26   ANION GAP mmol/L 6  --  6 8   BUN mg/dL 12  --  23 24   CREATININE mg/dL 0 79  -- 1  01 1 25   EGFR ml/min/1 73sq m 68  --  51 40   GLUCOSE RANDOM mg/dL 108  --  97 109   GLUCOSE, ISTAT mg/dl  --  107  --   --    CALCIUM mg/dL 8 5  --  8 3 8 6   AST U/L  --   --  60* 75*   ALT U/L  --   --  100* 111*   ALK PHOS U/L  --   --  89 95   TOTAL PROTEIN g/dL  --   --  6 6 7 0   BILIRUBIN TOTAL mg/dL  --   --  0 28 0 26       Results from last 7 days  Lab Units 08/10/18  0816 08/09/18  0519 08/08/18  2102   WBC Thousand/uL  --  5 15 5 53   HEMOGLOBIN g/dL  --  10 4* 10 8*   I STAT HEMOGLOBIN g/dl 11 2*  --   --    PLATELETS Thousands/uL  --  245 302       Results from last 7 days  Lab Units 08/08/18  2309 08/08/18  2102   BLOOD CULTURE   --  No Growth at 24 hrs  No Growth at 24 hrs  URINE CULTURE  <10,000 cfu/ml Candida sp  presumptively albicans*  --        Imaging Studies:   I have personally reviewed pertinent imaging study reports and images in PACS  chest x-ray shows possible right mid lung infiltrate    EKG, Pathology, and Other Studies:   I have personally reviewed pertinent reports

## 2018-08-11 PROBLEM — I50.33 ACUTE ON CHRONIC DIASTOLIC (CONGESTIVE) HEART FAILURE (HCC): Status: ACTIVE | Noted: 2018-08-11

## 2018-08-11 PROBLEM — I48.0 PAROXYSMAL ATRIAL FIBRILLATION (HCC): Status: ACTIVE | Noted: 2018-08-11

## 2018-08-11 PROBLEM — B37.3 VULVOVAGINAL CANDIDIASIS: Status: ACTIVE | Noted: 2018-08-11

## 2018-08-11 LAB
ANION GAP SERPL CALCULATED.3IONS-SCNC: 5 MMOL/L (ref 4–13)
BUN SERPL-MCNC: 16 MG/DL (ref 5–25)
CALCIUM SERPL-MCNC: 8.6 MG/DL (ref 8.3–10.1)
CHLORIDE SERPL-SCNC: 100 MMOL/L (ref 100–108)
CO2 SERPL-SCNC: 29 MMOL/L (ref 21–32)
CREAT SERPL-MCNC: 0.68 MG/DL (ref 0.6–1.3)
GFR SERPL CREATININE-BSD FRML MDRD: 80 ML/MIN/1.73SQ M
GLUCOSE SERPL-MCNC: 115 MG/DL (ref 65–140)
POTASSIUM SERPL-SCNC: 4.1 MMOL/L (ref 3.5–5.3)
SODIUM SERPL-SCNC: 134 MMOL/L (ref 136–145)

## 2018-08-11 PROCEDURE — 99232 SBSQ HOSP IP/OBS MODERATE 35: CPT | Performed by: INTERNAL MEDICINE

## 2018-08-11 PROCEDURE — 99232 SBSQ HOSP IP/OBS MODERATE 35: CPT | Performed by: FAMILY MEDICINE

## 2018-08-11 PROCEDURE — 80048 BASIC METABOLIC PNL TOTAL CA: CPT | Performed by: FAMILY MEDICINE

## 2018-08-11 RX ORDER — FLUCONAZOLE 100 MG/1
200 TABLET ORAL ONCE
Status: COMPLETED | OUTPATIENT
Start: 2018-08-11 | End: 2018-08-11

## 2018-08-11 RX ADMIN — MELATONIN TAB 3 MG 3 MG: 3 TAB at 22:58

## 2018-08-11 RX ADMIN — Medication 250 MG: at 09:38

## 2018-08-11 RX ADMIN — NYSTATIN: 100000 POWDER TOPICAL at 16:51

## 2018-08-11 RX ADMIN — Medication 250 MG: at 17:45

## 2018-08-11 RX ADMIN — METOPROLOL SUCCINATE 25 MG: 25 TABLET, EXTENDED RELEASE ORAL at 23:00

## 2018-08-11 RX ADMIN — FUROSEMIDE 40 MG: 10 INJECTION, SOLUTION INTRAMUSCULAR; INTRAVENOUS at 16:50

## 2018-08-11 RX ADMIN — AMLODIPINE BESYLATE 5 MG: 5 TABLET ORAL at 22:58

## 2018-08-11 RX ADMIN — CLONIDINE HYDROCHLORIDE 0.1 MG: 0.1 TABLET ORAL at 23:01

## 2018-08-11 RX ADMIN — CEFAZOLIN SODIUM 1000 MG: 1 SOLUTION INTRAVENOUS at 04:30

## 2018-08-11 RX ADMIN — NYSTATIN: 100000 POWDER TOPICAL at 09:40

## 2018-08-11 RX ADMIN — POLYETHYLENE GLYCOL (3350) 17 G: 17 POWDER, FOR SOLUTION ORAL at 09:39

## 2018-08-11 RX ADMIN — APIXABAN 5 MG: 5 TABLET, FILM COATED ORAL at 09:39

## 2018-08-11 RX ADMIN — CLONIDINE HYDROCHLORIDE 0.1 MG: 0.1 TABLET ORAL at 09:38

## 2018-08-11 RX ADMIN — METOPROLOL SUCCINATE 25 MG: 25 TABLET, EXTENDED RELEASE ORAL at 00:00

## 2018-08-11 RX ADMIN — NYSTATIN: 100000 POWDER TOPICAL at 23:00

## 2018-08-11 RX ADMIN — DOCUSATE SODIUM 100 MG: 100 CAPSULE, LIQUID FILLED ORAL at 17:45

## 2018-08-11 RX ADMIN — AMLODIPINE BESYLATE 5 MG: 5 TABLET ORAL at 09:38

## 2018-08-11 RX ADMIN — FLUCONAZOLE 200 MG: 100 TABLET ORAL at 12:08

## 2018-08-11 RX ADMIN — FUROSEMIDE 40 MG: 10 INJECTION, SOLUTION INTRAMUSCULAR; INTRAVENOUS at 09:39

## 2018-08-11 RX ADMIN — METOPROLOL SUCCINATE 25 MG: 25 TABLET, EXTENDED RELEASE ORAL at 09:50

## 2018-08-11 RX ADMIN — DOCUSATE SODIUM 100 MG: 100 CAPSULE, LIQUID FILLED ORAL at 09:39

## 2018-08-11 RX ADMIN — CEFAZOLIN SODIUM 1000 MG: 1 SOLUTION INTRAVENOUS at 12:09

## 2018-08-11 RX ADMIN — APIXABAN 5 MG: 5 TABLET, FILM COATED ORAL at 17:45

## 2018-08-11 RX ADMIN — CEFAZOLIN SODIUM 1000 MG: 1 SOLUTION INTRAVENOUS at 22:57

## 2018-08-11 RX ADMIN — SENNOSIDES 17.2 MG: 8.6 TABLET, FILM COATED ORAL at 09:38

## 2018-08-11 NOTE — ASSESSMENT & PLAN NOTE
Patient had complained of her vaginal area being red and itchy and painful  In light of recent urine culture showing Candida will start patient on oral Diflucan

## 2018-08-11 NOTE — ASSESSMENT & PLAN NOTE
Improving on nebulized breathing treatments as needed, O2 by nasal cannula titrated to maintain oxygen saturations at or above 88%, incentive spirometry, mobilize out of bed as able and increased activity

## 2018-08-11 NOTE — ASSESSMENT & PLAN NOTE
Continue diuresis with Lasix 40 mg IV b i d , 2D echo done and official report is pending, continue daily weights, strict in's and out's, fluid restriction to less than 1 5 L per day, low-salt diet  Cardiology continuing to follow patient and keep patient on telemetry  Continue metoprolol, Norvasc

## 2018-08-11 NOTE — ASSESSMENT & PLAN NOTE
Continue rate control with metoprolol as well as anticoagulation with Eliquis  Patient seen and evaluated by Cardiology and appreciate the consult and recommendations  Continue on telemetry

## 2018-08-11 NOTE — PROGRESS NOTES
Progress Note - Pulmonary   [place] Julio Cesar 80 y o  female MRN: [id number]  Unit/Bed#: E5 -01 Encounter: [id number]      Assessment:  · Acute hypoxic respiratory failure  · All acute diastolic congestive heart failure  · Sepsis due to lower extremity cellulitis  Improved  Plan:  · Continue diuresis  · Titrate oxygen to maintain O2 saturation above 88%  · Incentive spirometry  · Will sign off  Please call with questions  Discussed with Dr Conner    Subjective: The patient is sitting in the chair  Her breathing has markedly improved  Denies cough or sputum production  No chest pain  Vitals: Blood pressure 132/60, pulse 81, temperature 98 °F (36 7 °C), temperature source Temporal, resp  rate 18, height 5' 3" (1 6 m), weight 112 kg (248 lb), SpO2 95 %  Intake/Output Summary (Last 24 hours) at 08/11/18 1511  Last data filed at 08/11/18 1304   Gross per 24 hour   Intake              400 ml   Output             4100 ml   Net            -3700 ml       Physical Exam  Gen: Awake, alert, oriented x 3, no acute distress  HEENT: Mucous membranes moist, no oral lesions, no thrush  NECK: No accessory muscle use, JVP not elevated  Cardiac: Irregular, single S1, single S2, no murmurs, no rubs, no gallops  Lungs:  Decreased breath sounds  No wheezing or rhonchi    Abdomen: normoactive bowel sounds, soft nontender, nondistended, no rebound or rigidity, no guarding  Extremities: no cyanosis, no clubbing, no edema    Labs:   CMP:   Lab Results   Component Value Date     (L) 08/11/2018    K 4 1 08/11/2018     08/11/2018    CO2 29 08/11/2018    ANIONGAP 5 08/11/2018    BUN 16 08/11/2018    CREATININE 0 68 08/11/2018    GLUCOSE 115 08/11/2018    CALCIUM 8 6 08/11/2018    EGFR 80 08/11/2018         Molinda Litten, MD

## 2018-08-11 NOTE — PROGRESS NOTES
Progress Note - Kera Ferguson 1933, 80 y o  female MRN: [id number]    Unit/Bed#: E5 -01 Encounter: [id number]    Primary Care Provider: JONO Hart   Date and time admitted to hospital: 8/8/2018  8:46 PM        Acute respiratory failure with hypoxia ([address] )   Assessment & Plan    IV Solu-Medrol discontinued by pulmonology  No clear evidence to suggest the patient has COPD in this instance as she has no prior smoking history  Continue breathing treatments, O2 by nasal cannula as needed p r n  Titrate O2 to keep O2 sats on maintain them at or above 88%,  Incentive spirometry, out of bed and increase activity as able  To this end will obtain a PT OT consultation  Speech eval pending to rule out aspiration  * Cellulitis of left lower extremity   Assessment & Plan    Continue IV cefazolin as per Infectious Disease recommendations  Blood cultures show no growth at 48 hr   Urine culture shows greater than 100,000 colony-forming units per mL of Candida presumptively albicans  Transaminitis   Assessment & Plan    AST/ALT has 60/100 from 75/111 yesterday  Right upper quadrant ultrasound  shows no biliary dilatation and also shows fatty infiltration of liver  Acute retention of urine   Assessment & Plan    attempt voiding trial and measure postvoid residual         Hyponatremia   Assessment & Plan    Resolved        Hypertension   Assessment & Plan    · Continue amlodipine and clonidine        Tinea corporis   Assessment & Plan    continue nystatin b i d  Osteoarthritis of both hips   Assessment & Plan    continue fentanyl patch  Vulvovaginal candidiasis   Assessment & Plan    Patient had complained of her vaginal area being red and itchy and painful  In light of recent urine culture showing Candida will start patient on oral Diflucan          Acute on chronic diastolic (congestive) heart failure (HCC)   Assessment & Plan    Continue diuresis with Lasix 40 mg IV b i d , 2D echo done and official report is pending, continue daily weights, strict in's and out's, fluid restriction to less than 1 5 L per day, low-salt diet  Cardiology continuing to follow patient and keep patient on telemetry  Continue metoprolol, Norvasc  Paroxysmal atrial fibrillation (HCC)   Assessment & Plan    Continue rate control with metoprolol as well as anticoagulation with Eliquis  Patient seen and evaluated by Cardiology and appreciate the consult and recommendations  Continue on telemetry  Acute bronchospasm   Assessment & Plan    Improving on nebulized breathing treatments as needed, O2 by nasal cannula titrated to maintain oxygen saturations at or above 88%, incentive spirometry, mobilize out of bed as able and increased activity  VTE Pharmacologic Prophylaxis:   Pharmacologic: Apixaban (Eliquis)  Mechanical VTE Prophylaxis in Place: No    Patient Centered Rounds: I have performed bedside rounds with nursing staff today  Discussions with Specialists or Other Care Team Provider:   Natalia An Team Provider:    Education and Discussions with Family / Patient:  Yes    Time Spent for Care: 20 minutes  More than 50% of total time spent on counseling and coordination of care as described above      Current Length of Stay: 2 day(s)    Current Patient Status: Inpatient   Certification Statement: The patient will continue to require additional inpatient hospital stay due to Acute on chronic diastolic congestive heart failure, paroxysmal atrial fibrillation, lower extremity cellulitis    Discharge Plan:  When medically stable    Code Status: Level 1 - Full Code      Subjective:   Patient complaining of itchiness and redness in the vaginal area    Objective:     Vitals:   Temp (24hrs), Av 7 °F (36 5 °C), Min:96 2 °F (35 7 °C), Max:98 5 °F (36 9 °C)    HR:  [72-91] 75  Resp:  [18-20] 18  BP: (121-143)/(67-88) 143/74  SpO2:  [93 %-97 %] 94 %  Body mass index is 43 93 kg/m²  Input and Output Summary (last 24 hours): Intake/Output Summary (Last 24 hours) at 08/11/18 1047  Last data filed at 08/11/18 0950   Gross per 24 hour   Intake              720 ml   Output             5000 ml   Net            -4280 ml       Physical Exam:     Physical Exam   Constitutional: She is oriented to person, place, and time  She appears well-developed and well-nourished  HENT:   Head: Normocephalic and atraumatic  Eyes: EOM are normal  Pupils are equal, round, and reactive to light  Neck: Normal range of motion  Neck supple  Cardiovascular: Normal rate and normal heart sounds  Irregular rhythm   Pulmonary/Chest: No respiratory distress  She has wheezes  Mild bilateral end expiratory wheezing   Abdominal: Soft  Bowel sounds are normal  She exhibits no distension  There is no tenderness  Musculoskeletal: She exhibits tenderness  Bilateral lower extremities with redness and erythema left greater than right which is improving  Neurological: She is alert and oriented to person, place, and time  Skin: Skin is warm  There is erythema  Psychiatric: She has a normal mood and affect   Her behavior is normal        Additional Data:     Labs:      Results from last 7 days  Lab Units 08/10/18  0816 08/09/18  0519   WBC Thousand/uL  --  5 15   HEMOGLOBIN g/dL  --  10 4*   I STAT HEMOGLOBIN g/dl 11 2*  --    HEMATOCRIT %  --  32 7*   PLATELETS Thousands/uL  --  245   NEUTROS PCT %  --  66   LYMPHS PCT %  --  14   MONOS PCT %  --  9   EOS PCT %  --  11*       Results from last 7 days  Lab Units 08/11/18  0517  08/09/18  0520   SODIUM mmol/L 134*  < > 132*   POTASSIUM mmol/L 4 1  < > 4 2   CHLORIDE mmol/L 100  < > 101   CO2 mmol/L 29  < > 25   BUN mg/dL 16  < > 23   CREATININE mg/dL 0 68  < > 1 01   CALCIUM mg/dL 8 6  < > 8 3   TOTAL PROTEIN g/dL  --   --  6 6   BILIRUBIN TOTAL mg/dL  --   --  0 28   ALK PHOS U/L  --   --  89   ALT U/L  --   --  100*   AST U/L  --   --  60* GLUCOSE RANDOM mg/dL 115  < > 97   GLUCOSE, ISTAT   --   < >  --    < > = values in this interval not displayed  * I Have Reviewed All Lab Data Listed Above  * Additional Pertinent Lab Tests Reviewed: [address] Admission Reviewed    Imaging:    Imaging Reports Reviewed Today Include:  None  Imaging Personally Reviewed by Myself Includes:  None    Recent Cultures (last 7 days):       Results from last 7 days  Lab Units 08/08/18  2309 08/08/18  2102   BLOOD CULTURE   --  No Growth at 48 hrs  No Growth at 48 hrs     URINE CULTURE  <10,000 cfu/ml Candida sp  presumptively albicans*  --        Last 24 Hours Medication List:     Current Facility-Administered Medications:  amLODIPine 5 mg Oral Q12H [facility], CRNP    apixaban 5 mg Oral BID Rujul Alonso, DO    benzonatate 100 mg Oral TID PRN Lisset Hockey, CRNP    bisacodyl 10 mg Rectal Daily PRN Lisset Hockey, CRNP    cefazolin 1,000 mg Intravenous Q8H [place] Hockey, CRNP Last Rate: 1,000 mg (08/11/18 0430)   cloNIDine 0 1 mg Oral Q12H [address] Kiet Londono PA-C    docusate sodium 100 mg Oral BID [place] Hockey, CRNP    fentanyl citrate (PF) 25 mcg Intravenous Once Luna Friday, MD    fluconazole 200 mg Oral Once Latha Pineda MD    furosemide 40 mg Intravenous BID (diuretic) Rujul Alonso, DO    HYDROmorphone 0 2 mg Intravenous Q6H PRN [place] Hockey, CRNP    levalbuterol 1 25 mg Nebulization Q6H PRN Latha Pineda MD    lidocaine 2 patch Transdermal Daily [place] Hockey, CRNP    melatonin 3 mg Oral HS [place] Hockey, CRNP    metoprolol succinate 25 mg Oral Q12H Dulmaximiliano Londono PA-C    nystatin  Topical TID [place] Hockey, CRNP    ondansetron 4 mg Intravenous Q6H PRN Lisset Hockey, CRNP    polyethylene glycol 17 g Oral Daily Lisset Hockey, CRNP    saccharomyces boulardii 250 mg Oral BID [place] Hockey, CRNP    senna 2 tablet Oral Daily [place] Hockey, CRNP    sodium chloride 3 mL Nebulization Q6H PRN Mando Santacruz MD         Today, Patient Was Seen By: Mando Santacruz MD    ** Please Note: Dictation voice to text software may have been used in the creation of this document   **

## 2018-08-11 NOTE — ASSESSMENT & PLAN NOTE
AST/ALT has 60/100 from 75/111 yesterday  Right upper quadrant ultrasound  shows no biliary dilatation and also shows fatty infiltration of liver

## 2018-08-11 NOTE — ASSESSMENT & PLAN NOTE
IV Solu-Medrol discontinued by pulmonology  No clear evidence to suggest the patient has COPD in this instance as she has no prior smoking history  Continue breathing treatments, O2 by nasal cannula as needed p r n  Titrate O2 to keep O2 sats on maintain them at or above 88%,  Incentive spirometry, out of bed and increase activity as able  To this end will obtain a PT OT consultation  Speech eval pending to rule out aspiration

## 2018-08-11 NOTE — PROGRESS NOTES
Progress Note - Cardiology   Alexsander Cardona 80 y o  female MRN: [id number]  Unit/Bed#: E5 -01 Encounter: [id number]      Assessment/Recommendations/Discussion:   1  Acute diastolic CHF  2  AFib, new onset, persistent, rate controlled, now on Eliquis  3  Aortic stenosis, suspect moderate by exam     4  HTN  5  LLE cellulitis    · Continue IV lasix, diuresing very well w/ clinical improvement, stable renal fx and K  · BP acceptable  · Tolerating Eliquis  · Rate controlled on metoprolol  · Will see again Monday, please call if any questions    Subjective: Pt seen/examined    Feels much better, less SOB      Physical Exam:  GEN:  NAD  HEENT:  MMM, NCAT, pink conjunctiva, EOMI, nonicteric sclera  CV:  NO JVD/HJR but obese neck, irregular rhythm with normal rate, 2/6 SEM3, +S1/S2, NO PARASTERNAL HEAVE/THRILL, ++LE EDEMA, NO HEPATIC SYSTOLIC PULSATION, WARM EXTREMITIES  RESP:  CTAB/L  ABD:  SOFT, NT, NO GROSS ORGANOMEGALY        Vitals:   /74   Pulse 75   Temp 98 4 °F (36 9 °C) (Temporal)   Resp 18   Ht 5' 3" (1 6 m)   Wt 112 kg (248 lb)   SpO2 94%   BMI 43 93 kg/m²   Vitals:    08/08/18 2049   Weight: 112 kg (248 lb)       Intake/Output Summary (Last 24 hours) at 08/11/18 1147  Last data filed at 08/11/18 0950   Gross per 24 hour   Intake              520 ml   Output             3150 ml   Net            -2630 ml       TELEMETRY: AF, rate controlled  Lab Results:    Results from last 7 days  Lab Units 08/10/18  0816 08/09/18  0519   WBC Thousand/uL  --  5 15   HEMOGLOBIN g/dL  --  10 4*   I STAT HEMOGLOBIN g/dl 11 2*  --    HEMATOCRIT %  --  32 7*   PLATELETS Thousands/uL  --  245       Results from last 7 days  Lab Units 08/11/18  0517  08/09/18  0520   SODIUM mmol/L 134*  < > 132*   POTASSIUM mmol/L 4 1  < > 4 2   CHLORIDE mmol/L 100  < > 101   CO2 mmol/L 29  < > 25   BUN mg/dL 16  < > 23   CREATININE mg/dL 0 68  < > 1 01   CALCIUM mg/dL 8 6  < > 8 3   TOTAL PROTEIN g/dL  --   --  6 6   BILIRUBIN TOTAL mg/dL  --   --  0 28   ALK PHOS U/L  --   --  89   ALT U/L  --   --  100*   AST U/L  --   --  60*   GLUCOSE RANDOM mg/dL 115  < > 97   GLUCOSE, ISTAT   --   < >  --    < > = values in this interval not displayed      Results from last 7 days  Lab Units 08/11/18  0517   SODIUM mmol/L 134*   POTASSIUM mmol/L 4 1   CHLORIDE mmol/L 100   CO2 mmol/L 29   BUN mg/dL 16   CREATININE mg/dL 0 68   GLUCOSE RANDOM mg/dL 115   CALCIUM mg/dL 8 6           Medications:    Current Facility-Administered Medications:     amLODIPine (NORVASC) tablet 5 mg, 5 mg, Oral, Q12H [address], Tedra Keep, CRNP, 5 mg at 08/11/18 6737    apixaban (ELIQUIS) tablet 5 mg, 5 mg, Oral, BID, Rujul Alonso, DO, 5 mg at 08/11/18 8228    benzonatate (TESSALON PERLES) capsule 100 mg, 100 mg, Oral, TID PRN, Tedra Keep, CRNP    bisacodyl (DULCOLAX) rectal suppository 10 mg, 10 mg, Rectal, Daily PRN, Tedra Keep, CRNP    ceFAZolin (ANCEF) IVPB (premix) 1,000 mg, 1,000 mg, Intravenous, Q8H, Tedra Keep, CRNP, Last Rate: 100 mL/hr at 08/11/18 0430, 1,000 mg at 08/11/18 0430    cloNIDine (CATAPRES) tablet 0 1 mg, 0 1 mg, Oral, Q12H [address], Era Pert, PA-C, 0 1 mg at 08/11/18 2103    docusate sodium (COLACE) capsule 100 mg, 100 mg, Oral, BID, Tedra Keep, CRNP, 100 mg at 08/11/18 8513    fentanyl citrate (PF) 100 MCG/2ML 25 mcg, 25 mcg, Intravenous, Once, Amy Vizcaino MD    fluconazole (DIFLUCAN) tablet 200 mg, 200 mg, Oral, Once, Renetta Dorantes MD    furosemide (LASIX) injection 40 mg, 40 mg, Intravenous, BID (diuretic), Rujul Alonso, DO, 40 mg at 08/11/18 0939    HYDROmorphone (DILAUDID) injection 0 2 mg, 0 2 mg, Intravenous, Q6H PRN, JONO Hu, 0 2 mg at 08/10/18 0534    levalbuterol (XOPENEX) inhalation solution 1 25 mg, 1 25 mg, Nebulization, Q6H PRN, Renetta Dorantes MD, 1 25 mg at 08/10/18 0232    lidocaine (LIDODERM) 5 % patch 2 patch, 2 patch, Transdermal, Daily, JONO Hu, Stopped at 08/09/18 0849    melatonin tablet 3 mg, 3 mg, Oral, HS, Shashank Gunnison, CRNP, 3 mg at 08/10/18 2111    metoprolol succinate (TOPROL-XL) 24 hr tablet 25 mg, 25 mg, Oral, Q12H, Tignall Ala, PA-C, 25 mg at 08/11/18 6897    nystatin (MYCOSTATIN) powder, , Topical, TID, Shashank Gunnison, CRNP    ondansetron [facility]) injection 4 mg, 4 mg, Intravenous, Q6H PRN, Shashank Gunnison, CRNP    polyethylene glycol (MIRALAX) packet 17 g, 17 g, Oral, Daily, Shashank Gunnison, CRNP, 17 g at 08/11/18 0362    saccharomyces boulardii (FLORASTOR) capsule 250 mg, 250 mg, Oral, BID, Shashank Gunnison, CRNP, 250 mg at 08/11/18 5687    senna (SENOKOT) tablet 17 2 mg, 2 tablet, Oral, Daily, Shashank Gunnison, CRNP, 17 2 mg at 08/11/18 8359    sodium chloride 0 9 % inhalation solution 3 mL, 3 mL, Nebulization, Q6H PRN, Mahnaz Luo MD, 3 mL at 08/10/18 0232    This note was completed in part utilizing M-Modal Fluency Direct Software  Grammatical errors, random word insertions, spelling mistakes, and incomplete sentences may be an occasional consequence of this system secondary to software limitations, ambient noise, and hardware issues  If you have any questions or concerns about the content, text, or information contained within the body of this dictation, please contact the provider for clarification

## 2018-08-11 NOTE — ASSESSMENT & PLAN NOTE
Continue IV cefazolin as per Infectious Disease recommendations  Blood cultures show no growth at 48 hr   Urine culture shows greater than 100,000 colony-forming units per mL of Candida presumptively albicans

## 2018-08-12 LAB
ANION GAP SERPL CALCULATED.3IONS-SCNC: 7 MMOL/L (ref 4–13)
BUN SERPL-MCNC: 20 MG/DL (ref 5–25)
CALCIUM SERPL-MCNC: 8.9 MG/DL (ref 8.3–10.1)
CHLORIDE SERPL-SCNC: 98 MMOL/L (ref 100–108)
CO2 SERPL-SCNC: 32 MMOL/L (ref 21–32)
CREAT SERPL-MCNC: 0.78 MG/DL (ref 0.6–1.3)
GFR SERPL CREATININE-BSD FRML MDRD: 70 ML/MIN/1.73SQ M
GLUCOSE SERPL-MCNC: 89 MG/DL (ref 65–140)
POTASSIUM SERPL-SCNC: 3.8 MMOL/L (ref 3.5–5.3)
SODIUM SERPL-SCNC: 137 MMOL/L (ref 136–145)

## 2018-08-12 PROCEDURE — 99232 SBSQ HOSP IP/OBS MODERATE 35: CPT | Performed by: FAMILY MEDICINE

## 2018-08-12 PROCEDURE — 94640 AIRWAY INHALATION TREATMENT: CPT

## 2018-08-12 PROCEDURE — 80048 BASIC METABOLIC PNL TOTAL CA: CPT | Performed by: FAMILY MEDICINE

## 2018-08-12 PROCEDURE — 94760 N-INVAS EAR/PLS OXIMETRY 1: CPT

## 2018-08-12 RX ADMIN — AMLODIPINE BESYLATE 5 MG: 5 TABLET ORAL at 21:48

## 2018-08-12 RX ADMIN — METOPROLOL SUCCINATE 25 MG: 25 TABLET, EXTENDED RELEASE ORAL at 11:05

## 2018-08-12 RX ADMIN — MELATONIN TAB 3 MG 3 MG: 3 TAB at 21:48

## 2018-08-12 RX ADMIN — FUROSEMIDE 40 MG: 10 INJECTION, SOLUTION INTRAMUSCULAR; INTRAVENOUS at 08:38

## 2018-08-12 RX ADMIN — AMLODIPINE BESYLATE 5 MG: 5 TABLET ORAL at 08:38

## 2018-08-12 RX ADMIN — NYSTATIN: 100000 POWDER TOPICAL at 16:20

## 2018-08-12 RX ADMIN — NYSTATIN: 100000 POWDER TOPICAL at 21:48

## 2018-08-12 RX ADMIN — APIXABAN 5 MG: 5 TABLET, FILM COATED ORAL at 08:36

## 2018-08-12 RX ADMIN — CEFAZOLIN SODIUM 1000 MG: 1 SOLUTION INTRAVENOUS at 15:16

## 2018-08-12 RX ADMIN — LEVALBUTEROL HYDROCHLORIDE 1.25 MG: 1.25 SOLUTION, CONCENTRATE RESPIRATORY (INHALATION) at 13:59

## 2018-08-12 RX ADMIN — NYSTATIN: 100000 POWDER TOPICAL at 08:38

## 2018-08-12 RX ADMIN — METOPROLOL SUCCINATE 25 MG: 25 TABLET, EXTENDED RELEASE ORAL at 21:48

## 2018-08-12 RX ADMIN — DOCUSATE SODIUM 100 MG: 100 CAPSULE, LIQUID FILLED ORAL at 08:36

## 2018-08-12 RX ADMIN — POLYETHYLENE GLYCOL (3350) 17 G: 17 POWDER, FOR SOLUTION ORAL at 08:36

## 2018-08-12 RX ADMIN — ISODIUM CHLORIDE 3 ML: 0.03 SOLUTION RESPIRATORY (INHALATION) at 13:58

## 2018-08-12 RX ADMIN — CEFAZOLIN SODIUM 1000 MG: 1 SOLUTION INTRAVENOUS at 07:24

## 2018-08-12 RX ADMIN — HYDROMORPHONE HYDROCHLORIDE 0.2 MG: 1 INJECTION, SOLUTION INTRAMUSCULAR; INTRAVENOUS; SUBCUTANEOUS at 14:10

## 2018-08-12 RX ADMIN — SENNOSIDES 17.2 MG: 8.6 TABLET, FILM COATED ORAL at 08:36

## 2018-08-12 RX ADMIN — CLONIDINE HYDROCHLORIDE 0.1 MG: 0.1 TABLET ORAL at 08:38

## 2018-08-12 RX ADMIN — CLONIDINE HYDROCHLORIDE 0.1 MG: 0.1 TABLET ORAL at 21:48

## 2018-08-12 RX ADMIN — Medication 250 MG: at 08:38

## 2018-08-12 RX ADMIN — APIXABAN 5 MG: 5 TABLET, FILM COATED ORAL at 18:18

## 2018-08-12 RX ADMIN — Medication 250 MG: at 18:18

## 2018-08-12 RX ADMIN — FUROSEMIDE 40 MG: 10 INJECTION, SOLUTION INTRAMUSCULAR; INTRAVENOUS at 16:03

## 2018-08-12 NOTE — ASSESSMENT & PLAN NOTE
Discontinue IV cefazolin today per Infectious Disease recommendations  She will have completed 5 total days of therapy today

## 2018-08-12 NOTE — ASSESSMENT & PLAN NOTE
IV Solu-Medrol discontinued by pulmonology  No clear evidence to suggest the patient has COPD in this instance as she has no prior smoking history  Continue breathing treatments, O2 by nasal cannula as needed p r n  Titrate O2 to keep O2 sats on maintain them at or above 88%,  Incentive spirometry, out of bed and increase activity as able  To this end will obtain a PT OT consultation  Speech eval-patient presents with no signs of symptoms of aspiration

## 2018-08-12 NOTE — ASSESSMENT & PLAN NOTE
Right upper quadrant ultrasound  shows no biliary dilatation and also shows fatty infiltration of liver

## 2018-08-12 NOTE — PROGRESS NOTES
Progress Note - Alexsander Cardona 1933, 80 y o  female MRN: [id number]    Unit/Bed#: E5 -01 Encounter: [id number]    Primary Care Provider: JONO Simmons   Date and time admitted to hospital: 8/8/2018  8:46 PM        Acute respiratory failure with hypoxia ([address] )   Assessment & Plan    IV Solu-Medrol discontinued by pulmonology  No clear evidence to suggest the patient has COPD in this instance as she has no prior smoking history  Continue breathing treatments, O2 by nasal cannula as needed p r n  Titrate O2 to keep O2 sats on maintain them at or above 88%,  Incentive spirometry, out of bed and increase activity as able  To this end will obtain a PT OT consultation  Speech eval-patient presents with no signs of symptoms of aspiration  * Cellulitis of left lower extremity   Assessment & Plan    Discontinue IV cefazolin today per Infectious Disease recommendations  She will have completed 5 total days of therapy today  Transaminitis   Assessment & Plan      Right upper quadrant ultrasound  shows no biliary dilatation and also shows fatty infiltration of liver  Acute retention of urine   Assessment & Plan    attempt voiding trial and measure postvoid residual         Hyponatremia   Assessment & Plan    Resolved        Hypertension   Assessment & Plan    · Continue amlodipine and clonidine        Tinea corporis   Assessment & Plan    continue nystatin b i d  Osteoarthritis of both hips   Assessment & Plan    continue fentanyl patch  Vulvovaginal candidiasis   Assessment & Plan    Patient completed oral Diflucan  Acute on chronic diastolic (congestive) heart failure (HCC)   Assessment & Plan    Continue diuresis with Lasix 40 mg IV b i d , 2D echo done and official report is pending, continue daily weights, strict in's and out's, fluid restriction to less than 1 5 L per day, low-salt diet    Cardiology continuing to follow patient and keep patient on telemetry  Continue metoprolol, Norvasc  Paroxysmal atrial fibrillation (HCC)   Assessment & Plan    Continue rate control with metoprolol as well as anticoagulation with Eliquis  Patient seen and evaluated by Cardiology and appreciate the consult and recommendations  Continue on telemetry  Acute bronchospasm   Assessment & Plan    Improving on nebulized breathing treatments as needed, O2 by nasal cannula titrated to maintain oxygen saturations at or above 88%, incentive spirometry, mobilize out of bed as able and increased activity  VTE Pharmacologic Prophylaxis:   Pharmacologic: Apixaban (Eliquis)  Mechanical VTE Prophylaxis in Place: No    Patient Centered Rounds: I have performed bedside rounds with nursing staff today  Discussions with Specialists or Other Care Team Provider:   [facility]  Team Provider:    Education and Discussions with Family / Patient:  Yes    Time Spent for Care: 20 minutes  More than 50% of total time spent on counseling and coordination of care as described above  Current Length of Stay: 3 day(s)    Current Patient Status: Inpatient   Certification Statement: The patient will continue to require additional inpatient hospital stay due to Acute on chronic diastolic congestive heart failure, acute atrial fibrillation    Discharge Plan:  When medically stable    Code Status: Level 1 - Full Code      Subjective:   Patient has no complaints at this time    Objective:     Vitals:   Temp (24hrs), Av 8 °F (36 6 °C), Min:97 5 °F (36 4 °C), Max:98 °F (36 7 °C)    HR:  [75-85] 85  Resp:  [18-20] 20  BP: (132-147)/(60-88) 147/88  SpO2:  [95 %-97 %] 96 %  Body mass index is 43 93 kg/m²  Input and Output Summary (last 24 hours):        Intake/Output Summary (Last 24 hours) at 18 0946  Last data filed at 18 0008   Gross per 24 hour   Intake              600 ml   Output             3050 ml   Net            -2450 ml       Physical Exam: Physical Exam   Constitutional: She is oriented to person, place, and time  She appears well-developed and well-nourished  No distress  HENT:   Head: Normocephalic and atraumatic  Eyes: Pupils are equal, round, and reactive to light  No scleral icterus  Neck: Normal range of motion  Neck supple  No JVD present  Cardiovascular: Normal rate and normal heart sounds  Irregular rhythm   Pulmonary/Chest: Effort normal and breath sounds normal    Mild wheezing bilaterally   Abdominal: Soft  Bowel sounds are normal  She exhibits no distension  There is no tenderness  Musculoskeletal: Normal range of motion  She exhibits edema  She exhibits no tenderness  Markedly decreased erythema and swelling on left lower extremity compared to admission  Neurological: She is alert and oriented to person, place, and time  Skin: Skin is warm and dry  She is not diaphoretic  There is erythema  Additional Data:     Labs:      Results from last 7 days  Lab Units 08/10/18  0816 08/09/18  0519   WBC Thousand/uL  --  5 15   HEMOGLOBIN g/dL  --  10 4*   I STAT HEMOGLOBIN g/dl 11 2*  --    HEMATOCRIT %  --  32 7*   PLATELETS Thousands/uL  --  245   NEUTROS PCT %  --  66   LYMPHS PCT %  --  14   MONOS PCT %  --  9   EOS PCT %  --  11*       Results from last 7 days  Lab Units 08/12/18  0609  08/09/18  0520   SODIUM mmol/L 137  < > 132*   POTASSIUM mmol/L 3 8  < > 4 2   CHLORIDE mmol/L 98*  < > 101   CO2 mmol/L 32  < > 25   BUN mg/dL 20  < > 23   CREATININE mg/dL 0 78  < > 1 01   CALCIUM mg/dL 8 9  < > 8 3   TOTAL PROTEIN g/dL  --   --  6 6   BILIRUBIN TOTAL mg/dL  --   --  0 28   ALK PHOS U/L  --   --  89   ALT U/L  --   --  100*   AST U/L  --   --  60*   GLUCOSE RANDOM mg/dL 89  < > 97   GLUCOSE, ISTAT   --   < >  --    < > = values in this interval not displayed  * I Have Reviewed All Lab Data Listed Above  * Additional Pertinent Lab Tests Reviewed:  [facility]    Imaging:    Imaging Reports Reviewed Today Include:   NoneReports Reviewed Today Include:  Imaging Personally Reviewed by Myself Includes:  None    Recent Cultures (last 7 days):       Results from last 7 days  Lab Units 08/08/18  2309 08/08/18 2102   BLOOD CULTURE   --  No Growth at 72 hrs  No Growth at 72 hrs  URINE CULTURE  <10,000 cfu/ml Candida sp  presumptively albicans*  --        Last 24 Hours Medication List:     Current Facility-Administered Medications:  amLODIPine 5 mg Oral Q12H [facility], CRNP    apixaban 5 mg Oral BID Rujul Alonso, DO    benzonatate 100 mg Oral TID PRN Darlen Lint, CRNP    bisacodyl 10 mg Rectal Daily PRN Darlen Lint, CRNP    cefazolin 1,000 mg Intravenous Q8H Darlen Lint, CRNP Last Rate: 1,000 mg (08/12/18 0724)   cloNIDine 0 1 mg Oral Q12H [address] Precilla Fine, PA-C    docusate sodium 100 mg Oral BID Darlen Lint, CRNP    fentanyl citrate (PF) 25 mcg Intravenous Once Rossi Salinas MD    furosemide 40 mg Intravenous BID (diuretic) Zohaib Alonso,     HYDROmorphone 0 2 mg Intravenous Q6H PRN Darlen Lint, CRNP    levalbuterol 1 25 mg Nebulization Q6H PRN Lizy Dumas MD    lidocaine 2 patch Transdermal Daily Darlen Lint, CRNP    melatonin 3 mg Oral HS Darlen Lint, CRNP    metoprolol succinate 25 mg Oral Q12H Precluiza Toledo PA-C    nystatin  Topical TID Darlen Lint, CRNP    ondansetron 4 mg Intravenous Q6H PRN Darlen Lint, CRNP    polyethylene glycol 17 g Oral Daily Darlen Lint, CRNP    saccharomyces boulardii 250 mg Oral BID Darlen Lint, CRNP    senna 2 tablet Oral Daily Darlen Lint, CRNP    sodium chloride 3 mL Nebulization Q6H PRN Lizy Dumas MD         Today, Patient Was Seen By: Lizy Dumas MD    ** Please Note: Dictation voice to text software may have been used in the creation of this document   **

## 2018-08-13 LAB
ALBUMIN SERPL BCP-MCNC: 2.6 G/DL (ref 3.5–5)
ALP SERPL-CCNC: 93 U/L (ref 46–116)
ALT SERPL W P-5'-P-CCNC: 98 U/L (ref 12–78)
ANION GAP SERPL CALCULATED.3IONS-SCNC: 5 MMOL/L (ref 4–13)
ANISOCYTOSIS BLD QL SMEAR: PRESENT
AST SERPL W P-5'-P-CCNC: 58 U/L (ref 5–45)
BACTERIA BLD CULT: NORMAL
BACTERIA BLD CULT: NORMAL
BASOPHILS # BLD MANUAL: 0.08 THOUSAND/UL (ref 0–0.1)
BASOPHILS NFR MAR MANUAL: 1 % (ref 0–1)
BILIRUB SERPL-MCNC: 0.56 MG/DL (ref 0.2–1)
BUN SERPL-MCNC: 19 MG/DL (ref 5–25)
CALCIUM SERPL-MCNC: 9 MG/DL (ref 8.3–10.1)
CHLORIDE SERPL-SCNC: 97 MMOL/L (ref 100–108)
CO2 SERPL-SCNC: 34 MMOL/L (ref 21–32)
CREAT SERPL-MCNC: 0.74 MG/DL (ref 0.6–1.3)
EOSINOPHIL # BLD MANUAL: 0.4 THOUSAND/UL (ref 0–0.4)
EOSINOPHIL NFR BLD MANUAL: 5 % (ref 0–6)
ERYTHROCYTE [DISTWIDTH] IN BLOOD BY AUTOMATED COUNT: 13 % (ref 11.6–15.1)
GFR SERPL CREATININE-BSD FRML MDRD: 74 ML/MIN/1.73SQ M
GLUCOSE SERPL-MCNC: 95 MG/DL (ref 65–140)
HCT VFR BLD AUTO: 38.1 % (ref 34.8–46.1)
HGB BLD-MCNC: 12.1 G/DL (ref 11.5–15.4)
LYMPHOCYTES # BLD AUTO: 4.22 THOUSAND/UL (ref 0.6–4.47)
LYMPHOCYTES # BLD AUTO: 53 % (ref 14–44)
MCH RBC QN AUTO: 30.3 PG (ref 26.8–34.3)
MCHC RBC AUTO-ENTMCNC: 31.8 G/DL (ref 31.4–37.4)
MCV RBC AUTO: 95 FL (ref 82–98)
METAMYELOCYTES NFR BLD MANUAL: 1 % (ref 0–1)
MONOCYTES # BLD AUTO: 0.32 THOUSAND/UL (ref 0–1.22)
MONOCYTES NFR BLD: 4 % (ref 4–12)
MYELOCYTES NFR BLD MANUAL: 1 % (ref 0–1)
NEUTROPHILS # BLD MANUAL: 2.63 THOUSAND/UL (ref 1.85–7.62)
NEUTS BAND NFR BLD MANUAL: 1 % (ref 0–8)
NEUTS SEG NFR BLD AUTO: 32 % (ref 43–75)
NRBC BLD AUTO-RTO: 0 /100 WBCS
PLATELET # BLD AUTO: 318 THOUSANDS/UL (ref 149–390)
PLATELET BLD QL SMEAR: ADEQUATE
PMV BLD AUTO: 9 FL (ref 8.9–12.7)
POTASSIUM SERPL-SCNC: 3.7 MMOL/L (ref 3.5–5.3)
PROT SERPL-MCNC: 7.2 G/DL (ref 6.4–8.2)
RBC # BLD AUTO: 4 MILLION/UL (ref 3.81–5.12)
SODIUM SERPL-SCNC: 136 MMOL/L (ref 136–145)
TOTAL CELLS COUNTED SPEC: 100
VARIANT LYMPHS # BLD AUTO: 2 %
WBC # BLD AUTO: 7.96 THOUSAND/UL (ref 4.31–10.16)

## 2018-08-13 PROCEDURE — 99232 SBSQ HOSP IP/OBS MODERATE 35: CPT | Performed by: INTERNAL MEDICINE

## 2018-08-13 PROCEDURE — 80053 COMPREHEN METABOLIC PANEL: CPT | Performed by: FAMILY MEDICINE

## 2018-08-13 PROCEDURE — 85027 COMPLETE CBC AUTOMATED: CPT | Performed by: FAMILY MEDICINE

## 2018-08-13 PROCEDURE — 93306 TTE W/DOPPLER COMPLETE: CPT | Performed by: INTERNAL MEDICINE

## 2018-08-13 PROCEDURE — 85007 BL SMEAR W/DIFF WBC COUNT: CPT | Performed by: FAMILY MEDICINE

## 2018-08-13 RX ADMIN — HYDROMORPHONE HYDROCHLORIDE 0.2 MG: 1 INJECTION, SOLUTION INTRAMUSCULAR; INTRAVENOUS; SUBCUTANEOUS at 23:54

## 2018-08-13 RX ADMIN — AMLODIPINE BESYLATE 5 MG: 5 TABLET ORAL at 09:18

## 2018-08-13 RX ADMIN — METOPROLOL SUCCINATE 25 MG: 25 TABLET, EXTENDED RELEASE ORAL at 10:20

## 2018-08-13 RX ADMIN — METOPROLOL SUCCINATE 25 MG: 25 TABLET, EXTENDED RELEASE ORAL at 21:57

## 2018-08-13 RX ADMIN — SENNOSIDES 17.2 MG: 8.6 TABLET, FILM COATED ORAL at 09:18

## 2018-08-13 RX ADMIN — APIXABAN 5 MG: 5 TABLET, FILM COATED ORAL at 17:50

## 2018-08-13 RX ADMIN — AMLODIPINE BESYLATE 5 MG: 5 TABLET ORAL at 21:26

## 2018-08-13 RX ADMIN — POLYETHYLENE GLYCOL (3350) 17 G: 17 POWDER, FOR SOLUTION ORAL at 09:17

## 2018-08-13 RX ADMIN — FUROSEMIDE 40 MG: 10 INJECTION, SOLUTION INTRAMUSCULAR; INTRAVENOUS at 09:18

## 2018-08-13 RX ADMIN — Medication 250 MG: at 09:18

## 2018-08-13 RX ADMIN — NYSTATIN: 100000 POWDER TOPICAL at 16:48

## 2018-08-13 RX ADMIN — NYSTATIN: 100000 POWDER TOPICAL at 09:10

## 2018-08-13 RX ADMIN — APIXABAN 5 MG: 5 TABLET, FILM COATED ORAL at 09:18

## 2018-08-13 RX ADMIN — DOCUSATE SODIUM 100 MG: 100 CAPSULE, LIQUID FILLED ORAL at 17:50

## 2018-08-13 RX ADMIN — CLONIDINE HYDROCHLORIDE 0.1 MG: 0.1 TABLET ORAL at 09:18

## 2018-08-13 RX ADMIN — FUROSEMIDE 40 MG: 10 INJECTION, SOLUTION INTRAMUSCULAR; INTRAVENOUS at 16:46

## 2018-08-13 RX ADMIN — NYSTATIN: 100000 POWDER TOPICAL at 21:27

## 2018-08-13 RX ADMIN — DOCUSATE SODIUM 100 MG: 100 CAPSULE, LIQUID FILLED ORAL at 09:18

## 2018-08-13 RX ADMIN — CLONIDINE HYDROCHLORIDE 0.1 MG: 0.1 TABLET ORAL at 21:27

## 2018-08-13 RX ADMIN — Medication 250 MG: at 17:50

## 2018-08-13 RX ADMIN — MELATONIN TAB 3 MG 3 MG: 3 TAB at 21:26

## 2018-08-13 NOTE — SOCIAL WORK
Heart Failure Care Coordination Note  RE: Briefly met with the patient and her daughter to explain role and establish a relationship  The patient lives at an [address] [facility] and will go to a SNF for STR prior to returning there  The  did come in to discuss discharge planning  I will continue to follow as an inpatient and telephonically when discharged

## 2018-08-13 NOTE — PROGRESS NOTES
Progress Note - Cardiology   Rushie Fothergill 80 y o  female MRN: [id number]  Unit/Bed#: E5 -01 Encounter: [id number]      Assessment/Recommendations/Discussion:   1  Acute diastolic CHF  2  AFib, new onset, persistent, rate controlled, now on Eliquis  3  Aortic stenosis, suspect moderate by exam     4  HTN  5  LLE cellulitis    · Continue IV lasix  · BP acceptable  · Continue Eliquis, metoprolol, amlodipine        Subjective: Pt seen/examined  Feels well, no CP/SOB/Palps    +occasional LH w/ activitity      Physical Exam:  GEN:  NAD  HEENT:  MMM, NCAT, pink conjunctiva, EOMI, nonicteric sclera  CV:  NO JVD/HJR but obese neck, RR, 2/6 JOSE, +S1/S2, NO PARASTERNAL HEAVE/THRILL, +LE EDEMA, NO HEPATIC SYSTOLIC PULSATION, WARM EXTREMITIES  RESP:  Distant BS, bibasilar crackles (mild)  ABD:  SOFT, NT, NO GROSS ORGANOMEGALY        Vitals:   /86   Pulse 82   Temp 97 6 °F (36 4 °C) (Temporal)   Resp 20   Ht 5' 3" (1 6 m)   Wt 112 kg (248 lb)   SpO2 97%   BMI 43 93 kg/m²   Vitals:    08/08/18 2049   Weight: 112 kg (248 lb)       Intake/Output Summary (Last 24 hours) at 08/13/18 1021  Last data filed at 08/13/18 0451   Gross per 24 hour   Intake              390 ml   Output             2700 ml   Net            -2310 ml       TELEMETRY: off  Lab Results:    Results from last 7 days  Lab Units 08/13/18  0505   WBC Thousand/uL 7 96   HEMOGLOBIN g/dL 12 1   HEMATOCRIT % 38 1   PLATELETS Thousands/uL 318       Results from last 7 days  Lab Units 08/13/18  0505   SODIUM mmol/L 136   POTASSIUM mmol/L 3 7   CHLORIDE mmol/L 97*   CO2 mmol/L 34*   BUN mg/dL 19   CREATININE mg/dL 0 74   CALCIUM mg/dL 9 0   TOTAL PROTEIN g/dL 7 2   BILIRUBIN TOTAL mg/dL 0 56   ALK PHOS U/L 93   ALT U/L 98*   AST U/L 58*   GLUCOSE RANDOM mg/dL 95       Results from last 7 days  Lab Units 08/13/18  0505   SODIUM mmol/L 136   POTASSIUM mmol/L 3 7   CHLORIDE mmol/L 97*   CO2 mmol/L 34*   BUN mg/dL 19   CREATININE mg/dL 0 74   GLUCOSE RANDOM mg/dL 95   CALCIUM mg/dL 9 0           Medications:    Current Facility-Administered Medications:     amLODIPine (NORVASC) tablet 5 mg, 5 mg, Oral, Q12H [facility], Peg Day, GAGENP, 5 mg at 08/13/18 9316    apixaban (ELIQUIS) tablet 5 mg, 5 mg, Oral, BID, Rujul Alonso, DO, 5 mg at 08/13/18 8340    benzonatate (TESSALON PERLES) capsule 100 mg, 100 mg, Oral, TID PRN, Peg Day, CRNP    bisacodyl (DULCOLAX) rectal suppository 10 mg, 10 mg, Rectal, Daily PRN, GAGE PaizNP    cloNIDine (CATAPRES) tablet 0 1 mg, 0 1 mg, Oral, Q12H [facility], Coco Huntley PA-C, 0 1 mg at 08/13/18 6681    docusate sodium (COLACE) capsule 100 mg, 100 mg, Oral, BID, Peg Day, CRNP, 100 mg at 08/13/18 9554    fentanyl citrate (PF) 100 MCG/2ML 25 mcg, 25 mcg, Intravenous, Once, Evangelina Carrero MD    furosemide (LASIX) injection 40 mg, 40 mg, Intravenous, BID (diuretic), Rujul Alonso, DO, 40 mg at 08/13/18 0918    HYDROmorphone (DILAUDID) injection 0 2 mg, 0 2 mg, Intravenous, Q6H PRN, JONO Paiz, 0 2 mg at 08/12/18 1410    levalbuterol (Leanord Chill) inhalation solution 1 25 mg, 1 25 mg, Nebulization, Q6H PRN, Alcides Quesada MD, 1 25 mg at 08/12/18 1359    lidocaine (LIDODERM) 5 % patch 2 patch, 2 patch, Transdermal, Daily, JONO Paiz, Stopped at 08/09/18 0849    melatonin tablet 3 mg, 3 mg, Oral, HS, GAGE PaizNP, 3 mg at 08/12/18 2148    metoprolol succinate (TOPROL-XL) 24 hr tablet 25 mg, 25 mg, Oral, Q12H, Coco Huntley PA-C, 25 mg at 08/12/18 2148    nystatin (MYCOSTATIN) powder, , Topical, TID, Peg Raisin, CRNP    ondansetron [facility]) injection 4 mg, 4 mg, Intravenous, Q6H PRN, Peg Raisin, CRNP    polyethylene glycol (MIRALAX) packet 17 g, 17 g, Oral, Daily, Peg Raisin, CRNP, 17 g at 08/13/18 2236    saccharomyces boulardii (FLORASTOR) capsule 250 mg, 250 mg, Oral, BID, Peg Raisin, CRNP, 250 mg at 08/13/18 8807   senna (SENOKOT) tablet 17 2 mg, 2 tablet, Oral, Daily, JONO Kirk, 17 2 mg at 08/13/18 6254    sodium chloride 0 9 % inhalation solution 3 mL, 3 mL, Nebulization, Q6H PRN, Leida Dey MD, 3 mL at 08/12/18 0285    This note was completed in part utilizing [employer] Software  Grammatical errors, random word insertions, spelling mistakes, and incomplete sentences may be an occasional consequence of this system secondary to software limitations, ambient noise, and hardware issues  If you have any questions or concerns about the content, text, or information contained within the body of this dictation, please contact the provider for clarification

## 2018-08-13 NOTE — PROGRESS NOTES
[address] Internal Medicine Progress Note  Patient: Tony Meyer 80 y o  female   MRN: [id number]  PCP: Brady Mortimer, CRNP  Unit/Bed#: E5 -01 Encounter: [id number]  Date Of Visit: 08/13/18    Assessment:    Principal Problem:    Acute respiratory failure with hypoxia ([address] )  Active Problems:    Hypertension    Transaminitis    Cellulitis of left lower extremity    Hyponatremia    Osteoarthritis of both hips    Tinea corporis    Acute retention of urine    Acute bronchospasm    Paroxysmal atrial fibrillation (HCC)    Acute on chronic diastolic (congestive) heart failure (HCC)    Vulvovaginal candidiasis      Plan:    · Acute respiratory failure with hypoxia still requires O2 but off IV steroids and now presume mostly in relation to acute on chronic diastolic heart failure that is improving with IV diuresis  2D echocardiogram still pending read/will need home O2 eval prior to discharge  · Acute on chronic diastolic heart failure in relation new onset atrial fibrillation await 2D echocardiogram assessment continue fluid restriction need to update weight  · Paroxysmal atrial fibrillation with rate control offered by beta-blockade and now on Eliquis anticoagulation appreciate Cardiology input  · Hypertension continue amlodipine and clonidine along with beta-blockade  · Transaminitis unclear etiology although has fatty liver on ultrasound as only no or the finding  · Vulvovaginal candidiasis continue on oral Diflucan  · Left lower extremity cellulitis with significant contracture from lines of demarcation on entrance at presentation of fever now resolved last day of antibiotics yesterday patient will need physical therapy assessment and home O2 eval prior to discharge      VTE Pharmacologic Prophylaxis:   Pharmacologic: Apixaban (Eliquis)  Mechanical VTE Prophylaxis in Place: No    Discussions with Specialists or Other Care Team Provider:  No    Time Spent for Care: 45 minutes    More than 50% of total time spent on counseling and coordination of care as described above  Subjective:   States she is breathing easier still has a dry cough and still on 4 L of nasal cannula oxygen denies any chest pain denies palpitations states left leg is significantly improved but has not tried walking on a as yet  She is anxious to pursue physical therapy I also informed her that she will need a home O2 evaluation prior to discharge    Objective:     Vitals:   Temp (24hrs), Av 4 °F (36 3 °C), Min:97 °F (36 1 °C), Max:97 6 °F (36 4 °C)    HR:  [72-84] 82  Resp:  [18-20] 20  BP: (108-147)/(58-86) 147/86  SpO2:  [94 %-99 %] 97 %  Body mass index is 43 93 kg/m²  Input and Output Summary (last 24 hours):        Intake/Output Summary (Last 24 hours) at 18 0955  Last data filed at 18 0451   Gross per 24 hour   Intake              390 ml   Output             2700 ml   Net            -2310 ml       Physical Exam:     Physical Exam:   General appearance: alert, appears stated age and cooperative  Head: Normocephalic, without obvious abnormality, atraumatic  Lungs: clear to auscultation bilaterally  Heart: regular rate and rhythm  Abdomen: soft, non-tender; bowel sounds normal; no masses,  no organomegaly  Back: negative  Extremities: Area of erythema in the pretibial has reduced significantly from demarcation admission and extremities normal, atraumatic, no cyanosis or edema  Neurologic: Grossly normal      Additional Data:     Labs:      Results from last 7 days  Lab Units 18  0505  18  0519   WBC Thousand/uL 7 96  --  5 15   HEMOGLOBIN g/dL 12 1  --  10 4*   I STAT HEMOGLOBIN   --   < >  --    HEMATOCRIT % 38 1  --  32 7*   PLATELETS Thousands/uL 318  --  245   NEUTROS PCT %  --   --  66   LYMPHS PCT %  --   --  14   LYMPHO PCT % 53*  --   --    MONOS PCT %  --   --  9   MONO PCT MAN % 4  --   --    EOS PCT %  --   --  11*   EOSINO PCT MANUAL % 5  --   --    < > = values in this interval not displayed  Results from last 7 days  Lab Units 08/13/18  0505   SODIUM mmol/L 136   POTASSIUM mmol/L 3 7   CHLORIDE mmol/L 97*   CO2 mmol/L 34*   BUN mg/dL 19   CREATININE mg/dL 0 74   CALCIUM mg/dL 9 0   TOTAL PROTEIN g/dL 7 2   BILIRUBIN TOTAL mg/dL 0 56   ALK PHOS U/L 93   ALT U/L 98*   AST U/L 58*   GLUCOSE RANDOM mg/dL 95           * I Have Reviewed All Lab Data Listed Above  * Additional Pertinent Lab Tests Reviewed: All Labs For Current Hospital Admission Reviewed    Imaging:  Xr Chest Portable    Result Date: 8/10/2018  Narrative: CHEST INDICATION:   Wheezing and severe dyspnea  COMPARISON:  8/8/2018, CT chest 7/23/2018 EXAM PERFORMED/VIEWS:  XR CHEST PORTABLE FINDINGS:  Study limited by portable technique and patient body habitus  Cardiomediastinal silhouette appears unchanged, noting uncoiled thoracic aorta  Mild peripheral reticular interstitial prominence, more pronounced than the prior study  Pulmonary vasculature appears similar  Suspected infiltrate right midlung  Cannot exclude small pleural effusions  No pneumothorax  Degenerative changes of the spine and shoulders  Impression: Limited study  Suspected infiltrate right midlung  Mild reticular interstitial prominence  Follow-up PA and lateral views recommended  Workstation performed: [id number]     Xr Chest 2 Views    Result Date: 8/9/2018  Narrative: CHEST INDICATION:   sob with new onset hypoxia  COMPARISON:  07/22/2018 EXAM PERFORMED/VIEWS:  XR CHEST PA & LATERAL Images: 2 FINDINGS: Cardiomediastinal silhouette appears enlarged  The aorta is tortuous  No evidence of heart failure  The lungs are clear  No pneumothorax or pleural effusion  Arthritic changes in both shoulder joints  Impression: No acute cardiopulmonary disease  Cardiomegaly  Workstation performed: [id number]     Xr Chest Pa & Lateral    Result Date: 7/22/2018  Narrative: CHEST INDICATION:   Shortness of breath and wheezing   COMPARISON:  July 19, 2018 EXAM PERFORMED/VIEWS:  XR CHEST PA & LATERAL FINDINGS: Heart shadow is enlarged but unchanged from prior exam  Examination is somewhat limited secondary to underpenetration however there appears to be mild pulmonary vascular congestive change  No focal airspace opacity to suggest pneumonia  Trace bilateral costophrenic angle pleural effusions are noted  No pneumothorax  Osseous structures appear within normal limits for patient age  Impression: Underpenetrated examination demonstrating findings suspicious for mild pulmonary vascular congestive change and small costophrenic angle effusions  Workstation performed: [id number]     Xr Chest 2 Views    Result Date: 7/19/2018  Narrative: CHEST INDICATION:   sepsis   "low oxygen level, no surg" COMPARISON:  None EXAM PERFORMED/VIEWS:  XR CHEST PA & LATERAL FINDINGS: Tortuous course of the thoracic aorta  Normal cardiac contours  The lungs are clear  No pneumothorax or pleural effusion  Osseous structures appear within normal limits for patient age  Impression: No acute cardiopulmonary disease  Workstation performed: [id number]     Xr Hip/pelv 2-3 Vws Left    Result Date: 7/19/2018  Narrative: LEFT HIP INDICATION:   Pain, atraumatic  COMPARISON:  None VIEWS:  XR HIP/PELV 2-3 VWS LEFT  W PELVIS IF PERFORMED FINDINGS: There is no acute fracture or dislocation  Advanced degenerative arthritic changes noted of both hips with flattening of the femoral heads  There are more extensive destructive changes at the left femoral head with underlying cystic formation  The bones are demineralized  Soft tissues are unremarkable  The visualized lumbar spine is unremarkable  Impression: No acute osseous abnormality  Advanced degenerative changes of both hips worse on the left  Workstation performed: [id number]     Xr Knee 1 Or 2 Vw Left    Result Date: 7/19/2018  Narrative: LEFT KNEE INDICATION:   Pain, atraumatic   COMPARISON:  None VIEWS:  XR KNEE 1 OR 2 VW LEFT FINDINGS: 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  There is no acute fracture or dislocation  There is no joint effusion  No significant degenerative changes  No lytic or blastic lesions are seen  Soft tissues are unremarkable  Impression: Prior total knee replacement  No acute osseous abnormality  Workstation performed: [id number]     Xr Knee 1 Or 2 Vw Right    Result Date: 7/19/2018  Narrative: RIGHT KNEE INDICATION:   Pain, atraumatic  COMPARISON:  None VIEWS:  XR KNEE 1 OR 2 VW RIGHT Images: 2 FINDINGS: 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  There is no acute fracture or dislocation  There is no joint effusion  No lytic or blastic lesions are seen  Soft tissues are unremarkable  Impression: Prior total knee replacement  No acute osseous abnormality  Workstation performed: [id number]     Xr Foot 3+ Vw Left    Result Date: 7/20/2018  Narrative: LEFT FOOT INDICATION:   XR of left foot/tibia/fibula - r/o osteomyelitis   "severe left foot pain  inability to bend left knee  best films possible due to patient mobility  r/o osteomyelitis" COMPARISON:  None VIEWS:  XR FOOT 3+ VW LEFT FINDINGS: Partially imaged distal fibular screw and plate device  There is no acute fracture or dislocation  No definite osteomyelitis  Diffuse degenerative changes  Prominent superior and plantar calcaneal spurs and distal Achilles calcifications  No lytic or blastic lesions seen  Soft tissues are unremarkable  Impression: No definite osteomyelitis  Chronic changes as detailed  Workstation performed: [id number]     Ct Chest Wo Contrast    Result Date: 7/23/2018  Narrative: CT CHEST WITHOUT IV CONTRAST INDICATION:   Cough, persistent Cough, new onset  COMPARISON:  None  TECHNIQUE: CT examination of the chest was performed without intravenous contrast   Axial, sagittal, and coronal 2D reformatted images were created from the source data and submitted for interpretation   Radiation dose length product (DLP) for this visit:  [id number] mGy-cm   This examination, like all CT scans performed in the [facility], was performed utilizing techniques to minimize radiation dose exposure, including the use of iterative reconstruction and automated exposure control  FINDINGS: LUNGS:  Patchy right upper and right lower lobe opacities are seen suspicious for pneumonia  Minimal dependent right lower lobe atelectasis is present  PLEURA:  Trace right pleural effusion is present  HEART/GREAT VESSELS:  Unremarkable for patient's age  MEDIASTINUM AND SHERWIN:  Reactive appearing mediastinal lymph nodes are noted  CHEST WALL AND LOWER NECK:   Unremarkable  VISUALIZED STRUCTURES IN THE UPPER ABDOMEN:  Unremarkable  OSSEOUS STRUCTURES:  No acute fracture or destructive osseous lesion  Impression: Patchy right upper and right lower lobe pulmonary opacities likely representing pneumonia  Trace right pleural effusion  Workstation performed: [id number]     Us Right Upper Quadrant    Result Date: 8/9/2018  Narrative: RIGHT UPPER QUADRANT ULTRASOUND INDICATION:     Transaminitis  COMPARISON:  None TECHNIQUE:   Real-time ultrasound of the right upper quadrant was performed with a curvilinear transducer with both volumetric sweeps and still imaging techniques  FINDINGS: PANCREAS:  Pancreas evaluation is limited AORTA AND IVC:  Visualized portions are normal for patient age  LIVER: Size:  Within normal range  The liver measures 17 3 cm in the midclavicular line  Contour:  Surface contour is smooth  Parenchyma: There is mild increased echogenicity of the liver suggesting fatty infiltration Limited imaging of the main portal vein shows it to be patent and hepatopetal   BILIARY: Gallbladder is surgically absent No intrahepatic biliary dilatation  CBD measures 4 9 mm  No choledocholithiasis  KIDNEY: Right kidney measures 10 2 x 5 3 cm  Within normal limits  There is a cyst within the right kidney in the lower pole which measures 3 4 x 2 9 x 3 0 cm    This demonstrates mild septation and nodularity ASCITES:   None  Impression: No biliary dilation seen Increased echogenicity of the liver suggests fatty infiltration 3 4 x 2 9 x 3 centimeters Septated  cyst within the lower pole of the right kidney with mild nodularity  This can be evaluated with the MRI performed on nonemergent basis for further characterization The study was marked in EPIC for significant notification  Workstation performed: [id number]     Vas Lower Limb Venous Duplex Study, Unilateral/limited    Result Date: 7/19/2018  Narrative:  [facility] REPORT [facility]ICAL: Indications: Patient presents with left lower extremity pain and hip pain x 2 weeks  Risk Factors: Patient denies history of injury, DVT and smoking  FINDINGS:  Left     Impression       CFV      Normal (Patent)     CONCLUSION:  Impression: RIGHT LOWER LIMB LIMITED: Patient refused images from this leg  LEFT LOWER LIMB: No evidence of acute or chronic deep vein thrombosis, however difficult obtaining color Doppler in one of the posterior tibial veins secondary to small lumen vs thrombus  Area was difficult to visualize due to the below tech note  No evidence of superficial thrombophlebitis noted  Doppler evaluation shows a normal response to augmentation maneuvers  Popliteal, posterior tibial and anterior tibial arterial Doppler waveforms are triphasic  TECH NOTE: Difficult/ limited exam secondary to patient in severe pain, unable to bend leg into proper positioning, movement, edema and body habitus  Unable to visualize peroneal veins  Technical findings were given to Dr Annemarie Nieves at the time of the exam   SIGNATURE: Electronically Signed by: Roque Morley MD, [address] on 2018-07-19 10:11:10 PM    Vas Lower Limb Venous Duplex Study, Complete Bilateral    Result Date: 8/10/2018  Narrative:  [facility] REPORT CLINICAL: Indications: Dyspnea [R06 02]  Patient admitted with sepsis, left leg cellulitis   She developed dyspnea and hypoxia, and she has abnormal D-Dimer  Risk factors:  Obesity  FINDINGS:  Segment  Right            Left              Impression       Impression       CFV      Normal (Patent)  Normal (Patent)     CONCLUSION:  Impression: RIGHT LOWER LIMB: No evidence of acute or chronic deep vein thrombosis  No evidence of superficial thrombophlebitis noted  Doppler evaluation shows a normal response to augmentation maneuvers  Popliteal, posterior tibial and anterior tibial arterial Doppler waveforms are triphasic/biphasic  LEFT LOWER LIMB: No evidence of acute or chronic deep vein thrombosis  The peroneal veins were not well identified/imaged  No evidence of superficial thrombophlebitis noted  Doppler evaluation shows a normal response to augmentation maneuvers  Popliteal, posterior tibial and anterior tibial arterial Doppler waveforms are triphasic  Technical findings were given to "Sol Neat  Tech Note: This study was technically difficult/limited due to patient body habitus  SIGNATURE: Electronically Signed by: Ginny Quintero on 2018-08-10 12:41:44 PM    Imaging Reports Reviewed Today Include:  Reviewed right upper quadrant ultrasound also chest x-rays and CT imaging along with vascular imaging  Imaging Personally Reviewed by Myself Includes:  Now  Procedure: Vas Lower Limb Venous Duplex Study, Complete Bilateral    Result Date: 8/10/2018  Narrative:  [facility] REPORT [facility]INICAL: Indications: Dyspnea [R06 02]  Patient admitted with sepsis, left leg cellulitis  She developed dyspnea and hypoxia, and she has abnormal D-Dimer  Risk factors:  Obesity  FINDINGS:  Segment  Right            Left              Impression       Impression       CFV      Normal (Patent)  Normal (Patent)     CONCLUSION:  Impression: RIGHT LOWER LIMB: No evidence of acute or chronic deep vein thrombosis  No evidence of superficial thrombophlebitis noted  Doppler evaluation shows a normal response to augmentation maneuvers   Popliteal, posterior tibial and anterior tibial arterial Doppler waveforms are triphasic/biphasic  LEFT LOWER LIMB: No evidence of acute or chronic deep vein thrombosis  The peroneal veins were not well identified/imaged  No evidence of superficial thrombophlebitis noted  Doppler evaluation shows a normal response to augmentation maneuvers  Popliteal, posterior tibial and anterior tibial arterial Doppler waveforms are triphasic  Technical findings were given to "Nj Salazar Note: This study was technically difficult/limited due to patient body habitus  SIGNATURE: Electronically Signed by: Inocente Fried on 2018-08-10 12:41:44 PM       Recent Cultures (last 7 days):       Results from last 7 days  Lab Units 08/08/18  2309 08/08/18  2102   BLOOD CULTURE   --  No Growth After 4 Days  No Growth After 4 Days     URINE CULTURE  <10,000 cfu/ml Candida sp  presumptively albicans*  --        Last 24 Hours Medication List:     Current Facility-Administered Medications:  amLODIPine 5 mg Oral Q12H [address] JONO Troncoso   apixaban 5 mg Oral BID Zohaib Alonso, DO   benzonatate 100 mg Oral TID PRN JONO Troncoso   bisacodyl 10 mg Rectal Daily PRN JONO Troncoso   cloNIDine 0 1 mg Oral Q12H [address] Lemuel Sheffield PA-C   docusate sodium 100 mg Oral BID JONO Troncoso   fentanyl citrate (PF) 25 mcg Intravenous Once Briana Zambrano MD   furosemide 40 mg Intravenous BID (diuretic) Zohaib Alonso,    HYDROmorphone 0 2 mg Intravenous Q6H PRN JONO Troncoso   levalbuterol 1 25 mg Nebulization Q6H PRN Rukhsana Nevarez MD   lidocaine 2 patch Transdermal Daily JONO Troncoso   melatonin 3 mg Oral HS JONO Troncoso   metoprolol succinate 25 mg Oral Q12H Lemuel Sheffield, PA-C   nystatin  Topical TID JONO Troncoso   ondansetron 4 mg Intravenous Q6H PRN JONO Troncoso   polyethylene glycol 17 g Oral Daily JONO Troncoso   saccharomyces boulardii 250 mg Oral BID Jeff Davis Hockey, CRNP   senna 2 tablet Oral Daily Jeff Davis Hockey, CRNP   sodium chloride 3 mL Nebulization Q6H PRN Latha Pineda MD        Today, Patient Was Seen By: Anita Herrera MD    ** Please Note: Dragon 360 Dictation voice to text software may have been used in the creation of this document   **

## 2018-08-13 NOTE — SOCIAL WORK
Followed-up with pt and daughter Alexia Gamboa regarding discharge planning  Both pt and daughter refused to return back to [facility] [address]  Daughter would like to see if pt can return back to [facility] and stated that she a staff member from there will be coming tomorrow morning to see pt  Daughter stated that the assisted living also does provide rehab as well  Message left for Yung from [facility]  If pt needs further STR daughter would like referrals to be made to [facility]  Referrals made  Will continue to follow-up

## 2018-08-13 NOTE — PROGRESS NOTES
Progress Note - Infectious Disease   Lemuel Anger 80 y o  female MRN: [id number]  Unit/Bed#: E5 -01 Encounter: [id number]         Impression/Recommendations:   1   Fever  Alberteen Ice developed a fever of 102 at nursing home   T-max here is 100  4    No associated leukocytosis  Procalcitonin is 0  14   She is hemodynamically stable, nontoxic   Unclear at this point if patient has a real acute infection   She does have some residual left lower extremity erythema but this may be secondary to resolving cellulitis , as well as persistent edema  Acute UTI is also consideration as patient acutely developed urinary retention a few days ago requiring placement of Barnett catheter  Although doubtful as urine culture is negative   No clinical or radiographic evidence of pneumonia  Blood cultures remain negative  Fevers have improved      -  Antibiotic as below  -  Monitor temperatures closely  - Check CBC in a m   - monitor hemodynamics      2  Left lower extremity cellulitis   Patient recently completed a prolonged course of antibiotic for left lower extremity cellulitis   She still has mild erythema and warmth, but this may be secondary to edema  Patient completed an additional 5 day course of IV cefazolin for mild cellulitis  Cellulitis has improved      -continue to monitor closely off antibiotics  - frequent leg elevation  - aggressive edema control  - fluid management per primary team      3  Acute hypoxic respiratory failure   No clinical or radiographic evidence to suggest pneumonia   May be a component of atelectasis as patient is relatively immobile  Also possible mild volume overload  Pulmonology evaluation noted  Patient started on IV diuresis    Again, nothing clinically to suggest developing pneumonia      -  Diuresis per Cardiology  -  Wean oxygen as tolerated  - incentive spirometry   - improved mobility as able  -may require home oxygen on discharge      4  Acute urinary retention with abnormal urinalysis    Status post recent placement of indwelling Barnett catheter at nursing home   Difficult to assess for urinary symptoms at this time   Urinalysis is positive   Urine culture only reveals low colony count of Candida albicans, which is likely a colonizer  No antibiotic indicated for this        5  Bilateral hip pain   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  This appears to be a chronic ongoing process which is severely limiting patient's mobility      Antibiotics:  None    Stable from ID standpoint  Subjective:  Remains on supplemental oxygen by nasal cannula  Denies fevers, chills, or sweats  Denies nausea, vomiting, or diarrhea  Objective:  Vitals:  HR:  [73-87] 87  Resp:  [18-20] 20  BP: (108-147)/(58-86) 115/78  SpO2:  [94 %-99 %] 97 %  Temp (24hrs), Av 4 °F (36 3 °C), Min:97 °F (36 1 °C), Max:97 6 °F (36 4 °C)  Current: Temperature: 97 6 °F (36 4 °C)    Physical Exam:   General:  No acute distress  Psychiatric:  Awake and alert  Pulmonary:  Normal respiratory excursion without accessory muscle use  Abdomen:  Soft, nontender  Extremities:  No edema  Skin:  No rashes    Lab Results:  I have personally reviewed pertinent labs      Results from last 7 days  Lab Units 18  0505 18  0609 18  0517  18  0520 18  2102   SODIUM mmol/L 136 137 134*  < > 132* 132*   POTASSIUM mmol/L 3 7 3 8 4 1  < > 4 2 4 5   CHLORIDE mmol/L 97* 98* 100  < > 101 98*   CO2 mmol/L 34* 32 29  < > 25 26   ANION GAP mmol/L 5 7 5  < > 6 8   BUN mg/dL 19 20 16  < > 23 24   CREATININE mg/dL 0 74 0 78 0 68  < > 1 01 1 25   EGFR ml/min/1 73sq m 74 70 80  < > 51 40   GLUCOSE RANDOM mg/dL 95 89 115  < > 97 109   GLUCOSE, ISTAT   --   --   --   < >  --   --    CALCIUM mg/dL 9 0 8 9 8 6  < > 8 3 8 6   AST U/L 58*  --   --   --  60* 75*   ALT U/L 98*  --   --   --  100* 111*   ALK PHOS U/L 93  --   --   --  89 95   TOTAL PROTEIN g/dL 7 2  --   --   --  6 6 7 0   BILIRUBIN TOTAL mg/dL 0 56  --   --   --  0 28 0 26   < > = values in this interval not displayed  Results from last 7 days  Lab Units 08/13/18  0505 08/10/18  0816 08/09/18  0519 08/08/18  2102   WBC Thousand/uL 7 96  --  5 15 5 53   HEMOGLOBIN g/dL 12 1  --  10 4* 10 8*   I STAT HEMOGLOBIN g/dl  --  11 2*  --   --    PLATELETS Thousands/uL 318  --  245 302       Results from last 7 days  Lab Units 08/08/18  2309 08/08/18  2102   BLOOD CULTURE   --  No Growth After 4 Days  No Growth After 4 Days  URINE CULTURE  <10,000 cfu/ml Candida sp  presumptively albicans*  --        Imaging Studies:   I have personally reviewed pertinent imaging study reports and images in PACS  EKG, Pathology, and Other Studies:   I have personally reviewed pertinent reports

## 2018-08-14 PROCEDURE — G8987 SELF CARE CURRENT STATUS: HCPCS

## 2018-08-14 PROCEDURE — G8978 MOBILITY CURRENT STATUS: HCPCS

## 2018-08-14 PROCEDURE — 99232 SBSQ HOSP IP/OBS MODERATE 35: CPT | Performed by: INTERNAL MEDICINE

## 2018-08-14 PROCEDURE — G8979 MOBILITY GOAL STATUS: HCPCS

## 2018-08-14 PROCEDURE — 94760 N-INVAS EAR/PLS OXIMETRY 1: CPT

## 2018-08-14 PROCEDURE — G8988 SELF CARE GOAL STATUS: HCPCS

## 2018-08-14 PROCEDURE — 97163 PT EVAL HIGH COMPLEX 45 MIN: CPT

## 2018-08-14 PROCEDURE — 97167 OT EVAL HIGH COMPLEX 60 MIN: CPT

## 2018-08-14 PROCEDURE — 97530 THERAPEUTIC ACTIVITIES: CPT

## 2018-08-14 RX ADMIN — FUROSEMIDE 40 MG: 10 INJECTION, SOLUTION INTRAMUSCULAR; INTRAVENOUS at 09:50

## 2018-08-14 RX ADMIN — APIXABAN 5 MG: 5 TABLET, FILM COATED ORAL at 09:56

## 2018-08-14 RX ADMIN — BISACODYL 10 MG: 10 SUPPOSITORY RECTAL at 18:00

## 2018-08-14 RX ADMIN — METOPROLOL SUCCINATE 25 MG: 25 TABLET, EXTENDED RELEASE ORAL at 10:00

## 2018-08-14 RX ADMIN — AMLODIPINE BESYLATE 5 MG: 5 TABLET ORAL at 09:59

## 2018-08-14 RX ADMIN — METOPROLOL SUCCINATE 25 MG: 25 TABLET, EXTENDED RELEASE ORAL at 22:14

## 2018-08-14 RX ADMIN — DOCUSATE SODIUM 100 MG: 100 CAPSULE, LIQUID FILLED ORAL at 09:55

## 2018-08-14 RX ADMIN — AMLODIPINE BESYLATE 5 MG: 5 TABLET ORAL at 22:00

## 2018-08-14 RX ADMIN — NYSTATIN: 100000 POWDER TOPICAL at 09:50

## 2018-08-14 RX ADMIN — HYDROMORPHONE HYDROCHLORIDE 0.2 MG: 1 INJECTION, SOLUTION INTRAMUSCULAR; INTRAVENOUS; SUBCUTANEOUS at 12:40

## 2018-08-14 RX ADMIN — CLONIDINE HYDROCHLORIDE 0.1 MG: 0.1 TABLET ORAL at 22:00

## 2018-08-14 RX ADMIN — FUROSEMIDE 40 MG: 10 INJECTION, SOLUTION INTRAMUSCULAR; INTRAVENOUS at 17:11

## 2018-08-14 RX ADMIN — APIXABAN 5 MG: 5 TABLET, FILM COATED ORAL at 17:11

## 2018-08-14 RX ADMIN — MELATONIN TAB 3 MG 3 MG: 3 TAB at 22:15

## 2018-08-14 RX ADMIN — HYDROMORPHONE HYDROCHLORIDE 0.2 MG: 1 INJECTION, SOLUTION INTRAMUSCULAR; INTRAVENOUS; SUBCUTANEOUS at 06:05

## 2018-08-14 RX ADMIN — CLONIDINE HYDROCHLORIDE 0.1 MG: 0.1 TABLET ORAL at 09:56

## 2018-08-14 RX ADMIN — NYSTATIN: 100000 POWDER TOPICAL at 22:00

## 2018-08-14 RX ADMIN — SENNOSIDES 17.2 MG: 8.6 TABLET, FILM COATED ORAL at 09:55

## 2018-08-14 RX ADMIN — NYSTATIN: 100000 POWDER TOPICAL at 17:00

## 2018-08-14 RX ADMIN — DOCUSATE SODIUM 100 MG: 100 CAPSULE, LIQUID FILLED ORAL at 17:11

## 2018-08-14 NOTE — PLAN OF CARE
Problem: PHYSICAL THERAPY ADULT  Goal: Performs mobility at highest level of function for planned discharge setting  See evaluation for individualized goals  Treatment/Interventions: Functional transfer training, LE strengthening/ROM, Therapeutic exercise, Endurance training, Patient/family training, Equipment eval/education, Bed mobility, Continued evaluation, Spoke to nursing, OT, Family  Equipment Recommended: Wheelchair       See flowsheet documentation for full assessment, interventions and recommendations  Prognosis: Fair  Problem List: Decreased strength, Decreased endurance, Decreased range of motion, Impaired balance, Decreased mobility, Pain, Obesity  Assessment: Pt is 80 y o  female seen for PT evaluation s/p admit to [facility] on 8/8/2018  Two pt identifiers were used to confirm  Pt presented w/ with increased fever, SOB and cough  Pt was admitted with a primary dx of: acute respiratory failure with hypoxia  PT now consulted for assessment of mobility and d/c needs  Pt with Up with assistance orders  Pts current co morbidities effecting treatment include: HTN, HLD, hx of ankle fx hx of b/l knee replacements   Pts current clinical presentation is Unstable/ Unpredictable (high complexity) due to Ongoing medical management for primary dx, Increased reliance on more restrictive AD compared to baseline, Decreased activity tolerance compared to baseline, Fall risk, Increased assistance needed from caregiver at current time, Increased O2 via NC from pts baseline    Prior to admission, pt was utilizing [facility] [place] for all sit <--> stand transfers and working towards ambulation as per pt  Upon evaluation, pt currently is requiring mod A x2 for bed mobility; MAx Ax2 for transfers   Ambulation not attempted due to decreased standing tolerance and fatigue at current time  Pt reports slight lightheadedness sitting EOB  Pts BP taken and recorded above   Pt presents at PT eval functioning below baseline and currently w/ overall mobility deficits 2* to: BLE weakness, decreased ROM, impaired balance, decreased endurance, pain, decreased activity tolerance compared to baseline, fall risk, SOB upon exertion  Pt currently at a fall risk 2* to impairments listed above  Based on the aforementioned PT evaluation, pt will continue to benefit from skilled Acute PT interventions to address stated impairments; to maximize functional mobility; for ongoing pt/ family training; and DME needs  At conclusion of PT session pt returned back in chair with phone and call bell within reach  Pt denies any further questions at this time  PT is currently recommending rehab due to decreased functional mobility compared to baseline and increased A needed from caregiver at current time  Pt/ family agreeable to plan and goals as stated on evaluation  PT will continue to follow during hospital stay  Barriers to Discharge: None     Recommendation: Short-term skilled PT     PT - OK to Discharge: Yes (to rehab when medically cleared )    See flowsheet documentation for full assessment

## 2018-08-14 NOTE — OCCUPATIONAL THERAPY NOTE
[address]  Evaluation     Patient Name: Lemuel Leon  WNUNR'T Date: 8/14/2018  Problem List  Patient Active Problem List   Diagnosis    Cellulitis    Hypertension    Hip pain    Severe sepsis [facility] [facility]    Morbid (severe) obesity due to excess calories ([facility])    Hyperlipidemia    Transaminitis    Cellulitis of left lower extremity    Hyponatremia    Osteoarthritis of both hips    Acute respiratory failure with hypoxia ([facility] [address] [facility] )    Tinea corporis    Acute retention of urine    ANSELMO (acute kidney injury) ([facility] [address] )    Abnormal finding on EKG    Acute bronchospasm    Paroxysmal atrial fibrillation (HCC)    Acute on chronic diastolic (congestive) heart failure ([facility] [address] )    Vulvovaginal candidiasis     Past Medical History  Past Medical History:   Diagnosis Date    Hyperlipidemia     Hypertension      Past Surgical History  Past Surgical History:   Procedure Laterality Date    ANKLE FRACTURE SURGERY Left     CHOLECYSTECTOMY      REPLACEMENT TOTAL KNEE BILATERAL Bilateral          08/14/18 1150   Note Type   Note type Eval/Treat   Restrictions/Precautions   Weight Bearing Precautions Per Order No   Other Precautions Multiple lines; Fall Risk;Pain;O2   Pain Assessment   Pain Assessment 0-10   Pain Score 6   Pain Type Chronic pain   Pain Location Hip   Pain Orientation Bilateral   Pain Descriptors Aching   Pain Frequency Constant/continuous   Pain Onset Ongoing   Clinical Progression Gradually improving   Effect of Pain on Daily Activities Increased pain with activity   Patient's Stated Pain Goal No pain   Hospital Pain Intervention(s) Repositioned; Ambulation/increased activity; Emotional support   Response to Interventions Tolerated OT   Multiple Pain Sites No   Home Living   Type of Home Apartment;Assisted living   Home Layout One level;Ramped entrance;Elevator  ([facility])   Bathroom Shower/Tub Walk-in shower   Bathroom Toilet Standard   Bathroom Equipment Grab bars in shower;Built-in shower seat;Grab bars around toilet   [address] [facility]; Wheelchair-manual   Additional Comments Pt presents from [place] where she was receiving rehab since D/C from [address] on 07/28/2018, however prior to rehab Pt lives at [address]  Prior Function   Level of [place] Independent with ADLs and functional mobility; Needs assistance with ADLs and functional mobility; Needs assistance with IADLs   Lives With Facility staff   Receives Help From Personal care attendant   ADL Assistance Needs assistance   IADLs Needs assistance   Falls in the last 6 months 0   Vocational Retired   Comments At rehab, pt required assist w/ all ADLs, IADLs, and transfers (pt reports non-ambulatory at rehab)  Prior to rehab, pt was I w/ ADLs and functional mobility/transfers with use of RW for household distances and WC for community distances and and required assist w/ IADLs  Lifestyle   Autonomy At rehab, pt required assist w/ all ADLs, IADLs, and transfers (pt reports non-ambulatory at rehab)  Prior to rehab, pt was I w/ ADLs and functional mobility/transfers with use of RW for household distances and WC for community distances and and required assist w/ IADLs  Reciprocal Relationships Supportive dtr   Service to Others Retired   Intrinsic Gratification Watching TV   Psychosocial   Psychosocial (WDL) WDL   ADL   Eating Assistance 5  Supervision/Setup   Grooming Assistance 5  Supervision/Setup   UB Bathing Assistance 4  Minimal Assistance   LB [address] 2  Maximal Assistance   [address] 4  [address] 2  Maximal [address]  2  Maximal Assistance   Bed Mobility   Rolling R 3  Moderate assistance   Additional items Assist x 2;Bedrails; Increased time required;Verbal cues;LE management   Rolling L 3  Moderate assistance   Additional items Assist x 2;Bedrails; Increased time required;Verbal cues;LE management   Supine to Sit 3 Moderate assistance   Additional items Assist x 2;HOB elevated; Bedrails; Increased time required;Verbal cues;LE management   Sit to Supine 3  Moderate assistance   Additional items Assist x 2; Increased time required;Verbal cues;LE management   Additional Comments Pt reported increased lightheadedness when seated EOB, BP measured to be 141/72  Pt seated OOB in chair at end of OT evaluation with call bell and phone within reach  All needs met and pt reports no further questions for OT at this time   Transfers   Sit to Stand 2  Maximal assistance   Additional items Assist x 2; Increased time required;Verbal cues   Stand to Sit 3  Moderate assistance   Additional items Assist x 2; Increased time required;Verbal cues   Mechanical lift 2  Maximal assistance   Additional items Assist x 2;Armrests; Increased time required;Verbal cues  (Quick Move)   Additional Comments Cues for safe technique and hand placement during transfers  Recommend use of Quick Move for OOB activity with nursing at this time   Functional Mobility   Additional Comments Not appropriate at this time 2* decreased standing balance/tolerance;  Recommend Quick Move for OOB activity with nursing   Balance   Static Sitting Fair +   Dynamic Sitting Fair   Static Standing Poor +   Activity Tolerance   Activity Tolerance Patient limited by fatigue;Patient limited by pain   Nurse Made Aware Pt appropriate to be seen per GEOVANNY Shields   RUE Assessment   RUE Assessment WFL   RUE Strength   RUE Overall Strength Within Functional Limits - able to perform ADL tasks with strength  (3+/5 throughout)   LUE Assessment   LUE Assessment WFL   LUE Strength   LUE Overall Strength Within Functional Limits - able to perform ADL tasks with strength  (3+/5 throughout)   Hand Function   Gross Motor Coordination Functional   Fine Motor Coordination Functional   Sensation   Light Touch No apparent deficits   Sharp/Dull No apparent deficits   Proprioception   Proprioception No apparent deficits   Vision - Complex Assessment   Ocular Range of Motion [facility]   Acuity Able to read clock/calendar on wall without difficulty   Perception   Inattention/Neglect Appears intact   Cognition   Overall Cognitive Status [facility]   Arousal/Participation Alert; Cooperative   Attention Within functional limits   Orientation Level Oriented X4   Memory Within functional limits   Following Commands Follows one step commands without difficulty   Assessment   Limitation Decreased ADL status; Decreased UE strength;Decreased endurance;Decreased self-care trans;Decreased high-level ADLs   Prognosis Fair   Assessment Pt is a 80 y o  female seen for OT evaluation s/p adm to [address] on 8/8/2018 w/ c/o fever, SOB, cough, and nausea and dx'd with acute respiratory failure with hypoxia  Comorbidities affecting pts functional performance include a significant PMH of HLD and HTN  Pt with active OT orders and activity orders for Up with assistance  Pt presents from [address] [place] [address] where she was receiving rehab since D/C from [facility] on 07/28/2018, however prior to rehab Pt lives at [facility]  At rehab, pt required assist w/ all ADLs, IADLs, and transfers (pt reports non-ambulatory at rehab)  Prior to rehab, pt was I w/ ADLs and functional mobility/transfers with use of RW for household distances and WC for community distances and and required assist w/ IADLs  Upon evaluation, pt currently requires Max A for LB ADLs, Max A for toileting, Min A for UB ADLs, Mod A of 2 for bed mobility, and Max-Mod A of 2 for functional transfers 2* the following deficits impacting occupational performance: weakness, decreased ROM, decreased strength, decreased balance, decreased tolerance and increased pain   These impairments, as well at pts limited home support, difficulty performing ADLS and difficulty performing IADLS  limit pts ability to safely engage in all baseline areas of occupation, including grooming, bathing, dressing, toileting, functional mobility/transfers, community mobility, social participation  and leisure activities   Pt scored overall 45/100 on the Barthel Index  Based on the aforementioned OT evaluation, functional performance deficits, and assessments, pt has been identified as a high complexity evaluation  Pt to continue to benefit from continued acute OT services during hospital stay to address defined deficits and to maximize level of functional independence in the following Occupational Performance areas: grooming, bathing/shower, toilet hygiene, dressing, socialization, health maintenance, functional mobility, social participation and transfers to common household surfaces  From OT standpoint, recommend STR upon D/C  OT will continue to follow pt 3-5x/wk to address the following goals to  w/in 10-14 days:   Goals   Patient Goals "To get out of bed and into the chair"   LTG Time Frame 10-   Long Term Goal Please refer to LTGs listed below   Plan   Treatment Interventions ADL retraining;Functional transfer training;UE strengthening/ROM; Endurance training;Patient/family training;Equipment evaluation/education; Compensatory technique education; Energy conservation; Activityengagement   Goal Expiration Date 18   Treatment Day 1   OT Frequency 3-5x/wk   Additional Treatment Session   Start Time 1140   End Time 1150   Treatment Assessment Pt seen for additional OT treatment session this AM focusing on functional activity tolerance, bed mobility, and ADLs  Pt alert and cooperative throughout session  Pt lying supine at start of session and reported desire to get off of bedpan  Rolling L/R completed for removal of bed pan and placement of new bed pads with Mod A of 2 2* assist for LE management and trunk control  Mod verbal cues required for safe rolling technique with pt utilizing bed rails for UE support   Total A required for toileting tasks at this time (perineal hygiene) 2* decreased functional reach demonstrated by pt at this time  Pt lying supine at end of session with call bell and phone within reach  All needs met and pt reports no further questions for OT at this time  Continue to recommend STR upon D/C and pt agreeable  OT to follow pt on caseload      Additional Treatment Day 1   Recommendation   OT Discharge Recommendation Short Term Rehab   OT - OK to Discharge Yes  (when medically cleared to rehab)   Barthel Index   Feeding 10   Bathing 0   Grooming Score 5   Dressing Score 5   Bladder Score 5   Bowels Score 10   Toilet Use Score 5   Transfers (Bed/Chair) Score 5   Mobility (Level Surface) Score 0   Stairs Score 0   Barthel Index Score 45   Modified [place] Scale   Modified Dover Scale 4        GOALS    1) Pt will improve activity tolerance to G for min 30 min txment sessions    2) Pt will complete bed mobility at a Min A level w/ G balance/safety demonstrated     3) Pt will complete UB dressing/bathing w/ Supervision using adaptive device and DME as needed    4) Pt will complete UB dressing/bathing w/ min A using adaptive device and DME as needed    5) Pt will complete toileting w/ Min A w/ G hygiene/thoroughness using DME as needed    6) Pt will improve functional transfers to Min A on/off all surfaces using DME as needed w/ G balance/safety     7) Pt will tolerate continued functional mobility assessment and appropriate goals will be established when applicable     8) Pt will engage in ongoing cognitive assessment w/ G participation w/ mod I to assist w/ safe d/c planning/recommendations    9) Pt will demonstrate G carryover of pt/caregiver education and training as appropriate w/ mod I w/o cues w/ good tolerance    10) Pt will demonstrate 100% carryover of energy conservation techniques w/ mod I t/o functional I/ADL/leisure tasks w/o cues s/p skilled education     11) Pt will increase UE strength by 1 MM grade to increase independence in ADLs and transfers      Artist Kelechi, OTR/L

## 2018-08-14 NOTE — PROGRESS NOTES
Progress Note - Cardiology   Jovon Guzman 80 y o  female MRN: [id number]  Unit/Bed#: E5 -01 Encounter: [id number]      Assessment/Recommendations/Discussion:   1  Acute diastolic CHF  2  AFib, new onset, persistent, rate controlled, now on Eliquis  3  Aortic stenosis, suspect moderate by exam     4  HTN  5  LLE cellulitis      · Continue IV lasix--good UO, stable renal fx, markedly improved edema and respiratory status  · Wean down O2 as able  · Continue Eliquis, metoprolol, amlodipine  · BP stable      Subjective: Pt seen/examined    Feels well, no CP, SOB, orthopnea      Physical Exam:  GEN:  NAD  HEENT:  MMM, NCAT, pink conjunctiva, EOMI, nonicteric sclera  CV:  NO JVD/HJR, irregular rhythm, 2/6 JOSE, +S1/S2, NO PARASTERNAL HEAVE/THRILL, NO LLE EDEMA, +RLE edema, NO HEPATIC SYSTOLIC PULSATION, WARM EXTREMITIES  RESP:  CTAB/L  ABD:  SOFT, NT, NO GROSS ORGANOMEGALY        Vitals:   /58   Pulse 83   Temp 97 8 °F (36 6 °C) (Temporal)   Resp 18   Ht 5' 3" (1 6 m)   Wt 112 kg (248 lb)   SpO2 97%   BMI 43 93 kg/m²   Vitals:    08/08/18 2049   Weight: 112 kg (248 lb)       Intake/Output Summary (Last 24 hours) at 08/14/18 1045  Last data filed at 08/13/18 2100   Gross per 24 hour   Intake               80 ml   Output             1700 ml   Net            -1620 ml       TELEMETRY: off  Lab Results:    Results from last 7 days  Lab Units 08/13/18  0505   WBC Thousand/uL 7 96   HEMOGLOBIN g/dL 12 1   HEMATOCRIT % 38 1   PLATELETS Thousands/uL 318       Results from last 7 days  Lab Units 08/13/18  0505   SODIUM mmol/L 136   POTASSIUM mmol/L 3 7   CHLORIDE mmol/L 97*   CO2 mmol/L 34*   BUN mg/dL 19   CREATININE mg/dL 0 74   CALCIUM mg/dL 9 0   TOTAL PROTEIN g/dL 7 2   BILIRUBIN TOTAL mg/dL 0 56   ALK PHOS U/L 93   ALT U/L 98*   AST U/L 58*   GLUCOSE RANDOM mg/dL 95       Results from last 7 days  Lab Units 08/13/18  0505   SODIUM mmol/L 136   POTASSIUM mmol/L 3 7   CHLORIDE mmol/L 97*   CO2 mmol/L 34* BUN mg/dL 19   CREATININE mg/dL 0 74   GLUCOSE RANDOM mg/dL 95   CALCIUM mg/dL 9 0           Medications:    Current Facility-Administered Medications:     amLODIPine (NORVASC) tablet 5 mg, 5 mg, Oral, Q12H [address], GAGE TinocoNP, 5 mg at 08/14/18 1545    apixaban (ELIQUIS) tablet 5 mg, 5 mg, Oral, BID, Rujul Alonso, DO, 5 mg at 08/14/18 6416    benzonatate (TESSALON PERLES) capsule 100 mg, 100 mg, Oral, TID PRN, JONO Tinoco    bisacodyl (DULCOLAX) rectal suppository 10 mg, 10 mg, Rectal, Daily PRN, JONO Tinoco    cloNIDine (CATAPRES) tablet 0 1 mg, 0 1 mg, Oral, Q12H [address], Marion Cockayne, PA-C, 0 1 mg at 08/14/18 1870    docusate sodium (COLACE) capsule 100 mg, 100 mg, Oral, BID, JONO Tinoco, 100 mg at 08/14/18 0955    fentanyl citrate (PF) 100 MCG/2ML 25 mcg, 25 mcg, Intravenous, Once, Concepcion Dodson MD    furosemide (LASIX) injection 40 mg, 40 mg, Intravenous, BID (diuretic), Rujul Alonso, DO, 40 mg at 08/14/18 0950    HYDROmorphone (DILAUDID) injection 0 2 mg, 0 2 mg, Intravenous, Q6H PRN, JONO Tinoco, 0 2 mg at 08/14/18 0605    levalbuterol ([place]) inhalation solution 1 25 mg, 1 25 mg, Nebulization, Q6H PRN, Alexandro Galvan MD, 1 25 mg at 08/12/18 1359    lidocaine (LIDODERM) 5 % patch 2 patch, 2 patch, Transdermal, Daily, JONO Tinoco, Stopped at 08/09/18 0849    melatonin tablet 3 mg, 3 mg, Oral, HS, JONO Tinoco, 3 mg at 08/13/18 2126    metoprolol succinate (TOPROL-XL) 24 hr tablet 25 mg, 25 mg, Oral, Q12H, Valorie Cockayne, PA-C, 25 mg at 08/14/18 1000    nystatin (MYCOSTATIN) powder, , Topical, TID, JONO Tinoco    ondansetron [facility]) injection 4 mg, 4 mg, Intravenous, Q6H PRN, JONO Tinoco    polyethylene glycol (MIRALAX) packet 17 g, 17 g, Oral, Daily, JONO Tinoco, 17 g at 08/13/18 7263    senna (SENOKOT) tablet 17 2 mg, 2 tablet, Oral, Daily, JONO Tinoco, 17 2 mg at 08/14/18 0955    sodium chloride 0 9 % inhalation solution 3 mL, 3 mL, Nebulization, Q6H PRN, Diaz Farrell MD, 3 mL at 08/12/18 1358    This note was completed in part utilizing M-Modal Fluency Direct Software  Grammatical errors, random word insertions, spelling mistakes, and incomplete sentences may be an occasional consequence of this system secondary to software limitations, ambient noise, and hardware issues  If you have any questions or concerns about the content, text, or information contained within the body of this dictation, please contact the provider for clarification

## 2018-08-14 NOTE — PROGRESS NOTES
[address] Internal Medicine Progress Note  Patient: Vamsi Carrillo 80 y o  female   MRN: [id number]  PCP: JONO Lopez  Unit/Bed#: E5 -01 Encounter: [id number]  Date Of Visit: 08/14/18    Assessment:    Principal Problem:    Acute respiratory failure with hypoxia ([address] )  Active Problems:    Hypertension    Transaminitis    Cellulitis of left lower extremity    Hyponatremia    Osteoarthritis of both hips    Tinea corporis    Acute retention of urine    Acute bronchospasm    Paroxysmal atrial fibrillation (HCC)    Acute on chronic diastolic (congestive) heart failure (HCC)    Vulvovaginal candidiasis      Plan:    · Acute respiratory failure with hypoxia still requires O2 but off IV steroids and now presume mostly in relation to acute on chronic diastolic heart failure that is improving with IV diuresis  2D echocardiogram still pending read/will need home O2 eval prior to discharge  · Acute on chronic diastolic heart failure in relation new onset atrial fibrillation  2D echocardiogram assessment continue fluid restriction need to update weight  · Paroxysmal atrial fibrillation with rate control offered by beta-blockade and now on Eliquis anticoagulation appreciate Cardiology input  · Hypertension continue amlodipine and clonidine along with beta-blockade  · Transaminitis unclear etiology although has fatty liver on ultrasound as only no or the finding  · Vulvovaginal candidiasis continue on oral Diflucan  · Left lower extremity cellulitis with significant contracture from lines of demarcation of previous erythema at presentation of fever now resolved off antibiotic patient will need physical therapy assessment and home O2 eval prior to discharge      VTE Pharmacologic Prophylaxis:   Pharmacologic: Apixaban (Eliquis)  Mechanical VTE Prophylaxis in Place: No    Discussions with Specialists or Other Care Team Provider:  No    Time Spent for Care: 45 minutes    More than 50% of total time spent on counseling and coordination of care as described above  Subjective:   States she is breathing easier still has a dry cough and still on 4 L of nasal cannula oxygen denies any chest pain denies palpitations states left leg is significantly improved but has not tried walking on a as yet  She is anxious to pursue physical therapy I also informed her that she will need a home O2 evaluation prior to discharge    Objective:     Vitals:   Temp (24hrs), Av 8 °F (36 6 °C), Min:97 8 °F (36 6 °C), Max:97 9 °F (36 6 °C)    HR:  [71-86] 83  Resp:  [16-18] 18  BP: (121-134)/(58-73) 129/58  SpO2:  [91 %-98 %] 97 %  Body mass index is 43 93 kg/m²  Input and Output Summary (last 24 hours): Intake/Output Summary (Last 24 hours) at 18 1114  Last data filed at 18 2100   Gross per 24 hour   Intake               80 ml   Output             1700 ml   Net            -1620 ml       Physical Exam:     Physical Exam:   General appearance: alert, appears stated age and cooperative  Head: Normocephalic, without obvious abnormality, atraumatic  Lungs: clear to auscultation bilaterally  Heart: regular rate and rhythm  Abdomen: soft, non-tender; bowel sounds normal; no masses,  no organomegaly  Back: negative  Extremities: Area of erythema in the pretibial has reduced significantly from demarcation admission and extremities normal, atraumatic, no cyanosis or edema  Neurologic: Grossly normal      Additional Data:     Labs:      Results from last 7 days  Lab Units 18  0505  18  0519   WBC Thousand/uL 7 96  --  5 15   HEMOGLOBIN g/dL 12 1  --  10 4*   I STAT HEMOGLOBIN   --   < >  --    HEMATOCRIT % 38 1  --  32 7*   PLATELETS Thousands/uL 318  --  245   NEUTROS PCT %  --   --  66   LYMPHS PCT %  --   --  14   LYMPHO PCT % 53*  --   --    MONOS PCT %  --   --  9   MONO PCT MAN % 4  --   --    EOS PCT %  --   --  11*   EOSINO PCT MANUAL % 5  --   --    < > = values in this interval not displayed      Results from last 7 days  Lab Units 08/13/18  0505   SODIUM mmol/L 136   POTASSIUM mmol/L 3 7   CHLORIDE mmol/L 97*   CO2 mmol/L 34*   BUN mg/dL 19   CREATININE mg/dL 0 74   CALCIUM mg/dL 9 0   TOTAL PROTEIN g/dL 7 2   BILIRUBIN TOTAL mg/dL 0 56   ALK PHOS U/L 93   ALT U/L 98*   AST U/L 58*   GLUCOSE RANDOM mg/dL 95           * I Have Reviewed All Lab Data Listed Above  * Additional Pertinent Lab Tests Reviewed: All Labs For Current Hospital Admission Reviewed    Imaging:  Xr Chest Portable    Result Date: 8/10/2018  Narrative: CHEST INDICATION:   Wheezing and severe dyspnea  COMPARISON:  8/8/2018, CT chest 7/23/2018 EXAM PERFORMED/VIEWS:  XR CHEST PORTABLE FINDINGS:  Study limited by portable technique and patient body habitus  Cardiomediastinal silhouette appears unchanged, noting uncoiled thoracic aorta  Mild peripheral reticular interstitial prominence, more pronounced than the prior study  Pulmonary vasculature appears similar  Suspected infiltrate right midlung  Cannot exclude small pleural effusions  No pneumothorax  Degenerative changes of the spine and shoulders  Impression: Limited study  Suspected infiltrate right midlung  Mild reticular interstitial prominence  Follow-up PA and lateral views recommended  Workstation performed: [id number]     Xr Chest 2 Views    Result Date: 8/9/2018  Narrative: CHEST INDICATION:   sob with new onset hypoxia  COMPARISON:  07/22/2018 EXAM PERFORMED/VIEWS:  XR CHEST PA & LATERAL Images: 2 FINDINGS: Cardiomediastinal silhouette appears enlarged  The aorta is tortuous  No evidence of heart failure  The lungs are clear  No pneumothorax or pleural effusion  Arthritic changes in both shoulder joints  Impression: No acute cardiopulmonary disease  Cardiomegaly  Workstation performed: [id number]     Xr Chest Pa & Lateral    Result Date: 7/22/2018  Narrative: CHEST INDICATION:   Shortness of breath and wheezing   COMPARISON:  July 19, 2018 EXAM PERFORMED/VIEWS:  XR CHEST PA & LATERAL FINDINGS: Heart shadow is enlarged but unchanged from prior exam  Examination is somewhat limited secondary to underpenetration however there appears to be mild pulmonary vascular congestive change  No focal airspace opacity to suggest pneumonia  Trace bilateral costophrenic angle pleural effusions are noted  No pneumothorax  Osseous structures appear within normal limits for patient age  Impression: Underpenetrated examination demonstrating findings suspicious for mild pulmonary vascular congestive change and small costophrenic angle effusions  Workstation performed: [id number]     Xr Chest 2 Views    Result Date: 7/19/2018  Narrative: CHEST INDICATION:   sepsis   "low oxygen level, no surg" COMPARISON:  None EXAM PERFORMED/VIEWS:  XR CHEST PA & LATERAL FINDINGS: Tortuous course of the thoracic aorta  Normal cardiac contours  The lungs are clear  No pneumothorax or pleural effusion  Osseous structures appear within normal limits for patient age  Impression: No acute cardiopulmonary disease  Workstation performed: [id number]     Xr Hip/pelv 2-3 Vws Left    Result Date: 7/19/2018  Narrative: LEFT HIP INDICATION:   Pain, atraumatic  COMPARISON:  None VIEWS:  XR HIP/PELV 2-3 VWS LEFT  W PELVIS IF PERFORMED FINDINGS: There is no acute fracture or dislocation  Advanced degenerative arthritic changes noted of both hips with flattening of the femoral heads  There are more extensive destructive changes at the left femoral head with underlying cystic formation  The bones are demineralized  Soft tissues are unremarkable  The visualized lumbar spine is unremarkable  Impression: No acute osseous abnormality  Advanced degenerative changes of both hips worse on the left  Workstation performed: [id number]     Xr Knee 1 Or 2 Vw Left    Result Date: 7/19/2018  Narrative: LEFT KNEE INDICATION:   Pain, atraumatic   COMPARISON:  None VIEWS:  XR KNEE 1 OR 2 VW LEFT FINDINGS: 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  There is no acute fracture or dislocation  There is no joint effusion  No significant degenerative changes  No lytic or blastic lesions are seen  Soft tissues are unremarkable  Impression: Prior total knee replacement  No acute osseous abnormality  Workstation performed: [id number]     Xr Knee 1 Or 2 Vw Right    Result Date: 7/19/2018  Narrative: RIGHT KNEE INDICATION:   Pain, atraumatic  COMPARISON:  None VIEWS:  XR KNEE 1 OR 2 VW RIGHT Images: 2 FINDINGS: 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  There is no acute fracture or dislocation  There is no joint effusion  No lytic or blastic lesions are seen  Soft tissues are unremarkable  Impression: Prior total knee replacement  No acute osseous abnormality  Workstation performed: [id number]     Xr Foot 3+ Vw Left    Result Date: 7/20/2018  Narrative: LEFT FOOT INDICATION:   XR of left foot/tibia/fibula - r/o osteomyelitis   "severe left foot pain  inability to bend left knee  best films possible due to patient mobility  r/o osteomyelitis" COMPARISON:  None VIEWS:  XR FOOT 3+ VW LEFT FINDINGS: Partially imaged distal fibular screw and plate device  There is no acute fracture or dislocation  No definite osteomyelitis  Diffuse degenerative changes  Prominent superior and plantar calcaneal spurs and distal Achilles calcifications  No lytic or blastic lesions seen  Soft tissues are unremarkable  Impression: No definite osteomyelitis  Chronic changes as detailed  Workstation performed: [id number]     Ct Chest Wo Contrast    Result Date: 7/23/2018  Narrative: CT CHEST WITHOUT IV CONTRAST INDICATION:   Cough, persistent Cough, new onset  COMPARISON:  None  TECHNIQUE: CT examination of the chest was performed without intravenous contrast   Axial, sagittal, and coronal 2D reformatted images were created from the source data and submitted for interpretation  Radiation dose length product (DLP) for this visit:  811 mGy-cm     This examination, like all CT scans performed in the [facility], was performed utilizing techniques to minimize radiation dose exposure, including the use of iterative reconstruction and automated exposure control  FINDINGS: LUNGS:  Patchy right upper and right lower lobe opacities are seen suspicious for pneumonia  Minimal dependent right lower lobe atelectasis is present  PLEURA:  Trace right pleural effusion is present  HEART/GREAT VESSELS:  Unremarkable for patient's age  MEDIASTINUM AND SHERWIN:  Reactive appearing mediastinal lymph nodes are noted  CHEST WALL AND LOWER NECK:   Unremarkable  VISUALIZED STRUCTURES IN THE UPPER ABDOMEN:  Unremarkable  OSSEOUS STRUCTURES:  No acute fracture or destructive osseous lesion  Impression: Patchy right upper and right lower lobe pulmonary opacities likely representing pneumonia  Trace right pleural effusion  Workstation performed: [id number]     Us Right Upper Quadrant    Result Date: 8/9/2018  Narrative: RIGHT UPPER QUADRANT ULTRASOUND INDICATION:     Transaminitis  COMPARISON:  None TECHNIQUE:   Real-time ultrasound of the right upper quadrant was performed with a curvilinear transducer with both volumetric sweeps and still imaging techniques  FINDINGS: PANCREAS:  Pancreas evaluation is limited AORTA AND IVC:  Visualized portions are normal for patient age  LIVER: Size:  Within normal range  The liver measures 17 3 cm in the midclavicular line  Contour:  Surface contour is smooth  Parenchyma: There is mild increased echogenicity of the liver suggesting fatty infiltration Limited imaging of the main portal vein shows it to be patent and hepatopetal   BILIARY: Gallbladder is surgically absent No intrahepatic biliary dilatation  CBD measures 4 9 mm  No choledocholithiasis  KIDNEY: Right kidney measures 10 2 x 5 3 cm  Within normal limits  There is a cyst within the right kidney in the lower pole which measures 3 4 x 2 9 x 3 0 cm    This demonstrates mild septation and nodularity ASCITES:   None  Impression: No biliary dilation seen Increased echogenicity of the liver suggests fatty infiltration 3 4 x 2 9 x 3 centimeters Septated  cyst within the lower pole of the right kidney with mild nodularity  This can be evaluated with the MRI performed on nonemergent basis for further characterization The study was marked in EPIC for significant notification  Workstation performed: [id number]     Vas Lower Limb Venous Duplex Study, Unilateral/limited    Result Date: 7/19/2018  Narrative:  [facility] REPORT [facility]INICAL: Indications: Patient presents with left lower extremity pain and hip pain x 2 weeks  Risk Factors: Patient denies history of injury, DVT and smoking  FINDINGS:  Left     Impression       CFV      Normal (Patent)     CONCLUSION:  Impression: RIGHT LOWER LIMB LIMITED: Patient refused images from this leg  LEFT LOWER LIMB: No evidence of acute or chronic deep vein thrombosis, however difficult obtaining color Doppler in one of the posterior tibial veins secondary to small lumen vs thrombus  Area was difficult to visualize due to the below tech note  No evidence of superficial thrombophlebitis noted  Doppler evaluation shows a normal response to augmentation maneuvers  Popliteal, posterior tibial and anterior tibial arterial Doppler waveforms are triphasic  TECH NOTE: Difficult/ limited exam secondary to patient in severe pain, unable to bend leg into proper positioning, movement, edema and body habitus  Unable to visualize peroneal veins  Technical findings were given to Dr Ashley Rivera at the time of the exam   SIGNATURE: Electronically Signed by: Ignacio Kramer MD, [address] on 2018-07-19 10:11:10 PM    Vas Lower Limb Venous Duplex Study, Complete Bilateral    Result Date: 8/10/2018  Narrative:  [facility] REPORT CLINICAL: Indications: Dyspnea [R06 02]  Patient admitted with sepsis, left leg cellulitis   She developed dyspnea and hypoxia, and she has abnormal D-Dimer  Risk factors:  Obesity  FINDINGS:  Segment  Right            Left              Impression       Impression       CFV      Normal (Patent)  Normal (Patent)     CONCLUSION:  Impression: RIGHT LOWER LIMB: No evidence of acute or chronic deep vein thrombosis  No evidence of superficial thrombophlebitis noted  Doppler evaluation shows a normal response to augmentation maneuvers  Popliteal, posterior tibial and anterior tibial arterial Doppler waveforms are triphasic/biphasic  LEFT LOWER LIMB: No evidence of acute or chronic deep vein thrombosis  The peroneal veins were not well identified/imaged  No evidence of superficial thrombophlebitis noted  Doppler evaluation shows a normal response to augmentation maneuvers  Popliteal, posterior tibial and anterior tibial arterial Doppler waveforms are triphasic  Technical findings were given to "[employer]  Tech Note: This study was technically difficult/limited due to patient body habitus  SIGNATURE: Electronically Signed by: Fidencio Bloch on 2018-08-10 12:41:44 PM    Imaging Reports Reviewed Today Include:  Reviewed right upper quadrant ultrasound also chest x-rays and CT imaging along with vascular imaging  Imaging Personally Reviewed by Myself Includes:  Now  Procedure: Vas Lower Limb Venous Duplex Study, Complete Bilateral    Result Date: 8/10/2018  Narrative:  [facility] REPORT CLINICAL: Indications: Dyspnea [R06 02]  Patient admitted with sepsis, left leg cellulitis  She developed dyspnea and hypoxia, and she has abnormal D-Dimer  Risk factors:  Obesity  FINDINGS:  Segment  Right            Left              Impression       Impression       CFV      Normal (Patent)  Normal (Patent)     CONCLUSION:  Impression: RIGHT LOWER LIMB: No evidence of acute or chronic deep vein thrombosis  No evidence of superficial thrombophlebitis noted  Doppler evaluation shows a normal response to augmentation maneuvers   Popliteal, posterior tibial and anterior tibial arterial Doppler waveforms are triphasic/biphasic  LEFT LOWER LIMB: No evidence of acute or chronic deep vein thrombosis  The peroneal veins were not well identified/imaged  No evidence of superficial thrombophlebitis noted  Doppler evaluation shows a normal response to augmentation maneuvers  Popliteal, posterior tibial and anterior tibial arterial Doppler waveforms are triphasic  Technical findings were given to "Marvin Link  Tech Note: This study was technically difficult/limited due to patient body habitus  SIGNATURE: Electronically Signed by: Zev Delacruz on 2018-08-10 12:41:44 PM       Recent Cultures (last 7 days):       Results from last 7 days  Lab Units 08/08/18  2309 08/08/18  2102   BLOOD CULTURE   --  No Growth After 5 Days  No Growth After 5 Days     URINE CULTURE  <10,000 cfu/ml Candida sp  presumptively albicans*  --        Last 24 Hours Medication List:     Current Facility-Administered Medications:  amLODIPine 5 mg Oral Q12H [address] [facility], JONO   apixaban 5 mg Oral BID Rujul Alonso, DO   benzonatate 100 mg Oral TID PRN Antione Prey, CRNP   bisacodyl 10 mg Rectal Daily PRN Antione Prey, CRNP   cloNIDine 0 1 mg Oral Q12H [address] Luis Enrique Ines, MORIS   docusate sodium 100 mg Oral BID Antione Prey, CRNP   fentanyl citrate (PF) 25 mcg Intravenous Once Keila Monroe MD   furosemide 40 mg Intravenous BID (diuretic) Rujul Alonso, DO   HYDROmorphone 0 2 mg Intravenous Q6H PRN Antione Prey, CRNP   levalbuterol 1 25 mg Nebulization Q6H PRN Valerie Esteban MD   lidocaine 2 patch Transdermal Daily Antione Prey, CRNP   melatonin 3 mg Oral HS Antione Prey, CRNP   metoprolol succinate 25 mg Oral Q12H Luis Enrique JUSTIN Chacon-C   nystatin  Topical TID Antione Prey, CRNP   ondansetron 4 mg Intravenous Q6H PRN Antione Prey, CRNP   polyethylene glycol 17 g Oral Daily Antione Prey, CRNP   senna 2 tablet Oral Daily Antione Prey, CRNP   sodium chloride 3 mL Nebulization Q6H PRN Renetta Dorantes MD        Today, Patient Was Seen By: Tye Chaves MD    ** Please Note: Dragon 360 Dictation voice to text software may have been used in the creation of this document   **

## 2018-08-14 NOTE — CASE MANAGEMENT
Continued Stay Review    Date:   8/14/2018    Vital Signs: /58   Pulse 83   Temp 97 8 °F (36 6 °C) (Temporal)   Resp 18   Ht 5' 3" (1 6 m)   Wt 112 kg (248 lb)   SpO2 97%   BMI 43 93 kg/m²     Medications:   Scheduled Meds:   Current Facility-Administered Medications:  amLODIPine 5 mg Oral Q12H [address], JONO   apixaban 5 mg Oral BID Rujul Alonso, DO   benzonatate 100 mg Oral TID PRN JONO Garza   bisacodyl 10 mg Rectal Daily PRN JONO Garza   cloNIDine 0 1 mg Oral Q12H [address] Coral Fraga PA-C   docusate sodium 100 mg Oral BID JONO Garza   fentanyl citrate (PF) 25 mcg Intravenous Once Tahir Paris MD   furosemide 40 mg Intravenous BID (diuretic) Rujul Alonso,    HYDROmorphone 0 2 mg Intravenous Q6H PRN JONO Garza   levalbuterol 1 25 mg Nebulization Q6H PRN Mayuri Walker MD   lidocaine 2 patch Transdermal Daily JONO Garza   melatonin 3 mg Oral HS JONO Garza   metoprolol succinate 25 mg Oral Q12H Coral Fraga PA-C   nystatin  Topical TID JONO Garza   ondansetron 4 mg Intravenous Q6H PRN JONO Garza   polyethylene glycol 17 g Oral Daily JONO Garza   senna 2 tablet Oral Daily JONO Garza   sodium chloride 3 mL Nebulization Q6H PRN Mayuri Walker MD     Continuous Infusions:    PRN Meds: benzonatate    bisacodyl    HYDROmorphone    levalbuterol    ondansetron    sodium chloride    Abnormal Labs/Diagnostic Results:   n o labs  8/14    Age/Sex: 80 y o  female     · Assessment/Plan: Acute respiratory failure with hypoxia still requires O2 but off IV steroids and now presume mostly in relation to acute on chronic diastolic heart failure that is improving with IV diuresis    2D echocardiogram still pending read/will need home O2 eval prior to discharge  · Acute on chronic diastolic heart failure in relation new onset atrial fibrillation  2D echocardiogram assessment continue fluid restriction need to update weight  · Paroxysmal atrial fibrillation with rate control offered by beta-blockade and now on Eliquis anticoagulation appreciate Cardiology input  · Hypertension continue amlodipine and clonidine along with beta-blockade  · Transaminitis unclear etiology although has fatty liver on ultrasound as only no or the finding  · Vulvovaginal candidiasis continue on oral Diflucan  · Left lower extremity cellulitis with significant contracture from lines of demarcation of previous erythema at presentation of fever now resolved off antibiotic patient will need physical therapy assessment and home O2 eval prior to discharge    PER  CARDIOLOGY   8/14  1  Acute diastolic CHF  2  AFib, new onset, persistent, rate controlled, now on Eliquis  3  Aortic stenosis, suspect moderate by exam     4  HTN  5  LLE cellulitis        · Continue IV lasix--good UO, stable renal fx, markedly improved edema and respiratory status  · Wean down O2 as able  · Continue Eliquis, metoprolol, amlodipine  · BP stable          Discharge Plan:   STR      Thank you,  Jermaine Figueroa  [facility] Utilization Review Department  Phone: [phone]; Fax [phone]  ATTENTION: The Network Utilization Review Department is now centralized for our 9 Facilities  Make a note that we have a new phone and fax numbers for our Department  Please call with any questions or concerns to [phone] and carefully follow the prompts so that you are directed to the right person  All voicemails are confidential  Fax any determinations, approvals, denials, and requests for initial or continue stay review clinical to [phone]  Due to HIGH CALL volume, it would be easier if you could please send faxed requests to expedite your requests and in part, help us provide discharge notifications faster

## 2018-08-14 NOTE — PLAN OF CARE
Problem: OCCUPATIONAL THERAPY ADULT  Goal: Performs self-care activities at highest level of function for planned discharge setting  See evaluation for individualized goals  Treatment Interventions: ADL retraining, Functional transfer training, UE strengthening/ROM, Endurance training, Patient/family training, Equipment evaluation/education, Compensatory technique education, Energy conservation, Activityengagement          See flowsheet documentation for full assessment, interventions and recommendations  Outcome: Progressing  Limitation: Decreased ADL status, Decreased UE strength, Decreased endurance, Decreased self-care trans, Decreased high-level ADLs  Prognosis: Fair  Assessment: Pt is a 80 y o  female seen for OT evaluation s/p adm to [facility]  on 8/8/2018 w/ c/o fever, SOB, cough, and nausea and dx'd with acute respiratory failure with hypoxia  Comorbidities affecting pts functional performance include a significant PMH of HLD and HTN  Pt with active OT orders and activity orders for Up with assistance  Pt presents from [facility] where she was receiving rehab since D/C from [facility] on 07/28/2018, however prior to rehab Pt lives at [address]  At rehab, pt required assist w/ all ADLs, IADLs, and transfers (pt reports non-ambulatory at rehab)  Prior to rehab, pt was I w/ ADLs and functional mobility/transfers with use of RW for household distances and WC for community distances and and required assist w/ IADLs  Upon evaluation, pt currently requires Max A for LB ADLs, Max A for toileting, Min A for UB ADLs, Mod A of 2 for bed mobility, and Max-Mod A of 2 for functional transfers 2* the following deficits impacting occupational performance: weakness, decreased ROM, decreased strength, decreased balance, decreased tolerance and increased pain   These impairments, as well at pts limited home support, difficulty performing ADLS and difficulty performing IADLS  limit pts ability to safely engage in all baseline areas of occupation, including grooming, bathing, dressing, toileting, functional mobility/transfers, community mobility, social participation  and leisure activities   Pt scored overall 45/100 on the Barthel Index  Based on the aforementioned OT evaluation, functional performance deficits, and assessments, pt has been identified as a high complexity evaluation  Pt to continue to benefit from continued acute OT services during hospital stay to address defined deficits and to maximize level of functional independence in the following Occupational Performance areas: grooming, bathing/shower, toilet hygiene, dressing, socialization, health maintenance, functional mobility, social participation and transfers to common household surfaces  From OT standpoint, recommend STR upon D/C   OT will continue to follow pt 3-5x/wk to address the following goals to  w/in 10-14 days:     OT Discharge Recommendation: Short Term Rehab  OT - OK to Discharge: Yes (when medically cleared to rehab)

## 2018-08-14 NOTE — SOCIAL WORK
Representatives Alison Edwards and Lorraine) from [employer] came today to see the pt  They feel pt should go to STR prior to returning back to the assisted living facility  Daughter is aware  Referrals that were made yesterday to [address] both facilities unable to accept pt a they do not have a bed available  Daughter informed  List of facilities provided to daughter and she is agreeable for other referrals to be made to [address] [place] [address], [address] [place] [address], and [facility]  Referrals made  Followed-up with the referrals: [address] not in network  [facility] and [facility] able to accept pt  Daughter informed  Advised daughter to tour the facilities to help choose which facility  Will continue to follow-up

## 2018-08-14 NOTE — PHYSICAL THERAPY NOTE
PHYSICAL THERAPY EVALUATION  NAME:  Kera Ferguson  DATE: 08/14/18    AGE:   80 y o  Mrn:   [id number]  ADMIT DX:  Pneumonia [J18 9]  Fever [R50 9]  Cellulitis of left leg [E79 491]    Past Medical History:   Diagnosis Date    Hyperlipidemia     Hypertension        Past Surgical History:   Procedure Laterality Date    ANKLE FRACTURE SURGERY Left     CHOLECYSTECTOMY      REPLACEMENT TOTAL KNEE BILATERAL Bilateral        Length Of Stay: 5    PHYSICAL THERAPY EVALUATION:      08/14/18 1148   Note Type   Note type Eval only   Pain Assessment   Pain Assessment 0-10   Pain Score 6   Pain Type Chronic pain   Pain Location Hip   Pain Orientation Bilateral   Pain Descriptors Aching   Pain Frequency Constant/continuous   Pain Onset Ongoing   Clinical Progression Gradually improving   Effect of Pain on Daily Activities increased pain with activity    Patient's Stated Pain Goal No pain   Hospital Pain Intervention(s) Ambulation/increased activity;Repositioned   Response to Interventions tolerated    Home Living   Type of Home House;Assisted living   Home Layout One level  ([facility] )   Home Equipment Walker; Wheelchair-manual   Additional Comments Pt reports living at [facility], however prior to admission pt receiving rehab at [place] [address] with ADLs and functional mobility   Lives With Facility staff   Receives Help From Personal care attendant   ADL Assistance Needs assistance   Falls in the last 6 months 0   Comments Pt reports the use of a RW for limited ambulation, however has not been ambulating at [facility]  Pt reports using quick move for transfers at [facility]  Pt currently progressing towards ambualtion at rehab as per pt    Restrictions/Precautions   Weight Bearing Precautions Per Order No   Other Precautions Multiple lines; Fall Risk;Pain;O2   General   Additional Pertinent History Pt recently D/C from [facility] on 7/28/2018 to [facility]  Pt at [facility]   Pts BP sitting /72   Family/Caregiver Present No   Cognition   Overall Cognitive Status WFL   Arousal/Participation Alert   Orientation Level Oriented to person;Oriented to place;Oriented to time   Memory Within functional limits   Following Commands Follows one step commands without difficulty   RUE Assessment   RUE Assessment WFL   LUE Assessment   LUE Assessment WFL   RLE Assessment   RLE Assessment WFL   Strength RLE   RLE Overall Strength 3+/5   LLE Assessment   LLE Assessment WFL   Strength LLE   LLE Overall Strength 4-/5   Bed Mobility   Rolling R 3  Moderate assistance   Additional items Assist x 2; Increased time required;Verbal cues   Rolling L 3  Moderate assistance   Additional items Assist x 2; Increased time required;Verbal cues   Supine to Sit 3  Moderate assistance   Additional items Assist x 2; Increased time required;Verbal cues   Sit to Supine 3  Moderate assistance   Additional items Assist x 2; Increased time required;Verbal cues   Transfers   Sit to Stand 2  Maximal assistance   Additional items Assist x 2; Increased time required;Verbal cues   Stand to Sit 3  Moderate assistance   Additional items Assist x 2; Increased time required;Verbal cues   Additional Comments VC needed for hand placement and safety  [place] utilized for transfer OOB to chair    Ambulation/Elevation   Gait pattern Not tested  (poor standing tolerance and balance at current time )   Balance   Static Sitting Fair +   Dynamic Sitting Fair   Static Standing Poor +   Endurance Deficit   Endurance Deficit Yes   Endurance Deficit Description fatigue and pain    Activity Tolerance   Activity Tolerance Patient limited by fatigue;Patient limited by pain   Nurse Made Aware Pt appropriate to be seen per nsg GEOVANNY Shields   Assessment   Prognosis Fair   Problem List Decreased strength;Decreased endurance;Decreased range of motion; Impaired balance;Decreased mobility;Pain;Obesity   Assessment Pt is 80 y o  female seen for PT evaluation s/p admit to [facility] on 8/8/2018  Two pt identifiers were used to confirm  Pt presented w/ with increased fever, SOB and cough  Pt was admitted with a primary dx of: acute respiratory failure with hypoxia  PT now consulted for assessment of mobility and d/c needs  Pt with Up with assistance orders  Pts current co morbidities effecting treatment include: HTN, HLD, hx of ankle fx hx of b/l knee replacements   Pts current clinical presentation is Unstable/ Unpredictable (high complexity) due to Ongoing medical management for primary dx, Increased reliance on more restrictive AD compared to baseline, Decreased activity tolerance compared to baseline, Fall risk, Increased assistance needed from caregiver at current time, Increased O2 via [place] from pts baseline    Prior to admission, pt was utilizing quickmove at [facility] for all sit <--> stand transfers and working towards ambulation as per pt  Upon evaluation, pt currently is requiring mod A x2 for bed mobility; MAx Ax2 for transfers   Ambulation not attempted due to decreased standing tolerance and fatigue at current time  Pt reports slight lightheadedness sitting EOB  Pts BP taken and recorded above  Pt presents at PT eval functioning below baseline and currently w/ overall mobility deficits 2* to: BLE weakness, decreased ROM, impaired balance, decreased endurance, pain, decreased activity tolerance compared to baseline, fall risk, SOB upon exertion  Pt currently at a fall risk 2* to impairments listed above  Based on the aforementioned PT evaluation, pt will continue to benefit from skilled Acute PT interventions to address stated impairments; to maximize functional mobility; for ongoing pt/ family training; and DME needs  At conclusion of PT session pt returned back in chair with phone and call bell within reach  Pt denies any further questions at this time  PT is currently recommending rehab due to decreased functional mobility compared to baseline and increased A needed from caregiver at current time  Pt/ family agreeable to plan and goals as stated on evaluation  PT will continue to follow during hospital stay  Barriers to Discharge None   Goals   Patient Goals " to get OOB and into the chair"   [facility] Expiration Date 08/24/18   Short Term Goal #1 In 10 days pt will complete: 1) Bed mobility skills with S to increase safety and independence as well as decrease caregiver burden  2) Functional transfers with min A to promote increased independence, safety, and QOL and work towards pts stated goal  3) Improve balance grades to Good to increase safety with all mobility and decrease fall risk  4) Improve BLE strength by 1/2 grade to help increase functional mobility and decrease fall risk  5) PT for ongoing pt and family education; DME needs and D/C planning to promote highest level of function in least restrictive environment  Plan   Treatment/Interventions Functional transfer training;LE strengthening/ROM; Therapeutic exercise; Endurance training;Patient/family training;Equipment eval/education; Bed mobility;Continued evaluation;Spoke to nursing;OT;Family   PT Frequency Other (Comment)  (4-5x a week )   Recommendation   Recommendation Short-term skilled PT   Equipment Recommended Wheelchair   PT - OK to Discharge Yes  (to rehab when medically cleared )   Modified [place] Scale   Modified [place] Scale 4   Barthel Index   Feeding 10   Bathing 0   Grooming Score 5   Dressing Score 5   Bladder Score 5   Bowels Score 10   Toilet Use Score 5   Transfers (Bed/Chair) Score 5   Mobility (Level Surface) Score 0   Stairs Score 0   Barthel Index Score 45   Minnie Miranda, PT

## 2018-08-15 LAB
ANION GAP SERPL CALCULATED.3IONS-SCNC: 4 MMOL/L (ref 4–13)
BUN SERPL-MCNC: 25 MG/DL (ref 5–25)
CALCIUM SERPL-MCNC: 9.1 MG/DL (ref 8.3–10.1)
CHLORIDE SERPL-SCNC: 92 MMOL/L (ref 100–108)
CO2 SERPL-SCNC: 37 MMOL/L (ref 21–32)
CREAT SERPL-MCNC: 1.07 MG/DL (ref 0.6–1.3)
GFR SERPL CREATININE-BSD FRML MDRD: 47 ML/MIN/1.73SQ M
GLUCOSE SERPL-MCNC: 136 MG/DL (ref 65–140)
POTASSIUM SERPL-SCNC: 3.8 MMOL/L (ref 3.5–5.3)
SODIUM SERPL-SCNC: 133 MMOL/L (ref 136–145)

## 2018-08-15 PROCEDURE — 97110 THERAPEUTIC EXERCISES: CPT

## 2018-08-15 PROCEDURE — 99232 SBSQ HOSP IP/OBS MODERATE 35: CPT | Performed by: INTERNAL MEDICINE

## 2018-08-15 PROCEDURE — 97530 THERAPEUTIC ACTIVITIES: CPT

## 2018-08-15 PROCEDURE — 80048 BASIC METABOLIC PNL TOTAL CA: CPT | Performed by: INTERNAL MEDICINE

## 2018-08-15 PROCEDURE — 97535 SELF CARE MNGMENT TRAINING: CPT

## 2018-08-15 PROCEDURE — 94760 N-INVAS EAR/PLS OXIMETRY 1: CPT

## 2018-08-15 RX ADMIN — NYSTATIN: 100000 POWDER TOPICAL at 08:30

## 2018-08-15 RX ADMIN — APIXABAN 5 MG: 5 TABLET, FILM COATED ORAL at 08:30

## 2018-08-15 RX ADMIN — METOPROLOL SUCCINATE 25 MG: 25 TABLET, EXTENDED RELEASE ORAL at 22:10

## 2018-08-15 RX ADMIN — AMLODIPINE BESYLATE 5 MG: 5 TABLET ORAL at 22:00

## 2018-08-15 RX ADMIN — FUROSEMIDE 40 MG: 10 INJECTION, SOLUTION INTRAMUSCULAR; INTRAVENOUS at 08:30

## 2018-08-15 RX ADMIN — NYSTATIN: 100000 POWDER TOPICAL at 22:00

## 2018-08-15 RX ADMIN — MELATONIN TAB 3 MG 3 MG: 3 TAB at 22:00

## 2018-08-15 RX ADMIN — DOCUSATE SODIUM 100 MG: 100 CAPSULE, LIQUID FILLED ORAL at 08:30

## 2018-08-15 RX ADMIN — APIXABAN 5 MG: 5 TABLET, FILM COATED ORAL at 17:20

## 2018-08-15 RX ADMIN — CLONIDINE HYDROCHLORIDE 0.1 MG: 0.1 TABLET ORAL at 22:00

## 2018-08-15 RX ADMIN — HYDROMORPHONE HYDROCHLORIDE 0.2 MG: 1 INJECTION, SOLUTION INTRAMUSCULAR; INTRAVENOUS; SUBCUTANEOUS at 15:25

## 2018-08-15 RX ADMIN — CLONIDINE HYDROCHLORIDE 0.1 MG: 0.1 TABLET ORAL at 08:30

## 2018-08-15 RX ADMIN — DOCUSATE SODIUM 100 MG: 100 CAPSULE, LIQUID FILLED ORAL at 17:20

## 2018-08-15 RX ADMIN — AMLODIPINE BESYLATE 5 MG: 5 TABLET ORAL at 08:30

## 2018-08-15 RX ADMIN — FUROSEMIDE 40 MG: 10 INJECTION, SOLUTION INTRAMUSCULAR; INTRAVENOUS at 17:20

## 2018-08-15 RX ADMIN — NYSTATIN: 100000 POWDER TOPICAL at 17:20

## 2018-08-15 NOTE — PLAN OF CARE
Problem: PHYSICAL THERAPY ADULT  Goal: Performs mobility at highest level of function for planned discharge setting  See evaluation for individualized goals  Treatment/Interventions: Functional transfer training, LE strengthening/ROM, Therapeutic exercise, Endurance training, Patient/family training, Equipment eval/education, Bed mobility, Continued evaluation, Spoke to nursing, OT, Family  Equipment Recommended: Wheelchair       See flowsheet documentation for full assessment, interventions and recommendations  Outcome: Progressing  Prognosis: Fair  Problem List: Decreased strength, Decreased range of motion, Decreased endurance, Impaired balance, Decreased mobility, Obesity, Decreased skin integrity  Assessment: PT showing good progress with less assistance for bed mobility and transfers  Pt  Progressed to out of bed with mod assist x2 with verbal cues and guidance with walker and sequencing of le's  Pt requires A to steer and manuver the walker  Pt performs supine aa-arom exercises to b/l le's  PT requires encouragement to try and put forth best effort  Pt continues to demonstrate impaired ability to perform bed mobility and requires increased time for all mobility  B/l hip abduction arom severely limited by arthritis  PT will benefit from continued skilled inpt PT and rehab inorder to progress strength, balance, mobility and function  Barriers to Discharge: None     Recommendation: Short-term skilled PT     PT - OK to Discharge: Yes (to rehab)    See flowsheet documentation for full assessment

## 2018-08-15 NOTE — SOCIAL WORK
Message left for Luciana Jauregui from [facility] [phone] to follow-up on status on auth  Call back not returned yet  Will continue to follow-up

## 2018-08-15 NOTE — PLAN OF CARE
Problem: OCCUPATIONAL THERAPY ADULT  Goal: Performs self-care activities at highest level of function for planned discharge setting  See evaluation for individualized goals  Treatment Interventions: ADL retraining, Functional transfer training, UE strengthening/ROM, Endurance training, Patient/family training, Equipment evaluation/education, Compensatory technique education, Energy conservation, Activityengagement          See flowsheet documentation for full assessment, interventions and recommendations  Outcome: Progressing  Limitation: Decreased ADL status, Decreased UE strength, Decreased endurance, Decreased self-care trans, Decreased high-level ADLs  Prognosis: Fair  Assessment: Pt seen for OT treatment session this PM session focusing on functional activity tolerance, ADLs, and transfer trials  Pt alert and cooperative throughout session  Pt seated OOB in chair at start of session  ADLs completed while seated OOB in chair  UB dressing completed with Min A 2* assist required to pull gown down/around in back and cues for sequencing  LB dressing completed with Mod A  On first attempt, pt unable to don/doff B/L socks 2* decreased functional reach  Educated pt on available [address] to increase independence in LB ADLs with pt reporting interest in learning more about equipment  Therefore, educated pt on [address] through verbal instruction and demonstration with pt demonstrating ability to don/doff B/L socks with Mod A after education  Pt able to doff B/L socks with Supervision with use of dressing stick, however required assist to don B/L socks with sock aid 2* max cues for sequencing/technique, assist with placement of sock aid, and assist with pulling strings of sock aid  Recommend further trials with sock aid in future sessions  Pt reports owning LH shoehorn, reacher, and dressing stick for home environment   Light grooming tasks completed with Supervision 2* setup required and increased time to wash/dry face and complete teeth care  Transfers (sit<>stand) completed with Mod A of 2 with use of Quick Move 2* increased fatigue from sitting OOB in chair  Pt noted to be incontinent of bowels, therefore requiring Total A for toileting at this time 2* pt requiring BUE support on Quick Move when standing for perineal hygiene  Pt returned to Supine position with Mod A of 2 2* assist for LE management and trunk control  Pt lying supine at end of session with call bell and phone within reach  All needs met and pt reports no further questions for OT at this time  Continue to recommend STR upon D/C and pt agreeable  OT to follow pt on caseload        OT Discharge Recommendation: Short Term Rehab  OT - OK to Discharge: Yes (when medically cleared to STR)

## 2018-08-15 NOTE — SOCIAL WORK
Followed-up with pt's daughter regarding STR choices  She did not want pt to go to either facilities mentioned yesterday and requested for pt to be referred to [facility]  Referral made and is medically accepted pending auth  Clinicals [facility]ed over [facility] Adriana Archuleta [facility] at [phone]  Will continue to follow-up

## 2018-08-15 NOTE — OCCUPATIONAL THERAPY NOTE
[address] Progress Note     Patient Name: Lizandro Gomez  XPTMI'R Date: 8/15/2018  Problem List  Patient Active Problem List   Diagnosis    Cellulitis    Hypertension    Hip pain    Severe sepsis ([facility] [place] [facility]    [facility] to [facility]    Hyperlipidemia    Transaminitis    Cellulitis of left lower extremity    Hyponatremia    Osteoarthritis of both hips    Acute respiratory failure with hypoxia [facility]    Tinea corporis    Acute retention of urine    [facility]SELMO [facility]acute kidney injury) [facility]    Abnormal finding on EKG    Acute bronchospasm    Paroxysmal atrial fibrillation (HCC)    Acute on chronic diastolic (congestive) heart failure ([facility] [place] [facility]    Vulvovaginal candidiasis         08/15/18 1348   Restrictions/Precautions   Weight Bearing Precautions Per Order No   Other Precautions Fall Risk;O2;Pain   Lifestyle   Autonomy At rehab, pt required assist w/ all ADLs, IADLs, and transfers (pt reports non-ambulatory at rehab)  Prior to rehab, pt was I w/ ADLs and functional mobility/transfers with use of RW for household distances and WC for community distances and and required assist w/ IADLs  Reciprocal Relationships Supportive dtr   Service to Others Retired   Intrinsic Gratification Watching TV   Pain Assessment   Pain Assessment 0-10   Pain Score 3   Pain Type Chronic pain   Pain Location Hip   Pain Orientation Bilateral   Pain Descriptors Aching   Pain Frequency Constant/continuous   Pain Onset Ongoing   Clinical Progression Gradually improving   Effect of Pain on Daily Activities Increased pain with activity   Patient's Stated Pain Goal No pain   Hospital Pain Intervention(s) Repositioned; Ambulation/increased activity; Emotional support   Response to Interventions Tolerated OT   Multiple Pain Sites No   ADL   Where Assessed Chair   Grooming Assistance 5  Supervision/Setup   Grooming Deficit Setup;Verbal cueing;Supervision/safety; Increased time to complete   Grooming Comments Light grooming tasks completed with Supervision 2* setup required and increased time to wash/dry face and complete teeth care  UB Dressing Assistance 4  Minimal Assistance   UB Dressing Deficit Setup;Verbal cueing;Supervision/safety; Increased time to complete;Pull around back;Pull down in back   UB Dressing Comments UB dressing completed with Min A 2* assist required to pull gown down/around in back and cues for sequencing  LB Dressing Assistance 3  Moderate Assistance   LB Dressing Deficit Setup;Verbal cueing;Supervision/safety; Increased time to complete; Don/doff R sock; Don/doff L sock; Use of adaptive equipment   LB Dressing Comments LB dressing completed with Mod A  On first attempt, pt unable to don/doff B/L socks 2* decreased functional reach  Educated pt on available [address] to increase independence in LB ADLs with pt reporting interest in learning more about equipment  Therefore, educated pt on [address] through verbal instruction and demonstration with pt demonstrating ability to don/doff B/L socks with Mod A after education  Pt able to doff B/L socks with Supervision with use of dressing stick, however required assist to don B/L socks with sock aid 2* max cues for sequencing/technique, assist with placement of sock aid, and assist with pulling strings of sock aid  Recommend further trials with sock aid in future sessions  Toileting Assistance  1  Total Assistance   Toileting Deficit Setup;Perineal hygiene   Toileting Comments Pt noted to be incontinent of bowels, therefore requiring Total A for toileting at this time 2* pt requiring BUE support on Quick Move when standing for perineal hygiene  Functional Standing Tolerance   Time 1 min   Activity Static standing for perineal hygiene    Comments No LOB noted, however cues required for posture   Bed Mobility   Rolling R 3  Moderate assistance   Additional items Assist x 1;Bedrails; Increased time required;Verbal cues;LE management   Rolling L 3  Moderate assistance   Additional items Assist x 1;Bedrails; Increased time required;Verbal cues;LE management   Supine to Sit Unable to assess  (Pt seated OOB in chair at start of session)   Sit to Supine 3  Moderate assistance   Additional items Assist x 2; Increased time required;Verbal cues;LE management   Additional Comments Pt lying supine at end of session with call bell and phone within reach  All needs met and pt reports no further questions for OT at this time  Transfers   Sit to Stand 3  Moderate assistance   Additional items Assist x 2;Armrests; Increased time required;Verbal cues   Stand to Sit 3  Moderate assistance   Additional items Assist x 2; Increased time required;Verbal cues   Mechanical lift 3  Moderate assistance   Additional items Assist x 2; Increased time required;Verbal cues;Armrests  (Quick Move)   Additional Comments Transfers (sit<>stand) completed with Mod A of 2 with use of Quick Move 2* increased fatigue from sitting OOB in chair  Cues for safe technique and hand placement required   Functional Mobility   Additional Comments Not appropriate 2* decreased standing balance/tolerance   Cognition   Overall Cognitive Status WFL   Arousal/Participation Alert; Cooperative   Attention Within functional limits   Orientation Level Oriented X4   Memory Within functional limits   Following Commands Follows one step commands without difficulty   Additional Activities   Additional Activities Other (Comment)  (Education of [address])   Additional Activities Comments LB dressing completed with Mod A  On first attempt, pt unable to don/doff B/L socks 2* decreased functional reach  Educated pt on available [facility] to increase independence in LB ADLs with pt reporting interest in learning more about equipment  Therefore, educated pt on [facility] through verbal instruction and demonstration with pt demonstrating ability to don/doff B/L socks with Mod A after education      Activity Tolerance   Activity Tolerance Patient limited by fatigue;Patient limited by pain   Medical Staff Made Aware Pt appropriate to be seen per Dixie Yuan RN   Assessment   Assessment Pt seen for OT treatment session this PM session focusing on functional activity tolerance, ADLs, and transfer trials  Pt alert and cooperative throughout session  Pt seated OOB in chair at start of session  ADLs completed while seated OOB in chair  UB dressing completed with Min A 2* assist required to pull gown down/around in back and cues for sequencing  LB dressing completed with Mod A  On first attempt, pt unable to don/doff B/L socks 2* decreased functional reach  Educated pt on available [address] to increase independence in LB ADLs with pt reporting interest in learning more about equipment  Therefore, educated pt on [address] through verbal instruction and demonstration with pt demonstrating ability to don/doff B/L socks with Mod A after education  Pt able to doff B/L socks with Supervision with use of dressing stick, however required assist to don B/L socks with sock aid 2* max cues for sequencing/technique, assist with placement of sock aid, and assist with pulling strings of sock aid  Recommend further trials with sock aid in future sessions  Pt reports owning LH shoehorn, reacher, and dressing stick for home environment  Light grooming tasks completed with Supervision 2* setup required and increased time to wash/dry face and complete teeth care  Transfers (sit<>stand) completed with Mod A of 2 with use of Quick Move 2* increased fatigue from sitting OOB in chair  Pt noted to be incontinent of bowels, therefore requiring Total A for toileting at this time 2* pt requiring BUE support on Quick Move when standing for perineal hygiene  Pt returned to Supine position with Mod A of 2 2* assist for LE management and trunk control  Pt lying supine at end of session with call bell and phone within reach  All needs met and pt reports no further questions for OT at this time   Continue to recommend STR upon D/C and pt agreeable  OT to follow pt on caseload  Plan   Treatment Interventions ADL retraining;Functional transfer training;UE strengthening/ROM; Endurance training;Patient/family training;Equipment evaluation/education; Compensatory technique education;Continued evaluation; Energy conservation; Activityengagement   Goal Expiration Date 08/28/18   Treatment Day 2   OT Frequency 3-5x/wk   Recommendation   OT Discharge Recommendation Short Term Rehab   OT - OK to Discharge Yes  (when medically cleared to STR)   Barthel Index   Feeding 10   Bathing 0   Grooming Score 5   Dressing Score 5   Bladder Score 5   Bowels Score 5   Toilet Use Score 5   Transfers (Bed/Chair) Score 5   Mobility (Level Surface) Score 0   Stairs Score 0   Barthel Index Score 40   Modified Obion Scale   Modified Obion Scale 4         Tena Guy, OTR/L

## 2018-08-15 NOTE — PROGRESS NOTES
Progress Note - Cardiology   Alise Lowe 80 y o  female MRN: [id number]  Unit/Bed#: E5 -01 Encounter: [id number]      Assessment:     1  Acute hypoxic respiratory failure secondary to bronchospasm as well as diastolic heart failure  2  New discovery of atrial fibrillation  3  Aortic sclerosis versus mild aortic stenosis  4  Hypertension  5  Left lower extremity cellulitis    Discussion/Recommendations:    Rate controlled atrial fibrillation  Would check renal function and if stable continue diuretics  Looks very close to euvolemic  We oxygen as able      Subjective:   Notes some nasal congestio-denies shortness of breath or chest pain nSubjective:      Physical Exam:  GEN:  NAD  HEENT:  MMM, NCAT, pink conjunctiva, EOMI, nonicteric sclera  CV:  NO JVD/HJR, RR, NO M/R/G, +S1/S2, NO PARASTERNAL HEAVE/THRILL, NO LE EDEMA, NO HEPATIC SYSTOLIC PULSATION, WARM EXTREMITIES  RESP:  Faint crackles the bases  ABD:  SOFT, NT, NO GROSS ORGANOMEGALY        Vitals:   /67 (BP Location: Left arm)   Pulse 86   Temp 97 6 °F (36 4 °C) (Temporal)   Resp 18   Ht 5' 3" (1 6 m)   Wt 112 kg (247 lb 2 2 oz)   SpO2 99%   BMI 43 78 kg/m²   Vitals:    08/08/18 2049 08/15/18 0600   Weight: 112 kg (248 lb) 112 kg (247 lb 2 2 oz)       Intake/Output Summary (Last 24 hours) at 08/15/18 0823  Last data filed at 08/14/18 1621   Gross per 24 hour   Intake                0 ml   Output              650 ml   Net             -650 ml         TELEMETRY:   No events  Lab Results:    Results from last 7 days  Lab Units 08/13/18  0505   WBC Thousand/uL 7 96   HEMOGLOBIN g/dL 12 1   HEMATOCRIT % 38 1   PLATELETS Thousands/uL 318       Results from last 7 days  Lab Units 08/13/18  0505   SODIUM mmol/L 136   POTASSIUM mmol/L 3 7   CHLORIDE mmol/L 97*   CO2 mmol/L 34*   BUN mg/dL 19   CREATININE mg/dL 0 74   CALCIUM mg/dL 9 0   TOTAL PROTEIN g/dL 7 2   BILIRUBIN TOTAL mg/dL 0 56   ALK PHOS U/L 93   ALT U/L 98*   AST U/L 58* GLUCOSE RANDOM mg/dL 95       Results from last 7 days  Lab Units 08/13/18  0505   SODIUM mmol/L 136   POTASSIUM mmol/L 3 7   CHLORIDE mmol/L 97*   CO2 mmol/L 34*   BUN mg/dL 19   CREATININE mg/dL 0 74   GLUCOSE RANDOM mg/dL 95   CALCIUM mg/dL 9 0             Medications:    Current Facility-Administered Medications:     amLODIPine (NORVASC) tablet 5 mg, 5 mg, Oral, Q12H [facility], JONO Orourke, 5 mg at 08/14/18 2200    apixaban (ELIQUIS) tablet 5 mg, 5 mg, Oral, BID, Rujul Alonso, DO, 5 mg at 08/14/18 1711    benzonatate (TESSALON PERLES) capsule 100 mg, 100 mg, Oral, TID PRN, JONO Orourke    bisacodyl (DULCOLAX) rectal suppository 10 mg, 10 mg, Rectal, Daily PRN, JONO Orourke, 10 mg at 08/14/18 1800    cloNIDine (CATAPRES) tablet 0 1 mg, 0 1 mg, Oral, Q12H [facility], Rubén Factor, PA-C, 0 1 mg at 08/14/18 2200    docusate sodium (COLACE) capsule 100 mg, 100 mg, Oral, BID, JONO Orourke, 100 mg at 08/14/18 1711    fentanyl citrate (PF) 100 MCG/2ML 25 mcg, 25 mcg, Intravenous, Once, Fernando Murphy MD    furosemide (LASIX) injection 40 mg, 40 mg, Intravenous, BID (diuretic), Rujul Alonso, DO, 40 mg at 08/14/18 1711    HYDROmorphone (DILAUDID) injection 0 2 mg, 0 2 mg, Intravenous, Q6H PRN, JONO Orourke, 0 2 mg at 08/14/18 1240    levalbuterol (XOPENEX) inhalation solution 1 25 mg, 1 25 mg, Nebulization, Q6H PRN, Pipo Conner MD, 1 25 mg at 08/12/18 1359    lidocaine (LIDODERM) 5 % patch 2 patch, 2 patch, Transdermal, Daily, JONO Orourke, Stopped at 08/09/18 0849    melatonin tablet 3 mg, 3 mg, Oral, HS, JONO Orourke, 3 mg at 08/14/18 2215    metoprolol succinate (TOPROL-XL) 24 hr tablet 25 mg, 25 mg, Oral, Q12H, Rubén Factor, PA-C, 25 mg at 08/14/18 2214    nystatin (MYCOSTATIN) powder, , Topical, TID, JONO Orourke    ondansetron [facility]) injection 4 mg, 4 mg, Intravenous, Q6H PRN, JONO Orourke  [facility] polyethylene glycol (MIRALAX) packet 17 g, 17 g, Oral, Daily, Thiago Escalante, JONO, 17 g at 08/13/18 4152    senna (SENOKOT) tablet 17 2 mg, 2 tablet, Oral, Daily, JONO Ventura, 17 2 mg at 08/14/18 0955    sodium chloride 0 9 % inhalation solution 3 mL, 3 mL, Nebulization, Q6H PRN, Martha Langston MD, 3 mL at 08/12/18 1358    Portions of the record may have been created with voice recognition software  Occasional wrong word or "sound a like" substitutions may have occurred due to the inherent limitations of voice recognition software  Read the chart carefully and recognize, using context, where substitutions have occurred

## 2018-08-15 NOTE — PHYSICAL THERAPY NOTE
Physical Therapy Treatment Note       08/15/18 1154   Pain Assessment   Pain Assessment 0-10   Restrictions/Precautions   Other Precautions Fall Risk;O2   General   Chart Reviewed Yes   Family/Caregiver Present No   Cognition   Overall Cognitive Status WFL   Arousal/Participation Alert; Cooperative   Attention Within functional limits   Orientation Level Oriented X4   Memory Within functional limits   Following Commands Follows one step commands without difficulty   Subjective   Subjective Pt in bed upon arrival   Pt  offers no c/o pain  Pt agreeable to PT     Bed Mobility   Rolling R 3  Moderate assistance   Additional items Assist x 1;Bedrails; Increased time required;Verbal cues   Rolling L 3  Moderate assistance   Additional items Assist x 1;Bedrails; Increased time required;Verbal cues   Supine to Sit 2  Maximal assistance   Additional items Assist x 1;Bedrails;HOB elevated; Increased time required;Verbal cues   Additional Comments Pt performed static sitting at edge of bed x 5 minutes  with supervision  Transfers   Sit to Stand 3  Moderate assistance   Additional items Assist x 2; Increased time required;Verbal cues   Stand to Sit 3  Moderate assistance   Additional items Assist x 2;Armrests; Increased time required;Verbal cues   Stand pivot 3  Moderate assistance   Additional items Assist x 2; Increased time required;Verbal cues   Additional Comments PT transfers on and off  bedpan    Balance   Static Sitting Fair +   Dynamic Sitting Fair   Static Standing Poor +   Endurance Deficit   Endurance Deficit Yes   Endurance Deficit Description fatigue, weakness   Activity Tolerance   Activity Tolerance Patient limited by fatigue   Nurse Made Aware Tete SMALL  Exercises   Quad Sets Supine;10 reps;AROM; Bilateral   Heelslides Supine;10 reps;AAROM; Bilateral   Glute Sets Supine;10 reps;AROM; Bilateral   Hip Flexion Supine;10 reps;AAROM; Bilateral  (SLR)   Knee AROM Short Arc Quad Supine;10 reps;AROM; Bilateral   Knee AROM Long Arc Quad Sitting;10 reps;AROM; Bilateral   Ankle Pumps Supine;15 reps;AROM; Bilateral   Equipment Use   Comments PT transfers on and off bedpan with max assist x1 and total assist for pericare  Assessment   Prognosis Fair   Problem List Decreased strength;Decreased range of motion;Decreased endurance; Impaired balance;Decreased mobility;Obesity; Decreased skin integrity   Assessment PT showing good progress with less assistance for bed mobility and transfers  Pt  Progressed to out of bed with mod assist x2 with verbal cues and guidance with walker and sequencing of le's  Pt requires A to steer and manuver the walker  Pt performs supine aa-arom exercises to b/l le's  PT requires encouragement to try and put forth best effort  Pt continues to demonstrate impaired ability to perform bed mobility and requires increased time for all mobility  B/l hip abduction arom severely limited by arthritis  PT will benefit from continued skilled inpt PT and rehab inorder to progress strength, balance, mobility and function  Goals   Patient Goals " To get back to [facility] "     STG Expiration Date 08/24/18   Treatment Day 1   Plan   Treatment/Interventions Functional transfer training;LE strengthening/ROM; Therapeutic exercise; Endurance training;Patient/family training;Equipment eval/education; Bed mobility;Spoke to nursing   Progress Progressing toward goals   PT Frequency (4-5x/ week)   Recommendation   Recommendation Short-term skilled PT   PT - OK to Discharge Yes  (to rehab)        08/15/18 1154   Pain Assessment   Pain Assessment 0-10   Restrictions/Precautions   Other Precautions Fall Risk;O2   General   Chart Reviewed Yes   Family/Caregiver Present No   Cognition   Overall Cognitive Status WFL   Arousal/Participation Alert; Cooperative   Attention Within functional limits   Orientation Level Oriented X4   Memory Within functional limits   Following Commands Follows one step commands without difficulty   Subjective Subjective Pt in bed upon arrival   Pt  offers no c/o pain  Pt agreeable to PT     Bed Mobility   Rolling R 3  Moderate assistance   Additional items Assist x 1;Bedrails; Increased time required;Verbal cues   Rolling L 3  Moderate assistance   Additional items Assist x 1;Bedrails; Increased time required;Verbal cues   Supine to Sit 2  Maximal assistance   Additional items Assist x 1;Bedrails;HOB elevated; Increased time required;Verbal cues   Additional Comments Pt performed static sitting at edge of bed x 5 minutes  with supervision  Transfers   Sit to Stand 3  Moderate assistance   Additional items Assist x 2; Increased time required;Verbal cues   Stand to Sit 3  Moderate assistance   Additional items Assist x 2;Armrests; Increased time required;Verbal cues   Stand pivot 3  Moderate assistance   Additional items Assist x 2; Increased time required;Verbal cues   Additional Comments PT transfers on and off  bedpan    Balance   Static Sitting Fair +   Dynamic Sitting Fair   Static Standing Poor +   Endurance Deficit   Endurance Deficit Yes   Endurance Deficit Description fatigue, weakness   Activity Tolerance   Activity Tolerance Patient limited by fatigue   Nurse Made Aware RN, Manny Darting  Exercises   Quad Sets Supine;10 reps;AROM; Bilateral   Heelslides Supine;10 reps;AAROM; Bilateral   Glute Sets Supine;10 reps;AROM; Bilateral   Hip Flexion Supine;10 reps;AAROM; Bilateral  (SLR)   Knee AROM Short Arc Quad Supine;10 reps;AROM; Bilateral   Knee AROM Long Arc Quad Sitting;10 reps;AROM; Bilateral   Ankle Pumps Supine;15 reps;AROM; Bilateral   Equipment Use   Comments PT transfers on and off bedpan with max assist x1 and total assist for pericare  Assessment   Prognosis Fair   Problem List Decreased strength;Decreased range of motion;Decreased endurance; Impaired balance;Decreased mobility;Obesity; Decreased skin integrity   Assessment PT showing good progress with less assistance for bed mobility and transfers    Pt Progressed to out of bed with mod assist x2 with verbal cues and guidance with walker and sequencing of le's  Pt requires A to steer and manuver the walker  Pt performs supine aa-arom exercises to b/l le's  PT requires encouragement to try and put forth best effort  Pt continues to demonstrate impaired ability to perform bed mobility and requires increased time for all mobility  B/l hip abduction arom severely limited by arthritis  PT will benefit from continued skilled inpt PT and rehab inorder to progress strength, balance, mobility and function  Goals   Patient Goals " To get back to [facility] "     [facility] Expiration Date 08/24/18   Treatment Day 1   Plan   Treatment/Interventions Functional transfer training;LE strengthening/ROM; Therapeutic exercise; Endurance training;Patient/family training;Equipment eval/education; Bed mobility;Spoke to nursing   Progress Progressing toward goals   PT Frequency (4-5x/ week)   Recommendation   Recommendation Short-term skilled PT   PT - OK to Discharge Yes  (to rehab)       Ari Sánchez PTA

## 2018-08-15 NOTE — PROGRESS NOTES
[address] Internal Medicine Progress Note  Patient: Hyacinth Hargrove 80 y o  female   MRN: [id number]  PCP: JONO Astudillo  Unit/Bed#: E5 -01 Encounter: [id number]  Date Of Visit: 08/15/18    Assessment:    Principal Problem:    Acute respiratory failure with hypoxia ([address] )  Active Problems:    Hypertension    Transaminitis    Cellulitis of left lower extremity    Hyponatremia    Osteoarthritis of both hips    Tinea corporis    Acute retention of urine    Acute bronchospasm    Paroxysmal atrial fibrillation (HCC)    Acute on chronic diastolic (congestive) heart failure (HCC)    Vulvovaginal candidiasis      Plan:    · Acute respiratory failure with hypoxia still requires O2 but off IV steroids and now presume mostly in relation to acute on chronic diastolic heart failure that is improving with IV diuresis  2D echocardiogram EF of 65%/will need with no regional wall motion abnormality and a grade 2 diastolic dysfunction from pseudonormal left ventricular filling home O2 eval will check repeat BMP approaching euvolemia in spite of weight unchanged from admission  · Acute on chronic diastolic heart failure in relation new onset atrial fibrillation   continue fluid restriction need to update weight unchanged from admission  · Paroxysmal atrial fibrillation with rate control offered by beta-blockade and now on Eliquis anticoagulation appreciate Cardiology input  · Hypertension continue amlodipine and clonidine along with beta-blockade  · Transaminitis unclear etiology although has fatty liver on ultrasound as only no or the finding    · Vulvovaginal candidiasis continue on oral Diflucan  · Left lower extremity cellulitis with significant contracture from lines of demarcation of previous erythema at presentation of fever now resolved off antibiotic patient will need physical therapy assessment and home O2 eval prior to discharge  · Constipation finally had large BM after Dulcolax suppository yesterday with rectal pain resolved      VTE Pharmacologic Prophylaxis:   Pharmacologic: Apixaban (Eliquis)  Mechanical VTE Prophylaxis in Place: No    Discussions with Specialists or Other Care Team Provider:  No    Time Spent for Care: 45 minutes  More than 50% of total time spent on counseling and coordination of care as described above  Subjective:   States she is breathing easier still has a dry cough and still on 4 L of nasal cannula oxygen denies any chest pain denies palpitations states left leg is significantly improved but has not tried walking on a as yet  She is anxious to pursue physical therapy and they are recommending rehab awaiting disposition location of choice by family and patient/she had been complaining of constipation for last 3 days but had finally had large BM after Dulcolax suppository yesterday with relief    Objective:     Vitals:   Temp (24hrs), Av 1 °F (36 7 °C), Min:97 6 °F (36 4 °C), Max:98 3 °F (36 8 °C)    HR:  [81-88] 86  Resp:  [18] 18  BP: (121-176)/(58-73) 121/67  SpO2:  [95 %-99 %] 99 %  Body mass index is 43 78 kg/m²  Input and Output Summary (last 24 hours):        Intake/Output Summary (Last 24 hours) at 08/15/18 3893  Last data filed at 18 1621   Gross per 24 hour   Intake                0 ml   Output              650 ml   Net             -650 ml       Physical Exam:     Physical Exam:   General appearance: alert, appears stated age and cooperative  Head: Normocephalic, without obvious abnormality, atraumatic  Lungs: crackles  Heart: regular rate and rhythm  Abdomen: soft, non-tender; bowel sounds normal; no masses,  no organomegaly  Back: negative  Extremities: Area of erythema in the pretibial has reduced significantly from demarcation admission and extremities normal, atraumatic, no cyanosis or edema  Neurologic: Grossly normal      Additional Data:     Labs:      Results from last 7 days  Lab Units 18  0505  18  0519   WBC Thousand/uL 7 96  --  5 15 HEMOGLOBIN g/dL 12 1  --  10 4*   I STAT HEMOGLOBIN   --   < >  --    HEMATOCRIT % 38 1  --  32 7*   PLATELETS Thousands/uL 318  --  245   NEUTROS PCT %  --   --  66   LYMPHS PCT %  --   --  14   LYMPHO PCT % 53*  --   --    MONOS PCT %  --   --  9   MONO PCT MAN % 4  --   --    EOS PCT %  --   --  11*   EOSINO PCT MANUAL % 5  --   --    < > = values in this interval not displayed  Results from last 7 days  Lab Units 08/13/18  0505   SODIUM mmol/L 136   POTASSIUM mmol/L 3 7   CHLORIDE mmol/L 97*   CO2 mmol/L 34*   BUN mg/dL 19   CREATININE mg/dL 0 74   CALCIUM mg/dL 9 0   TOTAL PROTEIN g/dL 7 2   BILIRUBIN TOTAL mg/dL 0 56   ALK PHOS U/L 93   ALT U/L 98*   AST U/L 58*   GLUCOSE RANDOM mg/dL 95           * I Have Reviewed All Lab Data Listed Above  * Additional Pertinent Lab Tests Reviewed: All Labs For Current Hospital Admission Reviewed    Imaging:  Xr Chest Portable    Result Date: 8/10/2018  Narrative: CHEST INDICATION:   Wheezing and severe dyspnea  COMPARISON:  8/8/2018, CT chest 7/23/2018 EXAM PERFORMED/VIEWS:  XR CHEST PORTABLE FINDINGS:  Study limited by portable technique and patient body habitus  Cardiomediastinal silhouette appears unchanged, noting uncoiled thoracic aorta  Mild peripheral reticular interstitial prominence, more pronounced than the prior study  Pulmonary vasculature appears similar  Suspected infiltrate right midlung  Cannot exclude small pleural effusions  No pneumothorax  Degenerative changes of the spine and shoulders  Impression: Limited study  Suspected infiltrate right midlung  Mild reticular interstitial prominence  Follow-up PA and lateral views recommended  Workstation performed: [id number]     Xr Chest 2 Views    Result Date: 8/9/2018  Narrative: CHEST INDICATION:   sob with new onset hypoxia  COMPARISON:  07/22/2018 EXAM PERFORMED/VIEWS:  XR CHEST PA & LATERAL Images: 2 FINDINGS: Cardiomediastinal silhouette appears enlarged  The aorta is tortuous    No evidence of heart failure  The lungs are clear  No pneumothorax or pleural effusion  Arthritic changes in both shoulder joints  Impression: No acute cardiopulmonary disease  Cardiomegaly  Workstation performed: [id number]     Xr Chest Pa & Lateral    Result Date: 7/22/2018  Narrative: CHEST INDICATION:   Shortness of breath and wheezing  COMPARISON:  July 19, 2018 EXAM PERFORMED/VIEWS:  XR CHEST PA & LATERAL FINDINGS: Heart shadow is enlarged but unchanged from prior exam  Examination is somewhat limited secondary to underpenetration however there appears to be mild pulmonary vascular congestive change  No focal airspace opacity to suggest pneumonia  Trace bilateral costophrenic angle pleural effusions are noted  No pneumothorax  Osseous structures appear within normal limits for patient age  Impression: Underpenetrated examination demonstrating findings suspicious for mild pulmonary vascular congestive change and small costophrenic angle effusions  Workstation performed: [id number]     Xr Chest 2 Views    Result Date: 7/19/2018  Narrative: CHEST INDICATION:   sepsis   "low oxygen level, no surg" COMPARISON:  None EXAM PERFORMED/VIEWS:  XR CHEST PA & LATERAL FINDINGS: Tortuous course of the thoracic aorta  Normal cardiac contours  The lungs are clear  No pneumothorax or pleural effusion  Osseous structures appear within normal limits for patient age  Impression: No acute cardiopulmonary disease  Workstation performed: [id number]     Xr Hip/pelv 2-3 Vws Left    Result Date: 7/19/2018  Narrative: LEFT HIP INDICATION:   Pain, atraumatic  COMPARISON:  None VIEWS:  XR HIP/PELV 2-3 VWS LEFT  W PELVIS IF PERFORMED FINDINGS: There is no acute fracture or dislocation  Advanced degenerative arthritic changes noted of both hips with flattening of the femoral heads  There are more extensive destructive changes at the left femoral head with underlying cystic formation  The bones are demineralized   Soft tissues are unremarkable  The visualized lumbar spine is unremarkable  Impression: No acute osseous abnormality  Advanced degenerative changes of both hips worse on the left  Workstation performed: [id number]     Xr Knee 1 Or 2 Vw Left    Result Date: 7/19/2018  Narrative: LEFT KNEE INDICATION:   Pain, atraumatic  COMPARISON:  None VIEWS:  XR KNEE 1 OR 2 VW LEFT FINDINGS: 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  There is no acute fracture or dislocation  There is no joint effusion  No significant degenerative changes  No lytic or blastic lesions are seen  Soft tissues are unremarkable  Impression: Prior total knee replacement  No acute osseous abnormality  Workstation performed: [id number]     Xr Knee 1 Or 2 Vw Right    Result Date: 7/19/2018  Narrative: RIGHT KNEE INDICATION:   Pain, atraumatic  COMPARISON:  None VIEWS:  XR KNEE 1 OR 2 VW RIGHT Images: 2 FINDINGS: 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  There is no acute fracture or dislocation  There is no joint effusion  No lytic or blastic lesions are seen  Soft tissues are unremarkable  Impression: Prior total knee replacement  No acute osseous abnormality  Workstation performed: [id number]     Xr Foot 3+ Vw Left    Result Date: 7/20/2018  Narrative: LEFT FOOT INDICATION:   XR of left foot/tibia/fibula - r/o osteomyelitis   "severe left foot pain  inability to bend left knee  best films possible due to patient mobility  r/o osteomyelitis" COMPARISON:  None VIEWS:  XR FOOT 3+ VW LEFT FINDINGS: Partially imaged distal fibular screw and plate device  There is no acute fracture or dislocation  No definite osteomyelitis  Diffuse degenerative changes  Prominent superior and plantar calcaneal spurs and distal Achilles calcifications  No lytic or blastic lesions seen  Soft tissues are unremarkable  Impression: No definite osteomyelitis  Chronic changes as detailed   Workstation performed: [id number]     Ct Chest Wo Contrast    Result Date: 7/23/2018  Narrative: CT CHEST WITHOUT IV CONTRAST INDICATION:   Cough, persistent Cough, new onset  COMPARISON:  None  TECHNIQUE: CT examination of the chest was performed without intravenous contrast   Axial, sagittal, and coronal 2D reformatted images were created from the source data and submitted for interpretation  Radiation dose length product (DLP) for this visit:  811 mGy-cm   This examination, like all CT scans performed in the [facility], was performed utilizing techniques to minimize radiation dose exposure, including the use of iterative reconstruction and automated exposure control  FINDINGS: LUNGS:  Patchy right upper and right lower lobe opacities are seen suspicious for pneumonia  Minimal dependent right lower lobe atelectasis is present  PLEURA:  Trace right pleural effusion is present  HEART/GREAT VESSELS:  Unremarkable for patient's age  MEDIASTINUM AND SHERWIN:  Reactive appearing mediastinal lymph nodes are noted  CHEST WALL AND LOWER NECK:   Unremarkable  VISUALIZED STRUCTURES IN THE UPPER ABDOMEN:  Unremarkable  OSSEOUS STRUCTURES:  No acute fracture or destructive osseous lesion  Impression: Patchy right upper and right lower lobe pulmonary opacities likely representing pneumonia  Trace right pleural effusion  Workstation performed: [id number]     Us Right Upper Quadrant    Result Date: 8/9/2018  Narrative: RIGHT UPPER QUADRANT ULTRASOUND INDICATION:     Transaminitis  COMPARISON:  None TECHNIQUE:   Real-time ultrasound of the right upper quadrant was performed with a curvilinear transducer with both volumetric sweeps and still imaging techniques  FINDINGS: PANCREAS:  Pancreas evaluation is limited AORTA AND IVC:  Visualized portions are normal for patient age  LIVER: Size:  Within normal range  The liver measures 17 3 cm in the midclavicular line  Contour:  Surface contour is smooth  Parenchyma:   There is mild increased echogenicity of the liver suggesting fatty infiltration Limited imaging of the main portal vein shows it to be patent and hepatopetal   BILIARY: Gallbladder is surgically absent No intrahepatic biliary dilatation  CBD measures 4 9 mm  No choledocholithiasis  KIDNEY: Right kidney measures 10 2 x 5 3 cm  Within normal limits  There is a cyst within the right kidney in the lower pole which measures 3 4 x 2 9 x 3 0 cm  This demonstrates mild septation and nodularity ASCITES:   None  Impression: No biliary dilation seen Increased echogenicity of the liver suggests fatty infiltration 3 4 x 2 9 x 3 centimeters Septated  cyst within the lower pole of the right kidney with mild nodularity  This can be evaluated with the MRI performed on nonemergent basis for further characterization The study was marked in EPIC for significant notification  Workstation performed: [id number]     Vas Lower Limb Venous Duplex Study, Unilateral/limited    Result Date: 7/19/2018  Narrative:  [facility]PORT [facility]: Indications: Patient presents with left lower extremity pain and hip pain x 2 weeks  Risk Factors: Patient denies history of injury, DVT and smoking  FINDINGS:  Left     Impression       CFV      Normal (Patent)     CONCLUSION:  Impression: RIGHT LOWER LIMB LIMITED: Patient refused images from this leg  LEFT LOWER LIMB: No evidence of acute or chronic deep vein thrombosis, however difficult obtaining color Doppler in one of the posterior tibial veins secondary to small lumen vs thrombus  Area was difficult to visualize due to the below tech note  No evidence of superficial thrombophlebitis noted  Doppler evaluation shows a normal response to augmentation maneuvers  Popliteal, posterior tibial and anterior tibial arterial Doppler waveforms are triphasic  TECH NOTE: Difficult/ limited exam secondary to patient in severe pain, unable to bend leg into proper positioning, movement, edema and body habitus  Unable to visualize peroneal veins    Technical findings were given to Dr Bozena Brand at the time of the exam   SIGNATURE: Electronically Signed by: Sal Uriostegui MD, [address] on 2018-07-19 10:11:10 PM    Vas Lower Limb Venous Duplex Study, Complete Bilateral    Result Date: 8/10/2018  Narrative:  [facility] REPORT [facility]INICAL: Indications: Dyspnea [R06 02]  Patient admitted with sepsis, left leg cellulitis  She developed dyspnea and hypoxia, and she has abnormal D-Dimer  Risk factors:  Obesity  FINDINGS:  Segment  Right            Left              Impression       Impression       CFV      Normal (Patent)  Normal (Patent)     CONCLUSION:  Impression: RIGHT LOWER LIMB: No evidence of acute or chronic deep vein thrombosis  No evidence of superficial thrombophlebitis noted  Doppler evaluation shows a normal response to augmentation maneuvers  Popliteal, posterior tibial and anterior tibial arterial Doppler waveforms are triphasic/biphasic  LEFT LOWER LIMB: No evidence of acute or chronic deep vein thrombosis  The peroneal veins were not well identified/imaged  No evidence of superficial thrombophlebitis noted  Doppler evaluation shows a normal response to augmentation maneuvers  Popliteal, posterior tibial and anterior tibial arterial Doppler waveforms are triphasic  Technical findings were given to "Dorothye Beans  Tech Note: This study was technically difficult/limited due to patient body habitus  SIGNATURE: Electronically Signed by: Alma Avila on 2018-08-10 12:41:44 PM    Imaging Reports Reviewed Today Include:  Reviewed right upper quadrant ultrasound also chest x-rays and CT imaging along with vascular imaging  Imaging Personally Reviewed by Myself Includes:  Now  Procedure: Vas Lower Limb Venous Duplex Study, Complete Bilateral    Result Date: 8/10/2018  Narrative:  [facility] REPORT [facility]IN[facility]: Indications: Dyspnea [R06 02]  Patient admitted with sepsis, left leg cellulitis  She developed dyspnea and hypoxia, and she has abnormal D-Dimer   Risk factors:  Obesity  FINDINGS:  Segment  Right            Left              Impression       Impression       CFV      Normal (Patent)  Normal (Patent)     CONCLUSION:  Impression: RIGHT LOWER LIMB: No evidence of acute or chronic deep vein thrombosis  No evidence of superficial thrombophlebitis noted  Doppler evaluation shows a normal response to augmentation maneuvers  Popliteal, posterior tibial and anterior tibial arterial Doppler waveforms are triphasic/biphasic  LEFT LOWER LIMB: No evidence of acute or chronic deep vein thrombosis  The peroneal veins were not well identified/imaged  No evidence of superficial thrombophlebitis noted  Doppler evaluation shows a normal response to augmentation maneuvers  Popliteal, posterior tibial and anterior tibial arterial Doppler waveforms are triphasic  Technical findings were given to "Kalina Minks  Tech Note: This study was technically difficult/limited due to patient body habitus  SIGNATURE: Electronically Signed by: Shubham Joseph on 2018-08-10 12:41:44 PM       Recent Cultures (last 7 days):       Results from last 7 days  Lab Units 08/08/18  2309 08/08/18  2102   BLOOD CULTURE   --  No Growth After 5 Days  No Growth After 5 Days     URINE CULTURE  <10,000 cfu/ml Candida sp  presumptively albicans*  --        Last 24 Hours Medication List:     Current Facility-Administered Medications:  amLODIPine 5 mg Oral Q12H [address] JONO Garza   apixaban 5 mg Oral BID Zohaib Alonso DO   benzonatate 100 mg Oral TID PRN JONO Garza   bisacodyl 10 mg Rectal Daily PRN JONO Garza   cloNIDine 0 1 mg Oral Q12H [address] Coral Fraga PA-C   docusate sodium 100 mg Oral BID JONO Garza   fentanyl citrate (PF) 25 mcg Intravenous Once Tahir Paris MD   furosemide 40 mg Intravenous BID (diuretic) Zohaib Alonso DO   HYDROmorphone 0 2 mg Intravenous Q6H PRN JONO Garza   levalbuterol 1 25 mg Nebulization Q6H PRN Pipo Conner MD   lidocaine 2 patch Transdermal Daily Job Part, CRLELE   melatonin 3 mg Oral HS Job Part, CRNP   metoprolol succinate 25 mg Oral Q12H Vikram Grace PA-C   nystatin  Topical TID Job Part, CRNP   ondansetron 4 mg Intravenous Q6H PRN Job Part, CRNP   polyethylene glycol 17 g Oral Daily Job Part, CRNP   senna 2 tablet Oral Daily Job Part, CRNP   sodium chloride 3 mL Nebulization Q6H PRN Maksim Darden MD        Today, Patient Was Seen By: Speedy Knowles MD    ** Please Note: Dragon 360 Dictation voice to text software may have been used in the creation of this document   **

## 2018-08-16 ENCOUNTER — APPOINTMENT (INPATIENT)
Dept: NON INVASIVE DIAGNOSTICS | Facility: HOSPITAL | Age: 83
DRG: 871 | End: 2018-08-16
Payer: COMMERCIAL

## 2018-08-16 PROCEDURE — 99232 SBSQ HOSP IP/OBS MODERATE 35: CPT | Performed by: INTERNAL MEDICINE

## 2018-08-16 PROCEDURE — 93308 TTE F-UP OR LMTD: CPT

## 2018-08-16 RX ORDER — FUROSEMIDE 40 MG/1
40 TABLET ORAL DAILY
Status: DISCONTINUED | OUTPATIENT
Start: 2018-08-16 | End: 2018-08-17 | Stop reason: HOSPADM

## 2018-08-16 RX ADMIN — DOCUSATE SODIUM 100 MG: 100 CAPSULE, LIQUID FILLED ORAL at 09:37

## 2018-08-16 RX ADMIN — APIXABAN 5 MG: 5 TABLET, FILM COATED ORAL at 09:37

## 2018-08-16 RX ADMIN — NYSTATIN: 100000 POWDER TOPICAL at 22:00

## 2018-08-16 RX ADMIN — METOPROLOL SUCCINATE 25 MG: 25 TABLET, EXTENDED RELEASE ORAL at 22:15

## 2018-08-16 RX ADMIN — FUROSEMIDE 40 MG: 10 INJECTION, SOLUTION INTRAMUSCULAR; INTRAVENOUS at 09:37

## 2018-08-16 RX ADMIN — CLONIDINE HYDROCHLORIDE 0.1 MG: 0.1 TABLET ORAL at 22:00

## 2018-08-16 RX ADMIN — AMLODIPINE BESYLATE 5 MG: 5 TABLET ORAL at 09:38

## 2018-08-16 RX ADMIN — HYDROMORPHONE HYDROCHLORIDE 0.2 MG: 1 INJECTION, SOLUTION INTRAMUSCULAR; INTRAVENOUS; SUBCUTANEOUS at 11:14

## 2018-08-16 RX ADMIN — NYSTATIN: 100000 POWDER TOPICAL at 09:39

## 2018-08-16 RX ADMIN — MELATONIN TAB 3 MG 3 MG: 3 TAB at 22:16

## 2018-08-16 RX ADMIN — CLONIDINE HYDROCHLORIDE 0.1 MG: 0.1 TABLET ORAL at 09:37

## 2018-08-16 RX ADMIN — HYDROMORPHONE HYDROCHLORIDE 0.2 MG: 1 INJECTION, SOLUTION INTRAMUSCULAR; INTRAVENOUS; SUBCUTANEOUS at 19:48

## 2018-08-16 RX ADMIN — METOPROLOL SUCCINATE 25 MG: 25 TABLET, EXTENDED RELEASE ORAL at 11:10

## 2018-08-16 RX ADMIN — AMLODIPINE BESYLATE 5 MG: 5 TABLET ORAL at 22:00

## 2018-08-16 RX ADMIN — APIXABAN 5 MG: 5 TABLET, FILM COATED ORAL at 17:15

## 2018-08-16 NOTE — PROGRESS NOTES
Progress Note - Cardiology   Zula Promise 80 y o  female MRN: [id number]  Unit/Bed#: E5 -01 Encounter: [id number]      Assessment/Recommendations/Discussion:   1  Acute diastolic CHF--resolved  2  AFib, new onset, persistent, rate controlled, now on Eliquis  3  Aortic stenosis, suspect moderate by exam     4  HTN  5  LLE cellulitis      · Continue PO lasix 40/d  · Tolerating Eliquis  · BP controlled  · Wean off O2 as able  PT/OT, OOB as tolerated  · Will request limited echocardiogram to re-evaluate aortic valve  · Weight today 243 lb  · NTproBNP in AM   · Will sign off  Please call if any questions  F/U in 1 month--will leave office phone number in d/c section      Subjective: Pt seen/examined    Feels well, no SOB, CP      Physical Exam:  GEN:  NAD  HEENT:  MMM, NCAT, pink conjunctiva, EOMI, nonicteric sclera  CV:  NO JVD/HJR, irregular rhythm, 2/6 JOSE, +S1/S2, NO PARASTERNAL HEAVE/THRILL, NO LLE EDEMA, +RLE edema, NO HEPATIC SYSTOLIC PULSATION, WARM EXTREMITIES  RESP:  CTAB/L  ABD:  SOFT, NT, NO GROSS ORGANOMEGALY        Vitals:   /67 (BP Location: Left arm)   Pulse 80   Temp 97 6 °F (36 4 °C) (Temporal)   Resp 18   Ht 5' 3" (1 6 m)   Wt 110 kg (243 lb 9 7 oz)   SpO2 97%   BMI 43 15 kg/m²   Vitals:    08/15/18 0600 08/16/18 0543   Weight: 112 kg (247 lb 2 2 oz) 110 kg (243 lb 9 7 oz)       Intake/Output Summary (Last 24 hours) at 08/16/18 1015  Last data filed at 08/16/18 0601   Gross per 24 hour   Intake                0 ml   Output              975 ml   Net             -975 ml       TELEMETRY: off  Lab Results:    Results from last 7 days  Lab Units 08/13/18  0505   WBC Thousand/uL 7 96   HEMOGLOBIN g/dL 12 1   HEMATOCRIT % 38 1   PLATELETS Thousands/uL 318       Results from last 7 days  Lab Units 08/15/18  1431 08/13/18  0505   SODIUM mmol/L 133* 136   POTASSIUM mmol/L 3 8 3 7   CHLORIDE mmol/L 92* 97*   CO2 mmol/L 37* 34*   BUN mg/dL 25 19   CREATININE mg/dL 1 07 0 74   CALCIUM mg/dL 9 1 9 0   TOTAL PROTEIN g/dL  --  7 2   BILIRUBIN TOTAL mg/dL  --  0 56   ALK PHOS U/L  --  93   ALT U/L  --  98*   AST U/L  --  58*   GLUCOSE RANDOM mg/dL 136 95       Results from last 7 days  Lab Units 08/15/18  1431   SODIUM mmol/L 133*   POTASSIUM mmol/L 3 8   CHLORIDE mmol/L 92*   CO2 mmol/L 37*   BUN mg/dL 25   CREATININE mg/dL 1 07   GLUCOSE RANDOM mg/dL 136   CALCIUM mg/dL 9 1           Medications:    Current Facility-Administered Medications:     amLODIPine (NORVASC) tablet 5 mg, 5 mg, Oral, Q12H [address], Ronelle Aase, CRNP, 5 mg at 08/16/18 5986    apixaban (ELIQUIS) tablet 5 mg, 5 mg, Oral, BID, Rujul Alonso, DO, 5 mg at 08/16/18 8210    benzonatate (TESSALON PERLES) capsule 100 mg, 100 mg, Oral, TID PRN, Ronelle Aase, CRNP    bisacodyl (DULCOLAX) rectal suppository 10 mg, 10 mg, Rectal, Daily PRN, Ronelle Aase, CRNP, 10 mg at 08/14/18 1800    cloNIDine (CATAPRES) tablet 0 1 mg, 0 1 mg, Oral, Q12H [address], Gill Beard PA-C, 0 1 mg at 08/16/18 9701    docusate sodium (COLACE) capsule 100 mg, 100 mg, Oral, BID, Ronelle Aase, CRNP, 100 mg at 08/16/18 0743    fentanyl citrate (PF) 100 MCG/2ML 25 mcg, 25 mcg, Intravenous, Once, Gary Belle MD    furosemide (LASIX) tablet 40 mg, 40 mg, Oral, Daily, Marlys Montanez MD    HYDROmorphone (DILAUDID) injection 0 2 mg, 0 2 mg, Intravenous, Q6H PRN, Ronelle Aase, CRNP, 0 2 mg at 08/15/18 1525    lidocaine (LIDODERM) 5 % patch 2 patch, 2 patch, Transdermal, Daily, Ronelle Aase, CRNP, Stopped at 08/09/18 0849    melatonin tablet 3 mg, 3 mg, Oral, HS, Ronelle Aase, CRNP, 3 mg at 08/15/18 2200    metoprolol succinate (TOPROL-XL) 24 hr tablet 25 mg, 25 mg, Oral, Q12H, Gill Beard PA-C, 25 mg at 08/15/18 2210    nystatin (MYCOSTATIN) powder, , Topical, TID, Ronelle Aase, CRNP    ondansetron [facility]) injection 4 mg, 4 mg, Intravenous, Q6H PRN, Ronelle Aase, CRNP    polyethylene glycol (MIRALAX) packet 17 g, 17 g, Oral, Daily, Richard Hemp, CRNP, 17 g at 08/13/18 7431    senna (SENOKOT) tablet 17 2 mg, 2 tablet, Oral, Daily, Richard Hemp, CRNP, 17 2 mg at 08/14/18 7475    This note was completed in part utilizing [employer] Fluency Direct Software  Grammatical errors, random word insertions, spelling mistakes, and incomplete sentences may be an occasional consequence of this system secondary to software limitations, ambient noise, and hardware issues  If you have any questions or concerns about the content, text, or information contained within the body of this dictation, please contact the provider for clarification

## 2018-08-16 NOTE — SOCIAL WORK
Another Message left for Luciana Jauregui from [facility] [phone] to follow-up on status on auth  Call back not returned yet  Will continue to follow-up

## 2018-08-16 NOTE — PROGRESS NOTES
[facility] Progress Note  Patient: Jayleen Diaz 80 y o  female   MRN: [id number]  PCP: [employer], CRNP  Unit/Bed#: E5 -01 Encounter: [id number]  Date Of Visit: 08/16/18    Assessment:    Principal Problem:    Acute respiratory failure with hypoxia ([address] )  Active Problems:    Hypertension    Transaminitis    Cellulitis of left lower extremity    Hyponatremia    Osteoarthritis of both hips    Tinea corporis    Acute retention of urine    Acute bronchospasm    Paroxysmal atrial fibrillation (HCC)    Acute on chronic diastolic (congestive) heart failure (HCC)    Vulvovaginal candidiasis      Plan:    · Acute respiratory failure with hypoxia still requires O2 but off IV steroids and now presume mostly in relation to acute on chronic diastolic heart failure that is improving with IV diuresis  2D echocardiogram EF of 65%/will need with no regional wall motion abnormality and a grade 2 diastolic dysfunction from pseudonormal left ventricular filling home O2 eval will check repeat BMP approaching euvolemia as developing some alkalosis and elevated creatinine will change to p  o  furosemide 40 mg daily  · Acute on chronic diastolic heart failure in relation new onset atrial fibrillation   continue fluid restriction need to update weight unchanged from admission now at 110 kg which may be considered dry weight at this point  · Paroxysmal atrial fibrillation with rate control offered by beta-blockade and now on Eliquis anticoagulation appreciate Cardiology input  · Hypertension continue amlodipine and clonidine along with beta-blockade  · Transaminitis unclear etiology although has fatty liver on ultrasound as only no or the finding    · Vulvovaginal candidiasis continue on oral Diflucan  · Left lower extremity cellulitis with significant contracture from lines of demarcation of previous erythema at presentation of fever now resolved off antibiotic patient will need physical therapy assessment and home O2 eval prior to discharge  · Constipation finally had large BM after Dulcolax suppository yesterday with rectal pain resolved      VTE Pharmacologic Prophylaxis:   Pharmacologic: Apixaban (Eliquis)  Mechanical VTE Prophylaxis in Place: No    Discussions with Specialists or Other Care Team Provider:  No    Time Spent for Care: 45 minutes  More than 50% of total time spent on counseling and coordination of care as described above  Subjective:   States she is breathing easier still has a dry cough and still on 4 L of nasal cannula oxygen denies any chest pain denies palpitations states left leg is significantly improved but has not tried walking on a as yet  She is anxious to pursue physical therapy and they are recommending rehab awaiting disposition location of choice by family and patient appears to be [facility] had been complaining of constipation for last 3 days but had finally had large BM after Dulcolax suppository yesterday with relief    Objective:     Vitals:   Temp (24hrs), Av 8 °F (37 1 °C), Min:97 6 °F (36 4 °C), Max:100 1 °F (37 8 °C)    HR:  [68-88] 80  Resp:  [16-18] 18  BP: (102-143)/(62-74) 122/67  SpO2:  [95 %-97 %] 97 %  Body mass index is 43 15 kg/m²  Input and Output Summary (last 24 hours):        Intake/Output Summary (Last 24 hours) at 18 0955  Last data filed at 18 0601   Gross per 24 hour   Intake                0 ml   Output              975 ml   Net             -975 ml       Physical Exam:     Physical Exam:   General appearance: alert, appears stated age and cooperative  Head: Normocephalic, without obvious abnormality, atraumatic  Lungs: crackles  Heart: regular rate and rhythm  Abdomen: soft, non-tender; bowel sounds normal; no masses,  no organomegaly  Back: negative  Extremities: Area of erythema in the pretibial has reduced significantly from demarcation admission and extremities normal, atraumatic, no cyanosis or edema  Neurologic: Grossly normal      Additional Data:     Labs:      Results from last 7 days  Lab Units 08/13/18  0505   WBC Thousand/uL 7 96   HEMOGLOBIN g/dL 12 1   HEMATOCRIT % 38 1   PLATELETS Thousands/uL 318   LYMPHO PCT % 53*   MONO PCT MAN % 4   EOSINO PCT MANUAL % 5       Results from last 7 days  Lab Units 08/15/18  1431 08/13/18  0505   SODIUM mmol/L 133* 136   POTASSIUM mmol/L 3 8 3 7   CHLORIDE mmol/L 92* 97*   CO2 mmol/L 37* 34*   BUN mg/dL 25 19   CREATININE mg/dL 1 07 0 74   CALCIUM mg/dL 9 1 9 0   TOTAL PROTEIN g/dL  --  7 2   BILIRUBIN TOTAL mg/dL  --  0 56   ALK PHOS U/L  --  93   ALT U/L  --  98*   AST U/L  --  58*   GLUCOSE RANDOM mg/dL 136 95           * I Have Reviewed All Lab Data Listed Above  * Additional Pertinent Lab Tests Reviewed: All Labs For Current Hospital Admission Reviewed    Imaging:  Xr Chest Portable    Result Date: 8/10/2018  Narrative: CHEST INDICATION:   Wheezing and severe dyspnea  COMPARISON:  8/8/2018, CT chest 7/23/2018 EXAM PERFORMED/VIEWS:  XR CHEST PORTABLE FINDINGS:  Study limited by portable technique and patient body habitus  Cardiomediastinal silhouette appears unchanged, noting uncoiled thoracic aorta  Mild peripheral reticular interstitial prominence, more pronounced than the prior study  Pulmonary vasculature appears similar  Suspected infiltrate right midlung  Cannot exclude small pleural effusions  No pneumothorax  Degenerative changes of the spine and shoulders  Impression: Limited study  Suspected infiltrate right midlung  Mild reticular interstitial prominence  Follow-up PA and lateral views recommended  Workstation performed: [id number]     Xr Chest 2 Views    Result Date: 8/9/2018  Narrative: CHEST INDICATION:   sob with new onset hypoxia  COMPARISON:  07/22/2018 EXAM PERFORMED/VIEWS:  XR CHEST PA & LATERAL Images: 2 FINDINGS: Cardiomediastinal silhouette appears enlarged  The aorta is tortuous  No evidence of heart failure  The lungs are clear    No pneumothorax or pleural effusion  Arthritic changes in both shoulder joints  Impression: No acute cardiopulmonary disease  Cardiomegaly  Workstation performed: [id number]     Xr Chest Pa & Lateral    Result Date: 7/22/2018  Narrative: CHEST INDICATION:   Shortness of breath and wheezing  COMPARISON:  July 19, 2018 EXAM PERFORMED/VIEWS:  XR CHEST PA & LATERAL FINDINGS: Heart shadow is enlarged but unchanged from prior exam  Examination is somewhat limited secondary to underpenetration however there appears to be mild pulmonary vascular congestive change  No focal airspace opacity to suggest pneumonia  Trace bilateral costophrenic angle pleural effusions are noted  No pneumothorax  Osseous structures appear within normal limits for patient age  Impression: Underpenetrated examination demonstrating findings suspicious for mild pulmonary vascular congestive change and small costophrenic angle effusions  Workstation performed: [id number]     Xr Chest 2 Views    Result Date: 7/19/2018  Narrative: CHEST INDICATION:   sepsis   "low oxygen level, no surg" COMPARISON:  None EXAM PERFORMED/VIEWS:  XR CHEST PA & LATERAL FINDINGS: Tortuous course of the thoracic aorta  Normal cardiac contours  The lungs are clear  No pneumothorax or pleural effusion  Osseous structures appear within normal limits for patient age  Impression: No acute cardiopulmonary disease  Workstation performed: [id number]     Xr Hip/pelv 2-3 Vws Left    Result Date: 7/19/2018  Narrative: LEFT HIP INDICATION:   Pain, atraumatic  COMPARISON:  None VIEWS:  XR HIP/PELV 2-3 VWS LEFT  W PELVIS IF PERFORMED FINDINGS: There is no acute fracture or dislocation  Advanced degenerative arthritic changes noted of both hips with flattening of the femoral heads  There are more extensive destructive changes at the left femoral head with underlying cystic formation  The bones are demineralized  Soft tissues are unremarkable  The visualized lumbar spine is unremarkable  Impression: No acute osseous abnormality  Advanced degenerative changes of both hips worse on the left  Workstation performed: [id number]     Xr Knee 1 Or 2 Vw Left    Result Date: 7/19/2018  Narrative: LEFT KNEE INDICATION:   Pain, atraumatic  COMPARISON:  None VIEWS:  XR KNEE 1 OR 2 VW LEFT FINDINGS: 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  There is no acute fracture or dislocation  There is no joint effusion  No significant degenerative changes  No lytic or blastic lesions are seen  Soft tissues are unremarkable  Impression: Prior total knee replacement  No acute osseous abnormality  Workstation performed: [id number]     Xr Knee 1 Or 2 Vw Right    Result Date: 7/19/2018  Narrative: RIGHT KNEE INDICATION:   Pain, atraumatic  COMPARISON:  None VIEWS:  XR KNEE 1 OR 2 VW RIGHT Images: 2 FINDINGS: 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  There is no acute fracture or dislocation  There is no joint effusion  No lytic or blastic lesions are seen  Soft tissues are unremarkable  Impression: Prior total knee replacement  No acute osseous abnormality  Workstation performed: [id number]     Xr Foot 3+ Vw Left    Result Date: 7/20/2018  Narrative: LEFT FOOT INDICATION:   XR of left foot/tibia/fibula - r/o osteomyelitis   "severe left foot pain  inability to bend left knee  best films possible due to patient mobility  r/o osteomyelitis" COMPARISON:  None VIEWS:  XR FOOT 3+ VW LEFT FINDINGS: Partially imaged distal fibular screw and plate device  There is no acute fracture or dislocation  No definite osteomyelitis  Diffuse degenerative changes  Prominent superior and plantar calcaneal spurs and distal Achilles calcifications  No lytic or blastic lesions seen  Soft tissues are unremarkable  Impression: No definite osteomyelitis  Chronic changes as detailed   Workstation performed: [id number]     Ct Chest Wo Contrast    Result Date: 7/23/2018  Narrative: CT CHEST WITHOUT IV CONTRAST INDICATION: Cough, persistent Cough, new onset  COMPARISON:  None  TECHNIQUE: CT examination of the chest was performed without intravenous contrast   Axial, sagittal, and coronal 2D reformatted images were created from the source data and submitted for interpretation  Radiation dose length product (DLP) for this visit:  811 mGy-cm   This examination, like all CT scans performed in the [facility], was performed utilizing techniques to minimize radiation dose exposure, including the use of iterative reconstruction and automated exposure control  FINDINGS: LUNGS:  Patchy right upper and right lower lobe opacities are seen suspicious for pneumonia  Minimal dependent right lower lobe atelectasis is present  PLEURA:  Trace right pleural effusion is present  HEART/GREAT VESSELS:  Unremarkable for patient's age  MEDIASTINUM AND SHERWIN:  Reactive appearing mediastinal lymph nodes are noted  CHEST WALL AND LOWER NECK:   Unremarkable  VISUALIZED STRUCTURES IN THE UPPER ABDOMEN:  Unremarkable  OSSEOUS STRUCTURES:  No acute fracture or destructive osseous lesion  Impression: Patchy right upper and right lower lobe pulmonary opacities likely representing pneumonia  Trace right pleural effusion  Workstation performed: [id number]     Us Right Upper Quadrant    Result Date: 8/9/2018  Narrative: RIGHT UPPER QUADRANT ULTRASOUND INDICATION:     Transaminitis  COMPARISON:  None TECHNIQUE:   Real-time ultrasound of the right upper quadrant was performed with a curvilinear transducer with both volumetric sweeps and still imaging techniques  FINDINGS: PANCREAS:  Pancreas evaluation is limited AORTA AND IVC:  Visualized portions are normal for patient age  LIVER: Size:  Within normal range  The liver measures 17 3 cm in the midclavicular line  Contour:  Surface contour is smooth  Parenchyma:   There is mild increased echogenicity of the liver suggesting fatty infiltration Limited imaging of the main portal vein shows it to be patent and hepatopetal   BILIARY: Gallbladder is surgically absent No intrahepatic biliary dilatation  CBD measures 4 9 mm  No choledocholithiasis  KIDNEY: Right kidney measures 10 2 x 5 3 cm  Within normal limits  There is a cyst within the right kidney in the lower pole which measures 3 4 x 2 9 x 3 0 cm  This demonstrates mild septation and nodularity ASCITES:   None  Impression: No biliary dilation seen Increased echogenicity of the liver suggests fatty infiltration 3 4 x 2 9 x 3 centimeters Septated  cyst within the lower pole of the right kidney with mild nodularity  This can be evaluated with the MRI performed on nonemergent basis for further characterization The study was marked in EPIC for significant notification  Workstation performed: [id number]     Vas Lower Limb Venous Duplex Study, Unilateral/limited    Result Date: 7/19/2018  Narrative:  [facility] REPORT [facility]INICAL: Indications: Patient presents with left lower extremity pain and hip pain x 2 weeks  Risk Factors: Patient denies history of injury, DVT and smoking  FINDINGS:  Left     Impression       CFV      Normal (Patent)     CONCLUSION:  Impression: RIGHT LOWER LIMB LIMITED: Patient refused images from this leg  LEFT LOWER LIMB: No evidence of acute or chronic deep vein thrombosis, however difficult obtaining color Doppler in one of the posterior tibial veins secondary to small lumen vs thrombus  Area was difficult to visualize due to the below tech note  No evidence of superficial thrombophlebitis noted  Doppler evaluation shows a normal response to augmentation maneuvers  Popliteal, posterior tibial and anterior tibial arterial Doppler waveforms are triphasic  TECH NOTE: Difficult/ limited exam secondary to patient in severe pain, unable to bend leg into proper positioning, movement, edema and body habitus  Unable to visualize peroneal veins    Technical findings were given to Dr Carlos Kahn at the time of the exam   SIGNATURE: Electronically Signed by: Ashely Carpenter MD, [address] on 2018-07-19 10:11:10 PM    Vas Lower Limb Venous Duplex Study, Complete Bilateral    Result Date: 8/10/2018  Narrative:  [facility] REPORT CLINICAL: Indications: Dyspnea [R06 02]  Patient admitted with sepsis, left leg cellulitis  She developed dyspnea and hypoxia, and she has abnormal D-Dimer  Risk factors:  Obesity  FINDINGS:  Segment  Right            Left              Impression       Impression       CFV      Normal (Patent)  Normal (Patent)     CONCLUSION:  Impression: RIGHT LOWER LIMB: No evidence of acute or chronic deep vein thrombosis  No evidence of superficial thrombophlebitis noted  Doppler evaluation shows a normal response to augmentation maneuvers  Popliteal, posterior tibial and anterior tibial arterial Doppler waveforms are triphasic/biphasic  LEFT LOWER LIMB: No evidence of acute or chronic deep vein thrombosis  The peroneal veins were not well identified/imaged  No evidence of superficial thrombophlebitis noted  Doppler evaluation shows a normal response to augmentation maneuvers  Popliteal, posterior tibial and anterior tibial arterial Doppler waveforms are triphasic  Technical findings were given to "Dibspacerandi  Tech Note: This study was technically difficult/limited due to patient body habitus  SIGNATURE: Electronically Signed by: Monik Siddiqi on 2018-08-10 12:41:44 PM    Imaging Reports Reviewed Today Include:  Reviewed right upper quadrant ultrasound also chest x-rays and CT imaging along with vascular imaging  Imaging Personally Reviewed by Myself Includes:  Now  Procedure: Vas Lower Limb Venous Duplex Study, Complete Bilateral    Result Date: 8/10/2018  Narrative:  [facility] REPORT CLINICAL: Indications: Dyspnea [R06 02]  Patient admitted with sepsis, left leg cellulitis  She developed dyspnea and hypoxia, and she has abnormal D-Dimer   Risk factors:  Obesity  FINDINGS:  Segment  Right            Left Impression       Impression       CFV      Normal (Patent)  Normal (Patent)     CONCLUSION:  Impression: RIGHT LOWER LIMB: No evidence of acute or chronic deep vein thrombosis  No evidence of superficial thrombophlebitis noted  Doppler evaluation shows a normal response to augmentation maneuvers  Popliteal, posterior tibial and anterior tibial arterial Doppler waveforms are triphasic/biphasic  LEFT LOWER LIMB: No evidence of acute or chronic deep vein thrombosis  The peroneal veins were not well identified/imaged  No evidence of superficial thrombophlebitis noted  Doppler evaluation shows a normal response to augmentation maneuvers  Popliteal, posterior tibial and anterior tibial arterial Doppler waveforms are triphasic  Technical findings were given to "Otis Bottoms  Tech Note: This study was technically difficult/limited due to patient body habitus    SIGNATURE: Electronically Signed by: Tashia Diaz on 2018-08-10 12:41:44 PM       Recent Cultures (last 7 days):           Last 24 Hours Medication List:     Current Facility-Administered Medications:  amLODIPine 5 mg Oral Q12H [facility], CRNP   apixaban 5 mg Oral BID Rujul Alonso, DO   benzonatate 100 mg Oral TID PRN  [place], CRNP   bisacodyl 10 mg Rectal Daily PRN Lord Chun, JONO   cloNIDine 0 1 mg Oral Q12H [facility] Maryellen Morris PA-C   docusate sodium 100 mg Oral BID Lord Chun, JONO   fentanyl citrate (PF) 25 mcg Intravenous Once Rasheeda Meléndez MD   furosemide 40 mg Oral Daily Jaci Dsouza MD   HYDROmorphone 0 2 mg Intravenous Q6H PRN Lord Peamoise, CRNP   lidocaine 2 patch Transdermal Daily Lord Chun, JONO   melatonin 3 mg Oral HS Lord Chun, CRLELE   metoprolol succinate 25 mg Oral Q12H Maryellen Morris PA-C   nystatin  Topical TID JONO Cabrera   ondansetron 4 mg Intravenous Q6H PRN  [place], CRNP   polyethylene glycol 17 g Oral Daily JONO Cabrera senna 2 tablet Oral Daily JONO Garza        Today, Patient Was Seen By: Yash Funes MD    ** Please Note: Dragon 360 Dictation voice to text software may have been used in the creation of this document   **

## 2018-08-17 VITALS
OXYGEN SATURATION: 95 % | TEMPERATURE: 97.1 F | HEART RATE: 81 BPM | HEIGHT: 63 IN | DIASTOLIC BLOOD PRESSURE: 76 MMHG | BODY MASS INDEX: 41.33 KG/M2 | SYSTOLIC BLOOD PRESSURE: 117 MMHG | RESPIRATION RATE: 18 BRPM | WEIGHT: 233.25 LBS

## 2018-08-17 LAB — NT-PROBNP SERPL-MCNC: 476 PG/ML

## 2018-08-17 PROCEDURE — 97110 THERAPEUTIC EXERCISES: CPT

## 2018-08-17 PROCEDURE — 83880 ASSAY OF NATRIURETIC PEPTIDE: CPT | Performed by: INTERNAL MEDICINE

## 2018-08-17 PROCEDURE — 93308 TTE F-UP OR LMTD: CPT | Performed by: INTERNAL MEDICINE

## 2018-08-17 PROCEDURE — 97530 THERAPEUTIC ACTIVITIES: CPT

## 2018-08-17 PROCEDURE — 97535 SELF CARE MNGMENT TRAINING: CPT

## 2018-08-17 PROCEDURE — 93321 DOPPLER ECHO F-UP/LMTD STD: CPT | Performed by: INTERNAL MEDICINE

## 2018-08-17 PROCEDURE — 99239 HOSP IP/OBS DSCHRG MGMT >30: CPT | Performed by: INTERNAL MEDICINE

## 2018-08-17 PROCEDURE — 93325 DOPPLER ECHO COLOR FLOW MAPG: CPT | Performed by: INTERNAL MEDICINE

## 2018-08-17 RX ORDER — SENNOSIDES 8.6 MG
2 TABLET ORAL DAILY
Qty: 120 EACH | Refills: 0
Start: 2018-08-18 | End: 2018-09-27 | Stop reason: CLARIF

## 2018-08-17 RX ORDER — HYDROCODONE BITARTRATE AND ACETAMINOPHEN 5; 300 MG/1; MG/1
1 TABLET ORAL EVERY 12 HOURS PRN
Qty: 56 TABLET | Refills: 0 | Status: SHIPPED | OUTPATIENT
Start: 2018-08-17 | End: 2018-08-27

## 2018-08-17 RX ORDER — METOPROLOL SUCCINATE 25 MG/1
25 TABLET, EXTENDED RELEASE ORAL EVERY 12 HOURS
Refills: 0
Start: 2018-08-17 | End: 2018-09-27 | Stop reason: CLARIF

## 2018-08-17 RX ORDER — FUROSEMIDE 40 MG/1
40 TABLET ORAL DAILY
Refills: 0
Start: 2018-08-18 | End: 2018-09-27 | Stop reason: CLARIF

## 2018-08-17 RX ADMIN — METOPROLOL SUCCINATE 25 MG: 25 TABLET, EXTENDED RELEASE ORAL at 11:22

## 2018-08-17 RX ADMIN — HYDROMORPHONE HYDROCHLORIDE 0.2 MG: 1 INJECTION, SOLUTION INTRAMUSCULAR; INTRAVENOUS; SUBCUTANEOUS at 12:18

## 2018-08-17 RX ADMIN — FUROSEMIDE 40 MG: 40 TABLET ORAL at 09:04

## 2018-08-17 RX ADMIN — CLONIDINE HYDROCHLORIDE 0.1 MG: 0.1 TABLET ORAL at 09:04

## 2018-08-17 RX ADMIN — AMLODIPINE BESYLATE 5 MG: 5 TABLET ORAL at 09:04

## 2018-08-17 RX ADMIN — NYSTATIN: 100000 POWDER TOPICAL at 09:05

## 2018-08-17 RX ADMIN — APIXABAN 5 MG: 5 TABLET, FILM COATED ORAL at 09:04

## 2018-08-17 NOTE — DISCHARGE SUMMARY
[facility]    Patient Information: Emeka Vigil 80 y o  female MRN: [id number]  Unit/Bed#: E5 -01 Encounter: [id number]    Discharging Physician / Practitioner: Sailaja Reynolds MD  PCP: [facility], [address]  Admission Date: 8/8/2018  Discharge Date: 08/17/18    Reason for Admission:  Severe sepsis    Discharge Diagnoses:  Acute on chronic diastolic heart failure in relation new onset atrial fibrillation    Principal Problem:    Acute respiratory failure with hypoxia ([address] )  Active Problems:    Hypertension    Transaminitis    Cellulitis of left lower extremity    Hyponatremia    Osteoarthritis of both hips    Tinea corporis    Acute retention of urine    Acute bronchospasm    Paroxysmal atrial fibrillation (HCC)    Acute on chronic diastolic (congestive) heart failure ([address] )    Vulvovaginal candidiasis  Resolved Problems:    * No resolved hospital problems  *      Consultations During Hospital Stay:  · Infectious Disease, Cardiology,    Procedures Performed:     Xr Chest Portable    Result Date: 8/10/2018  Narrative: CHEST INDICATION:   Wheezing and severe dyspnea  COMPARISON:  8/8/2018, CT chest 7/23/2018 EXAM PERFORMED/VIEWS:  XR CHEST PORTABLE FINDINGS:  Study limited by portable technique and patient body habitus  Cardiomediastinal silhouette appears unchanged, noting uncoiled thoracic aorta  Mild peripheral reticular interstitial prominence, more pronounced than the prior study  Pulmonary vasculature appears similar  Suspected infiltrate right midlung  Cannot exclude small pleural effusions  No pneumothorax  Degenerative changes of the spine and shoulders  Impression: Limited study  Suspected infiltrate right midlung  Mild reticular interstitial prominence  Follow-up PA and lateral views recommended  Workstation performed: [id number]     Xr Chest 2 Views    Result Date: 8/9/2018  Narrative: CHEST INDICATION:   sob with new onset hypoxia   COMPARISON: 07/22/2018 EXAM PERFORMED/VIEWS:  XR CHEST PA & LATERAL Images: 2 FINDINGS: Cardiomediastinal silhouette appears enlarged  The aorta is tortuous  No evidence of heart failure  The lungs are clear  No pneumothorax or pleural effusion  Arthritic changes in both shoulder joints  Impression: No acute cardiopulmonary disease  Cardiomegaly  Workstation performed: [id number]     Xr Chest Pa & Lateral    Result Date: 7/22/2018  Narrative: CHEST INDICATION:   Shortness of breath and wheezing  COMPARISON:  July 19, 2018 EXAM PERFORMED/VIEWS:  XR CHEST PA & LATERAL FINDINGS: Heart shadow is enlarged but unchanged from prior exam  Examination is somewhat limited secondary to underpenetration however there appears to be mild pulmonary vascular congestive change  No focal airspace opacity to suggest pneumonia  Trace bilateral costophrenic angle pleural effusions are noted  No pneumothorax  Osseous structures appear within normal limits for patient age  Impression: Underpenetrated examination demonstrating findings suspicious for mild pulmonary vascular congestive change and small costophrenic angle effusions  Workstation performed: [id number]     Xr Chest 2 Views    Result Date: 7/19/2018  Narrative: CHEST INDICATION:   sepsis   "low oxygen level, no surg" COMPARISON:  None EXAM PERFORMED/VIEWS:  XR CHEST PA & LATERAL FINDINGS: Tortuous course of the thoracic aorta  Normal cardiac contours  The lungs are clear  No pneumothorax or pleural effusion  Osseous structures appear within normal limits for patient age  Impression: No acute cardiopulmonary disease  Workstation performed: [id number]     Xr Hip/pelv 2-3 Vws Left    Result Date: 7/19/2018  Narrative: LEFT HIP INDICATION:   Pain, atraumatic  COMPARISON:  None VIEWS:  XR HIP/PELV 2-3 VWS LEFT  W PELVIS IF PERFORMED FINDINGS: There is no acute fracture or dislocation   Advanced degenerative arthritic changes noted of both hips with flattening of the femoral heads   There are more extensive destructive changes at the left femoral head with underlying cystic formation  The bones are demineralized  Soft tissues are unremarkable  The visualized lumbar spine is unremarkable  Impression: No acute osseous abnormality  Advanced degenerative changes of both hips worse on the left  Workstation performed: [id number]     Xr Knee 1 Or 2 Vw Left    Result Date: 7/19/2018  Narrative: LEFT KNEE INDICATION:   Pain, atraumatic  COMPARISON:  None VIEWS:  XR KNEE 1 OR 2 VW LEFT FINDINGS: 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  There is no acute fracture or dislocation  There is no joint effusion  No significant degenerative changes  No lytic or blastic lesions are seen  Soft tissues are unremarkable  Impression: Prior total knee replacement  No acute osseous abnormality  Workstation performed: [id number]     Xr Knee 1 Or 2 Vw Right    Result Date: 7/19/2018  Narrative: RIGHT KNEE INDICATION:   Pain, atraumatic  COMPARISON:  None VIEWS:  XR KNEE 1 OR 2 VW RIGHT Images: 2 FINDINGS: 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  There is no acute fracture or dislocation  There is no joint effusion  No lytic or blastic lesions are seen  Soft tissues are unremarkable  Impression: Prior total knee replacement  No acute osseous abnormality  Workstation performed: [id number]     Xr Foot 3+ Vw Left    Result Date: 7/20/2018  Narrative: LEFT FOOT INDICATION:   XR of left foot/tibia/fibula - r/o osteomyelitis   "severe left foot pain  inability to bend left knee  best films possible due to patient mobility  r/o osteomyelitis" COMPARISON:  None VIEWS:  XR FOOT 3+ VW LEFT FINDINGS: Partially imaged distal fibular screw and plate device  There is no acute fracture or dislocation  No definite osteomyelitis  Diffuse degenerative changes  Prominent superior and plantar calcaneal spurs and distal Achilles calcifications  No lytic or blastic lesions seen  Soft tissues are unremarkable  Impression: No definite osteomyelitis  Chronic changes as detailed  Workstation performed: [id number]     Ct Chest Wo Contrast    Result Date: 7/23/2018  Narrative: CT CHEST WITHOUT IV CONTRAST INDICATION:   Cough, persistent Cough, new onset  COMPARISON:  None  TECHNIQUE: CT examination of the chest was performed without intravenous contrast   Axial, sagittal, and coronal 2D reformatted images were created from the source data and submitted for interpretation  Radiation dose length product (DLP) for this visit:  811 mGy-cm   This examination, like all CT scans performed in the [facility], was performed utilizing techniques to minimize radiation dose exposure, including the use of iterative reconstruction and automated exposure control  FINDINGS: LUNGS:  Patchy right upper and right lower lobe opacities are seen suspicious for pneumonia  Minimal dependent right lower lobe atelectasis is present  PLEURA:  Trace right pleural effusion is present  HEART/GREAT VESSELS:  Unremarkable for patient's age  MEDIASTINUM AND SHERWIN:  Reactive appearing mediastinal lymph nodes are noted  CHEST WALL AND LOWER NECK:   Unremarkable  VISUALIZED STRUCTURES IN THE UPPER ABDOMEN:  Unremarkable  OSSEOUS STRUCTURES:  No acute fracture or destructive osseous lesion  Impression: Patchy right upper and right lower lobe pulmonary opacities likely representing pneumonia  Trace right pleural effusion  Workstation performed: [id number]     Us Right Upper Quadrant    Result Date: 8/9/2018  Narrative: RIGHT UPPER QUADRANT ULTRASOUND INDICATION:     Transaminitis  COMPARISON:  None TECHNIQUE:   Real-time ultrasound of the right upper quadrant was performed with a curvilinear transducer with both volumetric sweeps and still imaging techniques  FINDINGS: PANCREAS:  Pancreas evaluation is limited AORTA AND IVC:  Visualized portions are normal for patient age  LIVER: Size:  Within normal range    The liver measures 17 3 cm in the midclavicular line  Contour:  Surface contour is smooth  Parenchyma: There is mild increased echogenicity of the liver suggesting fatty infiltration Limited imaging of the main portal vein shows it to be patent and hepatopetal   BILIARY: Gallbladder is surgically absent No intrahepatic biliary dilatation  CBD measures 4 9 mm  No choledocholithiasis  KIDNEY: Right kidney measures 10 2 x 5 3 cm  Within normal limits  There is a cyst within the right kidney in the lower pole which measures 3 4 x 2 9 x 3 0 cm  This demonstrates mild septation and nodularity ASCITES:   None  Impression: No biliary dilation seen Increased echogenicity of the liver suggests fatty infiltration 3 4 x 2 9 x 3 centimeters Septated  cyst within the lower pole of the right kidney with mild nodularity  This can be evaluated with the MRI performed on nonemergent basis for further characterization The study was marked in EPIC for significant notification  Workstation performed: [id number]     Vas Lower Limb Venous Duplex Study, Unilateral/limited    Result Date: 7/19/2018  Narrative:  [facility] REPORT [facility]INICAL: Indications: Patient presents with left lower extremity pain and hip pain x 2 weeks  Risk Factors: Patient denies history of injury, DVT and smoking  FINDINGS:  Left     Impression       CFV      Normal (Patent)     CONCLUSION:  Impression: RIGHT LOWER LIMB LIMITED: Patient refused images from this leg  LEFT LOWER LIMB: No evidence of acute or chronic deep vein thrombosis, however difficult obtaining color Doppler in one of the posterior tibial veins secondary to small lumen vs thrombus  Area was difficult to visualize due to the below tech note  No evidence of superficial thrombophlebitis noted  Doppler evaluation shows a normal response to augmentation maneuvers  Popliteal, posterior tibial and anterior tibial arterial Doppler waveforms are triphasic    TECH NOTE: Difficult/ limited exam secondary to patient in severe pain, unable to bend leg into proper positioning, movement, edema and body habitus  Unable to visualize peroneal veins  Technical findings were given to Dr Frankey Mole at the time of the exam   SIGNATURE: Electronically Signed by: Dante Hdez MD, [address] on 2018-07-19 10:11:10 PM    Vas Lower Limb Venous Duplex Study, Complete Bilateral    Result Date: 8/10/2018  Narrative:  [facility] REPORT CLINICAL: Indications: Dyspnea [R06 02]  Patient admitted with sepsis, left leg cellulitis  She developed dyspnea and hypoxia, and she has abnormal D-Dimer  Risk factors:  Obesity  FINDINGS:  Segment  Right            Left              Impression       Impression       CFV      Normal (Patent)  Normal (Patent)     CONCLUSION:  Impression: RIGHT LOWER LIMB: No evidence of acute or chronic deep vein thrombosis  No evidence of superficial thrombophlebitis noted  Doppler evaluation shows a normal response to augmentation maneuvers  Popliteal, posterior tibial and anterior tibial arterial Doppler waveforms are triphasic/biphasic  LEFT LOWER LIMB: No evidence of acute or chronic deep vein thrombosis  The peroneal veins were not well identified/imaged  No evidence of superficial thrombophlebitis noted  Doppler evaluation shows a normal response to augmentation maneuvers  Popliteal, posterior tibial and anterior tibial arterial Doppler waveforms are triphasic  Technical findings were given to "Kaitlynn Glow  Tech Note: This study was technically difficult/limited due to patient body habitus  SIGNATURE: Electronically Signed by: Sharla Hawk on 2018-08-10 12:41:44 PM        Significant Findings:     · 80-year-old female who presented with in the setting extended care facility acquired of fever shortness of breath cough greenish sputum nausea and feeling sick and on to her stomach  Loss of appetite    She was hypothermic tachypneic with worsening lower extremity bilateral edema failing outpatient course of treatment and hypoxic respiratory failure requiring nasal cannula that was new  · Presentation acute respiratory failure with hypoxia required oxygen therapy still ongoing at time of discharge  · Treated with empiric antibiotic coverage with Ancef and vancomycin for presumptive diagnosis of healthcare acquired pneumonia possibly in relation aspiration  · Possible source of infection at 1st thought to be bilateral lower extremity erythema and cellulitis she had failed outpatient course of oral Keflex and Bactrim on further investigation the patient was found to have significant acute on chronic diastolic heart failure was seen by Cardiology who favored a course of diuresis which significantly improved her lower extremity edema redness and irritation and was seen by the Infectious Disease service who had a low suspicion for active cellulitis is source of sepsis  They discontinued vancomycin in favor IVs cefazolin urine culture turned out to be negative and blood cultures remain negative    · Hypoxic respiratory failure improved with treatment of her underlying diastolic heart failure a 2D echocardiogram noted ejection fraction of 65% with no diagnostic regional wall motion abnormality there pseudonormal left ventricular filling grade 2 diastolic dysfunction she was switched to oral diuretic management on the 16th of August which will be continued/aortic stenosis felt to be moderate by exam  · She was noted to have new onset atrial fibrillation with rate controlled achieved with and will be continued on Eliquis anticoagulation  · Hypertension managed with clonidine 0 1 mg q 12 amlodipine 5 mg q 12 and metoprolol for BP and rate control at 25 mg q 12  · She continues to have bilateral hip pains in relation DJD and based on performance and PT/OT qualifies for short-term rehab and will be admitted to short-term rehab unit at [facility] she was given a prescription for a 10 day course of hydrocodone thereafter to be assessed and managed by primary care team there    Incidental Findings:   · Will vaginal candidiasis treated with Diflucan initially and now nystatin powder    Test Results Pending at Discharge (will require follow up):   ·      Outpatient Tests Requested:  ·     Complications:      Hospital Course:     Santhosh Muñoz is a 80 y o  female patient who originally presented to the hospital on 8/8/2018 due to severe sepsis on presentation  Condition at Discharge: good     Discharge Day Visit / Exam:     * Please refer to separate progress for these details *    Discharge instructions/Information to patient and family:   See after visit summary for information provided to patient and family  Provisions for Follow-Up Care:  See after visit summary for information related to follow-up care and any pertinent home health orders  Disposition:     Acute Rehab at [facility]:   · Not Applicable to this Patient - Not Applicable to this Patient      Discharge Statement:  I spent 56 minutes discharging the patient  This time was spent on the day of discharge  I had direct contact with the patient on the day of discharge  Greater than 50% of the total time was spent examining patient, answering all patient questions, arranging and discussing plan of care with patient as well as directly providing post-discharge instructions  Additional time then spent on discharge activities  Discharge Medications:  See after visit summary for reconciled discharge medications provided to patient and family  ** Please Note: Dragon 360 Dictation voice to text software may have been used in the creation of this document   **

## 2018-08-17 NOTE — PLAN OF CARE
Problem: PHYSICAL THERAPY ADULT  Goal: Performs mobility at highest level of function for planned discharge setting  See evaluation for individualized goals  Treatment/Interventions: Functional transfer training, LE strengthening/ROM, Therapeutic exercise, Endurance training, Patient/family training, Equipment eval/education, Bed mobility, Continued evaluation, Spoke to nursing, OT, Family  Equipment Recommended: Wheelchair       See flowsheet documentation for full assessment, interventions and recommendations  Outcome: Progressing  Prognosis: Fair  Problem List: Decreased strength, Decreased range of motion, Decreased endurance, Impaired balance, Decreased mobility, Pain, Obesity  Assessment: Pt resting in bed at time of PT treatment session  Pt reports feeling a little better today and is willing to participate in PT  Pt able to perform all bed mobility with Mod A x 2 this session with increased A needed to facilitate anterior weight shift during sit to stand transfer  Pt attempted sit to stand with the RW and was able to tolerate standing x 1 min,  However pt unable to ambulate x 3 trials at this time due to poor standing tolerance and inability to weight shift and advance b/l LE  Pt noted to be slightly retropulsive in standing and requires verbal instruction to correct  Pt able to utilize quick move to perform transfers OOB to chair  With all transfers pt continues to require VC and TC for hand placement and safety  Assistance required from OT to assist with transfers for optimal pt safety  Pt able to complete all therex seated OOB in chair without any increased in complaints  Pt requires slight VC and TC for proper form and pacing as well as physical assistance to complete therex through full available ROM  Pt assisted back into chair at conclusion of PT session with all needs within reach  Pt denies any further questions at this time  PT will continue to follow   D/c recommendation when medically cleared is rehab due to decreased functional mobility compared to baseline and increased A needed form caregiver at current time  Barriers to Discharge: None     Recommendation: Short-term skilled PT     PT - OK to Discharge: Yes (to rehab when medically cleared)    See flowsheet documentation for full assessment

## 2018-08-17 NOTE — PHYSICAL THERAPY NOTE
PHYSICAL THERAPY NOTE          Patient Name: Lemuel Leon  RPRMW'C Date: 8/17/2018 08/17/18 0825   Pain Assessment   Pain Assessment No/denies pain   Pain Score No Pain   Restrictions/Precautions   Weight Bearing Precautions Per Order No   Other Precautions Fall Risk;O2;Pain   General   Chart Reviewed Yes   Response to Previous Treatment Patient with no complaints from previous session  Family/Caregiver Present No   Cognition   Overall Cognitive Status WFL   Arousal/Participation Alert   Attention Within functional limits   Orientation Level Oriented to person;Oriented to place;Oriented to time   Memory Within functional limits   Following Commands Follows one step commands without difficulty   Subjective   Subjective Pt willing to participate in PT    Bed Mobility   Supine to Sit 3  Moderate assistance   Additional items Assist x 2; Increased time required;Verbal cues   Sit to Supine 3  Moderate assistance   Additional items Assist x 2; Increased time required;Verbal cues   Transfers   Sit to Stand 3  Moderate assistance   Additional items Assist x 2; Increased time required;Verbal cues   Stand to Sit 3  Moderate assistance   Additional items Assist x 2; Increased time required;Verbal cues   Additional Comments VC needed for hand placement and safety with transfers  Use of quickmove for transfers OOB    Ambulation/Elevation   Gait pattern Not appropriate  (pt unable to advance b/l LE, poor standing tolerance )   Balance   Static Sitting Fair +   Dynamic Sitting Fair   Static Standing Poor +   Endurance Deficit   Endurance Deficit Yes   Endurance Deficit Description fatigue, weakness    Activity Tolerance   Activity Tolerance Patient limited by fatigue   Nurse Made Aware Pt appropriate to be seen per Flavio Pelaez RN   Exercises   THR Sitting;10 reps;AROM; Bilateral  (x 2 sets )   Assessment   Prognosis Fair   Problem List Decreased strength;Decreased range of motion;Decreased endurance; Impaired balance;Decreased mobility;Pain;Obesity   Assessment Pt resting in bed at time of PT treatment session  Pt reports feeling a little better today and is willing to participate in PT  Pt able to perform all bed mobility with Mod A x 2 this session with increased A needed to facilitate anterior weight shift during sit to stand transfer  Pt attempted sit to stand with the RW and was able to tolerate standing x 1 min,  However pt unable to ambulate x 3 trials at this time due to poor standing tolerance and inability to weight shift and advance b/l LE  Pt noted to be slightly retropulsive in standing and requires verbal instruction to correct  Pt able to utilize quick move to perform transfers OOB to chair  With all transfers pt continues to require VC and TC for hand placement and safety  Assistance required from OT to assist with transfers for optimal pt safety  Pt able to complete all therex seated OOB in chair without any increased in complaints  Pt requires slight VC and TC for proper form and pacing as well as physical assistance to complete therex through full available ROM  Pt assisted back into chair at conclusion of PT session with all needs within reach  Pt denies any further questions at this time  PT will continue to follow  D/c recommendation when medically cleared is rehab due to decreased functional mobility compared to baseline and increased A needed form caregiver at current time   Barriers to Discharge None   Goals   Patient Goals " to get back to [facility]"   STG Expiration Date 08/24/18   Treatment Day 2   Plan   Treatment/Interventions Functional transfer training;LE strengthening/ROM; Therapeutic exercise; Endurance training;Patient/family training;Equipment eval/education; Bed mobility;Gait training;Spoke to nursing;OT   Progress Progressing toward goals   PT Frequency Other (Comment)  (4-5x a week )   Recommendation   Recommendation Short-term skilled PT   Equipment Recommended Wheelchair;Walker   PT - OK to Discharge Yes  (to rehab when medically cleared)   Norman Russell, PT

## 2018-08-17 NOTE — SOCIAL WORK
Auth# obtained from [facility] from [employer] [id number]  Auth# [id number], update 8/23 to [place]: tel#[phone]  Provided auth# to [place] from [facility]  BLS transport setup with [facility] for a 2:30pm pick-up  Pt's daughter informed of discharge  IMM notice explained and provided to pt  Contact information for RN report updated in epic

## 2018-08-17 NOTE — OCCUPATIONAL THERAPY NOTE
[address] Progress Note     Patient Name: Killian Hernandez  MDRVG'E Date: 8/17/2018  Problem List  Patient Active Problem List   Diagnosis    Cellulitis    Hypertension    Hip pain    Severe sepsis ([facility] )    Morbid (severe) obesity due to excess calories ([facility])    Hyperlipidemia    Transaminitis    Cellulitis of left lower extremity    Hyponatremia    Osteoarthritis of both hips    Acute respiratory failure with hypoxia ([facility])    Tinea corporis    Acute retention of urine    ANSELMO (acute kidney injury) ([facility] )    Abnormal finding on EKG    Acute bronchospasm    Paroxysmal atrial fibrillation (HCC)    Acute on chronic diastolic (congestive) heart failure ([facility] )    Vulvovaginal candidiasis         08/17/18 0826   Restrictions/Precautions   Weight Bearing Precautions Per Order No   Other Precautions Fall Risk;O2;Pain   Lifestyle   Autonomy At rehab, pt required assist w/ all ADLs, IADLs, and transfers (pt reports non-ambulatory at rehab)  Prior to rehab, pt was I w/ ADLs and functional mobility/transfers with use of RW for household distances and WC for community distances and and required assist w/ IADLs  Reciprocal Relationships Supportive dtr   Service to Others Retired   Intrinsic Gratification Watching TV   Pain Assessment   Pain Assessment No/denies pain   Pain Score No Pain   ADL   Where Assessed Chair   Grooming Assistance 5  Supervision/Setup   Grooming Deficit Setup;Supervision/safety; Increased time to complete   Grooming Comments Light grooming tasks completed with Supervision 2* setup required and increased time to complete  UB Bathing Assistance 4  Minimal Assistance   UB Bathing Deficit Setup;Verbal cueing;Supervision/safety; Increased time to complete   UB Bathing Comments UB bathing completed with Min A 2* assist for thoroughness  LB Bathing Assistance 2  Maximal Assistance   LB Bathing Deficit Setup;Verbal cueing;Supervision/safety; Increased time to complete;Perineal area;Buttocks;Right lower leg including foot; Left lower leg including foot   LB Bathing Comments LB bathing completed with Max A  Pt able to wash B/L upper legs, however required assist to wash B/L lower legs, B/L feet, perineal area, and buttocks 2* decreased functional reach and pt requiring BUE support on RW when standing  UB Dressing Assistance 4  Minimal Assistance   UB Dressing Deficit Setup;Verbal cueing;Supervision/safety; Increased time to complete;Pull around back;Pull down in back   UB Dressing Comments UB dressing completed with Min A 2* assist to bring gown down/around in back  LB Dressing Assistance 1  Total Assistance   LB Dressing Deficit Setup;Don/doff R sock; Don/doff L sock   LB Dressing Comments LB dressing completed with Total A to don/doff B/L socks 2* decreased functional reach  Pt declined use of LH AE at this time  Functional Standing Tolerance   Time 1-2 mins   Activity Transfers, self care tasks   Comments No LOB noted   Bed Mobility   Supine to Sit 3  Moderate assistance   Additional items Assist x 2;Leg ;Verbal cues;LE management   Sit to Supine Unable to assess  (Pt seated OOB in chair at end of session)   Additional Comments Pt seated OOB in chair at end of session with call bell and phone within reach  All needs met and pt reports no further questions for OT at this time  Transfers   Sit to Stand 3  Moderate assistance   Additional items Assist x 2; Increased time required;Verbal cues   Stand to Sit 3  Moderate assistance   Additional items Assist x 2;Armrests; Increased time required;Verbal cues   Mechanical lift 3  Moderate assistance   Additional items Assist x 2; Increased time required;Verbal cues;Armrests  (Quick Move)   Additional Comments Transfers (sit<>stand) completed with Mod A of 2 with use of RW for first trial, however pt unable to take steps at this time   Therefore, utilized [facility] for sit<>stand transfer to move pt from EOB to Bedside recliner with Mod A of 2  Cues required for safe technique and hand placement during all transfers  Cognition   Overall Cognitive Status WFL   Arousal/Participation Alert; Cooperative   Attention Within functional limits   Orientation Level Oriented X4   Memory Within functional limits   Following Commands Follows one step commands without difficulty   Activity Tolerance   Activity Tolerance Patient limited by fatigue   Medical Staff Made Aware Pt appropriate to be seen per Jon Houston RN   Assessment   Assessment Pt seen for OT treatment session this AM focusing on functional activity tolerance, bed mobility, ADLs, and transfer trials  Pt alert and cooperative throughout session  Pt lying supine at start of session, requiring Mod A of 2 for supine>sit 2* assist for LE management and trunk control  Transfers (sit<>stand) completed with Mod A of 2 with use of RW for first trial, however pt unable to take steps at this time  Therefore, utilized [facility] for sit<>stand transfer to move pt from EOB to Bedside recliner with Mod A of 2  Cues required for safe technique and hand placement during all transfers  ADL routine completed while seated OOB in chair  UB bathing completed with Min A 2* assist for thoroughness  LB bathing completed with Max A  Pt able to wash B/L upper legs, however required assist to wash B/L lower legs, B/L feet, perineal area, and buttocks 2* decreased functional reach and pt requiring BUE support on RW when standing  UB dressing completed with Min A 2* assist to bring gown down/around in back  LB dressing completed with Total A to don/doff B/L socks 2* decreased functional reach  Pt declined use of LH AE at this time  Light grooming tasks completed with Supervision 2* setup required and increased time to complete  Pt seated OOB in chair at end of session with call bell and phone within reach  All needs met and pt reports no further questions for OT at this time  Continue to recommend STR upon D/C   OT to follow pt on caseload  Plan   Treatment Interventions ADL retraining;Functional transfer training;UE strengthening/ROM; Endurance training;Patient/family training;Equipment evaluation/education; Compensatory technique education;Continued evaluation; Energy conservation; Activityengagement   Goal Expiration Date 08/28/18   Treatment Day 3   OT Frequency 3-5x/wk   Recommendation   OT Discharge Recommendation Short Term Rehab   OT - OK to Discharge Yes  (when medically cleared to rehab)   Barthel Index   Feeding 10   Bathing 0   Grooming Score 5   Dressing Score 5   Bladder Score 5   Bowels Score 5   Toilet Use Score 5   Transfers (Bed/Chair) Score 5   Mobility (Level Surface) Score 0   Stairs Score 0   Barthel Index Score 40   Modified Bureau Scale   Modified Bureau Scale 4       Tena Guy OTR/L

## 2018-08-17 NOTE — PROGRESS NOTES
[address] Internal Medicine Progress Note  Patient: Frankie Alexander 80 y o  female   MRN: [id number]  PCP: JONO Horton  Unit/Bed#: E5 -01 Encounter: [id number]  Date Of Visit: 08/17/18    Assessment:    Principal Problem:    Acute respiratory failure with hypoxia ([address] )  Active Problems:    Hypertension    Transaminitis    Cellulitis of left lower extremity    Hyponatremia    Osteoarthritis of both hips    Tinea corporis    Acute retention of urine    Acute bronchospasm    Paroxysmal atrial fibrillation (HCC)    Acute on chronic diastolic (congestive) heart failure (HCC)    Vulvovaginal candidiasis      Plan:    · Acute respiratory failure with hypoxia still requires O2 but off IV steroids and now presume mostly in relation to acute on chronic diastolic heart failure that is improving with IV diuresis    2D echocardiogram EF of 65%/with no regional wall motion abnormality and a grade 2 diastolic dysfunction from pseudonormal left ventricular filling home O2 eval will check some alkalosis and elevated creatinine will change to p  o  furosemide 40 mg daily/  · Acute on chronic diastolic heart failure in relation new onset atrial fibrillation   continue fluid restriction need to update weight unchanged from admission now at 110 kg which may be considered dry weight at this point  · Moderate aortic stenosis cardiology wanted to have follow-up limited echo to pending  · Paroxysmal atrial fibrillation with rate control offered by beta-blockade and now on Eliquis anticoagulation appreciate Cardiology input  · Hypertension continue amlodipine and clonidine along with beta-blockade  · Transaminitis unclear etiology although has fatty liver on ultrasound   · Vulvovaginal candidiasis continue on oral Diflucan  · Left lower extremity cellulitis with significant contracture from lines of demarcation of previous erythema at presentation of fever now resolved off antibiotic presume inflammation mostly in relation to peripheral edema and stasis changes  · Constipation finally had large BM after Dulcolax suppository yesterday with rectal pain resolved  · Ambulatory dysfunction and deconditioning has qualify for short-term rehab awaiting bed disposition      VTE Pharmacologic Prophylaxis:   Pharmacologic: Apixaban (Eliquis)  Mechanical VTE Prophylaxis in Place: No    Discussions with Specialists or Other Care Team Provider:  No    Time Spent for Care: 45 minutes  More than 50% of total time spent on counseling and coordination of care as described above  Subjective:   States she is breathing easier still has a dry cough and still on 4 L of nasal cannula oxygen denies any chest pain denies palpitations states left leg is significantly improved but has not tried walking on a as yet  She is anxious to pursue physical therapy and they are recommending rehab awaiting disposition location of choice by family and patient appears to be [facility] had been complaining of constipation for last 3 days but had finally had large BM after Dulcolax suppository yesterday with relief    Objective:     Vitals:   Temp (24hrs), Av 2 °F (36 8 °C), Min:97 1 °F (36 2 °C), Max:99 2 °F (37 3 °C)    HR:  [55-85] 55  Resp:  [18] 18  BP: (122-125)/(62-74) 123/74  SpO2:  [95 %-97 %] 95 %  Body mass index is 41 32 kg/m²  Input and Output Summary (last 24 hours):        Intake/Output Summary (Last 24 hours) at 18 0920  Last data filed at 18 0559   Gross per 24 hour   Intake                0 ml   Output              400 ml   Net             -400 ml       Physical Exam:     Physical Exam:   General appearance: alert, appears stated age and cooperative  Head: Normocephalic, without obvious abnormality, atraumatic  Lungs: crackles  Heart: regular rate and rhythm  Abdomen: soft, non-tender; bowel sounds normal; no masses,  no organomegaly  Back: negative  Extremities: Area of erythema in the pretibial has reduced significantly from demarcation admission and extremities normal, atraumatic, no cyanosis or edema  Neurologic: Grossly normal      Additional Data:     Labs:      Results from last 7 days  Lab Units 08/13/18  0505   WBC Thousand/uL 7 96   HEMOGLOBIN g/dL 12 1   HEMATOCRIT % 38 1   PLATELETS Thousands/uL 318   LYMPHO PCT % 53*   MONO PCT MAN % 4   EOSINO PCT MANUAL % 5       Results from last 7 days  Lab Units 08/15/18  1431 08/13/18  0505   SODIUM mmol/L 133* 136   POTASSIUM mmol/L 3 8 3 7   CHLORIDE mmol/L 92* 97*   CO2 mmol/L 37* 34*   BUN mg/dL 25 19   CREATININE mg/dL 1 07 0 74   CALCIUM mg/dL 9 1 9 0   TOTAL PROTEIN g/dL  --  7 2   BILIRUBIN TOTAL mg/dL  --  0 56   ALK PHOS U/L  --  93   ALT U/L  --  98*   AST U/L  --  58*   GLUCOSE RANDOM mg/dL 136 95           * I Have Reviewed All Lab Data Listed Above  * Additional Pertinent Lab Tests Reviewed: All Labs For Current Hospital Admission Reviewed    Imaging:  Xr Chest Portable    Result Date: 8/10/2018  Narrative: CHEST INDICATION:   Wheezing and severe dyspnea  COMPARISON:  8/8/2018, CT chest 7/23/2018 EXAM PERFORMED/VIEWS:  XR CHEST PORTABLE FINDINGS:  Study limited by portable technique and patient body habitus  Cardiomediastinal silhouette appears unchanged, noting uncoiled thoracic aorta  Mild peripheral reticular interstitial prominence, more pronounced than the prior study  Pulmonary vasculature appears similar  Suspected infiltrate right midlung  Cannot exclude small pleural effusions  No pneumothorax  Degenerative changes of the spine and shoulders  Impression: Limited study  Suspected infiltrate right midlung  Mild reticular interstitial prominence  Follow-up PA and lateral views recommended  Workstation performed: [id number]     Xr Chest 2 Views    Result Date: 8/9/2018  Narrative: CHEST INDICATION:   sob with new onset hypoxia   COMPARISON:  07/22/2018 EXAM PERFORMED/VIEWS:  XR CHEST PA & LATERAL Images: 2 FINDINGS: Cardiomediastinal silhouette appears enlarged  The aorta is tortuous  No evidence of heart failure  The lungs are clear  No pneumothorax or pleural effusion  Arthritic changes in both shoulder joints  Impression: No acute cardiopulmonary disease  Cardiomegaly  Workstation performed: [id number]     Xr Chest Pa & Lateral    Result Date: 7/22/2018  Narrative: CHEST INDICATION:   Shortness of breath and wheezing  COMPARISON:  July 19, 2018 EXAM PERFORMED/VIEWS:  XR CHEST PA & LATERAL FINDINGS: Heart shadow is enlarged but unchanged from prior exam  Examination is somewhat limited secondary to underpenetration however there appears to be mild pulmonary vascular congestive change  No focal airspace opacity to suggest pneumonia  Trace bilateral costophrenic angle pleural effusions are noted  No pneumothorax  Osseous structures appear within normal limits for patient age  Impression: Underpenetrated examination demonstrating findings suspicious for mild pulmonary vascular congestive change and small costophrenic angle effusions  Workstation performed: [id number]     Xr Chest 2 Views    Result Date: 7/19/2018  Narrative: CHEST INDICATION:   sepsis   "low oxygen level, no surg" COMPARISON:  None EXAM PERFORMED/VIEWS:  XR CHEST PA & LATERAL FINDINGS: Tortuous course of the thoracic aorta  Normal cardiac contours  The lungs are clear  No pneumothorax or pleural effusion  Osseous structures appear within normal limits for patient age  Impression: No acute cardiopulmonary disease  Workstation performed: [id number]     Xr Hip/pelv 2-3 Vws Left    Result Date: 7/19/2018  Narrative: LEFT HIP INDICATION:   Pain, atraumatic  COMPARISON:  None VIEWS:  XR HIP/PELV 2-3 VWS LEFT  W PELVIS IF PERFORMED FINDINGS: There is no acute fracture or dislocation  Advanced degenerative arthritic changes noted of both hips with flattening of the femoral heads  There are more extensive destructive changes at the left femoral head with underlying cystic formation  The bones are demineralized  Soft tissues are unremarkable  The visualized lumbar spine is unremarkable  Impression: No acute osseous abnormality  Advanced degenerative changes of both hips worse on the left  Workstation performed: [id number]     Xr Knee 1 Or 2 Vw Left    Result Date: 7/19/2018  Narrative: LEFT KNEE INDICATION:   Pain, atraumatic  COMPARISON:  None VIEWS:  XR KNEE 1 OR 2 VW LEFT FINDINGS: 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  There is no acute fracture or dislocation  There is no joint effusion  No significant degenerative changes  No lytic or blastic lesions are seen  Soft tissues are unremarkable  Impression: Prior total knee replacement  No acute osseous abnormality  Workstation performed: [id number]     Xr Knee 1 Or 2 Vw Right    Result Date: 7/19/2018  Narrative: RIGHT KNEE INDICATION:   Pain, atraumatic  COMPARISON:  None VIEWS:  XR KNEE 1 OR 2 VW RIGHT Images: 2 FINDINGS: 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  There is no acute fracture or dislocation  There is no joint effusion  No lytic or blastic lesions are seen  Soft tissues are unremarkable  Impression: Prior total knee replacement  No acute osseous abnormality  Workstation performed: [id number]     Xr Foot 3+ Vw Left    Result Date: 7/20/2018  Narrative: LEFT FOOT INDICATION:   XR of left foot/tibia/fibula - r/o osteomyelitis   "severe left foot pain  inability to bend left knee  best films possible due to patient mobility  r/o osteomyelitis" COMPARISON:  None VIEWS:  XR FOOT 3+ VW LEFT FINDINGS: Partially imaged distal fibular screw and plate device  There is no acute fracture or dislocation  No definite osteomyelitis  Diffuse degenerative changes  Prominent superior and plantar calcaneal spurs and distal Achilles calcifications  No lytic or blastic lesions seen  Soft tissues are unremarkable  Impression: No definite osteomyelitis  Chronic changes as detailed   Workstation performed: [id number] Ct Chest Wo Contrast    Result Date: 7/23/2018  Narrative: CT CHEST WITHOUT IV CONTRAST INDICATION:   Cough, persistent Cough, new onset  COMPARISON:  None  TECHNIQUE: CT examination of the chest was performed without intravenous contrast   Axial, sagittal, and coronal 2D reformatted images were created from the source data and submitted for interpretation  Radiation dose length product (DLP) for this visit:  811 mGy-cm   This examination, like all CT scans performed in the [facility], was performed utilizing techniques to minimize radiation dose exposure, including the use of iterative reconstruction and automated exposure control  FINDINGS: LUNGS:  Patchy right upper and right lower lobe opacities are seen suspicious for pneumonia  Minimal dependent right lower lobe atelectasis is present  PLEURA:  Trace right pleural effusion is present  HEART/GREAT VESSELS:  Unremarkable for patient's age  MEDIASTINUM AND SHERWIN:  Reactive appearing mediastinal lymph nodes are noted  CHEST WALL AND LOWER NECK:   Unremarkable  VISUALIZED STRUCTURES IN THE UPPER ABDOMEN:  Unremarkable  OSSEOUS STRUCTURES:  No acute fracture or destructive osseous lesion  Impression: Patchy right upper and right lower lobe pulmonary opacities likely representing pneumonia  Trace right pleural effusion  Workstation performed: [id number]     Us Right Upper Quadrant    Result Date: 8/9/2018  Narrative: RIGHT UPPER QUADRANT ULTRASOUND INDICATION:     Transaminitis  COMPARISON:  None TECHNIQUE:   Real-time ultrasound of the right upper quadrant was performed with a curvilinear transducer with both volumetric sweeps and still imaging techniques  FINDINGS: PANCREAS:  Pancreas evaluation is limited AORTA AND IVC:  Visualized portions are normal for patient age  LIVER: Size:  Within normal range  The liver measures 17 3 cm in the midclavicular line  Contour:  Surface contour is smooth  Parenchyma:   There is mild increased echogenicity of the liver suggesting fatty infiltration Limited imaging of the main portal vein shows it to be patent and hepatopetal   BILIARY: Gallbladder is surgically absent No intrahepatic biliary dilatation  CBD measures 4 9 mm  No choledocholithiasis  KIDNEY: Right kidney measures 10 2 x 5 3 cm  Within normal limits  There is a cyst within the right kidney in the lower pole which measures 3 4 x 2 9 x 3 0 cm  This demonstrates mild septation and nodularity ASCITES:   None  Impression: No biliary dilation seen Increased echogenicity of the liver suggests fatty infiltration 3 4 x 2 9 x 3 centimeters Septated  cyst within the lower pole of the right kidney with mild nodularity  This can be evaluated with the MRI performed on nonemergent basis for further characterization The study was marked in EPIC for significant notification  Workstation performed: [id number]     Vas Lower Limb Venous Duplex Study, Unilateral/limited    Result Date: 7/19/2018  Narrative:  [facility] REPORT CLINICAL: Indications: Patient presents with left lower extremity pain and hip pain x 2 weeks  Risk Factors: Patient denies history of injury, DVT and smoking  FINDINGS:  Left     Impression       CFV      Normal (Patent)     CONCLUSION:  Impression: RIGHT LOWER LIMB LIMITED: Patient refused images from this leg  LEFT LOWER LIMB: No evidence of acute or chronic deep vein thrombosis, however difficult obtaining color Doppler in one of the posterior tibial veins secondary to small lumen vs thrombus  Area was difficult to visualize due to the below tech note  No evidence of superficial thrombophlebitis noted  Doppler evaluation shows a normal response to augmentation maneuvers  Popliteal, posterior tibial and anterior tibial arterial Doppler waveforms are triphasic  TECH NOTE: Difficult/ limited exam secondary to patient in severe pain, unable to bend leg into proper positioning, movement, edema and body habitus   Unable to visualize peroneal veins  Technical findings were given to Dr Frankey Mole at the time of the exam   SIGNATURE: Electronically Signed by: Dante Hdez MD, [address] on 2018-07-19 10:11:10 PM    Vas Lower Limb Venous Duplex Study, Complete Bilateral    Result Date: 8/10/2018  Narrative:  [facility] REPORT CLINICAL: Indications: Dyspnea [R06 02]  Patient admitted with sepsis, left leg cellulitis  She developed dyspnea and hypoxia, and she has abnormal D-Dimer  Risk factors:  Obesity  FINDINGS:  Segment  Right            Left              Impression       Impression       CFV      Normal (Patent)  Normal (Patent)     CONCLUSION:  Impression: RIGHT LOWER LIMB: No evidence of acute or chronic deep vein thrombosis  No evidence of superficial thrombophlebitis noted  Doppler evaluation shows a normal response to augmentation maneuvers  Popliteal, posterior tibial and anterior tibial arterial Doppler waveforms are triphasic/biphasic  LEFT LOWER LIMB: No evidence of acute or chronic deep vein thrombosis  The peroneal veins were not well identified/imaged  No evidence of superficial thrombophlebitis noted  Doppler evaluation shows a normal response to augmentation maneuvers  Popliteal, posterior tibial and anterior tibial arterial Doppler waveforms are triphasic  Technical findings were given to "Kaitlynn Glow  Tech Note: This study was technically difficult/limited due to patient body habitus  SIGNATURE: Electronically Signed by: Sharla Hakw on 2018-08-10 12:41:44 PM    Imaging Reports Reviewed Today Include:  Reviewed right upper quadrant ultrasound also chest x-rays and CT imaging along with vascular imaging  Imaging Personally Reviewed by Myself Includes:  Now  Procedure: Vas Lower Limb Venous Duplex Study, Complete Bilateral    Result Date: 8/10/2018  Narrative:  [facility] REPORT CLINICAL: Indications: Dyspnea [R06 02]  Patient admitted with sepsis, left leg cellulitis   She developed dyspnea and hypoxia, and she has abnormal D-Dimer  Risk factors:  Obesity  FINDINGS:  Segment  Right            Left              Impression       Impression       CFV      Normal (Patent)  Normal (Patent)     CONCLUSION:  Impression: RIGHT LOWER LIMB: No evidence of acute or chronic deep vein thrombosis  No evidence of superficial thrombophlebitis noted  Doppler evaluation shows a normal response to augmentation maneuvers  Popliteal, posterior tibial and anterior tibial arterial Doppler waveforms are triphasic/biphasic  LEFT LOWER LIMB: No evidence of acute or chronic deep vein thrombosis  The peroneal veins were not well identified/imaged  No evidence of superficial thrombophlebitis noted  Doppler evaluation shows a normal response to augmentation maneuvers  Popliteal, posterior tibial and anterior tibial arterial Doppler waveforms are triphasic  Technical findings were given to "Fabrice Matias  Tech Note: This study was technically difficult/limited due to patient body habitus    SIGNATURE: Electronically Signed by: Kevin Garcia on 2018-08-10 12:41:44 PM       Recent Cultures (last 7 days):           Last 24 Hours Medication List:     Current Facility-Administered Medications:  amLODIPine 5 mg Oral Q12H [address] [facility], CRNP   apixaban 5 mg Oral BID Zohaib Alonso DO   benzonatate 100 mg Oral TID PRN Janie Schwalbe, CRNP   bisacodyl 10 mg Rectal Daily PRN Janie Schwalbe, GAGENP   cloNIDine 0 1 mg Oral Q12H [address] [facility] MORIS Garcia   docusate sodium 100 mg Oral BID Janie Schwalbe, GAGENP   fentanyl citrate (PF) 25 mcg Intravenous Once Mariela Kirby MD   furosemide 40 mg Oral Daily Von Turcios MD   HYDROmorphone 0 2 mg Intravenous Q6H PRN Janie Schwalbe, CRNP   lidocaine 2 patch Transdermal Daily Janie Schwalbe, GAGENP   melatonin 3 mg Oral HS Janie Kuldipalbe, CRNP   metoprolol succinate 25 mg Oral Q12H [facility] Jose PA-C   nystatin  Topical TID Ajnie Schwalbe, JONO   ondansetron 4 mg Intravenous Q6H PRN Tedra Keep, CRNP   polyethylene glycol 17 g Oral Daily Tedra Keep, CRNP   senna 2 tablet Oral Daily Tedra Keep, GAGENP        Today, Patient Was Seen By: Tye Chaves MD    ** Please Note: Dragon 360 Dictation voice to text software may have been used in the creation of this document   **

## 2018-08-17 NOTE — PLAN OF CARE
Problem: OCCUPATIONAL THERAPY ADULT  Goal: Performs self-care activities at highest level of function for planned discharge setting  See evaluation for individualized goals  Treatment Interventions: ADL retraining, Functional transfer training, UE strengthening/ROM, Endurance training, Patient/family training, Equipment evaluation/education, Compensatory technique education, Energy conservation, Activityengagement          See flowsheet documentation for full assessment, interventions and recommendations  Outcome: Progressing  Limitation: Decreased ADL status, Decreased UE strength, Decreased endurance, Decreased self-care trans, Decreased high-level ADLs  Prognosis: Fair  Assessment: Pt seen for OT treatment session this AM focusing on functional activity tolerance, bed mobility, ADLs, and transfer trials  Pt alert and cooperative throughout session  Pt lying supine at start of session, requiring Mod A of 2 for supine>sit 2* assist for LE management and trunk control  Transfers (sit<>stand) completed with Mod A of 2 with use of RW for first trial, however pt unable to take steps at this time  Therefore, utilized [facility] for sit<>stand transfer to move pt from EOB to Bedside recliner with Mod A of 2  Cues required for safe technique and hand placement during all transfers  ADL routine completed while seated OOB in chair  UB bathing completed with Min A 2* assist for thoroughness  LB bathing completed with Max A  Pt able to wash B/L upper legs, however required assist to wash B/L lower legs, B/L feet, perineal area, and buttocks 2* decreased functional reach and pt requiring BUE support on RW when standing  UB dressing completed with Min A 2* assist to bring gown down/around in back  LB dressing completed with Total A to don/doff B/L socks 2* decreased functional reach  Pt declined use of LH AE at this time  Light grooming tasks completed with Supervision 2* setup required and increased time to complete   Pt seated OOB in chair at end of session with call bell and phone within reach  All needs met and pt reports no further questions for OT at this time  Continue to recommend STR upon D/C  OT to follow pt on caseload       OT Discharge Recommendation: Short Term Rehab  OT - OK to Discharge: Yes (when medically cleared to rehab)

## 2018-08-17 NOTE — PLAN OF CARE
Problem: DISCHARGE PLANNING - CARE MANAGEMENT  Goal: Discharge to post-acute care or home with appropriate resources  INTERVENTIONS:  - Conduct assessment to determine patient/family and health care team treatment goals, and need for post-acute services based on payer coverage, community resources, and patient preferences, and barriers to discharge  - Address psychosocial, clinical, and financial barriers to discharge as identified in assessment in conjunction with the patient/family and health care team  - Arrange appropriate level of post-acute services according to patients   needs and preference and payer coverage in collaboration with the physician and health care team  - Communicate with and update the patient/family, physician, and health care team regarding progress on the discharge plan  - Arrange appropriate transportation to post-acute venues   Outcome: Adequate for Discharge  -Pt is cleared for discharge to [address] [place] [address] at [facility] transport setup for 2:30om pick-up

## 2018-08-30 NOTE — ED ATTENDING ATTESTATION
Mis Zambrano MD, saw and evaluated the patient  I have discussed the patient with the resident/non-physician practitioner and agree with the resident's/non-physician practitioner's findings, Plan of Care, and MDM as documented in the resident's/non-physician practitioner's note, except where noted  All available labs and Radiology studies were reviewed  At this point I agree with the current assessment done in the Emergency Department  I have conducted an independent evaluation of this patient a history and physical is as follows:    80-year-old nursing home resident presented for evaluation of fever  Reports fever this evening of 102° F  She reports worsening cough productive of green sputum and worsening left swelling and erythema of her left lower extremity  She has been staying at the nursing facility after discharge from the hospital a few weeks ago after being treated for cellulitis and pneumonia  Now  Cough has been present for a few days, getting worse  Denies any chest pain  Has mild to moderate discomfort left lower extremity where the erythema is present  No other modifying factors or radiation of symptoms  Past medical history includes hypertension  He physical exam:  GCS is 15, nonfocal   Regular rate and rhythm, lungs with bilateral rales, worse on the right  Mild tachypnea and oxygen saturation in the high 80s on room air  This improved with supplemental oxygen  Abdomen is soft and nontender  There is mild to moderate edema of the left lower extremity erythema and warmth, worsening per patient  Other extremities are nontender  Impression plan:  80-year-old female with fever, tachycardia likely secondary to pneumonia versus cellulitis  Will check laboratory studies, plain radiographs, reassess  Patient will most likely require admission to the hospital for IV antibiotics and further evaluation/treatment    She did require supplemental oxygen for hypoxic respiratory failure in the emergency department  Fortunately after supplemental oxygen she remained stable during her emergency department stay  Critical Care Time  The patient presented with a condition in which there was a high probability of imminent or life-threatening deterioration, and critical care services (excluding separately billable procedures) totalled 30-74 minutes          Procedures

## 2018-09-27 ENCOUNTER — OFFICE VISIT (OUTPATIENT)
Dept: CARDIOLOGY CLINIC | Facility: CLINIC | Age: 83
End: 2018-09-27
Payer: COMMERCIAL

## 2018-09-27 VITALS
HEIGHT: 63 IN | OXYGEN SATURATION: 97 % | SYSTOLIC BLOOD PRESSURE: 132 MMHG | BODY MASS INDEX: 41.98 KG/M2 | HEART RATE: 82 BPM | DIASTOLIC BLOOD PRESSURE: 82 MMHG

## 2018-09-27 DIAGNOSIS — I35.0 NONRHEUMATIC AORTIC VALVE STENOSIS: ICD-10-CM

## 2018-09-27 DIAGNOSIS — I50.32 CHRONIC DIASTOLIC HEART FAILURE (HCC): Primary | ICD-10-CM

## 2018-09-27 DIAGNOSIS — I48.91 ATRIAL FIBRILLATION, UNSPECIFIED TYPE (HCC): ICD-10-CM

## 2018-09-27 DIAGNOSIS — I10 ESSENTIAL HYPERTENSION: ICD-10-CM

## 2018-09-27 PROCEDURE — 93000 ELECTROCARDIOGRAM COMPLETE: CPT | Performed by: NURSE PRACTITIONER

## 2018-09-27 PROCEDURE — 99215 OFFICE O/P EST HI 40 MIN: CPT | Performed by: NURSE PRACTITIONER

## 2018-09-27 RX ORDER — LANOLIN ALCOHOL/MO/W.PET/CERES
3 CREAM (GRAM) TOPICAL
COMMUNITY

## 2018-09-27 RX ORDER — ERGOCALCIFEROL 1.25 MG/1
50000 CAPSULE ORAL WEEKLY
COMMUNITY
End: 2018-09-27 | Stop reason: CLARIF

## 2018-09-27 RX ORDER — METOPROLOL SUCCINATE 25 MG/1
25 TABLET, EXTENDED RELEASE ORAL 2 TIMES DAILY
COMMUNITY
End: 2019-01-09 | Stop reason: HOSPADM

## 2018-09-27 RX ORDER — LANOLIN ALCOHOL/MO/W.PET/CERES
CREAM (GRAM) TOPICAL AS NEEDED
COMMUNITY
End: 2022-01-01

## 2018-09-27 RX ORDER — HYDROCODONE BITARTRATE AND ACETAMINOPHEN 5; 325 MG/1; MG/1
1 TABLET ORAL EVERY 6 HOURS PRN
COMMUNITY
End: 2019-07-01

## 2018-09-27 RX ORDER — DICLOFENAC SODIUM 25 MG/1
50 TABLET, DELAYED RELEASE ORAL 2 TIMES DAILY
COMMUNITY
End: 2018-09-27 | Stop reason: CLARIF

## 2018-09-27 RX ORDER — LIDOCAINE 40 MG/G
CREAM TOPICAL AS NEEDED
COMMUNITY
End: 2018-09-27 | Stop reason: CLARIF

## 2018-09-27 RX ORDER — GLUCOSAMINE/D3/BOSWELLIA SERRA 1500MG-400
TABLET ORAL
COMMUNITY
End: 2018-09-27 | Stop reason: CLARIF

## 2018-09-27 RX ORDER — NYSTATIN 100000 [USP'U]/G
POWDER TOPICAL AS NEEDED
COMMUNITY
End: 2022-01-01

## 2018-09-27 RX ORDER — LEVALBUTEROL INHALATION SOLUTION 1.25 MG/3ML
1.25 SOLUTION RESPIRATORY (INHALATION) 3 TIMES DAILY
COMMUNITY

## 2018-09-27 RX ORDER — LISINOPRIL AND HYDROCHLOROTHIAZIDE 25; 20 MG/1; MG/1
1 TABLET ORAL DAILY
COMMUNITY
End: 2018-09-27 | Stop reason: CLARIF

## 2018-09-27 RX ORDER — FUROSEMIDE 40 MG/1
40 TABLET ORAL DAILY
COMMUNITY
End: 2018-12-27 | Stop reason: SDUPTHER

## 2018-09-27 RX ORDER — POTASSIUM CHLORIDE 750 MG/1
20 CAPSULE, EXTENDED RELEASE ORAL 2 TIMES DAILY
COMMUNITY
End: 2020-10-09

## 2018-09-27 NOTE — PATIENT INSTRUCTIONS
Maintain a 2 gram daily sodium diet and 1500 ml daily fluid restriction  Check daily weights  If you gain 3 pounds in one day, 5 pounds in one week, or experience worsening shortness of breath or increasing lower leg swelling  Please call the heart failure office at [phone]    Please bring a  list of your current medications and daily weights to the office visit

## 2018-09-27 NOTE — PROGRESS NOTES
Heart Failure Office Visit   Emeka Vigil 80 y o  female MRN: [id number]    [facility]   Ms Tiny Mcknight is an 80year old famale with a known past medical history of hypertension, hyperlipidemia, osteoarthritis of both hips, and morbid obesity, recently being treated for lower extremity cellulitis and pneumonia  Ms Alo Ray resides at [address] [place], [place] [address]  Ms Alo Ray was recently hospitalized at [place] [address] on 8/08-8/17/18 with Acute respiratory failure with hypoxia  She presented from her extended care facility with a fever of 102, shortness of breath productive sputum green in color and worsening bilateral lower extremity edema  She presented with acute respiratory failure with hypoxia  Oxygen saturation on RA 88%  She was placed on oxygen 2 L nasal cannula with oxygen saturation of 92-94%  She was found have a new diagnosis of atrial fibrillation and was started on metoprolol succinate anticoagulation was indicated for [id number]=5 and placed on Xarelo  TTE technically difficult study, showed right atrium not well visualized, moderate mitral valve stenosis mean gradient 16 mmHg moderate aortic valve stenosis DANIELLA 1 3 cm2 mild aortic valve regurgitation  To is felt to have health Care acquired pneumonia and treated empirically with an 7 vancomycin  She was found to have acute on chronic diastolic heart failure and was diuresed with IV Lasix and transitioned to oral Lasix  Unknown discharge weight  Today Leonardo Gilman presents our office for a recent hospitalization follow-up visit  She is accompanied by her granddaughter  She is residing at [address] [place], [place] [address]  She is sitting in a wheelchair  She denies dyspnea with minimal moderate exertion chest pain palpitations lightheadedness or dizziness  Leonardo Gilman states she cannot stand and walk  I provided in-depth education in regards to diastolic heart failure and self-care management with a 2 g sodium diet and fluid restriction    No recent lab studies  Patient Active Problem List   Diagnosis    Cellulitis    Hypertension    Hip pain    Severe sepsis (HCC)    Morbid (severe) obesity due to excess calories (HCC)    Hyperlipidemia    Transaminitis    Cellulitis of left lower extremity    Hyponatremia    Osteoarthritis of both hips    Acute [facility] failure with hypoxia ([facility] )    Tinea corporis    Acute retention of urine    ANSELMO (acute kidney injury) ([facility] )    Abnormal finding on EKG    Acute bronchospasm    Paroxysmal atrial fibrillation (HCC)    Acute on chronic diastolic (congestive) heart failure (HCC)    Vulvovaginal candidiasis    Chronic diastolic heart failure (HCC)       Review of Systems   Musculoskeletal: Positive for arthritis and muscle weakness  Objective:   Vitals:   Vitals:    09/27/18 1104   BP: 132/82   BP Location: Left arm   Patient Position: Sitting   Cuff Size: Large   Pulse: 82   SpO2: 97%   Height: 5' 2 5" (1 588 m)     Body mass index is 41 98 kg/m²      Wt Readings from Last 3 Encounters:   08/17/18 106 kg (233 lb 4 oz)   07/19/18 121 kg (267 lb 3 2 oz)   07/19/18 120 kg (265 lb 3 4 oz)         Physical Exam:  GEN: Tony Speak morbidly obese female, appears well, alert and oriented x 3, pleasant and cooperative   HEENT: pupils equal, round, and reactive to light; extraocular muscles intact  NECK: supple, no carotid bruits   HEART: irregular rate and rhythm, normal S1 and S2, /6 JOSE distant heart sounds,  JVP is flat   LUNGS: bibasilar ralesi   ABDOMEN: Morbidly obese, normal bowel sounds, soft, no tenderness, no distention  EXTREMITIES: peripheral pulses normal; no clubbing, cyanosis, 1+-2+ santosh LE edema, LLE with slight erythema, cool to touch  NEURO: no focal findings   SKIN: normal without suspicious lesions on exposed skin      Current Outpatient Prescriptions:     amLODIPine (NORVASC) 5 mg tablet, Take 5 mg by mouth every 12 (twelve) hours  , Disp: , Rfl:     Biotin 22572 MCG TABS, Take by mouth, Disp: , Rfl:     cloNIDine (CATAPRES) 0 2 mg tablet, Take 0 1 mg by mouth 3 (three) times a day  , Disp: , Rfl:     diclofenac (VOLTAREN) 25 MG EC tablet, Take 50 mg by mouth 2 (two) times a day, Disp: , Rfl:     ergocalciferol (VITAMIN D2) 50,000 units, Take 50,000 Units by mouth once a week, Disp: , Rfl:     lidocaine (ASPERCREME W/LIDOCAINE) 4 % cream, Apply topically as needed for mild pain, Disp: , Rfl:     lisinopril-hydrochlorothiazide (PRINZIDE,ZESTORETIC) 20-25 MG per tablet, Take 1 tablet by mouth daily, Disp: , Rfl:     Polyethyl Glycol-Propyl Glycol (SYSTANE) 0 4-0 3 % GEL, Apply to eye, Disp: , Rfl:     RUTIN PO, Take by mouth, Disp: , Rfl:       Labs & Results:                  Assessment/Plan:   1  Chronic diastolic heart failure NYHA class III stage C- on physical exam appears decompensated heart rate and BP controlled,  Increase Lasix from 40 mg daily to 40 mg b i d  X3 days and increase K-Dur 10 mEq  4 times a day to K dur 20meq 4 times a day x 3 days then return to previous dosing  Daily weights  In view gain 3 pounds in one day, 5 pounds in one week, experience worsening shortness of breath or lower extremity swelling  Please call the heart failure office at [phone]  BMP, NT pro BNP in one week   2  Persistent Atrial Fibrillation [id number] - EKG confirms continue atrial fibrillation controlled ventricular response continue on metoprolol tartrate 25 mg q 12 hours and Eliquis 5 mg 2 times a day  3  HTN- Controlled on Clonidine 0 1mg TID and Metoprolol tartrate 25mg Q12 hours   4   Mod AS DANIELLA 1 32 cm2- continue to monitor   Follow up with Dr Dimple Ogden in 4 weeks in [address] [place] [address]  9/27/2018  11:17 AM

## 2018-09-29 PROBLEM — I35.0 NONRHEUMATIC AORTIC VALVE STENOSIS: Status: ACTIVE | Noted: 2018-09-29

## 2018-10-02 LAB
BNP SERPL-MCNC: 138 PG/ML
BUN SERPL-MCNC: 12 MG/DL (ref 7–25)
BUN/CREAT SERPL: 13 (CALC) (ref 6–22)
CALCIUM SERPL-MCNC: 9.5 MG/DL (ref 8.6–10.4)
CHLORIDE SERPL-SCNC: 102 MMOL/L (ref 98–110)
CO2 SERPL-SCNC: 28 MMOL/L (ref 20–32)
CREAT SERPL-MCNC: 0.89 MG/DL (ref 0.6–0.88)
GLUCOSE SERPL-MCNC: 102 MG/DL (ref 65–139)
POTASSIUM SERPL-SCNC: 3.9 MMOL/L (ref 3.5–5.3)
SL AMB EGFR AFRICAN AMERICAN: 68 ML/MIN/1.73M2
SL AMB EGFR NON AFRICAN AMERICAN: 59 ML/MIN/1.73M2
SODIUM SERPL-SCNC: 142 MMOL/L (ref 135–146)

## 2018-12-27 ENCOUNTER — OFFICE VISIT (OUTPATIENT)
Dept: CARDIOLOGY CLINIC | Facility: CLINIC | Age: 83
End: 2018-12-27
Payer: COMMERCIAL

## 2018-12-27 VITALS — HEART RATE: 76 BPM | SYSTOLIC BLOOD PRESSURE: 146 MMHG | DIASTOLIC BLOOD PRESSURE: 90 MMHG

## 2018-12-27 DIAGNOSIS — I48.91 ATRIAL FIBRILLATION, UNSPECIFIED TYPE (HCC): Primary | ICD-10-CM

## 2018-12-27 PROCEDURE — 93000 ELECTROCARDIOGRAM COMPLETE: CPT | Performed by: INTERNAL MEDICINE

## 2018-12-27 PROCEDURE — 99214 OFFICE O/P EST MOD 30 MIN: CPT | Performed by: INTERNAL MEDICINE

## 2018-12-27 RX ORDER — FUROSEMIDE 40 MG/1
80 TABLET ORAL DAILY
Qty: 60 TABLET | Refills: 5 | Status: SHIPPED | OUTPATIENT
Start: 2018-12-27 | End: 2020-10-09

## 2018-12-27 NOTE — LETTER
2018     [address]  [address] [place] [address]    Patient: Ladarius Pettit   YOB: 1933   Date of Visit: 2018       Dear Dr Aline Hall: Thank you for referring Wade Maynard to me for evaluation  Below are my notes for this consultation  If you have questions, please do not hesitate to call me  I look forward to following your patient along with you  Sincerely,        Redgie Riedel, MD        CC: No Recipients  Redgie Riedel, MD  2018  2:18 PM  Sign at close encounter  [address] [facility] [place]  [address]  [facility] [facility]  [phone]    Cardiology Follow up    Patient:  Ladarius Pettit  :  1933  MRN:  [id number]    History of Present Illness:     80-year-old female with past medical history of hypertension, dyslipidemia, atrial fibrillation, mild to moderate aortic stenosis, acute on chronic diastolic heart failure, venous insufficiency status post ablation presents for cardiology follow-up  She notes dyspnea on exertion even with limited exertion such as speaking or syncope  She notes no chest pain, palpitations, dizziness, or syncope          Patient Active Problem List   Diagnosis    Cellulitis    Hypertension    Hip pain    Severe sepsis (HCC)    Morbid (severe) obesity due to excess calories (HCC)    Hyperlipidemia    Transaminitis    Cellulitis of left lower extremity    Hyponatremia    Osteoarthritis of both hips    Acute respiratory failure with hypoxia ([address] )    Tinea corporis    Acute retention of urine    ANSELMO (acute kidney injury) ([address] )    Abnormal finding on EKG    Acute bronchospasm    Paroxysmal atrial fibrillation (HCC)    Acute on chronic diastolic (congestive) heart failure (HCC)    Vulvovaginal candidiasis    Chronic diastolic heart failure ([address] )    Nonrheumatic aortic valve stenosis       Past Surgical History  Past Surgical History:   Procedure Laterality Date    ANKLE FRACTURE SURGERY Left     CHOLECYSTECTOMY      REPLACEMENT TOTAL KNEE BILATERAL Bilateral        Social History   Social History     Social History    Marital status:      Spouse name: N/A    Number of children: N/A    Years of education: N/A     Occupational History    Not on file  Social History Main Topics    Smoking status: Never Smoker    Smokeless tobacco: Never Used    Alcohol use No    Drug use: No    Sexual activity: Not on file     Other Topics Concern    Not on file     Social History Narrative    No narrative on file        No Known Allergies    Family History   No family history on file  Review of Systems:  Review of Systems   Constitutional: Negative for chills, fatigue and fever  HENT: Negative for hearing loss, nosebleeds and tinnitus  Eyes: Negative for pain  Respiratory: Negative for cough, chest tightness and shortness of breath  Cardiovascular: Negative for chest pain, palpitations and leg swelling  Gastrointestinal: Negative for abdominal pain, nausea and vomiting  Endocrine: Negative for cold intolerance and heat intolerance  Genitourinary: Negative for difficulty urinating, hematuria and vaginal bleeding  Musculoskeletal: Negative for arthralgias  Skin: Negative for rash  Allergic/Immunologic: Negative for environmental allergies  Neurological: Negative for dizziness, syncope, weakness and light-headedness  Psychiatric/Behavioral: Negative for decreased concentration and sleep disturbance  The patient is not nervous/anxious            Current Outpatient Prescriptions:     apixaban (ELIQUIS) 5 mg, Take 5 mg by mouth 2 (two) times a day, Disp: , Rfl:     cloNIDine (CATAPRES) 0 2 mg tablet, Take 0 1 mg by mouth 3 (three) times a day  , Disp: , Rfl:     furosemide (LASIX) 40 mg tablet, Take 40 mg by mouth daily, Disp: , Rfl:     HYDROcodone-acetaminophen (NORCO) 5-325 mg per tablet, Take 1 tablet by mouth every 6 (six) hours as needed for pain, Disp: , Rfl:     levalbuterol (XOPENEX) 1 25 mg/3 mL nebulizer solution, Take 1 25 mg by nebulization 3 (three) times a day, Disp: , Rfl:     melatonin 3 mg, Take 3 mg by mouth daily at bedtime, Disp: , Rfl:     metoprolol succinate (TOPROL-XL) 25 mg 24 hr tablet, Take 25 mg by mouth 2 (two) times a day, Disp: , Rfl:     nystatin (MYCOSTATIN) powder, Apply topically 4 (four) times a day, Disp: , Rfl:     Polyethyl Glycol-Propyl Glycol (SYSTANE) 0 4-0 3 % GEL, Apply to eye, Disp: , Rfl:     potassium chloride (MICRO-K) 10 MEQ CR capsule, Take 10 mEq by mouth 4 (four) times a day, Disp: , Rfl:     white petrolatum-mineral oil (EUCERIN,HYDROCERIN) cream, Apply topically as needed, Disp: , Rfl:      Physical Exam:    There were no vitals filed for this visit  Physical Exam   Constitutional: She is oriented to person, place, and time  She appears well-developed and well-nourished  HENT:   Head: Normocephalic  Right Ear: External ear normal    Left Ear: External ear normal    Mouth/Throat: Oropharynx is clear and moist    Eyes: Pupils are equal, round, and reactive to light  Neck: JVD present  Carotid bruit is not present  Cardiovascular: Normal rate, regular rhythm and intact distal pulses  Exam reveals no gallop and no friction rub  No murmur heard  Pulmonary/Chest: Effort normal and breath sounds normal  No tachypnea  No respiratory distress  She has no wheezes  She has no rales  She exhibits no tenderness  Abdominal: Soft  She exhibits no distension  There is no tenderness  There is no rebound and no guarding  Musculoskeletal: She exhibits edema  Neurological: She is alert and oriented to person, place, and time  Skin: Skin is warm and dry  Psychiatric: She has a normal mood and affect  Her behavior is normal  Judgment and thought content normal    Nursing note and vitals reviewed  Labs:not applicable    Assessment/Plan:    1    Persistent atrial fibrillation  We discussed the risks and benefits of cardioversion she and her family would like to proceed  I have asked her to check TSH and hepatic function tests beforehand  We will likely start amiodarone after cardioversion  2   Chronic diastolic heart failure  I have asked her to increase her Lasix to 80 mg daily  Will follow up with a basic metabolic panel about 2 weeks  3   Hypertension  This is generally controlled  4   Mild to moderate aortic stenosis  Will need to periodically monitor this  I would like to see her back in 6-8 weeks  Thank you so much, please do not hesitate to contact me if any questions or concerns        Kristin Garcia MD  12/27/2018  1:41 PM

## 2018-12-27 NOTE — PROGRESS NOTES
[address] [facility] [place]  [address]  [facility] [facility]  [phone]    Cardiology Follow up    Patient:  Aleisha Benites  :  1933  MRN:  [id number]    History of Present Illness:     80-year-old female with past medical history of hypertension, dyslipidemia, atrial fibrillation, mild to moderate aortic stenosis, acute on chronic diastolic heart failure, venous insufficiency status post ablation presents for cardiology follow-up  She notes dyspnea on exertion even with limited exertion such as speaking or syncope  She notes no chest pain, palpitations, dizziness, or syncope  Patient Active Problem List   Diagnosis    Cellulitis    Hypertension    Hip pain    Severe sepsis (HCC)    Morbid (severe) obesity due to excess calories (HCC)    Hyperlipidemia    Transaminitis    Cellulitis of left lower extremity    Hyponatremia    Osteoarthritis of both hips    Acute respiratory failure with hypoxia ([address] )    Tinea corporis    Acute retention of urine    ANSELMO (acute kidney injury) ([address] )    Abnormal finding on EKG    Acute bronchospasm    Paroxysmal atrial fibrillation (HCC)    Acute on chronic diastolic (congestive) heart failure (HCC)    Vulvovaginal candidiasis    Chronic diastolic heart failure (HCC)    Nonrheumatic aortic valve stenosis       Past Surgical History  Past Surgical History:   Procedure Laterality Date    ANKLE FRACTURE SURGERY Left     CHOLECYSTECTOMY      REPLACEMENT TOTAL KNEE BILATERAL Bilateral        Social History   Social History     Social History    Marital status:      Spouse name: N/A    Number of children: N/A    Years of education: N/A     Occupational History    Not on file       Social History Main Topics    Smoking status: Never Smoker    Smokeless tobacco: Never Used    Alcohol use No    Drug use: No    Sexual activity: Not on file     Other Topics Concern    Not on file     Social History Narrative    No narrative on file        No Known Allergies    Family History   No family history on file  Review of Systems:  Review of Systems   Constitutional: Negative for chills, fatigue and fever  HENT: Negative for hearing loss, nosebleeds and tinnitus  Eyes: Negative for pain  Respiratory: Negative for cough, chest tightness and shortness of breath  Cardiovascular: Negative for chest pain, palpitations and leg swelling  Gastrointestinal: Negative for abdominal pain, nausea and vomiting  Endocrine: Negative for cold intolerance and heat intolerance  Genitourinary: Negative for difficulty urinating, hematuria and vaginal bleeding  Musculoskeletal: Negative for arthralgias  Skin: Negative for rash  Allergic/Immunologic: Negative for environmental allergies  Neurological: Negative for dizziness, syncope, weakness and light-headedness  Psychiatric/Behavioral: Negative for decreased concentration and sleep disturbance  The patient is not nervous/anxious            Current Outpatient Prescriptions:     apixaban (ELIQUIS) 5 mg, Take 5 mg by mouth 2 (two) times a day, Disp: , Rfl:     cloNIDine (CATAPRES) 0 2 mg tablet, Take 0 1 mg by mouth 3 (three) times a day  , Disp: , Rfl:     furosemide (LASIX) 40 mg tablet, Take 40 mg by mouth daily, Disp: , Rfl:     HYDROcodone-acetaminophen (NORCO) 5-325 mg per tablet, Take 1 tablet by mouth every 6 (six) hours as needed for pain, Disp: , Rfl:     levalbuterol (XOPENEX) 1 25 mg/3 mL nebulizer solution, Take 1 25 mg by nebulization 3 (three) times a day, Disp: , Rfl:     melatonin 3 mg, Take 3 mg by mouth daily at bedtime, Disp: , Rfl:     metoprolol succinate (TOPROL-XL) 25 mg 24 hr tablet, Take 25 mg by mouth 2 (two) times a day, Disp: , Rfl:     nystatin (MYCOSTATIN) powder, Apply topically 4 (four) times a day, Disp: , Rfl:     Polyethyl Glycol-Propyl Glycol (SYSTANE) 0 4-0 3 % GEL, Apply to eye, Disp: , Rfl:     potassium chloride (MICRO-K) 10 MEQ CR capsule, Take 10 mEq by mouth 4 (four) times a day, Disp: , Rfl:     white petrolatum-mineral oil (EUCERIN,HYDROCERIN) cream, Apply topically as needed, Disp: , Rfl:      Physical Exam:    There were no vitals filed for this visit  Physical Exam   Constitutional: She is oriented to person, place, and time  She appears well-developed and well-nourished  HENT:   Head: Normocephalic  Right Ear: External ear normal    Left Ear: External ear normal    Mouth/Throat: Oropharynx is clear and moist    Eyes: Pupils are equal, round, and reactive to light  Neck: JVD present  Carotid bruit is not present  Cardiovascular: Normal rate, regular rhythm and intact distal pulses  Exam reveals no gallop and no friction rub  No murmur heard  Pulmonary/Chest: Effort normal and breath sounds normal  No tachypnea  No respiratory distress  She has no wheezes  She has no rales  She exhibits no tenderness  Abdominal: Soft  She exhibits no distension  There is no tenderness  There is no rebound and no guarding  Musculoskeletal: She exhibits edema  Neurological: She is alert and oriented to person, place, and time  Skin: Skin is warm and dry  Psychiatric: She has a normal mood and affect  Her behavior is normal  Judgment and thought content normal    Nursing note and vitals reviewed  Labs:not applicable    Assessment/Plan:    1  Persistent atrial fibrillation  We discussed the risks and benefits of cardioversion she and her family would like to proceed  I have asked her to check TSH and hepatic function tests beforehand  We will likely start amiodarone after cardioversion  2   Chronic diastolic heart failure  I have asked her to increase her Lasix to 80 mg daily  Will follow up with a basic metabolic panel about 2 weeks  3   Hypertension  This is generally controlled  4   Mild to moderate aortic stenosis  Will need to periodically monitor this      I would like to see her back in 6-8 weeks     Thank you so much, please do not hesitate to contact me if any questions or concerns        Redgie Riedel, MD  12/27/2018  1:41 PM

## 2019-01-04 LAB
ALBUMIN SERPL-MCNC: 4.2 G/DL (ref 3.6–5.1)
ALBUMIN/GLOB SERPL: 1.6 (CALC) (ref 1–2.5)
ALP SERPL-CCNC: 92 U/L (ref 33–130)
ALT SERPL-CCNC: 12 U/L (ref 6–29)
AST SERPL-CCNC: 13 U/L (ref 10–35)
BILIRUB DIRECT SERPL-MCNC: 0.1 MG/DL
BILIRUB INDIRECT SERPL-MCNC: 0.5 MG/DL (CALC) (ref 0.2–1.2)
BILIRUB SERPL-MCNC: 0.6 MG/DL (ref 0.2–1.2)
BUN SERPL-MCNC: 23 MG/DL (ref 7–25)
BUN/CREAT SERPL: NORMAL (CALC) (ref 6–22)
CALCIUM SERPL-MCNC: 9.8 MG/DL (ref 8.6–10.4)
CHLORIDE SERPL-SCNC: 103 MMOL/L (ref 98–110)
CO2 SERPL-SCNC: 29 MMOL/L (ref 20–32)
CREAT SERPL-MCNC: 0.84 MG/DL (ref 0.6–0.88)
GLOBULIN SER CALC-MCNC: 2.7 G/DL (CALC) (ref 1.9–3.7)
GLUCOSE SERPL-MCNC: 96 MG/DL (ref 65–99)
POTASSIUM SERPL-SCNC: 3.9 MMOL/L (ref 3.5–5.3)
PROT SERPL-MCNC: 6.9 G/DL (ref 6.1–8.1)
SL AMB EGFR AFRICAN AMERICAN: 73 ML/MIN/1.73M2
SL AMB EGFR NON AFRICAN AMERICAN: 63 ML/MIN/1.73M2
SODIUM SERPL-SCNC: 141 MMOL/L (ref 135–146)
TSH SERPL-ACNC: 2.59 MIU/L (ref 0.4–4.5)

## 2019-01-08 ENCOUNTER — TELEPHONE (OUTPATIENT)
Dept: SURGERY | Facility: HOSPITAL | Age: 84
End: 2019-01-08

## 2019-01-08 RX ORDER — SODIUM CHLORIDE 9 MG/ML
100 INJECTION, SOLUTION INTRAVENOUS ONCE
Status: CANCELLED | OUTPATIENT
Start: 2019-01-09 | End: 2019-01-09

## 2019-01-09 ENCOUNTER — HOSPITAL ENCOUNTER (OUTPATIENT)
Dept: NON INVASIVE DIAGNOSTICS | Facility: HOSPITAL | Age: 84
Discharge: HOME | End: 2019-01-09
Attending: INTERNAL MEDICINE
Payer: COMMERCIAL

## 2019-01-09 ENCOUNTER — ANESTHESIA EVENT (OUTPATIENT)
Dept: NON INVASIVE DIAGNOSTICS | Facility: HOSPITAL | Age: 84
End: 2019-01-09

## 2019-01-09 ENCOUNTER — ANESTHESIA (OUTPATIENT)
Dept: NON INVASIVE DIAGNOSTICS | Facility: HOSPITAL | Age: 84
End: 2019-01-09

## 2019-01-09 VITALS
TEMPERATURE: 97.7 F | WEIGHT: 233 LBS | OXYGEN SATURATION: 96 % | RESPIRATION RATE: 20 BRPM | HEART RATE: 68 BPM | SYSTOLIC BLOOD PRESSURE: 143 MMHG | BODY MASS INDEX: 41.29 KG/M2 | DIASTOLIC BLOOD PRESSURE: 79 MMHG | HEIGHT: 63 IN

## 2019-01-09 DIAGNOSIS — I48.91 ATRIAL FIBRILLATION, UNSPECIFIED TYPE (HCC): ICD-10-CM

## 2019-01-09 DIAGNOSIS — I48.0 PAF (PAROXYSMAL ATRIAL FIBRILLATION) (HCC): Primary | ICD-10-CM

## 2019-01-09 LAB
ANION GAP SERPL CALCULATED.3IONS-SCNC: 12 MMOL/L (ref 4–13)
BUN SERPL-MCNC: 21 MG/DL (ref 5–25)
CALCIUM SERPL-MCNC: 9.4 MG/DL (ref 8.3–10.1)
CHLORIDE SERPL-SCNC: 100 MMOL/L (ref 100–108)
CO2 SERPL-SCNC: 28 MMOL/L (ref 21–32)
CREAT SERPL-MCNC: 0.84 MG/DL (ref 0.6–1.3)
GFR SERPL CREATININE-BSD FRML MDRD: 64 ML/MIN/1.73SQ M
GLUCOSE P FAST SERPL-MCNC: 101 MG/DL (ref 65–99)
GLUCOSE SERPL-MCNC: 101 MG/DL (ref 65–140)
POTASSIUM SERPL-SCNC: 4.2 MMOL/L (ref 3.5–5.3)
SODIUM SERPL-SCNC: 140 MMOL/L (ref 136–145)

## 2019-01-09 PROCEDURE — 93312 ECHO TRANSESOPHAGEAL: CPT

## 2019-01-09 PROCEDURE — 93325 DOPPLER ECHO COLOR FLOW MAPG: CPT | Performed by: INTERNAL MEDICINE

## 2019-01-09 PROCEDURE — 93005 ELECTROCARDIOGRAM TRACING: CPT

## 2019-01-09 PROCEDURE — 80048 BASIC METABOLIC PNL TOTAL CA: CPT | Performed by: PHYSICIAN ASSISTANT

## 2019-01-09 PROCEDURE — 93321 DOPPLER ECHO F-UP/LMTD STD: CPT | Performed by: INTERNAL MEDICINE

## 2019-01-09 PROCEDURE — 93312 ECHO TRANSESOPHAGEAL: CPT | Performed by: INTERNAL MEDICINE

## 2019-01-09 PROCEDURE — 92960 CARDIOVERSION ELECTRIC EXT: CPT

## 2019-01-09 RX ORDER — AMIODARONE HYDROCHLORIDE 200 MG/1
200 TABLET ORAL DAILY
Qty: 30 TABLET | Refills: 5 | Status: SHIPPED | OUTPATIENT
Start: 2019-01-09 | End: 2020-10-09

## 2019-01-09 RX ORDER — FENTANYL 50 UG/H
1 PATCH TRANSDERMAL
COMMUNITY
End: 2020-10-09 | Stop reason: DRUGHIGH

## 2019-01-09 RX ORDER — PROPOFOL 10 MG/ML
INJECTION, EMULSION INTRAVENOUS AS NEEDED
Status: DISCONTINUED | OUTPATIENT
Start: 2019-01-09 | End: 2019-01-09 | Stop reason: SURG

## 2019-01-09 RX ORDER — SODIUM CHLORIDE 9 MG/ML
100 INJECTION, SOLUTION INTRAVENOUS ONCE
Status: COMPLETED | OUTPATIENT
Start: 2019-01-09 | End: 2019-01-09

## 2019-01-09 RX ORDER — SODIUM CHLORIDE 9 MG/ML
125 INJECTION, SOLUTION INTRAVENOUS CONTINUOUS
Status: DISCONTINUED | OUTPATIENT
Start: 2019-01-09 | End: 2019-01-10 | Stop reason: HOSPADM

## 2019-01-09 RX ORDER — ONDANSETRON 2 MG/ML
INJECTION INTRAMUSCULAR; INTRAVENOUS AS NEEDED
Status: DISCONTINUED | OUTPATIENT
Start: 2019-01-09 | End: 2019-01-09 | Stop reason: SURG

## 2019-01-09 RX ORDER — ONDANSETRON 2 MG/ML
4 INJECTION INTRAMUSCULAR; INTRAVENOUS ONCE AS NEEDED
Status: COMPLETED | OUTPATIENT
Start: 2019-01-09 | End: 2019-01-09

## 2019-01-09 RX ADMIN — SODIUM CHLORIDE: 0.9 INJECTION, SOLUTION INTRAVENOUS at 11:13

## 2019-01-09 RX ADMIN — PROPOFOL 30 MG: 10 INJECTION, EMULSION INTRAVENOUS at 11:25

## 2019-01-09 RX ADMIN — PROPOFOL 20 MG: 10 INJECTION, EMULSION INTRAVENOUS at 11:38

## 2019-01-09 RX ADMIN — ONDANSETRON 4 MG: 2 INJECTION INTRAMUSCULAR; INTRAVENOUS at 12:38

## 2019-01-09 RX ADMIN — PROPOFOL 50 MG: 10 INJECTION, EMULSION INTRAVENOUS at 11:37

## 2019-01-09 RX ADMIN — PROPOFOL 50 MG: 10 INJECTION, EMULSION INTRAVENOUS at 11:40

## 2019-01-09 RX ADMIN — PROPOFOL 20 MG: 10 INJECTION, EMULSION INTRAVENOUS at 11:32

## 2019-01-09 RX ADMIN — PROPOFOL 20 MG: 10 INJECTION, EMULSION INTRAVENOUS at 11:34

## 2019-01-09 RX ADMIN — ONDANSETRON 4 MG: 2 INJECTION INTRAMUSCULAR; INTRAVENOUS at 11:48

## 2019-01-09 RX ADMIN — SODIUM CHLORIDE 100 ML/HR: 0.9 INJECTION, SOLUTION INTRAVENOUS at 10:57

## 2019-01-09 RX ADMIN — PROPOFOL 120 MG: 10 INJECTION, EMULSION INTRAVENOUS at 11:23

## 2019-01-09 RX ADMIN — PROPOFOL 50 MG: 10 INJECTION, EMULSION INTRAVENOUS at 11:29

## 2019-01-09 NOTE — ANESTHESIA PREPROCEDURE EVALUATION
Review of Systems/Medical History  Patient summary reviewed  Chart reviewed      Cardiovascular  Hyperlipidemia, Hypertension , Valvular heart disease , aortic valve stenosis, Dysrhythmias , atrial fibrillation, CHF ,   Comment: Moderate AS  DANIELLA 1 3 cm2,  Pulmonary       GI/Hepatic  Negative GI/hepatic ROS          Negative  ROS        Endo/Other  Negative endo/other ROS      GYN  Negative gynecology ROS          Hematology  Negative hematology ROS      Musculoskeletal    Arthritis     Neurology  Negative neurology ROS      Psychology   Negative psychology ROS              Physical Exam    Airway    Mallampati score: II  TM Distance: >3 FB  Neck ROM: full     Dental   upper dentures and lower dentures,     Cardiovascular  Rhythm: regular, Rate: normal, Murmur, Cardiovascular exam normal    Pulmonary  Pulmonary exam normal Breath sounds clear to auscultation,     Other Findings        Anesthesia Plan  ASA Score- 3     Anesthesia Type- general and IV sedation with anesthesia with ASA Monitors  Additional Monitors:   Airway Plan:         Plan Factors-    Induction-     Postoperative Plan-     Informed Consent- Anesthetic plan and risks discussed with patient

## 2019-01-09 NOTE — DISCHARGE INSTRUCTIONS
Cardioversion   WHAT YOU NEED TO KNOW:   Cardioversion is a procedure that uses medicine or electrical shocks to correct arrhythmias  An arrhythmias is a heartbeat that is too slow, too fast, or irregular  It may prevent your body from getting the blood and oxygen it needs  Your heart has 4 chambers, called the atria and ventricles  The atria are at the top of your heart, and the ventricles are at the bottom of your heart  Most arrhythmias that need cardioversion start in the atria  DISCHARGE INSTRUCTIONS:   Call 911 for any of the following:   · You have any of the following signs of a heart attack:      ¨ Squeezing, pressure, or pain in your chest that lasts longer than 5 minutes or returns    ¨ Discomfort or pain in your back, neck, jaw, stomach, or arm     ¨ Trouble breathing    ¨ Nausea or vomiting    ¨ Lightheadedness or a sudden cold sweat, especially with chest pain or trouble breathing    · You have any of the following signs of a stroke:      ¨ Numbness or drooping on one side of your face     ¨ Weakness in an arm or leg    ¨ Confusion or difficulty speaking    ¨ Dizziness, a severe headache, or vision loss    · You feel lightheaded, short of breath, and have chest pain  · You cough up blood  · You have trouble breathing  Seek care immediately if:   · You feel your heart beating fast or fluttering  · You feel weak or faint  · Your leg or arm is larger than usual, painful, and warm  Contact your healthcare provider if:   · Your skin is itchy, swollen, or you have a rash  · You have questions or concerns about your condition or care  Medicines: You may need any of the following:  · Heart medicines  help control your heart rate and rhythm  · Blood thinners    help prevent blood clots  Examples of blood thinners include heparin and warfarin  Clots can cause strokes, heart attacks, and death   The following are general safety guidelines to follow while you are taking a blood thinner:    ¨ Watch for bleeding and bruising while you take blood thinners  Watch for bleeding from your gums or nose  Watch for blood in your urine and bowel movements  Use a soft washcloth on your skin, and a soft toothbrush to brush your teeth  This can keep your skin and gums from bleeding  If you shave, use an electric shaver  Do not play contact sports  ¨ Tell your dentist and other healthcare providers that you take anticoagulants  Wear a bracelet or necklace that says you take this medicine  ¨ Do not start or stop any medicines unless your healthcare provider tells you to  Many medicines cannot be used with blood thinners  ¨ Tell your healthcare provider right away if you forget to take the medicine, or if you take too much  ¨ Warfarin  is a blood thinner that you may need to take  The following are things you should be aware of if you take warfarin  § Foods and medicines can affect the amount of warfarin in your blood  Do not make major changes to your diet while you take warfarin  Warfarin works best when you eat about the same amount of vitamin K every day  Vitamin K is found in green leafy vegetables and certain other foods  Ask for more information about what to eat when you are taking warfarin  § You will need to see your healthcare provider for follow-up visits when you are on warfarin  You will need regular blood tests  These tests are used to decide how much medicine you need  · Take your medicine as directed  Contact your healthcare provider if you think your medicine is not helping or if you have side effects  Tell him or her if you are allergic to any medicine  Keep a list of the medicines, vitamins, and herbs you take  Include the amounts, and when and why you take them  Bring the list or the pill bottles to follow-up visits  Carry your medicine list with you in case of an emergency  Self-care:   · Rest as directed  Do not drive for at least 24 hours   Ask your healthcare provider when you can return to your normal activities  · Check your heart rate and blood pressure as directed  Ask your healthcare provider what your heart rate and blood pressure should be  · Do not smoke  Nicotine and other chemicals in cigarettes and cigars can cause heart and lung damage  They can also increase your risk for another arrhythmia  Ask your healthcare provider for information if you currently smoke and need help to quit  E-cigarettes or smokeless tobacco still contain nicotine  Talk to your healthcare provider before you use these products  · Eat heart healthy foods  These include fruits, vegetables, whole-grain breads, low-fat dairy products, beans, lean meats, and fish  Replace butter and margarine with heart-healthy oils such as olive oil and canola oil  · Maintain a healthy weight  Ask your healthcare provider how much you should weigh  Ask him to help you create a weight loss plan if you are overweight  Follow up with your healthcare provider as directed:  Write down your questions so you remember to ask them during your visits  © 2017 [facility] Information is for End User's use only and may not be sold, redistributed or otherwise used for commercial purposes  All illustrations and images included in CareNotes® are the copyrighted property of A [employer] A [employer] Inc  or Julien Giles  The above information is an  only  It is not intended as medical advice for individual conditions or treatments  Talk to your doctor, nurse or pharmacist before following any medical regimen to see if it is safe and effective for you  Transesophageal Echocardiogram   WHAT YOU NEED TO KNOW:   A transesophageal echocardiogram (MARYAN) is a procedure used to check for problems with your heart  It will also show any problems in the blood vessels near your heart  Sound waves are sent to the heart through a tube inserted into your throat   The sound waves show the structure and function of your heart through pictures on a monitor  DISCHARGE INSTRUCTIONS:   Rest:  Rest until you are fully awake  You may be sleepy for a while after your procedure  Do not eat or drink until you are able to swallow normally:  Start with soft foods, such as oatmeal, yogurt, or applesauce  Once you can eat soft foods easily, you may begin to eat solid foods  Follow up with your healthcare provider as directed:  Write down your questions so you remember to ask them during your visits  Contact your healthcare provider if:   · You have a fever or chills  · You taste blood in your mouth  · You have a severe sore throat or difficulty swallowing  · You have questions or concerns about your condition or care  Seek care immediately or call 911 if:   · You have any of the following signs of a heart attack:      ¨ Squeezing, pressure, or pain in your chest that lasts longer than 5 minutes or returns    ¨ Discomfort or pain in your back, neck, jaw, stomach, or arm     ¨ Trouble breathing    ¨ Nausea or vomiting    ¨ Lightheadedness or a sudden cold sweat, especially with chest pain or trouble breathing    © 2017 [facility] [address] is for End User's use only and may not be sold, redistributed or otherwise used for commercial purposes  All illustrations and images included in CareNotes® are the copyrighted property of [employer]  or Julien Giles  The above information is an  only  It is not intended as medical advice for individual conditions or treatments  Talk to your doctor, nurse or pharmacist before following any medical regimen to see if it is safe and effective for you

## 2019-01-09 NOTE — PROCEDURES
Patient anesthetized by anesthesia service  No left atrial appendage clot visualized  Mild spontaneous echo contrast noted in the left atrium  Cardioversion successful with a single 150 joule synchronized cardioversion  No complications  Patient returned to PACU in good condition

## 2019-01-09 NOTE — DISCHARGE INSTRUCTIONS
Cardioversion   WHAT YOU NEED TO KNOW:   Cardioversion is a procedure that uses medicine or electrical shocks to correct arrhythmias  An arrhythmias is a heartbeat that is too slow, too fast, or irregular  It may prevent your body from getting the blood and oxygen it needs  Your heart has 4 chambers, called the atria and ventricles  The atria are at the top of your heart, and the ventricles are at the bottom of your heart  Most arrhythmias that need cardioversion start in the atria  DISCHARGE INSTRUCTIONS:   Call 911 for any of the following:   · You have any of the following signs of a heart attack:      ¨ Squeezing, pressure, or pain in your chest that lasts longer than 5 minutes or returns    ¨ Discomfort or pain in your back, neck, jaw, stomach, or arm     ¨ Trouble breathing    ¨ Nausea or vomiting    ¨ Lightheadedness or a sudden cold sweat, especially with chest pain or trouble breathing    · You have any of the following signs of a stroke:      ¨ Numbness or drooping on one side of your face     ¨ Weakness in an arm or leg    ¨ Confusion or difficulty speaking    ¨ Dizziness, a severe headache, or vision loss    · You feel lightheaded, short of breath, and have chest pain  · You cough up blood  · You have trouble breathing  Seek care immediately if:   · You feel your heart beating fast or fluttering  · You feel weak or faint  · Your leg or arm is larger than usual, painful, and warm  Contact your healthcare provider if:   · Your skin is itchy, swollen, or you have a rash  · You have questions or concerns about your condition or care  Medicines: You may need any of the following:  · Heart medicines  help control your heart rate and rhythm  · Blood thinners    help prevent blood clots  Examples of blood thinners include heparin and warfarin  Clots can cause strokes, heart attacks, and death   The following are general safety guidelines to follow while you are taking a blood thinner:    ¨ Watch for bleeding and bruising while you take blood thinners  Watch for bleeding from your gums or nose  Watch for blood in your urine and bowel movements  Use a soft washcloth on your skin, and a soft toothbrush to brush your teeth  This can keep your skin and gums from bleeding  If you shave, use an electric shaver  Do not play contact sports  ¨ Tell your dentist and other healthcare providers that you take anticoagulants  Wear a bracelet or necklace that says you take this medicine  ¨ Do not start or stop any medicines unless your healthcare provider tells you to  Many medicines cannot be used with blood thinners  ¨ Tell your healthcare provider right away if you forget to take the medicine, or if you take too much  ¨ Warfarin  is a blood thinner that you may need to take  The following are things you should be aware of if you take warfarin  § Foods and medicines can affect the amount of warfarin in your blood  Do not make major changes to your diet while you take warfarin  Warfarin works best when you eat about the same amount of vitamin K every day  Vitamin K is found in green leafy vegetables and certain other foods  Ask for more information about what to eat when you are taking warfarin  § You will need to see your healthcare provider for follow-up visits when you are on warfarin  You will need regular blood tests  These tests are used to decide how much medicine you need  · Take your medicine as directed  Contact your healthcare provider if you think your medicine is not helping or if you have side effects  Tell him or her if you are allergic to any medicine  Keep a list of the medicines, vitamins, and herbs you take  Include the amounts, and when and why you take them  Bring the list or the pill bottles to follow-up visits  Carry your medicine list with you in case of an emergency  Self-care:   · Rest as directed  Do not drive for at least 24 hours   Ask your healthcare provider when you can return to your normal activities  · Check your heart rate and blood pressure as directed  Ask your healthcare provider what your heart rate and blood pressure should be  · Do not smoke  Nicotine and other chemicals in cigarettes and cigars can cause heart and lung damage  They can also increase your risk for another arrhythmia  Ask your healthcare provider for information if you currently smoke and need help to quit  E-cigarettes or smokeless tobacco still contain nicotine  Talk to your healthcare provider before you use these products  · Eat heart healthy foods  These include fruits, vegetables, whole-grain breads, low-fat dairy products, beans, lean meats, and fish  Replace butter and margarine with heart-healthy oils such as olive oil and canola oil  · Maintain a healthy weight  Ask your healthcare provider how much you should weigh  Ask him to help you create a weight loss plan if you are overweight  Follow up with your healthcare provider as directed:  Write down your questions so you remember to ask them during your visits  © 2017 [address] [facility] Information is for End User's use only and may not be sold, redistributed or otherwise used for commercial purposes  All illustrations and images included in CareNotes® are the copyrighted property of A [employer] M [employer] Inc  or Julien Giles  The above information is an  only  It is not intended as medical advice for individual conditions or treatments  Talk to your doctor, nurse or pharmacist before following any medical regimen to see if it is safe and effective for you  Transesophageal Echocardiogram   WHAT YOU NEED TO KNOW:   A transesophageal echocardiogram (MARYAN) is a procedure used to check for problems with your heart  It will also show any problems in the blood vessels near your heart  Sound waves are sent to the heart through a tube inserted into your throat   The sound waves show the structure and function of your heart through pictures on a monitor  DISCHARGE INSTRUCTIONS:   Rest:  Rest until you are fully awake  You may be sleepy for a while after your procedure  Do not eat or drink until you are able to swallow normally:  Start with soft foods, such as oatmeal, yogurt, or applesauce  Once you can eat soft foods easily, you may begin to eat solid foods  Follow up with your healthcare provider as directed:  Write down your questions so you remember to ask them during your visits  Contact your healthcare provider if:   · You have a fever or chills  · You taste blood in your mouth  · You have a severe sore throat or difficulty swallowing  · You have questions or concerns about your condition or care  Seek care immediately or call 911 if:   · You have any of the following signs of a heart attack:      ¨ Squeezing, pressure, or pain in your chest that lasts longer than 5 minutes or returns    ¨ Discomfort or pain in your back, neck, jaw, stomach, or arm     ¨ Trouble breathing    ¨ Nausea or vomiting    ¨ Lightheadedness or a sudden cold sweat, especially with chest pain or trouble breathing    © 2017 [facility] [address] is for End User's use only and may not be sold, redistributed or otherwise used for commercial purposes  All illustrations and images included in CareNotes® are the copyrighted property of [employer]  or Julien Giles  The above information is an  only  It is not intended as medical advice for individual conditions or treatments  Talk to your doctor, nurse or pharmacist before following any medical regimen to see if it is safe and effective for you

## 2019-01-10 DIAGNOSIS — I48.91 ATRIAL FIBRILLATION, UNSPECIFIED TYPE (HCC): Primary | ICD-10-CM

## 2019-01-11 LAB
ATRIAL RATE: 81 BPM
P AXIS: 45 DEGREES
PR INTERVAL: 200 MS
QRS AXIS: -17 DEGREES
QRS AXIS: -33 DEGREES
QRSD INTERVAL: 82 MS
QRSD INTERVAL: 90 MS
QT INTERVAL: 358 MS
QT INTERVAL: 388 MS
QTC INTERVAL: 415 MS
QTC INTERVAL: 450 MS
T WAVE AXIS: -5 DEGREES
T WAVE AXIS: 0 DEGREES
VENTRICULAR RATE: 81 BPM
VENTRICULAR RATE: 81 BPM

## 2019-01-11 PROCEDURE — 93010 ELECTROCARDIOGRAM REPORT: CPT | Performed by: INTERNAL MEDICINE

## 2019-02-21 ENCOUNTER — OFFICE VISIT (OUTPATIENT)
Dept: CARDIOLOGY CLINIC | Facility: CLINIC | Age: 84
End: 2019-02-21
Payer: COMMERCIAL

## 2019-02-21 VITALS
HEIGHT: 63 IN | SYSTOLIC BLOOD PRESSURE: 138 MMHG | HEART RATE: 64 BPM | WEIGHT: 250 LBS | DIASTOLIC BLOOD PRESSURE: 64 MMHG | RESPIRATION RATE: 18 BRPM | BODY MASS INDEX: 44.3 KG/M2

## 2019-02-21 DIAGNOSIS — I10 ESSENTIAL HYPERTENSION: Primary | ICD-10-CM

## 2019-02-21 PROCEDURE — 99214 OFFICE O/P EST MOD 30 MIN: CPT | Performed by: INTERNAL MEDICINE

## 2019-02-21 RX ORDER — METOLAZONE 2.5 MG/1
2.5 TABLET ORAL DAILY
COMMUNITY
End: 2019-07-01

## 2019-02-21 NOTE — PROGRESS NOTES
[facility] [place]  [address]  [facility] [address] [facility]  [phone]    Cardiology Follow up    Patient:  Ladarius Pettit  :  1933  MRN:  [id number]    History of Present Illness:     29-year-old female with past medical history of hypertension, dyslipidemia, persistent atrial fibrillation status post cardioversion in 2019, mild to moderate aortic stenosis, chronic diastolic heart failure, venous insufficiency status post ablation presents for cardiology follow-up  She has been doing relatively well since we have seen her last     She denies any chest pain but does get some dyspnea when speaking or singing  She has not had any palpitations, dizziness, or syncope      Since her cardioversion she notes she may be less fatigued  Patient Active Problem List   Diagnosis    Cellulitis    Hypertension    Hip pain    Severe sepsis (HCC)    Morbid (severe) obesity due to excess calories (HCC)    Hyperlipidemia    Transaminitis    Cellulitis of left lower extremity    Hyponatremia    Osteoarthritis of both hips    Acute respiratory failure with hypoxia ([address] )    Tinea corporis    Acute retention of urine    ANSELMO (acute kidney injury) ([address] )    Abnormal finding on EKG    Acute bronchospasm    Paroxysmal atrial fibrillation (HCC)    Acute on chronic diastolic (congestive) heart failure (HCC)    Vulvovaginal candidiasis    Chronic diastolic heart failure (HCC)    Nonrheumatic aortic valve stenosis       Past Surgical History  Past Surgical History:   Procedure Laterality Date    ANKLE FRACTURE SURGERY Left     CHOLECYSTECTOMY      JOINT REPLACEMENT Bilateral     TKR    REPLACEMENT TOTAL KNEE BILATERAL Bilateral        Social History   Social History     Socioeconomic History    Marital status:       Spouse name: Not on file    Number of children: Not on file    Years of education: Not on file    Highest education level: Not on file   Occupational History    Not on file   Social Needs    Financial resource strain: Not on file    Food insecurity:     Worry: Not on file     Inability: Not on file    Transportation needs:     Medical: Not on file     Non-medical: Not on file   Tobacco Use    Smoking status: Never Smoker    Smokeless tobacco: Never Used   Substance and Sexual Activity    Alcohol use: No    Drug use: No    Sexual activity: Not on file   Lifestyle    Physical activity:     Days per week: Not on file     Minutes per session: Not on file    Stress: Not on file   Relationships    Social connections:     Talks on phone: Not on file     Gets together: Not on file     Attends [employer] service: Not on file     Active member of club or organization: Not on file     Attends meetings of clubs or organizations: Not on file     Relationship status: Not on file    Intimate partner violence:     Fear of current or ex partner: Not on file     Emotionally abused: Not on file     Physically abused: Not on file     Forced sexual activity: Not on file   Other Topics Concern    Not on file   Social History Narrative    Not on file        No Known Allergies    Family History   No family history on file  Review of Systems:  Review of Systems   Constitutional: Negative for chills, fatigue and fever  HENT: Negative for hearing loss, nosebleeds and tinnitus  Eyes: Negative for pain  Respiratory: Negative for cough, chest tightness and shortness of breath  Cardiovascular: Positive for leg swelling  Negative for chest pain and palpitations  Gastrointestinal: Negative for abdominal pain, nausea and vomiting  Endocrine: Negative for cold intolerance and heat intolerance  Genitourinary: Negative for difficulty urinating, hematuria and vaginal bleeding  Musculoskeletal: Positive for arthralgias  Skin: Negative for rash  Allergic/Immunologic: Negative for environmental allergies     Neurological: Negative for dizziness, syncope, weakness and light-headedness  Psychiatric/Behavioral: Negative for decreased concentration and sleep disturbance  The patient is not nervous/anxious  Current Outpatient Medications:     amiodarone 200 mg tablet, Take 1 tablet (200 mg total) by mouth daily Please take 3 times daily for first 10 days then once daily after this  Thank you , Disp: 30 tablet, Rfl: 5    apixaban (ELIQUIS) 5 mg, Take 5 mg by mouth 2 (two) times a day, Disp: , Rfl:     cloNIDine (CATAPRES) 0 2 mg tablet, Take 0 1 mg by mouth 3 (three) times a day  , Disp: , Rfl:     fentaNYL (DURAGESIC) 50 mcg/hr, Place 1 patch on the skin every third day, Disp: , Rfl:     furosemide (LASIX) 40 mg tablet, Take 2 tablets (80 mg total) by mouth daily, Disp: 60 tablet, Rfl: 5    HYDROcodone-acetaminophen (NORCO) 5-325 mg per tablet, Take 1 tablet by mouth every 6 (six) hours as needed for pain, Disp: , Rfl:     ipratropium (ATROVENT) 0 02 % nebulizer solution, Take 0 5 mg by nebulization 4 (four) times a day, Disp: , Rfl:     levalbuterol (XOPENEX) 1 25 mg/3 mL nebulizer solution, Take 1 25 mg by nebulization 3 (three) times a day, Disp: , Rfl:     melatonin 3 mg, Take 3 mg by mouth daily at bedtime, Disp: , Rfl:     nystatin (MYCOSTATIN) powder, Apply topically 4 (four) times a day, Disp: , Rfl:     Polyethyl Glycol-Propyl Glycol (SYSTANE) 0 4-0 3 % GEL, Apply to eye, Disp: , Rfl:     potassium chloride (MICRO-K) 10 MEQ CR capsule, Take 10 mEq by mouth 4 (four) times a day, Disp: , Rfl:     white petrolatum-mineral oil (EUCERIN,HYDROCERIN) cream, Apply topically as needed, Disp: , Rfl:      Physical Exam:    Vitals:    02/21/19 1344   Resp: 18   Weight: 113 kg (250 lb)   Height: 5' 3" (1 6 m)       Physical Exam   Constitutional: She is oriented to person, place, and time  She appears well-developed and well-nourished  HENT:   Head: Normocephalic     Right Ear: External ear normal    Left Ear: External ear normal    Mouth/Throat: Oropharynx is clear and moist    Eyes: Pupils are equal, round, and reactive to light  Neck: No JVD present  Carotid bruit is not present  Cardiovascular: Normal rate, regular rhythm and intact distal pulses  Exam reveals no gallop and no friction rub  No murmur heard  Pulmonary/Chest: Effort normal and breath sounds normal  No tachypnea  No respiratory distress  She has no wheezes  She has no rales  She exhibits no tenderness  Abdominal: Soft  She exhibits no distension  There is no tenderness  There is no rebound and no guarding  Musculoskeletal: She exhibits edema  Neurological: She is alert and oriented to person, place, and time  Skin: Skin is warm and dry  Psychiatric: She has a normal mood and affect  Her behavior is normal  Judgment and thought content normal    Nursing note and vitals reviewed  Labs:not applicable    Assessment/Plan:    1  Persistent atrial fibrillation status post cardioversion  She continues on the amiodarone and Eliquis  I would like her to follow up with Ophthalmology and Pulmonary  After the next visit we will check thyroid function and hepatic function tests  2  Chronic diastolic heart failure-she is likely euvolemic-she does have some lower extremity edema that is likely multifactorial with a large component of venous insufficiency  I did ask her to watch her sodium intake and continue to wear her compression stockings and keep her legs up  3  Mild to moderate aortic stenosis  We will need to periodically monitor this  4  Hypertension-this is controlled on clonidine  In the future we can consider exchanging an ACE-inhibitor for the clonidine  Will see her back in about 4 months  Thank you so much, please do not hesitate to contact me with any questions or concerns        Alysha Charles MD  2/21/2019  1:48 PM

## 2019-06-07 ENCOUNTER — APPOINTMENT (OUTPATIENT)
Dept: WOUND CARE | Facility: HOSPITAL | Age: 84
End: 2019-06-07
Payer: COMMERCIAL

## 2019-06-07 PROCEDURE — 99213 OFFICE O/P EST LOW 20 MIN: CPT

## 2019-06-07 PROCEDURE — 11042 DBRDMT SUBQ TIS 1ST 20SQCM/<: CPT

## 2019-06-17 ENCOUNTER — APPOINTMENT (OUTPATIENT)
Dept: WOUND CARE | Facility: HOSPITAL | Age: 84
End: 2019-06-17
Payer: COMMERCIAL

## 2019-06-17 PROCEDURE — 11042 DBRDMT SUBQ TIS 1ST 20SQCM/<: CPT

## 2019-06-24 ENCOUNTER — APPOINTMENT (OUTPATIENT)
Dept: WOUND CARE | Facility: HOSPITAL | Age: 84
End: 2019-06-24
Payer: COMMERCIAL

## 2019-06-24 PROCEDURE — 11042 DBRDMT SUBQ TIS 1ST 20SQCM/<: CPT | Performed by: NURSE PRACTITIONER

## 2019-07-01 ENCOUNTER — HOSPITAL ENCOUNTER (EMERGENCY)
Facility: HOSPITAL | Age: 84
Discharge: HOME | End: 2019-07-01
Attending: EMERGENCY MEDICINE | Admitting: EMERGENCY MEDICINE
Payer: COMMERCIAL

## 2019-07-01 ENCOUNTER — APPOINTMENT (EMERGENCY)
Dept: CT IMAGING | Facility: HOSPITAL | Age: 84
End: 2019-07-01
Payer: COMMERCIAL

## 2019-07-01 VITALS
HEART RATE: 67 BPM | DIASTOLIC BLOOD PRESSURE: 71 MMHG | RESPIRATION RATE: 18 BRPM | TEMPERATURE: 99.2 F | BODY MASS INDEX: 40.11 KG/M2 | SYSTOLIC BLOOD PRESSURE: 140 MMHG | OXYGEN SATURATION: 94 % | WEIGHT: 226.41 LBS

## 2019-07-01 DIAGNOSIS — K59.00 CONSTIPATION: ICD-10-CM

## 2019-07-01 DIAGNOSIS — L89.159 SACRAL DECUBITUS ULCER: Primary | ICD-10-CM

## 2019-07-01 LAB
ALBUMIN SERPL BCP-MCNC: 3.4 G/DL (ref 3.5–5)
ALP SERPL-CCNC: 107 U/L (ref 46–116)
ALT SERPL W P-5'-P-CCNC: 19 U/L (ref 12–78)
ANION GAP SERPL CALCULATED.3IONS-SCNC: 8 MMOL/L (ref 4–13)
AST SERPL W P-5'-P-CCNC: 17 U/L (ref 5–45)
BASOPHILS # BLD AUTO: 0.06 THOUSANDS/ΜL (ref 0–0.1)
BASOPHILS NFR BLD AUTO: 1 % (ref 0–1)
BILIRUB SERPL-MCNC: 0.28 MG/DL (ref 0.2–1)
BUN SERPL-MCNC: 19 MG/DL (ref 5–25)
CALCIUM SERPL-MCNC: 9.5 MG/DL (ref 8.3–10.1)
CHLORIDE SERPL-SCNC: 100 MMOL/L (ref 100–108)
CO2 SERPL-SCNC: 32 MMOL/L (ref 21–32)
CREAT SERPL-MCNC: 1.03 MG/DL (ref 0.6–1.3)
CRP SERPL QL: 36.7 MG/L
EOSINOPHIL # BLD AUTO: 0.22 THOUSAND/ΜL (ref 0–0.61)
EOSINOPHIL NFR BLD AUTO: 3 % (ref 0–6)
ERYTHROCYTE [DISTWIDTH] IN BLOOD BY AUTOMATED COUNT: 13.2 % (ref 11.6–15.1)
ERYTHROCYTE [SEDIMENTATION RATE] IN BLOOD: 3 MM/HOUR (ref 0–20)
GFR SERPL CREATININE-BSD FRML MDRD: 50 ML/MIN/1.73SQ M
GLUCOSE SERPL-MCNC: 99 MG/DL (ref 65–140)
HCT VFR BLD AUTO: 39.9 % (ref 34.8–46.1)
HGB BLD-MCNC: 12.8 G/DL (ref 11.5–15.4)
IMM GRANULOCYTES # BLD AUTO: 0.03 THOUSAND/UL (ref 0–0.2)
IMM GRANULOCYTES NFR BLD AUTO: 0 % (ref 0–2)
LYMPHOCYTES # BLD AUTO: 1.62 THOUSANDS/ΜL (ref 0.6–4.47)
LYMPHOCYTES NFR BLD AUTO: 22 % (ref 14–44)
MCH RBC QN AUTO: 30.3 PG (ref 26.8–34.3)
MCHC RBC AUTO-ENTMCNC: 32.1 G/DL (ref 31.4–37.4)
MCV RBC AUTO: 94 FL (ref 82–98)
MONOCYTES # BLD AUTO: 0.98 THOUSAND/ΜL (ref 0.17–1.22)
MONOCYTES NFR BLD AUTO: 13 % (ref 4–12)
NEUTROPHILS # BLD AUTO: 4.39 THOUSANDS/ΜL (ref 1.85–7.62)
NEUTS SEG NFR BLD AUTO: 61 % (ref 43–75)
NRBC BLD AUTO-RTO: 0 /100 WBCS
PLATELET # BLD AUTO: 282 THOUSANDS/UL (ref 149–390)
PMV BLD AUTO: 8.2 FL (ref 8.9–12.7)
POTASSIUM SERPL-SCNC: 3.7 MMOL/L (ref 3.5–5.3)
PROT SERPL-MCNC: 7.5 G/DL (ref 6.4–8.2)
RBC # BLD AUTO: 4.23 MILLION/UL (ref 3.81–5.12)
SODIUM SERPL-SCNC: 140 MMOL/L (ref 136–145)
WBC # BLD AUTO: 7.3 THOUSAND/UL (ref 4.31–10.16)

## 2019-07-01 PROCEDURE — 99284 EMERGENCY DEPT VISIT MOD MDM: CPT | Performed by: EMERGENCY MEDICINE

## 2019-07-01 PROCEDURE — 74177 CT ABD & PELVIS W/CONTRAST: CPT

## 2019-07-01 PROCEDURE — 86140 C-REACTIVE PROTEIN: CPT | Performed by: EMERGENCY MEDICINE

## 2019-07-01 PROCEDURE — 96360 HYDRATION IV INFUSION INIT: CPT

## 2019-07-01 PROCEDURE — 85652 RBC SED RATE AUTOMATED: CPT | Performed by: EMERGENCY MEDICINE

## 2019-07-01 PROCEDURE — 85025 COMPLETE CBC W/AUTO DIFF WBC: CPT | Performed by: EMERGENCY MEDICINE

## 2019-07-01 PROCEDURE — 99284 EMERGENCY DEPT VISIT MOD MDM: CPT

## 2019-07-01 PROCEDURE — 36415 COLL VENOUS BLD VENIPUNCTURE: CPT | Performed by: EMERGENCY MEDICINE

## 2019-07-01 PROCEDURE — 80053 COMPREHEN METABOLIC PANEL: CPT | Performed by: EMERGENCY MEDICINE

## 2019-07-01 RX ORDER — POLYETHYLENE GLYCOL 3350 17 G/17G
17 POWDER, FOR SOLUTION ORAL 4 TIMES DAILY
Qty: 500 G | Refills: 0 | Status: SHIPPED | OUTPATIENT
Start: 2019-07-01 | End: 2019-07-01 | Stop reason: SDUPTHER

## 2019-07-01 RX ORDER — OXYCODONE HYDROCHLORIDE AND ACETAMINOPHEN 5; 325 MG/1; MG/1
1 TABLET ORAL EVERY 8 HOURS PRN
COMMUNITY
End: 2021-01-01

## 2019-07-01 RX ORDER — POLYETHYLENE GLYCOL 3350 17 G/17G
17 POWDER, FOR SOLUTION ORAL 4 TIMES DAILY
Qty: 500 G | Refills: 0 | Status: SHIPPED | OUTPATIENT
Start: 2019-07-01

## 2019-07-01 RX ORDER — HYDROXYCHLOROQUINE SULFATE 200 MG/1
200 TABLET, FILM COATED ORAL
COMMUNITY
End: 2021-01-01

## 2019-07-01 RX ADMIN — IODIXANOL 100 ML: 320 INJECTION, SOLUTION INTRAVASCULAR at 19:21

## 2019-07-01 RX ADMIN — SODIUM CHLORIDE 500 ML: 0.9 INJECTION, SOLUTION INTRAVENOUS at 19:02

## 2019-07-01 NOTE — ED PROVIDER NOTES
History  Chief Complaint   Patient presents with    Tailbone Pain     pt states pain in sacral area for a couple of weeks, also has not had a bowel movement in 5 days, denies N/V     This is a morbidly obese 66-year-old female with a history of hypertension, hyperlipidemia, atrial fibrillation on Eliquis, sacral decubitus ulcer who presents with worsening sacral decubitus ulcer  The patient states that she follows with wound care at [facility]  The patient does have a chronic sacral decubitus ulcer which she states is worsening  States that she was seen by wound care nursing today and told to come to the emergency department because she believes the wound probes to bone and she is concerned about osteomyelitis  Patient states that the nurse did not actually probe the wound as she was concerned about introducing infection  The patient also states that she has not had a bowel movement for the past 5 days  She is still passing gas  She does have a history of constipation  The patient is nonambulatory at baseline  She is not experiencing worsening pain or purulence at the wound site  Denies fever/chills, nausea/vomiting, lightheadedness/dizziness, numbness/weakness, headache, change in vision, URI symptoms, neck pain, chest pain, palpitations, shortness of breath, cough, back pain, flank pain, abdominal pain, diarrhea, hematochezia, melena, dysuria, hematuria, abnormal vaginal discharge/bleeding  Prior to Admission Medications   Prescriptions Last Dose Informant Patient Reported? Taking? Polyethyl Glycol-Propyl Glycol (SYSTANE) 0 4-0 3 % GEL  Self Yes No   Sig: Apply to eye   amiodarone 200 mg tablet  Self No Yes   Sig: Take 1 tablet (200 mg total) by mouth daily Please take 3 times daily for first 10 days then once daily after this  Thank you     apixaban (ELIQUIS) 5 mg  Self Yes Yes   Sig: Take 5 mg by mouth 2 (two) times a day   cloNIDine (CATAPRES) 0 2 mg tablet  Self Yes Yes   Sig: Take 0 1 mg by mouth 3 (three) times a day     fentaNYL (DURAGESIC) 50 mcg/hr  Self Yes Yes   Sig: Place 1 patch on the skin every third day   furosemide (LASIX) 40 mg tablet  Self No Yes   Sig: Take 2 tablets (80 mg total) by mouth daily   hydroxychloroquine (PLAQUENIL) 200 mg tablet  Self Yes Yes   Sig: Take 200 mg by mouth daily with breakfast   ipratropium (ATROVENT) 0 02 % nebulizer solution  Self Yes Yes   Sig: Take 0 5 mg by nebulization 4 (four) times a day   levalbuterol (XOPENEX) 1 25 mg/3 mL nebulizer solution  Self Yes Yes   Sig: Take 1 25 mg by nebulization 3 (three) times a day   melatonin 3 mg  Self Yes Yes   Sig: Take 3 mg by mouth daily at bedtime   nystatin (MYCOSTATIN) powder  Self Yes Yes   Sig: Apply topically 4 (four) times a day   oxyCODONE-acetaminophen (PERCOCET) 5-325 mg per tablet  Self Yes Yes   Sig: Take 1 tablet by mouth every 8 (eight) hours as needed for moderate pain   potassium chloride (MICRO-K) 10 MEQ CR capsule  Self Yes No   Sig: Take 20 mEq by mouth 2 (two) times a day    white petrolatum-mineral oil (EUCERIN,HYDROCERIN) cream  Self Yes Yes   Sig: Apply topically as needed      Facility-Administered Medications: None       Past Medical History:   Diagnosis Date    Atrial fibrillation (HCC)     Hyperlipidemia     Hypertension     PONV (postoperative nausea and vomiting)        Past Surgical History:   Procedure Laterality Date    ANKLE FRACTURE SURGERY Left     CHOLECYSTECTOMY      JOINT REPLACEMENT Bilateral     TKR    REPLACEMENT TOTAL KNEE BILATERAL Bilateral        History reviewed  No pertinent family history  I have reviewed and agree with the history as documented  Social History     Tobacco Use    Smoking status: Never Smoker    Smokeless tobacco: Never Used   Substance Use Topics    Alcohol use: No    Drug use: No        Review of Systems   Constitutional: Negative for chills and fever  HENT: Negative for rhinorrhea, sore throat and trouble swallowing      Eyes: Negative for photophobia and visual disturbance  Respiratory: Negative for cough, chest tightness and shortness of breath  Cardiovascular: Negative for chest pain, palpitations and leg swelling  Gastrointestinal: Positive for constipation  Negative for abdominal pain, blood in stool, diarrhea, nausea and vomiting  Endocrine: Negative for polyuria  Genitourinary: Negative for dysuria, flank pain, hematuria, vaginal bleeding and vaginal discharge  Musculoskeletal: Negative for back pain and neck pain  Skin: Positive for wound  Negative for color change and rash  Allergic/Immunologic: Negative for immunocompromised state  Neurological: Negative for dizziness, weakness, light-headedness, numbness and headaches  All other systems reviewed and are negative  Physical Exam  Physical Exam   Constitutional: Vital signs are normal  She appears well-developed  She is cooperative  No distress  HENT:   Mouth/Throat: Uvula is midline, oropharynx is clear and moist and mucous membranes are normal    Eyes: Pupils are equal, round, and reactive to light  Conjunctivae and EOM are normal    Neck: Trachea normal  No thyroid mass and no thyromegaly present  Cardiovascular: Normal rate, regular rhythm, normal heart sounds, intact distal pulses and normal pulses  No murmur heard  Pulmonary/Chest: Effort normal and breath sounds normal    Abdominal: Soft  Normal appearance and bowel sounds are normal  There is no tenderness  There is no rebound, no guarding and no CVA tenderness  Musculoskeletal:   Large sacral decubitus ulcer without evidence of purulence  No surrounding erythema or warmth  No evidence of abscess  Wound does not probe to bone  Neurological: She is alert  Skin: Skin is warm, dry and intact  Psychiatric: She has a normal mood and affect   Her speech is normal and behavior is normal  Thought content normal        Vital Signs  ED Triage Vitals [07/01/19 1708]   Temperature Pulse Respirations Blood Pressure SpO2   99 2 °F (37 3 °C) 82 18 (!) 175/113 95 %      Temp Source Heart Rate Source Patient Position - Orthostatic VS BP Location FiO2 (%)   Oral Monitor Lying Right arm --      Pain Score       5           Vitals:    07/01/19 1708 07/01/19 1904   BP: (!) 175/113 140/71   Pulse: 82 67   Patient Position - Orthostatic VS: Lying Lying         Visual Acuity      ED Medications  Medications   sodium chloride 0 9 % bolus 500 mL (0 mL Intravenous Stopped 7/1/19 2002)   iodixanol (VISIPAQUE) 320 MG/ML injection 100 mL (100 mL Intravenous Given 7/1/19 1921)       Diagnostic Studies  Results Reviewed     Procedure Component Value Units Date/Time    Sedimentation rate, automated [[id number]]  (Normal) Collected:  07/01/19 1751    Lab Status:  Final result Specimen:  Blood from Arm, Right Updated:  07/01/19 1918     Sed Rate 3 mm/hour     C-reactive protein [[id number]]  (Abnormal) Collected:  07/01/19 1751    Lab Status:  Final result Specimen:  Blood from Arm, Right Updated:  07/01/19 1905     CRP 36 7 mg/L     Comprehensive metabolic panel [[id number]]  (Abnormal) Collected:  07/01/19 1751    Lab Status:  Final result Specimen:  Blood from Arm, Right Updated:  07/01/19 1835     Sodium 140 mmol/L      Potassium 3 7 mmol/L      Chloride 100 mmol/L      CO2 32 mmol/L      ANION GAP 8 mmol/L      BUN 19 mg/dL      Creatinine 1 03 mg/dL      Glucose 99 mg/dL      Calcium 9 5 mg/dL      AST 17 U/L      ALT 19 U/L      Alkaline Phosphatase 107 U/L      Total Protein 7 5 g/dL      Albumin 3 4 g/dL      Total Bilirubin 0 28 mg/dL      eGFR 50 ml/min/1 73sq m     Narrative:       Tiffany guidelines for Chronic Kidney Disease (CKD):     Stage 1 with normal or high GFR (GFR > 90 mL/min/1 73 square meters)    Stage 2 Mild CKD (GFR = 60-89 mL/min/1 73 square meters)    Stage 3A Moderate CKD (GFR = 45-59 mL/min/1 73 square meters)    Stage 3B Moderate CKD (GFR = 30-44 mL/min/1 73 square meters)    Stage 4 Severe CKD (GFR = 15-29 mL/min/1 73 square meters)    Stage 5 End Stage CKD (GFR <15 mL/min/1 73 square meters)  Note: GFR calculation is accurate only with a steady state creatinine    CBC and differential [[id number]]  (Abnormal) Collected:  07/01/19 1751    Lab Status:  Final result Specimen:  Blood from Arm, Right Updated:  07/01/19 1810     WBC 7 30 Thousand/uL      RBC 4 23 Million/uL      Hemoglobin 12 8 g/dL      Hematocrit 39 9 %      MCV 94 fL      MCH 30 3 pg      MCHC 32 1 g/dL      RDW 13 2 %      MPV 8 2 fL      Platelets 887 Thousands/uL      nRBC 0 /100 WBCs      Neutrophils Relative 61 %      Immat GRANS % 0 %      Lymphocytes Relative 22 %      Monocytes Relative 13 %      Eosinophils Relative 3 %      Basophils Relative 1 %      Neutrophils Absolute 4 39 Thousands/µL      Immature Grans Absolute 0 03 Thousand/uL      Lymphocytes Absolute 1 62 Thousands/µL      Monocytes Absolute 0 98 Thousand/µL      Eosinophils Absolute 0 22 Thousand/µL      Basophils Absolute 0 06 Thousands/µL                  CT abdomen pelvis with contrast   Final Result by Dallin Magdaleno MD (07/01 1935)      No acute intra-abdominal abnormality  No free air or free fluid  Midline sacral decubitus ulcer with a few foci of air extending just superficial to the posterior aspect of the coccyx bone  There is no obvious adjacent bony erosion or sclerosis to suggest underlying osteomyelitis  If there is continued clinical    concern, an MRI of the pelvis could be performed for further evaluation  Moderate amount of stool noted throughout colon        Reidentified septated 3 4 cm right renal cyst             Workstation performed: [id number]                    Procedures  Procedures       ED Course  ED Course as of Jul 01 2027   Mon Jul 01, 2019   1914 No signs of systemic infection   WBC: 7 30   1914 No signs of systemic infection, awaiting CT abdomen/pelvis   C-REACTIVE PROTEIN(!): 36 7 1919 Sed Rate: 3   1952 Discussed findings of CT scan and lab results with family  I told her that the wound does not probe to bone and there is no evidence of osteomyelitis in her labs or her CT scan  I told her that aggressive wound care is needed  She does see wound care tomorrow  She needs to keep the weight off of her sacral decubitus while in bed and wheelchair  Strict return precautions given for infection and osteomyelitis  I reviewed all testing with the patient  I gave oral return precautions for what to return for in addition to the written return precautions  The patient (and any family present) verbalized understanding of the discharge instructions and warnings that would necessitate return to the Emergency Department  I specifically highlighted areas of special concern regarding the written and verbal discharge instructions and return precautions  All questions were answered prior to discharge  MDM  Number of Diagnoses or Management Options  Diagnosis management comments: Will check lab work within ESR and CRP  CT abdomen pelvis with contrast   Disposition pending results  Disposition  Final diagnoses:   Sacral decubitus ulcer   Constipation     Time reflects when diagnosis was documented in both MDM as applicable and the Disposition within this note     Time User Action Codes Description Comment    7/1/2019  7:54 PM Poonam Harris Add [L89 [id number]] Sacral decubitus ulcer     7/1/2019  7:55 PM Poonam Harris Add [K59 00] Constipation       ED Disposition     ED Disposition Condition Date/Time Comment    Discharge Stable Mon Jul 1, 2019  7:54 PM Rushie Fothergill discharge to home/self care              Follow-up Information     Follow up With Specialties Details Why Contact Info Additional [address] [facility], [address] [place] [address] Internal Medicine, Nurse Practitioner Schedule an appointment as soon as possible for a visit   [address] [place] [address] [address] [facility] [address]  [phone]       [address] [facility] Emergency Department Emergency Medicine Go to  If symptoms worsen [facility] [id number] [phone] [place] [facility], [address]  , [place], [place], [address]    Wound care  Go to  regular scheduled appointment            Current Discharge Medication List      START taking these medications    Details   polyethylene glycol (GLYCOLAX) powder Take 17 g by mouth 4 (four) times a day Take 4 times per day until a satisfying bowel movement and then switch to 1 time per day  Qty: 500 g, Refills: 0    Associated Diagnoses: Constipation         CONTINUE these medications which have NOT CHANGED    Details   amiodarone 200 mg tablet Take 1 tablet (200 mg total) by mouth daily Please take 3 times daily for first 10 days then once daily after this  Thank you    Qty: 30 tablet, Refills: 5    Associated Diagnoses: PAF (paroxysmal atrial fibrillation) ([facility])      apixaban (ELIQUIS) 5 mg Take 5 mg by mouth 2 (two) times a day      cloNIDine (CATAPRES) 0 2 mg tablet Take 0 1 mg by mouth 3 (three) times a day        fentaNYL (DURAGESIC) 50 mcg/hr Place 1 patch on the skin every third day      furosemide (LASIX) 40 mg tablet Take 2 tablets (80 mg total) by mouth daily  Qty: 60 tablet, Refills: 5    Associated Diagnoses: Atrial fibrillation, unspecified type ([facility])      hydroxychloroquine (PLAQUENIL) 200 mg tablet Take 200 mg by mouth daily with breakfast      ipratropium (ATROVENT) 0 02 % nebulizer solution Take 0 5 mg by nebulization 4 (four) times a day      levalbuterol (XOPENEX) 1 25 mg/3 mL nebulizer solution Take 1 25 mg by nebulization 3 (three) times a day      melatonin 3 mg Take 3 mg by mouth daily at bedtime      nystatin (MYCOSTATIN) powder Apply topically 4 (four) times a day      oxyCODONE-acetaminophen (PERCOCET) 5-325 mg per tablet Take 1 tablet by mouth every 8 (eight) hours as needed for moderate pain      white petrolatum-mineral oil (EUCERIN,HYDROCERIN) cream Apply topically as needed      Polyethyl Glycol-Propyl Glycol (SYSTANE) 0 4-0 3 % GEL Apply to eye      potassium chloride (MICRO-K) 10 MEQ CR capsule Take 20 mEq by mouth 2 (two) times a day            No discharge procedures on file      ED Provider  Electronically Signed by           Akira Montez MD  07/01/19 2027

## 2019-07-02 ENCOUNTER — APPOINTMENT (OUTPATIENT)
Dept: WOUND CARE | Facility: HOSPITAL | Age: 84
End: 2019-07-02
Payer: COMMERCIAL

## 2019-07-02 PROCEDURE — 99213 OFFICE O/P EST LOW 20 MIN: CPT | Performed by: FAMILY MEDICINE

## 2019-07-03 ENCOUNTER — APPOINTMENT (OUTPATIENT)
Dept: LAB | Facility: HOSPITAL | Age: 84
End: 2019-07-03
Payer: COMMERCIAL

## 2019-07-08 ENCOUNTER — APPOINTMENT (OUTPATIENT)
Dept: WOUND CARE | Facility: HOSPITAL | Age: 84
End: 2019-07-08
Payer: COMMERCIAL

## 2019-07-08 PROCEDURE — 11042 DBRDMT SUBQ TIS 1ST 20SQCM/<: CPT | Performed by: NURSE PRACTITIONER

## 2019-07-15 ENCOUNTER — APPOINTMENT (OUTPATIENT)
Dept: WOUND CARE | Facility: HOSPITAL | Age: 84
End: 2019-07-15
Payer: COMMERCIAL

## 2019-07-15 PROCEDURE — 11042 DBRDMT SUBQ TIS 1ST 20SQCM/<: CPT

## 2019-07-30 ENCOUNTER — APPOINTMENT (OUTPATIENT)
Dept: WOUND CARE | Facility: HOSPITAL | Age: 84
End: 2019-07-30
Payer: COMMERCIAL

## 2019-07-30 PROCEDURE — 11042 DBRDMT SUBQ TIS 1ST 20SQCM/<: CPT | Performed by: PREVENTIVE MEDICINE

## 2019-08-05 ENCOUNTER — APPOINTMENT (OUTPATIENT)
Dept: WOUND CARE | Facility: HOSPITAL | Age: 84
End: 2019-08-05
Payer: COMMERCIAL

## 2019-08-05 PROCEDURE — 11042 DBRDMT SUBQ TIS 1ST 20SQCM/<: CPT

## 2019-08-19 ENCOUNTER — APPOINTMENT (OUTPATIENT)
Dept: WOUND CARE | Facility: HOSPITAL | Age: 84
End: 2019-08-19
Payer: COMMERCIAL

## 2019-08-19 PROCEDURE — 11042 DBRDMT SUBQ TIS 1ST 20SQCM/<: CPT

## 2019-09-09 ENCOUNTER — APPOINTMENT (OUTPATIENT)
Dept: WOUND CARE | Facility: HOSPITAL | Age: 84
End: 2019-09-09
Payer: COMMERCIAL

## 2019-09-09 PROCEDURE — 11042 DBRDMT SUBQ TIS 1ST 20SQCM/<: CPT

## 2019-09-16 ENCOUNTER — APPOINTMENT (OUTPATIENT)
Dept: WOUND CARE | Facility: HOSPITAL | Age: 84
End: 2019-09-16
Payer: COMMERCIAL

## 2019-09-16 PROCEDURE — 11042 DBRDMT SUBQ TIS 1ST 20SQCM/<: CPT

## 2019-09-30 ENCOUNTER — APPOINTMENT (OUTPATIENT)
Dept: WOUND CARE | Facility: HOSPITAL | Age: 84
End: 2019-09-30
Payer: COMMERCIAL

## 2019-09-30 PROCEDURE — 11042 DBRDMT SUBQ TIS 1ST 20SQCM/<: CPT

## 2019-10-21 ENCOUNTER — APPOINTMENT (OUTPATIENT)
Dept: WOUND CARE | Facility: HOSPITAL | Age: 84
End: 2019-10-21
Payer: COMMERCIAL

## 2019-10-21 PROCEDURE — 11042 DBRDMT SUBQ TIS 1ST 20SQCM/<: CPT

## 2019-11-04 ENCOUNTER — APPOINTMENT (OUTPATIENT)
Dept: WOUND CARE | Facility: HOSPITAL | Age: 84
End: 2019-11-04
Payer: COMMERCIAL

## 2019-11-04 PROCEDURE — 97597 DBRDMT OPN WND 1ST 20 CM/<: CPT

## 2019-11-18 ENCOUNTER — APPOINTMENT (OUTPATIENT)
Dept: WOUND CARE | Facility: HOSPITAL | Age: 84
End: 2019-11-18
Payer: COMMERCIAL

## 2019-11-18 PROCEDURE — 97597 DBRDMT OPN WND 1ST 20 CM/<: CPT

## 2019-12-02 ENCOUNTER — APPOINTMENT (OUTPATIENT)
Dept: WOUND CARE | Facility: HOSPITAL | Age: 84
End: 2019-12-02
Payer: COMMERCIAL

## 2019-12-02 PROCEDURE — 97597 DBRDMT OPN WND 1ST 20 CM/<: CPT

## 2019-12-16 ENCOUNTER — APPOINTMENT (OUTPATIENT)
Dept: WOUND CARE | Facility: HOSPITAL | Age: 84
End: 2019-12-16
Payer: COMMERCIAL

## 2019-12-16 PROCEDURE — 97597 DBRDMT OPN WND 1ST 20 CM/<: CPT

## 2020-01-06 ENCOUNTER — APPOINTMENT (OUTPATIENT)
Dept: WOUND CARE | Facility: HOSPITAL | Age: 85
End: 2020-01-06
Payer: COMMERCIAL

## 2020-01-06 PROCEDURE — 99212 OFFICE O/P EST SF 10 MIN: CPT

## 2020-01-27 ENCOUNTER — APPOINTMENT (OUTPATIENT)
Dept: WOUND CARE | Facility: HOSPITAL | Age: 85
End: 2020-01-27
Payer: COMMERCIAL

## 2020-01-27 PROCEDURE — 99212 OFFICE O/P EST SF 10 MIN: CPT

## 2020-02-17 ENCOUNTER — APPOINTMENT (OUTPATIENT)
Dept: WOUND CARE | Facility: HOSPITAL | Age: 85
End: 2020-02-17
Payer: COMMERCIAL

## 2020-02-17 PROCEDURE — 99212 OFFICE O/P EST SF 10 MIN: CPT

## 2020-10-09 ENCOUNTER — APPOINTMENT (EMERGENCY)
Dept: RADIOLOGY | Facility: HOSPITAL | Age: 85
DRG: 872 | End: 2020-10-09
Payer: COMMERCIAL

## 2020-10-09 ENCOUNTER — HOSPITAL ENCOUNTER (INPATIENT)
Facility: HOSPITAL | Age: 85
LOS: 4 days | Discharge: HOME | DRG: 872 | End: 2020-10-13
Attending: EMERGENCY MEDICINE | Admitting: INTERNAL MEDICINE
Payer: COMMERCIAL

## 2020-10-09 DIAGNOSIS — L02.91 ABSCESS: ICD-10-CM

## 2020-10-09 DIAGNOSIS — L03.116 CELLULITIS OF LEFT LOWER EXTREMITY: ICD-10-CM

## 2020-10-09 DIAGNOSIS — L03.119 LOWER EXTREMITY CELLULITIS: ICD-10-CM

## 2020-10-09 DIAGNOSIS — R50.9 FEVER: Primary | ICD-10-CM

## 2020-10-09 DIAGNOSIS — L72.0 EPIDERMOID CYST: ICD-10-CM

## 2020-10-09 DIAGNOSIS — R21 RASH: ICD-10-CM

## 2020-10-09 LAB
ALBUMIN SERPL BCP-MCNC: 3.9 G/DL (ref 3.5–5)
ALP SERPL-CCNC: 107 U/L (ref 46–116)
ALT SERPL W P-5'-P-CCNC: 23 U/L (ref 12–78)
ANION GAP SERPL CALCULATED.3IONS-SCNC: 9 MMOL/L (ref 4–13)
APTT PPP: 34 SECONDS (ref 23–37)
AST SERPL W P-5'-P-CCNC: 16 U/L (ref 5–45)
BASOPHILS # BLD AUTO: 0.02 THOUSANDS/ΜL (ref 0–0.1)
BASOPHILS NFR BLD AUTO: 0 % (ref 0–1)
BILIRUB SERPL-MCNC: 0.63 MG/DL (ref 0.2–1)
BUN SERPL-MCNC: 28 MG/DL (ref 5–25)
CALCIUM SERPL-MCNC: 9.6 MG/DL (ref 8.3–10.1)
CHLORIDE SERPL-SCNC: 103 MMOL/L (ref 100–108)
CO2 SERPL-SCNC: 26 MMOL/L (ref 21–32)
CREAT SERPL-MCNC: 1.26 MG/DL (ref 0.6–1.3)
EOSINOPHIL # BLD AUTO: 0.06 THOUSAND/ΜL (ref 0–0.61)
EOSINOPHIL NFR BLD AUTO: 1 % (ref 0–6)
ERYTHROCYTE [DISTWIDTH] IN BLOOD BY AUTOMATED COUNT: 12.3 % (ref 11.6–15.1)
GFR SERPL CREATININE-BSD FRML MDRD: 38 ML/MIN/1.73SQ M
GLUCOSE SERPL-MCNC: 119 MG/DL (ref 65–140)
HCT VFR BLD AUTO: 43.3 % (ref 34.8–46.1)
HGB BLD-MCNC: 14.1 G/DL (ref 11.5–15.4)
IMM GRANULOCYTES # BLD AUTO: 0.06 THOUSAND/UL (ref 0–0.2)
IMM GRANULOCYTES NFR BLD AUTO: 1 % (ref 0–2)
INR PPP: 1.19 (ref 0.84–1.19)
LACTATE SERPL-SCNC: 1.9 MMOL/L (ref 0.5–2)
LYMPHOCYTES # BLD AUTO: 0.18 THOUSANDS/ΜL (ref 0.6–4.47)
LYMPHOCYTES NFR BLD AUTO: 2 % (ref 14–44)
MCH RBC QN AUTO: 31.2 PG (ref 26.8–34.3)
MCHC RBC AUTO-ENTMCNC: 32.6 G/DL (ref 31.4–37.4)
MCV RBC AUTO: 96 FL (ref 82–98)
MONOCYTES # BLD AUTO: 0.56 THOUSAND/ΜL (ref 0.17–1.22)
MONOCYTES NFR BLD AUTO: 5 % (ref 4–12)
NEUTROPHILS # BLD AUTO: 11.25 THOUSANDS/ΜL (ref 1.85–7.62)
NEUTS SEG NFR BLD AUTO: 91 % (ref 43–75)
NRBC BLD AUTO-RTO: 0 /100 WBCS
PLATELET # BLD AUTO: 257 THOUSANDS/UL (ref 149–390)
PMV BLD AUTO: 8.4 FL (ref 8.9–12.7)
POTASSIUM SERPL-SCNC: 4.3 MMOL/L (ref 3.5–5.3)
PROT SERPL-MCNC: 7.6 G/DL (ref 6.4–8.2)
PROTHROMBIN TIME: 14.9 SECONDS (ref 11.6–14.5)
RBC # BLD AUTO: 4.52 MILLION/UL (ref 3.81–5.12)
SARS-COV-2 RNA RESP QL NAA+PROBE: NEGATIVE
SODIUM SERPL-SCNC: 138 MMOL/L (ref 136–145)
WBC # BLD AUTO: 12.13 THOUSAND/UL (ref 4.31–10.16)

## 2020-10-09 PROCEDURE — 87040 BLOOD CULTURE FOR BACTERIA: CPT | Performed by: EMERGENCY MEDICINE

## 2020-10-09 PROCEDURE — 99285 EMERGENCY DEPT VISIT HI MDM: CPT

## 2020-10-09 PROCEDURE — 83605 ASSAY OF LACTIC ACID: CPT | Performed by: EMERGENCY MEDICINE

## 2020-10-09 PROCEDURE — 80053 COMPREHEN METABOLIC PANEL: CPT | Performed by: EMERGENCY MEDICINE

## 2020-10-09 PROCEDURE — 94664 DEMO&/EVAL PT USE INHALER: CPT

## 2020-10-09 PROCEDURE — 99284 EMERGENCY DEPT VISIT MOD MDM: CPT | Performed by: EMERGENCY MEDICINE

## 2020-10-09 PROCEDURE — 87147 CULTURE TYPE IMMUNOLOGIC: CPT | Performed by: HOSPITALIST

## 2020-10-09 PROCEDURE — 99223 1ST HOSP IP/OBS HIGH 75: CPT | Performed by: PHYSICIAN ASSISTANT

## 2020-10-09 PROCEDURE — 87081 CULTURE SCREEN ONLY: CPT | Performed by: HOSPITALIST

## 2020-10-09 PROCEDURE — 96367 TX/PROPH/DG ADDL SEQ IV INF: CPT

## 2020-10-09 PROCEDURE — 87635 SARS-COV-2 COVID-19 AMP PRB: CPT | Performed by: EMERGENCY MEDICINE

## 2020-10-09 PROCEDURE — 94760 N-INVAS EAR/PLS OXIMETRY 1: CPT

## 2020-10-09 PROCEDURE — 85025 COMPLETE CBC W/AUTO DIFF WBC: CPT | Performed by: EMERGENCY MEDICINE

## 2020-10-09 PROCEDURE — 85730 THROMBOPLASTIN TIME PARTIAL: CPT | Performed by: EMERGENCY MEDICINE

## 2020-10-09 PROCEDURE — 71045 X-RAY EXAM CHEST 1 VIEW: CPT

## 2020-10-09 PROCEDURE — 36415 COLL VENOUS BLD VENIPUNCTURE: CPT | Performed by: EMERGENCY MEDICINE

## 2020-10-09 PROCEDURE — 96365 THER/PROPH/DIAG IV INF INIT: CPT

## 2020-10-09 PROCEDURE — 85610 PROTHROMBIN TIME: CPT | Performed by: EMERGENCY MEDICINE

## 2020-10-09 RX ORDER — SULFAMETHOXAZOLE AND TRIMETHOPRIM 800; 160 MG/1; MG/1
1 TABLET ORAL EVERY 12 HOURS SCHEDULED
COMMUNITY
End: 2020-10-09

## 2020-10-09 RX ORDER — ACETAMINOPHEN 325 MG/1
650 TABLET ORAL ONCE
Status: COMPLETED | OUTPATIENT
Start: 2020-10-09 | End: 2020-10-09

## 2020-10-09 RX ORDER — AMLODIPINE BESYLATE 5 MG/1
5 TABLET ORAL DAILY
COMMUNITY

## 2020-10-09 RX ORDER — LEVALBUTEROL 1.25 MG/.5ML
1.25 SOLUTION, CONCENTRATE RESPIRATORY (INHALATION) 3 TIMES DAILY
Status: DISCONTINUED | OUTPATIENT
Start: 2020-10-09 | End: 2020-10-09

## 2020-10-09 RX ORDER — MELATONIN
1000 DAILY
Status: DISCONTINUED | OUTPATIENT
Start: 2020-10-09 | End: 2020-10-13 | Stop reason: HOSPADM

## 2020-10-09 RX ORDER — POTASSIUM CHLORIDE 20 MEQ/1
20 TABLET, EXTENDED RELEASE ORAL 2 TIMES DAILY
Status: DISCONTINUED | OUTPATIENT
Start: 2020-10-09 | End: 2020-10-13 | Stop reason: HOSPADM

## 2020-10-09 RX ORDER — FENTANYL 75 UG/H
1 PATCH TRANSDERMAL
Status: ON HOLD | COMMUNITY
End: 2021-01-01 | Stop reason: SDUPTHER

## 2020-10-09 RX ORDER — HEPARIN SODIUM 5000 [USP'U]/ML
5000 INJECTION, SOLUTION INTRAVENOUS; SUBCUTANEOUS EVERY 8 HOURS SCHEDULED
Status: CANCELLED | OUTPATIENT
Start: 2020-10-09

## 2020-10-09 RX ORDER — LANOLIN ALCOHOL/MO/W.PET/CERES
3 CREAM (GRAM) TOPICAL
Status: DISCONTINUED | OUTPATIENT
Start: 2020-10-09 | End: 2020-10-13 | Stop reason: HOSPADM

## 2020-10-09 RX ORDER — SENNOSIDES 8.6 MG
1 TABLET ORAL 2 TIMES DAILY PRN
Status: DISCONTINUED | OUTPATIENT
Start: 2020-10-09 | End: 2020-10-13 | Stop reason: HOSPADM

## 2020-10-09 RX ORDER — DIPHENHYDRAMINE HCL 25 MG
12.5 TABLET ORAL
Status: DISCONTINUED | OUTPATIENT
Start: 2020-10-09 | End: 2020-10-12

## 2020-10-09 RX ORDER — ONDANSETRON 2 MG/ML
4 INJECTION INTRAMUSCULAR; INTRAVENOUS EVERY 6 HOURS PRN
Status: DISCONTINUED | OUTPATIENT
Start: 2020-10-09 | End: 2020-10-13 | Stop reason: HOSPADM

## 2020-10-09 RX ORDER — LISINOPRIL 40 MG/1
40 TABLET ORAL DAILY
COMMUNITY
End: 2021-01-01

## 2020-10-09 RX ORDER — MINERAL OIL/I-PROP MYR/WATER
LOTION (ML) TOPICAL 2 TIMES DAILY
COMMUNITY
End: 2022-01-01

## 2020-10-09 RX ORDER — ANORECTAL OINTMENT 15.7; .44; 24; 20.6 G/100G; G/100G; G/100G; G/100G
OINTMENT TOPICAL AS NEEDED
COMMUNITY
End: 2022-01-01

## 2020-10-09 RX ORDER — DIPHENHYDRAMINE HCL 25 MG
12.5 TABLET ORAL
COMMUNITY
End: 2022-01-01 | Stop reason: HOSPADM

## 2020-10-09 RX ORDER — OXYCODONE HYDROCHLORIDE AND ACETAMINOPHEN 5; 325 MG/1; MG/1
1 TABLET ORAL EVERY 8 HOURS PRN
Status: DISCONTINUED | OUTPATIENT
Start: 2020-10-09 | End: 2020-10-13 | Stop reason: HOSPADM

## 2020-10-09 RX ORDER — SODIUM CHLORIDE 9 MG/ML
75 INJECTION, SOLUTION INTRAVENOUS CONTINUOUS
Status: DISPENSED | OUTPATIENT
Start: 2020-10-09 | End: 2020-10-10

## 2020-10-09 RX ORDER — LISINOPRIL 20 MG/1
40 TABLET ORAL DAILY
Status: DISCONTINUED | OUTPATIENT
Start: 2020-10-09 | End: 2020-10-11

## 2020-10-09 RX ORDER — NYSTATIN 100000 [USP'U]/G
POWDER TOPICAL 2 TIMES DAILY
Status: DISCONTINUED | OUTPATIENT
Start: 2020-10-09 | End: 2020-10-13 | Stop reason: HOSPADM

## 2020-10-09 RX ORDER — ACETAMINOPHEN 325 MG/1
650 TABLET ORAL EVERY 6 HOURS PRN
Status: DISCONTINUED | OUTPATIENT
Start: 2020-10-09 | End: 2020-10-13 | Stop reason: HOSPADM

## 2020-10-09 RX ORDER — CLONIDINE HYDROCHLORIDE 0.1 MG/1
0.1 TABLET ORAL EVERY 8 HOURS SCHEDULED
Status: DISCONTINUED | OUTPATIENT
Start: 2020-10-09 | End: 2020-10-13 | Stop reason: HOSPADM

## 2020-10-09 RX ORDER — FENTANYL 75 UG/H
1 PATCH TRANSDERMAL
Status: DISCONTINUED | OUTPATIENT
Start: 2020-10-09 | End: 2020-10-13 | Stop reason: HOSPADM

## 2020-10-09 RX ORDER — HYDROPHILIC CREAM
PASTE (GRAM) TOPICAL AS NEEDED
COMMUNITY
End: 2022-01-01

## 2020-10-09 RX ORDER — AMLODIPINE BESYLATE 5 MG/1
5 TABLET ORAL DAILY
Status: DISCONTINUED | OUTPATIENT
Start: 2020-10-09 | End: 2020-10-13 | Stop reason: HOSPADM

## 2020-10-09 RX ORDER — CEFAZOLIN SODIUM 2 G/50ML
2000 SOLUTION INTRAVENOUS EVERY 8 HOURS
Status: DISCONTINUED | OUTPATIENT
Start: 2020-10-09 | End: 2020-10-11

## 2020-10-09 RX ORDER — MELATONIN
1000 DAILY
Status: ON HOLD | COMMUNITY
End: 2022-01-01

## 2020-10-09 RX ORDER — SENNA PLUS 8.6 MG/1
1 TABLET ORAL 2 TIMES DAILY PRN
COMMUNITY

## 2020-10-09 RX ORDER — ALBUTEROL SULFATE 2.5 MG/3ML
2.5 SOLUTION RESPIRATORY (INHALATION) EVERY 6 HOURS PRN
Status: DISCONTINUED | OUTPATIENT
Start: 2020-10-09 | End: 2020-10-13 | Stop reason: HOSPADM

## 2020-10-09 RX ORDER — LORAZEPAM 0.5 MG/1
0.5 TABLET ORAL EVERY 8 HOURS PRN
Status: DISCONTINUED | OUTPATIENT
Start: 2020-10-09 | End: 2020-10-13 | Stop reason: HOSPADM

## 2020-10-09 RX ORDER — CALCIUM CARBONATE 200(500)MG
1000 TABLET,CHEWABLE ORAL 3 TIMES DAILY PRN
Status: DISCONTINUED | OUTPATIENT
Start: 2020-10-09 | End: 2020-10-13 | Stop reason: HOSPADM

## 2020-10-09 RX ORDER — POTASSIUM CHLORIDE 20 MEQ/1
20 TABLET, EXTENDED RELEASE ORAL 2 TIMES DAILY
COMMUNITY
End: 2021-01-01

## 2020-10-09 RX ORDER — HYDROXYCHLOROQUINE SULFATE 200 MG/1
200 TABLET, FILM COATED ORAL
Status: DISCONTINUED | OUTPATIENT
Start: 2020-10-10 | End: 2020-10-13 | Stop reason: HOSPADM

## 2020-10-09 RX ADMIN — FENTANYL 1 PATCH: 75 PATCH TRANSDERMAL at 17:16

## 2020-10-09 RX ADMIN — Medication 1000 UNITS: at 15:11

## 2020-10-09 RX ADMIN — DIPHENHYDRAMINE HCL 12.5 MG: 25 TABLET ORAL at 21:44

## 2020-10-09 RX ADMIN — POTASSIUM CHLORIDE 20 MEQ: 1500 TABLET, EXTENDED RELEASE ORAL at 17:15

## 2020-10-09 RX ADMIN — VANCOMYCIN HYDROCHLORIDE 1500 MG: 1 INJECTION, POWDER, LYOPHILIZED, FOR SOLUTION INTRAVENOUS at 09:58

## 2020-10-09 RX ADMIN — GLYCERIN, HYPROMELLOSE, POLYETHYLENE GLYCOL 1 DROP: .2; .2; 1 LIQUID OPHTHALMIC at 17:16

## 2020-10-09 RX ADMIN — SODIUM CHLORIDE 75 ML/HR: 0.9 INJECTION, SOLUTION INTRAVENOUS at 15:12

## 2020-10-09 RX ADMIN — NYSTATIN: 100000 POWDER TOPICAL at 18:30

## 2020-10-09 RX ADMIN — APIXABAN 5 MG: 5 TABLET, FILM COATED ORAL at 17:16

## 2020-10-09 RX ADMIN — ACETAMINOPHEN 650 MG: 325 TABLET ORAL at 20:26

## 2020-10-09 RX ADMIN — CLONIDINE HYDROCHLORIDE 0.1 MG: 0.1 TABLET ORAL at 15:11

## 2020-10-09 RX ADMIN — CEFAZOLIN SODIUM 2000 MG: 2 SOLUTION INTRAVENOUS at 20:27

## 2020-10-09 RX ADMIN — AMLODIPINE BESYLATE 5 MG: 5 TABLET ORAL at 15:11

## 2020-10-09 RX ADMIN — IPRATROPIUM BROMIDE 0.5 MG: 0.5 SOLUTION RESPIRATORY (INHALATION) at 14:33

## 2020-10-09 RX ADMIN — MELATONIN 3 MG: 3 TAB ORAL at 21:44

## 2020-10-09 RX ADMIN — LISINOPRIL 40 MG: 20 TABLET ORAL at 15:11

## 2020-10-09 RX ADMIN — LEVALBUTEROL HYDROCHLORIDE 1.25 MG: 1.25 SOLUTION, CONCENTRATE RESPIRATORY (INHALATION) at 14:33

## 2020-10-09 RX ADMIN — CLONIDINE HYDROCHLORIDE 0.1 MG: 0.1 TABLET ORAL at 21:44

## 2020-10-09 RX ADMIN — LORAZEPAM 0.5 MG: 0.5 TABLET ORAL at 16:14

## 2020-10-09 RX ADMIN — ACETAMINOPHEN 650 MG: 325 TABLET ORAL at 08:54

## 2020-10-09 RX ADMIN — CEFTRIAXONE SODIUM 1000 MG: 10 INJECTION, POWDER, FOR SOLUTION INTRAVENOUS at 09:15

## 2020-10-10 PROBLEM — G56.32: Status: ACTIVE | Noted: 2020-10-10

## 2020-10-10 PROBLEM — R20.0 NUMBNESS: Status: ACTIVE | Noted: 2020-10-10

## 2020-10-10 LAB
ANION GAP SERPL CALCULATED.3IONS-SCNC: 11 MMOL/L (ref 4–13)
BASOPHILS # BLD AUTO: 0.03 THOUSANDS/ΜL (ref 0–0.1)
BASOPHILS NFR BLD AUTO: 0 % (ref 0–1)
BUN SERPL-MCNC: 26 MG/DL (ref 5–25)
CALCIUM SERPL-MCNC: 8.4 MG/DL (ref 8.3–10.1)
CHLORIDE SERPL-SCNC: 107 MMOL/L (ref 100–108)
CO2 SERPL-SCNC: 23 MMOL/L (ref 21–32)
CREAT SERPL-MCNC: 1.2 MG/DL (ref 0.6–1.3)
EOSINOPHIL # BLD AUTO: 0.16 THOUSAND/ΜL (ref 0–0.61)
EOSINOPHIL NFR BLD AUTO: 2 % (ref 0–6)
ERYTHROCYTE [DISTWIDTH] IN BLOOD BY AUTOMATED COUNT: 12.4 % (ref 11.6–15.1)
GFR SERPL CREATININE-BSD FRML MDRD: 41 ML/MIN/1.73SQ M
GLUCOSE SERPL-MCNC: 99 MG/DL (ref 65–140)
HCT VFR BLD AUTO: 34.6 % (ref 34.8–46.1)
HGB BLD-MCNC: 10.9 G/DL (ref 11.5–15.4)
IMM GRANULOCYTES # BLD AUTO: 0.03 THOUSAND/UL (ref 0–0.2)
IMM GRANULOCYTES NFR BLD AUTO: 0 % (ref 0–2)
LYMPHOCYTES # BLD AUTO: 0.44 THOUSANDS/ΜL (ref 0.6–4.47)
LYMPHOCYTES NFR BLD AUTO: 6 % (ref 14–44)
MCH RBC QN AUTO: 30.8 PG (ref 26.8–34.3)
MCHC RBC AUTO-ENTMCNC: 31.5 G/DL (ref 31.4–37.4)
MCV RBC AUTO: 98 FL (ref 82–98)
MONOCYTES # BLD AUTO: 0.63 THOUSAND/ΜL (ref 0.17–1.22)
MONOCYTES NFR BLD AUTO: 9 % (ref 4–12)
NEUTROPHILS # BLD AUTO: 5.62 THOUSANDS/ΜL (ref 1.85–7.62)
NEUTS SEG NFR BLD AUTO: 83 % (ref 43–75)
NRBC BLD AUTO-RTO: 0 /100 WBCS
PLATELET # BLD AUTO: 214 THOUSANDS/UL (ref 149–390)
PMV BLD AUTO: 9.3 FL (ref 8.9–12.7)
POTASSIUM SERPL-SCNC: 4 MMOL/L (ref 3.5–5.3)
RBC # BLD AUTO: 3.54 MILLION/UL (ref 3.81–5.12)
SODIUM SERPL-SCNC: 141 MMOL/L (ref 136–145)
WBC # BLD AUTO: 6.91 THOUSAND/UL (ref 4.31–10.16)

## 2020-10-10 PROCEDURE — 99232 SBSQ HOSP IP/OBS MODERATE 35: CPT | Performed by: INTERNAL MEDICINE

## 2020-10-10 PROCEDURE — 85025 COMPLETE CBC W/AUTO DIFF WBC: CPT | Performed by: PHYSICIAN ASSISTANT

## 2020-10-10 PROCEDURE — 80048 BASIC METABOLIC PNL TOTAL CA: CPT | Performed by: PHYSICIAN ASSISTANT

## 2020-10-10 RX ADMIN — SENNOSIDES 8.6 MG: 8.6 TABLET, FILM COATED ORAL at 23:27

## 2020-10-10 RX ADMIN — LORAZEPAM 0.5 MG: 0.5 TABLET ORAL at 16:41

## 2020-10-10 RX ADMIN — APIXABAN 5 MG: 5 TABLET, FILM COATED ORAL at 09:36

## 2020-10-10 RX ADMIN — DIPHENHYDRAMINE HCL 12.5 MG: 25 TABLET ORAL at 22:05

## 2020-10-10 RX ADMIN — NYSTATIN: 100000 POWDER TOPICAL at 17:57

## 2020-10-10 RX ADMIN — CEFAZOLIN SODIUM 2000 MG: 2 SOLUTION INTRAVENOUS at 20:19

## 2020-10-10 RX ADMIN — AMLODIPINE BESYLATE 5 MG: 5 TABLET ORAL at 09:38

## 2020-10-10 RX ADMIN — APIXABAN 5 MG: 5 TABLET, FILM COATED ORAL at 17:46

## 2020-10-10 RX ADMIN — POTASSIUM CHLORIDE 20 MEQ: 1500 TABLET, EXTENDED RELEASE ORAL at 17:46

## 2020-10-10 RX ADMIN — NYSTATIN: 100000 POWDER TOPICAL at 09:38

## 2020-10-10 RX ADMIN — MELATONIN 3 MG: 3 TAB ORAL at 22:05

## 2020-10-10 RX ADMIN — CEFAZOLIN SODIUM 2000 MG: 2 SOLUTION INTRAVENOUS at 05:04

## 2020-10-10 RX ADMIN — CLONIDINE HYDROCHLORIDE 0.1 MG: 0.1 TABLET ORAL at 13:10

## 2020-10-10 RX ADMIN — CLONIDINE HYDROCHLORIDE 0.1 MG: 0.1 TABLET ORAL at 22:05

## 2020-10-10 RX ADMIN — LISINOPRIL 40 MG: 20 TABLET ORAL at 09:38

## 2020-10-10 RX ADMIN — ONDANSETRON 4 MG: 2 INJECTION INTRAMUSCULAR; INTRAVENOUS at 21:49

## 2020-10-10 RX ADMIN — POTASSIUM CHLORIDE 20 MEQ: 1500 TABLET, EXTENDED RELEASE ORAL at 09:36

## 2020-10-10 RX ADMIN — Medication 1000 UNITS: at 09:38

## 2020-10-10 RX ADMIN — HYDROXYCHLOROQUINE SULFATE 200 MG: 200 TABLET, FILM COATED ORAL at 07:50

## 2020-10-10 RX ADMIN — CEFAZOLIN SODIUM 2000 MG: 2 SOLUTION INTRAVENOUS at 13:12

## 2020-10-10 RX ADMIN — CLONIDINE HYDROCHLORIDE 0.1 MG: 0.1 TABLET ORAL at 05:04

## 2020-10-11 PROBLEM — S31.000A SACRAL WOUND: Status: ACTIVE | Noted: 2020-10-11

## 2020-10-11 PROBLEM — R78.81 POSITIVE BLOOD CULTURE: Status: ACTIVE | Noted: 2020-10-11

## 2020-10-11 LAB
ANION GAP SERPL CALCULATED.3IONS-SCNC: 9 MMOL/L (ref 4–13)
BASOPHILS # BLD AUTO: 0.02 THOUSANDS/ΜL (ref 0–0.1)
BASOPHILS NFR BLD AUTO: 0 % (ref 0–1)
BUN SERPL-MCNC: 22 MG/DL (ref 5–25)
CALCIUM SERPL-MCNC: 8.7 MG/DL (ref 8.3–10.1)
CHLORIDE SERPL-SCNC: 104 MMOL/L (ref 100–108)
CO2 SERPL-SCNC: 24 MMOL/L (ref 21–32)
CREAT SERPL-MCNC: 0.94 MG/DL (ref 0.6–1.3)
EOSINOPHIL # BLD AUTO: 0.53 THOUSAND/ΜL (ref 0–0.61)
EOSINOPHIL NFR BLD AUTO: 10 % (ref 0–6)
ERYTHROCYTE [DISTWIDTH] IN BLOOD BY AUTOMATED COUNT: 12.3 % (ref 11.6–15.1)
GFR SERPL CREATININE-BSD FRML MDRD: 55 ML/MIN/1.73SQ M
GLUCOSE SERPL-MCNC: 98 MG/DL (ref 65–140)
HCT VFR BLD AUTO: 35.3 % (ref 34.8–46.1)
HGB BLD-MCNC: 11.7 G/DL (ref 11.5–15.4)
IMM GRANULOCYTES # BLD AUTO: 0.02 THOUSAND/UL (ref 0–0.2)
IMM GRANULOCYTES NFR BLD AUTO: 0 % (ref 0–2)
LYMPHOCYTES # BLD AUTO: 0.59 THOUSANDS/ΜL (ref 0.6–4.47)
LYMPHOCYTES NFR BLD AUTO: 11 % (ref 14–44)
MCH RBC QN AUTO: 32 PG (ref 26.8–34.3)
MCHC RBC AUTO-ENTMCNC: 33.1 G/DL (ref 31.4–37.4)
MCV RBC AUTO: 96 FL (ref 82–98)
MONOCYTES # BLD AUTO: 0.67 THOUSAND/ΜL (ref 0.17–1.22)
MONOCYTES NFR BLD AUTO: 13 % (ref 4–12)
NEUTROPHILS # BLD AUTO: 3.4 THOUSANDS/ΜL (ref 1.85–7.62)
NEUTS SEG NFR BLD AUTO: 66 % (ref 43–75)
NRBC BLD AUTO-RTO: 0 /100 WBCS
PLATELET # BLD AUTO: 196 THOUSANDS/UL (ref 149–390)
PMV BLD AUTO: 9 FL (ref 8.9–12.7)
POTASSIUM SERPL-SCNC: 4.3 MMOL/L (ref 3.5–5.3)
RBC # BLD AUTO: 3.66 MILLION/UL (ref 3.81–5.12)
SODIUM SERPL-SCNC: 137 MMOL/L (ref 136–145)
WBC # BLD AUTO: 5.23 THOUSAND/UL (ref 4.31–10.16)

## 2020-10-11 PROCEDURE — 80048 BASIC METABOLIC PNL TOTAL CA: CPT | Performed by: INTERNAL MEDICINE

## 2020-10-11 PROCEDURE — 99232 SBSQ HOSP IP/OBS MODERATE 35: CPT | Performed by: INTERNAL MEDICINE

## 2020-10-11 PROCEDURE — 85025 COMPLETE CBC W/AUTO DIFF WBC: CPT | Performed by: INTERNAL MEDICINE

## 2020-10-11 RX ADMIN — NYSTATIN: 100000 POWDER TOPICAL at 09:23

## 2020-10-11 RX ADMIN — POTASSIUM CHLORIDE 20 MEQ: 1500 TABLET, EXTENDED RELEASE ORAL at 17:14

## 2020-10-11 RX ADMIN — DIPHENHYDRAMINE HCL 12.5 MG: 25 TABLET ORAL at 21:22

## 2020-10-11 RX ADMIN — VANCOMYCIN HYDROCHLORIDE 1500 MG: 5 INJECTION, POWDER, LYOPHILIZED, FOR SOLUTION INTRAVENOUS at 09:24

## 2020-10-11 RX ADMIN — HYDROXYCHLOROQUINE SULFATE 200 MG: 200 TABLET, FILM COATED ORAL at 08:00

## 2020-10-11 RX ADMIN — CLONIDINE HYDROCHLORIDE 0.1 MG: 0.1 TABLET ORAL at 21:22

## 2020-10-11 RX ADMIN — POTASSIUM CHLORIDE 20 MEQ: 1500 TABLET, EXTENDED RELEASE ORAL at 09:13

## 2020-10-11 RX ADMIN — APIXABAN 5 MG: 5 TABLET, FILM COATED ORAL at 17:14

## 2020-10-11 RX ADMIN — APIXABAN 5 MG: 5 TABLET, FILM COATED ORAL at 09:14

## 2020-10-11 RX ADMIN — CLONIDINE HYDROCHLORIDE 0.1 MG: 0.1 TABLET ORAL at 05:04

## 2020-10-11 RX ADMIN — MELATONIN 3 MG: 3 TAB ORAL at 21:22

## 2020-10-11 RX ADMIN — CLONIDINE HYDROCHLORIDE 0.1 MG: 0.1 TABLET ORAL at 15:17

## 2020-10-11 RX ADMIN — Medication 1000 UNITS: at 09:14

## 2020-10-11 RX ADMIN — CEFAZOLIN SODIUM 2000 MG: 2 SOLUTION INTRAVENOUS at 04:51

## 2020-10-11 RX ADMIN — NYSTATIN: 100000 POWDER TOPICAL at 17:15

## 2020-10-12 PROBLEM — L72.0 EPIDERMOID CYST: Status: ACTIVE | Noted: 2020-10-12

## 2020-10-12 PROBLEM — R21 RASH: Status: ACTIVE | Noted: 2020-10-12

## 2020-10-12 LAB
ALBUMIN SERPL BCP-MCNC: 2.8 G/DL (ref 3.5–5)
ALP SERPL-CCNC: 88 U/L (ref 46–116)
ALT SERPL W P-5'-P-CCNC: 27 U/L (ref 12–78)
ANION GAP SERPL CALCULATED.3IONS-SCNC: 9 MMOL/L (ref 4–13)
AST SERPL W P-5'-P-CCNC: 29 U/L (ref 5–45)
BACTERIA BLD CULT: ABNORMAL
BASOPHILS # BLD AUTO: 0.02 THOUSANDS/ΜL (ref 0–0.1)
BASOPHILS NFR BLD AUTO: 0 % (ref 0–1)
BILIRUB SERPL-MCNC: 0.2 MG/DL (ref 0.2–1)
BUN SERPL-MCNC: 20 MG/DL (ref 5–25)
CALCIUM ALBUM COR SERPL-MCNC: 9.6 MG/DL (ref 8.3–10.1)
CALCIUM SERPL-MCNC: 8.6 MG/DL (ref 8.3–10.1)
CHLORIDE SERPL-SCNC: 105 MMOL/L (ref 100–108)
CO2 SERPL-SCNC: 25 MMOL/L (ref 21–32)
CREAT SERPL-MCNC: 0.87 MG/DL (ref 0.6–1.3)
EOSINOPHIL # BLD AUTO: 0.49 THOUSAND/ΜL (ref 0–0.61)
EOSINOPHIL NFR BLD AUTO: 8 % (ref 0–6)
ERYTHROCYTE [DISTWIDTH] IN BLOOD BY AUTOMATED COUNT: 12.4 % (ref 11.6–15.1)
GFR SERPL CREATININE-BSD FRML MDRD: 60 ML/MIN/1.73SQ M
GLUCOSE SERPL-MCNC: 92 MG/DL (ref 65–140)
GRAM STN SPEC: ABNORMAL
HCT VFR BLD AUTO: 36.9 % (ref 34.8–46.1)
HGB BLD-MCNC: 11.9 G/DL (ref 11.5–15.4)
IMM GRANULOCYTES # BLD AUTO: 0.01 THOUSAND/UL (ref 0–0.2)
IMM GRANULOCYTES NFR BLD AUTO: 0 % (ref 0–2)
LYMPHOCYTES # BLD AUTO: 0.77 THOUSANDS/ΜL (ref 0.6–4.47)
LYMPHOCYTES NFR BLD AUTO: 13 % (ref 14–44)
MCH RBC QN AUTO: 31.5 PG (ref 26.8–34.3)
MCHC RBC AUTO-ENTMCNC: 32.2 G/DL (ref 31.4–37.4)
MCV RBC AUTO: 98 FL (ref 82–98)
MONOCYTES # BLD AUTO: 0.87 THOUSAND/ΜL (ref 0.17–1.22)
MONOCYTES NFR BLD AUTO: 15 % (ref 4–12)
NEUTROPHILS # BLD AUTO: 3.73 THOUSANDS/ΜL (ref 1.85–7.62)
NEUTS SEG NFR BLD AUTO: 64 % (ref 43–75)
NRBC BLD AUTO-RTO: 0 /100 WBCS
PLATELET # BLD AUTO: 218 THOUSANDS/UL (ref 149–390)
PMV BLD AUTO: 9.4 FL (ref 8.9–12.7)
POTASSIUM SERPL-SCNC: 4.8 MMOL/L (ref 3.5–5.3)
PROT SERPL-MCNC: 6.1 G/DL (ref 6.4–8.2)
RBC # BLD AUTO: 3.78 MILLION/UL (ref 3.81–5.12)
SODIUM SERPL-SCNC: 139 MMOL/L (ref 136–145)
WBC # BLD AUTO: 5.89 THOUSAND/UL (ref 4.31–10.16)

## 2020-10-12 PROCEDURE — 99232 SBSQ HOSP IP/OBS MODERATE 35: CPT | Performed by: INTERNAL MEDICINE

## 2020-10-12 PROCEDURE — 85025 COMPLETE CBC W/AUTO DIFF WBC: CPT | Performed by: INTERNAL MEDICINE

## 2020-10-12 PROCEDURE — 80053 COMPREHEN METABOLIC PANEL: CPT | Performed by: INTERNAL MEDICINE

## 2020-10-12 PROCEDURE — 99222 1ST HOSP IP/OBS MODERATE 55: CPT | Performed by: SURGERY

## 2020-10-12 RX ORDER — CEPHALEXIN 500 MG/1
500 CAPSULE ORAL EVERY 6 HOURS SCHEDULED
Status: DISCONTINUED | OUTPATIENT
Start: 2020-10-12 | End: 2020-10-12

## 2020-10-12 RX ORDER — CEPHALEXIN 500 MG/1
500 CAPSULE ORAL EVERY 6 HOURS SCHEDULED
Qty: 12 CAPSULE | Refills: 0 | Status: SHIPPED | OUTPATIENT
Start: 2020-10-12 | End: 2020-10-13

## 2020-10-12 RX ORDER — CEPHALEXIN 500 MG/1
500 CAPSULE ORAL EVERY 6 HOURS SCHEDULED
Status: DISCONTINUED | OUTPATIENT
Start: 2020-10-12 | End: 2020-10-13 | Stop reason: HOSPADM

## 2020-10-12 RX ORDER — HYDROXYZINE HYDROCHLORIDE 25 MG/1
25 TABLET, FILM COATED ORAL EVERY 6 HOURS PRN
Status: DISCONTINUED | OUTPATIENT
Start: 2020-10-12 | End: 2020-10-13 | Stop reason: HOSPADM

## 2020-10-12 RX ORDER — METHYLPREDNISOLONE SODIUM SUCCINATE 40 MG/ML
40 INJECTION, POWDER, LYOPHILIZED, FOR SOLUTION INTRAMUSCULAR; INTRAVENOUS ONCE
Status: COMPLETED | OUTPATIENT
Start: 2020-10-12 | End: 2020-10-12

## 2020-10-12 RX ADMIN — POTASSIUM CHLORIDE 20 MEQ: 1500 TABLET, EXTENDED RELEASE ORAL at 18:02

## 2020-10-12 RX ADMIN — CLONIDINE HYDROCHLORIDE 0.1 MG: 0.1 TABLET ORAL at 22:41

## 2020-10-12 RX ADMIN — CEPHALEXIN 500 MG: 500 CAPSULE ORAL at 23:10

## 2020-10-12 RX ADMIN — Medication 1000 UNITS: at 08:23

## 2020-10-12 RX ADMIN — LORAZEPAM 0.5 MG: 0.5 TABLET ORAL at 15:55

## 2020-10-12 RX ADMIN — NYSTATIN: 100000 POWDER TOPICAL at 08:39

## 2020-10-12 RX ADMIN — POTASSIUM CHLORIDE 20 MEQ: 1500 TABLET, EXTENDED RELEASE ORAL at 08:23

## 2020-10-12 RX ADMIN — METHYLPREDNISOLONE SODIUM SUCCINATE 40 MG: 40 INJECTION, POWDER, FOR SOLUTION INTRAMUSCULAR; INTRAVENOUS at 18:02

## 2020-10-12 RX ADMIN — HYDROXYCHLOROQUINE SULFATE 200 MG: 200 TABLET, FILM COATED ORAL at 08:23

## 2020-10-12 RX ADMIN — CLONIDINE HYDROCHLORIDE 0.1 MG: 0.1 TABLET ORAL at 14:42

## 2020-10-12 RX ADMIN — FENTANYL 1 PATCH: 75 PATCH TRANSDERMAL at 18:04

## 2020-10-12 RX ADMIN — APIXABAN 5 MG: 5 TABLET, FILM COATED ORAL at 08:23

## 2020-10-12 RX ADMIN — CEPHALEXIN 500 MG: 500 CAPSULE ORAL at 09:39

## 2020-10-12 RX ADMIN — DIPHENHYDRAMINE HYDROCHLORIDE 50 MG: 50 INJECTION, SOLUTION INTRAMUSCULAR; INTRAVENOUS at 18:06

## 2020-10-12 RX ADMIN — NYSTATIN: 100000 POWDER TOPICAL at 18:04

## 2020-10-12 RX ADMIN — APIXABAN 5 MG: 5 TABLET, FILM COATED ORAL at 18:02

## 2020-10-12 RX ADMIN — OXYCODONE HYDROCHLORIDE AND ACETAMINOPHEN 1 TABLET: 5; 325 TABLET ORAL at 20:38

## 2020-10-12 RX ADMIN — AMLODIPINE BESYLATE 5 MG: 5 TABLET ORAL at 08:23

## 2020-10-12 RX ADMIN — CEPHALEXIN 500 MG: 500 CAPSULE ORAL at 18:02

## 2020-10-12 RX ADMIN — MELATONIN 3 MG: 3 TAB ORAL at 22:41

## 2020-10-12 RX ADMIN — VANCOMYCIN HYDROCHLORIDE 1500 MG: 5 INJECTION, POWDER, LYOPHILIZED, FOR SOLUTION INTRAVENOUS at 05:30

## 2020-10-12 RX ADMIN — CLONIDINE HYDROCHLORIDE 0.1 MG: 0.1 TABLET ORAL at 05:30

## 2020-10-13 VITALS
HEIGHT: 63 IN | BODY MASS INDEX: 44.61 KG/M2 | SYSTOLIC BLOOD PRESSURE: 162 MMHG | OXYGEN SATURATION: 94 % | WEIGHT: 251.77 LBS | DIASTOLIC BLOOD PRESSURE: 71 MMHG | TEMPERATURE: 97.1 F | HEART RATE: 64 BPM | RESPIRATION RATE: 16 BRPM

## 2020-10-13 LAB
ANION GAP SERPL CALCULATED.3IONS-SCNC: 10 MMOL/L (ref 4–13)
BUN SERPL-MCNC: 15 MG/DL (ref 5–25)
CALCIUM SERPL-MCNC: 9.5 MG/DL (ref 8.3–10.1)
CHLORIDE SERPL-SCNC: 100 MMOL/L (ref 100–108)
CO2 SERPL-SCNC: 25 MMOL/L (ref 21–32)
CREAT SERPL-MCNC: 0.81 MG/DL (ref 0.6–1.3)
GFR SERPL CREATININE-BSD FRML MDRD: 66 ML/MIN/1.73SQ M
GLUCOSE SERPL-MCNC: 127 MG/DL (ref 65–140)
MRSA NOSE QL CULT: ABNORMAL
MRSA NOSE QL CULT: ABNORMAL
POTASSIUM SERPL-SCNC: 4.7 MMOL/L (ref 3.5–5.3)
SARS-COV-2 RNA RESP QL NAA+PROBE: NEGATIVE
SODIUM SERPL-SCNC: 135 MMOL/L (ref 136–145)

## 2020-10-13 PROCEDURE — 80048 BASIC METABOLIC PNL TOTAL CA: CPT | Performed by: INTERNAL MEDICINE

## 2020-10-13 PROCEDURE — 99239 HOSP IP/OBS DSCHRG MGMT >30: CPT | Performed by: INTERNAL MEDICINE

## 2020-10-13 PROCEDURE — 87635 SARS-COV-2 COVID-19 AMP PRB: CPT | Performed by: INTERNAL MEDICINE

## 2020-10-13 RX ORDER — METHYLPREDNISOLONE 4 MG/1
TABLET ORAL
Qty: 21 EACH | Refills: 0
Start: 2020-10-13 | End: 2021-01-01

## 2020-10-13 RX ORDER — CEPHALEXIN 500 MG/1
500 CAPSULE ORAL EVERY 6 HOURS SCHEDULED
Qty: 8 CAPSULE | Refills: 0
Start: 2020-10-13 | End: 2020-10-15

## 2020-10-13 RX ORDER — PREDNISONE 20 MG/1
40 TABLET ORAL DAILY
Status: COMPLETED | OUTPATIENT
Start: 2020-10-13 | End: 2020-10-13

## 2020-10-13 RX ADMIN — Medication 1000 UNITS: at 09:39

## 2020-10-13 RX ADMIN — OXYCODONE HYDROCHLORIDE AND ACETAMINOPHEN 1 TABLET: 5; 325 TABLET ORAL at 04:33

## 2020-10-13 RX ADMIN — HYDROXYCHLOROQUINE SULFATE 200 MG: 200 TABLET, FILM COATED ORAL at 09:38

## 2020-10-13 RX ADMIN — CEPHALEXIN 500 MG: 500 CAPSULE ORAL at 06:11

## 2020-10-13 RX ADMIN — CLONIDINE HYDROCHLORIDE 0.1 MG: 0.1 TABLET ORAL at 06:11

## 2020-10-13 RX ADMIN — PREDNISONE 40 MG: 20 TABLET ORAL at 11:41

## 2020-10-13 RX ADMIN — CLONIDINE HYDROCHLORIDE 0.1 MG: 0.1 TABLET ORAL at 15:45

## 2020-10-13 RX ADMIN — NYSTATIN: 100000 POWDER TOPICAL at 09:39

## 2020-10-13 RX ADMIN — POTASSIUM CHLORIDE 20 MEQ: 1500 TABLET, EXTENDED RELEASE ORAL at 09:39

## 2020-10-13 RX ADMIN — APIXABAN 5 MG: 5 TABLET, FILM COATED ORAL at 09:39

## 2020-10-13 RX ADMIN — AMLODIPINE BESYLATE 5 MG: 5 TABLET ORAL at 09:39

## 2020-10-13 RX ADMIN — CEPHALEXIN 500 MG: 500 CAPSULE ORAL at 11:43

## 2020-10-14 LAB — BACTERIA BLD CULT: NORMAL

## 2021-01-01 ENCOUNTER — APPOINTMENT (EMERGENCY)
Dept: RADIOLOGY | Facility: HOSPITAL | Age: 86
DRG: 603 | End: 2021-01-01
Payer: COMMERCIAL

## 2021-01-01 ENCOUNTER — HOSPITAL ENCOUNTER (EMERGENCY)
Facility: HOSPITAL | Age: 86
Discharge: HOME | End: 2021-07-22
Attending: EMERGENCY MEDICINE | Admitting: EMERGENCY MEDICINE
Payer: COMMERCIAL

## 2021-01-01 ENCOUNTER — APPOINTMENT (EMERGENCY)
Dept: CT IMAGING | Facility: HOSPITAL | Age: 86
End: 2021-01-01
Payer: COMMERCIAL

## 2021-01-01 ENCOUNTER — HOSPITAL ENCOUNTER (INPATIENT)
Facility: HOSPITAL | Age: 86
LOS: 10 days | Discharge: NURSING, REHAB OR LONG-TERM CARE | DRG: 603 | End: 2021-07-08
Attending: EMERGENCY MEDICINE | Admitting: INTERNAL MEDICINE
Payer: COMMERCIAL

## 2021-01-01 VITALS
TEMPERATURE: 98.9 F | WEIGHT: 254.85 LBS | OXYGEN SATURATION: 94 % | RESPIRATION RATE: 18 BRPM | BODY MASS INDEX: 45.14 KG/M2 | SYSTOLIC BLOOD PRESSURE: 149 MMHG | HEART RATE: 74 BPM | DIASTOLIC BLOOD PRESSURE: 79 MMHG

## 2021-01-01 VITALS
HEART RATE: 79 BPM | TEMPERATURE: 98.1 F | SYSTOLIC BLOOD PRESSURE: 141 MMHG | RESPIRATION RATE: 18 BRPM | WEIGHT: 253.97 LBS | OXYGEN SATURATION: 97 % | BODY MASS INDEX: 45 KG/M2 | DIASTOLIC BLOOD PRESSURE: 72 MMHG | HEIGHT: 63 IN

## 2021-01-01 DIAGNOSIS — L03.119 LOWER EXTREMITY CELLULITIS: Primary | ICD-10-CM

## 2021-01-01 DIAGNOSIS — S81.809A NON-HEALING WOUND OF LOWER EXTREMITY: ICD-10-CM

## 2021-01-01 DIAGNOSIS — M25.559 HIP PAIN: ICD-10-CM

## 2021-01-01 DIAGNOSIS — M16.0 PRIMARY OSTEOARTHRITIS OF BOTH HIPS: ICD-10-CM

## 2021-01-01 DIAGNOSIS — R10.9 ABDOMINAL PAIN: ICD-10-CM

## 2021-01-01 DIAGNOSIS — R11.2 NAUSEA AND VOMITING: ICD-10-CM

## 2021-01-01 DIAGNOSIS — K57.92 DIVERTICULITIS: Primary | ICD-10-CM

## 2021-01-01 DIAGNOSIS — N17.9 AKI (ACUTE KIDNEY INJURY) (HCC): ICD-10-CM

## 2021-01-01 LAB
ALBUMIN SERPL BCP-MCNC: 3.2 G/DL (ref 3.5–5)
ALBUMIN SERPL BCP-MCNC: 3.6 G/DL (ref 3.5–5)
ALBUMIN SERPL BCP-MCNC: 3.9 G/DL (ref 3.5–5)
ALP SERPL-CCNC: 121 U/L (ref 46–116)
ALP SERPL-CCNC: 68 U/L (ref 46–116)
ALP SERPL-CCNC: 87 U/L (ref 46–116)
ALT SERPL W P-5'-P-CCNC: 15 U/L (ref 12–78)
ALT SERPL W P-5'-P-CCNC: 27 U/L (ref 12–78)
ALT SERPL W P-5'-P-CCNC: 28 U/L (ref 12–78)
ANION GAP SERPL CALCULATED.3IONS-SCNC: 10 MMOL/L (ref 4–13)
ANION GAP SERPL CALCULATED.3IONS-SCNC: 6 MMOL/L (ref 4–13)
ANION GAP SERPL CALCULATED.3IONS-SCNC: 8 MMOL/L (ref 4–13)
ANION GAP SERPL CALCULATED.3IONS-SCNC: 9 MMOL/L (ref 4–13)
ANION GAP SERPL CALCULATED.3IONS-SCNC: 9 MMOL/L (ref 4–13)
APTT PPP: 32 SECONDS (ref 23–37)
AST SERPL W P-5'-P-CCNC: 11 U/L (ref 5–45)
AST SERPL W P-5'-P-CCNC: 14 U/L (ref 5–45)
AST SERPL W P-5'-P-CCNC: 18 U/L (ref 5–45)
BACTERIA BLD CULT: NORMAL
BACTERIA BLD CULT: NORMAL
BASOPHILS # BLD AUTO: 0.05 THOUSANDS/ΜL (ref 0–0.1)
BASOPHILS # BLD AUTO: 0.05 THOUSANDS/ΜL (ref 0–0.1)
BASOPHILS # BLD AUTO: 0.06 THOUSANDS/ΜL (ref 0–0.1)
BASOPHILS # BLD AUTO: 0.07 THOUSANDS/ΜL (ref 0–0.1)
BASOPHILS # BLD AUTO: 0.08 THOUSANDS/ΜL (ref 0–0.1)
BASOPHILS NFR BLD AUTO: 1 % (ref 0–1)
BILIRUB DIRECT SERPL-MCNC: 0.16 MG/DL (ref 0–0.2)
BILIRUB SERPL-MCNC: 0.33 MG/DL (ref 0.2–1)
BILIRUB SERPL-MCNC: 0.45 MG/DL (ref 0.2–1)
BILIRUB SERPL-MCNC: 0.63 MG/DL (ref 0.2–1)
BILIRUB UR QL STRIP: NEGATIVE
BUN SERPL-MCNC: 17 MG/DL (ref 5–25)
BUN SERPL-MCNC: 27 MG/DL (ref 5–25)
BUN SERPL-MCNC: 28 MG/DL (ref 5–25)
BUN SERPL-MCNC: 33 MG/DL (ref 5–25)
BUN SERPL-MCNC: 43 MG/DL (ref 5–25)
CALCIUM ALBUM COR SERPL-MCNC: 9.7 MG/DL (ref 8.3–10.1)
CALCIUM SERPL-MCNC: 8.9 MG/DL (ref 8.3–10.1)
CALCIUM SERPL-MCNC: 9.1 MG/DL (ref 8.3–10.1)
CALCIUM SERPL-MCNC: 9.2 MG/DL (ref 8.3–10.1)
CALCIUM SERPL-MCNC: 9.3 MG/DL (ref 8.3–10.1)
CALCIUM SERPL-MCNC: 9.5 MG/DL (ref 8.3–10.1)
CHLORIDE SERPL-SCNC: 103 MMOL/L (ref 100–108)
CHLORIDE SERPL-SCNC: 103 MMOL/L (ref 100–108)
CHLORIDE SERPL-SCNC: 104 MMOL/L (ref 100–108)
CHLORIDE SERPL-SCNC: 107 MMOL/L (ref 100–108)
CHLORIDE SERPL-SCNC: 107 MMOL/L (ref 100–108)
CLARITY UR: CLEAR
CO2 SERPL-SCNC: 25 MMOL/L (ref 21–32)
CO2 SERPL-SCNC: 28 MMOL/L (ref 21–32)
CO2 SERPL-SCNC: 29 MMOL/L (ref 21–32)
CO2 SERPL-SCNC: 31 MMOL/L (ref 21–32)
CO2 SERPL-SCNC: 34 MMOL/L (ref 21–32)
COLOR UR: NORMAL
CREAT SERPL-MCNC: 1.01 MG/DL (ref 0.6–1.3)
CREAT SERPL-MCNC: 1.08 MG/DL (ref 0.6–1.3)
CREAT SERPL-MCNC: 1.1 MG/DL (ref 0.6–1.3)
CREAT SERPL-MCNC: 1.14 MG/DL (ref 0.6–1.3)
CREAT SERPL-MCNC: 1.4 MG/DL (ref 0.6–1.3)
EOSINOPHIL # BLD AUTO: 0.04 THOUSAND/ΜL (ref 0–0.61)
EOSINOPHIL # BLD AUTO: 0.21 THOUSAND/ΜL (ref 0–0.61)
EOSINOPHIL # BLD AUTO: 0.3 THOUSAND/ΜL (ref 0–0.61)
EOSINOPHIL # BLD AUTO: 0.31 THOUSAND/ΜL (ref 0–0.61)
EOSINOPHIL # BLD AUTO: 0.41 THOUSAND/ΜL (ref 0–0.61)
EOSINOPHIL NFR BLD AUTO: 0 % (ref 0–6)
EOSINOPHIL NFR BLD AUTO: 3 % (ref 0–6)
EOSINOPHIL NFR BLD AUTO: 4 % (ref 0–6)
EOSINOPHIL NFR BLD AUTO: 4 % (ref 0–6)
EOSINOPHIL NFR BLD AUTO: 6 % (ref 0–6)
ERYTHROCYTE [DISTWIDTH] IN BLOOD BY AUTOMATED COUNT: 12.7 % (ref 11.6–15.1)
ERYTHROCYTE [DISTWIDTH] IN BLOOD BY AUTOMATED COUNT: 12.8 % (ref 11.6–15.1)
ERYTHROCYTE [DISTWIDTH] IN BLOOD BY AUTOMATED COUNT: 13.2 % (ref 11.6–15.1)
GFR SERPL CREATININE-BSD FRML MDRD: 34 ML/MIN/1.73SQ M
GFR SERPL CREATININE-BSD FRML MDRD: 43 ML/MIN/1.73SQ M
GFR SERPL CREATININE-BSD FRML MDRD: 45 ML/MIN/1.73SQ M
GFR SERPL CREATININE-BSD FRML MDRD: 46 ML/MIN/1.73SQ M
GFR SERPL CREATININE-BSD FRML MDRD: 50 ML/MIN/1.73SQ M
GLUCOSE SERPL-MCNC: 107 MG/DL (ref 65–140)
GLUCOSE SERPL-MCNC: 143 MG/DL (ref 65–140)
GLUCOSE SERPL-MCNC: 87 MG/DL (ref 65–140)
GLUCOSE SERPL-MCNC: 90 MG/DL (ref 65–140)
GLUCOSE SERPL-MCNC: 91 MG/DL (ref 65–140)
GLUCOSE SERPL-MCNC: 91 MG/DL (ref 65–140)
GLUCOSE SERPL-MCNC: 99 MG/DL (ref 65–140)
GLUCOSE UR STRIP-MCNC: NEGATIVE MG/DL
HCT VFR BLD AUTO: 36.6 % (ref 34.8–46.1)
HCT VFR BLD AUTO: 36.6 % (ref 34.8–46.1)
HCT VFR BLD AUTO: 40.7 % (ref 34.8–46.1)
HCT VFR BLD AUTO: 41.1 % (ref 34.8–46.1)
HCT VFR BLD AUTO: 45.9 % (ref 34.8–46.1)
HGB BLD-MCNC: 11.8 G/DL (ref 11.5–15.4)
HGB BLD-MCNC: 11.9 G/DL (ref 11.5–15.4)
HGB BLD-MCNC: 13.2 G/DL (ref 11.5–15.4)
HGB BLD-MCNC: 13.4 G/DL (ref 11.5–15.4)
HGB BLD-MCNC: 15.3 G/DL (ref 11.5–15.4)
HGB UR QL STRIP.AUTO: NEGATIVE
IMM GRANULOCYTES # BLD AUTO: 0.03 THOUSAND/UL (ref 0–0.2)
IMM GRANULOCYTES # BLD AUTO: 0.04 THOUSAND/UL (ref 0–0.2)
IMM GRANULOCYTES # BLD AUTO: 0.08 THOUSAND/UL (ref 0–0.2)
IMM GRANULOCYTES NFR BLD AUTO: 0 % (ref 0–2)
IMM GRANULOCYTES NFR BLD AUTO: 1 % (ref 0–2)
INR PPP: 1.31 (ref 0.84–1.19)
KETONES UR STRIP-MCNC: NEGATIVE MG/DL
LEUKOCYTE ESTERASE UR QL STRIP: NEGATIVE
LYMPHOCYTES # BLD AUTO: 0.63 THOUSANDS/ΜL (ref 0.6–4.47)
LYMPHOCYTES # BLD AUTO: 1.15 THOUSANDS/ΜL (ref 0.6–4.47)
LYMPHOCYTES # BLD AUTO: 1.26 THOUSANDS/ΜL (ref 0.6–4.47)
LYMPHOCYTES # BLD AUTO: 1.43 THOUSANDS/ΜL (ref 0.6–4.47)
LYMPHOCYTES # BLD AUTO: 1.55 THOUSANDS/ΜL (ref 0.6–4.47)
LYMPHOCYTES NFR BLD AUTO: 16 % (ref 14–44)
LYMPHOCYTES NFR BLD AUTO: 16 % (ref 14–44)
LYMPHOCYTES NFR BLD AUTO: 20 % (ref 14–44)
LYMPHOCYTES NFR BLD AUTO: 23 % (ref 14–44)
LYMPHOCYTES NFR BLD AUTO: 5 % (ref 14–44)
MCH RBC QN AUTO: 31.1 PG (ref 26.8–34.3)
MCH RBC QN AUTO: 31.4 PG (ref 26.8–34.3)
MCH RBC QN AUTO: 31.5 PG (ref 26.8–34.3)
MCH RBC QN AUTO: 31.6 PG (ref 26.8–34.3)
MCH RBC QN AUTO: 31.7 PG (ref 26.8–34.3)
MCHC RBC AUTO-ENTMCNC: 32.1 G/DL (ref 31.4–37.4)
MCHC RBC AUTO-ENTMCNC: 32.2 G/DL (ref 31.4–37.4)
MCHC RBC AUTO-ENTMCNC: 32.5 G/DL (ref 31.4–37.4)
MCHC RBC AUTO-ENTMCNC: 32.9 G/DL (ref 31.4–37.4)
MCHC RBC AUTO-ENTMCNC: 33.3 G/DL (ref 31.4–37.4)
MCV RBC AUTO: 95 FL (ref 82–98)
MCV RBC AUTO: 96 FL (ref 82–98)
MCV RBC AUTO: 96 FL (ref 82–98)
MCV RBC AUTO: 97 FL (ref 82–98)
MCV RBC AUTO: 98 FL (ref 82–98)
MONOCYTES # BLD AUTO: 0.78 THOUSAND/ΜL (ref 0.17–1.22)
MONOCYTES # BLD AUTO: 0.81 THOUSAND/ΜL (ref 0.17–1.22)
MONOCYTES # BLD AUTO: 0.84 THOUSAND/ΜL (ref 0.17–1.22)
MONOCYTES # BLD AUTO: 0.85 THOUSAND/ΜL (ref 0.17–1.22)
MONOCYTES # BLD AUTO: 1.03 THOUSAND/ΜL (ref 0.17–1.22)
MONOCYTES NFR BLD AUTO: 11 % (ref 4–12)
MONOCYTES NFR BLD AUTO: 12 % (ref 4–12)
MONOCYTES NFR BLD AUTO: 8 % (ref 4–12)
NEUTROPHILS # BLD AUTO: 10.8 THOUSANDS/ΜL (ref 1.85–7.62)
NEUTROPHILS # BLD AUTO: 3.9 THOUSANDS/ΜL (ref 1.85–7.62)
NEUTROPHILS # BLD AUTO: 4.67 THOUSANDS/ΜL (ref 1.85–7.62)
NEUTROPHILS # BLD AUTO: 4.96 THOUSANDS/ΜL (ref 1.85–7.62)
NEUTROPHILS # BLD AUTO: 5.35 THOUSANDS/ΜL (ref 1.85–7.62)
NEUTS SEG NFR BLD AUTO: 58 % (ref 43–75)
NEUTS SEG NFR BLD AUTO: 63 % (ref 43–75)
NEUTS SEG NFR BLD AUTO: 67 % (ref 43–75)
NEUTS SEG NFR BLD AUTO: 68 % (ref 43–75)
NEUTS SEG NFR BLD AUTO: 85 % (ref 43–75)
NITRITE UR QL STRIP: NEGATIVE
NRBC BLD AUTO-RTO: 0 /100 WBCS
PH UR STRIP.AUTO: 6.5 [PH]
PLATELET # BLD AUTO: 259 THOUSANDS/UL (ref 149–390)
PLATELET # BLD AUTO: 268 THOUSANDS/UL (ref 149–390)
PLATELET # BLD AUTO: 269 THOUSANDS/UL (ref 149–390)
PLATELET # BLD AUTO: 296 THOUSANDS/UL (ref 149–390)
PLATELET # BLD AUTO: 308 THOUSANDS/UL (ref 149–390)
PMV BLD AUTO: 8.3 FL (ref 8.9–12.7)
PMV BLD AUTO: 8.4 FL (ref 8.9–12.7)
PMV BLD AUTO: 8.6 FL (ref 8.9–12.7)
POTASSIUM SERPL-SCNC: 3.6 MMOL/L (ref 3.5–5.3)
POTASSIUM SERPL-SCNC: 3.6 MMOL/L (ref 3.5–5.3)
POTASSIUM SERPL-SCNC: 4.2 MMOL/L (ref 3.5–5.3)
POTASSIUM SERPL-SCNC: 4.4 MMOL/L (ref 3.5–5.3)
POTASSIUM SERPL-SCNC: 4.8 MMOL/L (ref 3.5–5.3)
PROT SERPL-MCNC: 6.7 G/DL (ref 6.4–8.2)
PROT SERPL-MCNC: 7.5 G/DL (ref 6.4–8.2)
PROT SERPL-MCNC: 7.7 G/DL (ref 6.4–8.2)
PROT UR STRIP-MCNC: NEGATIVE MG/DL
PROTHROMBIN TIME: 16 SECONDS (ref 11.6–14.5)
RBC # BLD AUTO: 3.77 MILLION/UL (ref 3.81–5.12)
RBC # BLD AUTO: 3.8 MILLION/UL (ref 3.81–5.12)
RBC # BLD AUTO: 4.21 MILLION/UL (ref 3.81–5.12)
RBC # BLD AUTO: 4.26 MILLION/UL (ref 3.81–5.12)
RBC # BLD AUTO: 4.83 MILLION/UL (ref 3.81–5.12)
SARS-COV-2 RNA RESP QL NAA+PROBE: NEGATIVE
SODIUM SERPL-SCNC: 141 MMOL/L (ref 136–145)
SODIUM SERPL-SCNC: 142 MMOL/L (ref 136–145)
SODIUM SERPL-SCNC: 142 MMOL/L (ref 136–145)
SODIUM SERPL-SCNC: 143 MMOL/L (ref 136–145)
SODIUM SERPL-SCNC: 145 MMOL/L (ref 136–145)
SP GR UR STRIP.AUTO: <=1.005 (ref 1–1.03)
UROBILINOGEN UR QL STRIP.AUTO: 0.2 E.U./DL
WBC # BLD AUTO: 12.64 THOUSAND/UL (ref 4.31–10.16)
WBC # BLD AUTO: 6.8 THOUSAND/UL (ref 4.31–10.16)
WBC # BLD AUTO: 7.27 THOUSAND/UL (ref 4.31–10.16)
WBC # BLD AUTO: 7.35 THOUSAND/UL (ref 4.31–10.16)
WBC # BLD AUTO: 7.76 THOUSAND/UL (ref 4.31–10.16)

## 2021-01-01 PROCEDURE — 74177 CT ABD & PELVIS W/CONTRAST: CPT

## 2021-01-01 PROCEDURE — 97530 THERAPEUTIC ACTIVITIES: CPT

## 2021-01-01 PROCEDURE — 81003 URINALYSIS AUTO W/O SCOPE: CPT | Performed by: INTERNAL MEDICINE

## 2021-01-01 PROCEDURE — 80048 BASIC METABOLIC PNL TOTAL CA: CPT | Performed by: STUDENT IN AN ORGANIZED HEALTH CARE EDUCATION/TRAINING PROGRAM

## 2021-01-01 PROCEDURE — 99232 SBSQ HOSP IP/OBS MODERATE 35: CPT | Performed by: STUDENT IN AN ORGANIZED HEALTH CARE EDUCATION/TRAINING PROGRAM

## 2021-01-01 PROCEDURE — 36415 COLL VENOUS BLD VENIPUNCTURE: CPT | Performed by: PHYSICIAN ASSISTANT

## 2021-01-01 PROCEDURE — 85730 THROMBOPLASTIN TIME PARTIAL: CPT | Performed by: PHYSICIAN ASSISTANT

## 2021-01-01 PROCEDURE — 82948 REAGENT STRIP/BLOOD GLUCOSE: CPT

## 2021-01-01 PROCEDURE — G1004 CDSM NDSC: HCPCS

## 2021-01-01 PROCEDURE — 99239 HOSP IP/OBS DSCHRG MGMT >30: CPT | Performed by: FAMILY MEDICINE

## 2021-01-01 PROCEDURE — U0003 INFECTIOUS AGENT DETECTION BY NUCLEIC ACID (DNA OR RNA); SEVERE ACUTE RESPIRATORY SYNDROME CORONAVIRUS 2 (SARS-COV-2) (CORONAVIRUS DISEASE [COVID-19]), AMPLIFIED PROBE TECHNIQUE, MAKING USE OF HIGH THROUGHPUT TECHNOLOGIES AS DESCRIBED BY CMS-2020-01-R: HCPCS | Performed by: FAMILY MEDICINE

## 2021-01-01 PROCEDURE — 85025 COMPLETE CBC W/AUTO DIFF WBC: CPT | Performed by: PHYSICIAN ASSISTANT

## 2021-01-01 PROCEDURE — 99232 SBSQ HOSP IP/OBS MODERATE 35: CPT | Performed by: FAMILY MEDICINE

## 2021-01-01 PROCEDURE — 97535 SELF CARE MNGMENT TRAINING: CPT

## 2021-01-01 PROCEDURE — 96374 THER/PROPH/DIAG INJ IV PUSH: CPT

## 2021-01-01 PROCEDURE — 97163 PT EVAL HIGH COMPLEX 45 MIN: CPT

## 2021-01-01 PROCEDURE — 97110 THERAPEUTIC EXERCISES: CPT

## 2021-01-01 PROCEDURE — 99285 EMERGENCY DEPT VISIT HI MDM: CPT | Performed by: PHYSICIAN ASSISTANT

## 2021-01-01 PROCEDURE — 85025 COMPLETE CBC W/AUTO DIFF WBC: CPT | Performed by: STUDENT IN AN ORGANIZED HEALTH CARE EDUCATION/TRAINING PROGRAM

## 2021-01-01 PROCEDURE — 80076 HEPATIC FUNCTION PANEL: CPT | Performed by: PHYSICIAN ASSISTANT

## 2021-01-01 PROCEDURE — 80053 COMPREHEN METABOLIC PANEL: CPT | Performed by: INTERNAL MEDICINE

## 2021-01-01 PROCEDURE — 80048 BASIC METABOLIC PNL TOTAL CA: CPT | Performed by: PHYSICIAN ASSISTANT

## 2021-01-01 PROCEDURE — U0005 INFEC AGEN DETEC AMPLI PROBE: HCPCS | Performed by: FAMILY MEDICINE

## 2021-01-01 PROCEDURE — 73590 X-RAY EXAM OF LOWER LEG: CPT

## 2021-01-01 PROCEDURE — 85610 PROTHROMBIN TIME: CPT | Performed by: PHYSICIAN ASSISTANT

## 2021-01-01 PROCEDURE — 80053 COMPREHEN METABOLIC PANEL: CPT | Performed by: PHYSICIAN ASSISTANT

## 2021-01-01 PROCEDURE — 99284 EMERGENCY DEPT VISIT MOD MDM: CPT | Performed by: PHYSICIAN ASSISTANT

## 2021-01-01 PROCEDURE — 96360 HYDRATION IV INFUSION INIT: CPT

## 2021-01-01 PROCEDURE — 97167 OT EVAL HIGH COMPLEX 60 MIN: CPT

## 2021-01-01 PROCEDURE — 99284 EMERGENCY DEPT VISIT MOD MDM: CPT

## 2021-01-01 PROCEDURE — 85025 COMPLETE CBC W/AUTO DIFF WBC: CPT | Performed by: INTERNAL MEDICINE

## 2021-01-01 PROCEDURE — 99223 1ST HOSP IP/OBS HIGH 75: CPT | Performed by: INTERNAL MEDICINE

## 2021-01-01 PROCEDURE — 87040 BLOOD CULTURE FOR BACTERIA: CPT | Performed by: PHYSICIAN ASSISTANT

## 2021-01-01 RX ORDER — CEFDINIR 300 MG/1
300 CAPSULE ORAL EVERY 12 HOURS SCHEDULED
Status: COMPLETED | OUTPATIENT
Start: 2021-01-01 | End: 2021-01-01

## 2021-01-01 RX ORDER — CLONIDINE HYDROCHLORIDE 0.1 MG/1
0.1 TABLET ORAL 3 TIMES DAILY
Status: DISCONTINUED | OUTPATIENT
Start: 2021-01-01 | End: 2021-01-01 | Stop reason: HOSPADM

## 2021-01-01 RX ORDER — AMOXICILLIN AND CLAVULANATE POTASSIUM 875; 125 MG/1; MG/1
1 TABLET, FILM COATED ORAL ONCE
Status: COMPLETED | OUTPATIENT
Start: 2021-01-01 | End: 2021-01-01

## 2021-01-01 RX ORDER — SODIUM CHLORIDE 9 MG/ML
75 INJECTION, SOLUTION INTRAVENOUS CONTINUOUS
Status: DISCONTINUED | OUTPATIENT
Start: 2021-01-01 | End: 2021-01-01

## 2021-01-01 RX ORDER — LEVALBUTEROL 1.25 MG/.5ML
1.25 SOLUTION, CONCENTRATE RESPIRATORY (INHALATION)
Status: DISCONTINUED | OUTPATIENT
Start: 2021-01-01 | End: 2021-01-01

## 2021-01-01 RX ORDER — AMOXICILLIN AND CLAVULANATE POTASSIUM 875; 125 MG/1; MG/1
1 TABLET, FILM COATED ORAL EVERY 12 HOURS
Qty: 20 TABLET | Refills: 0 | Status: SHIPPED | OUTPATIENT
Start: 2021-01-01 | End: 2021-01-01

## 2021-01-01 RX ORDER — FUROSEMIDE 40 MG/1
80 TABLET ORAL
Status: DISCONTINUED | OUTPATIENT
Start: 2021-01-01 | End: 2021-01-01 | Stop reason: HOSPADM

## 2021-01-01 RX ORDER — FUROSEMIDE 80 MG
80 TABLET ORAL
Status: ON HOLD | COMMUNITY
End: 2022-01-01 | Stop reason: SDUPTHER

## 2021-01-01 RX ORDER — CEFAZOLIN SODIUM 2 G/50ML
2000 SOLUTION INTRAVENOUS EVERY 8 HOURS
Status: DISCONTINUED | OUTPATIENT
Start: 2021-01-01 | End: 2021-01-01

## 2021-01-01 RX ORDER — SACCHAROMYCES BOULARDII 250 MG
250 CAPSULE ORAL 2 TIMES DAILY
Status: DISCONTINUED | OUTPATIENT
Start: 2021-01-01 | End: 2021-01-01 | Stop reason: HOSPADM

## 2021-01-01 RX ORDER — LEVALBUTEROL 1.25 MG/.5ML
1.25 SOLUTION, CONCENTRATE RESPIRATORY (INHALATION) EVERY 8 HOURS PRN
Status: DISCONTINUED | OUTPATIENT
Start: 2021-01-01 | End: 2021-01-01 | Stop reason: HOSPADM

## 2021-01-01 RX ORDER — BUSPIRONE HYDROCHLORIDE 5 MG/1
5 TABLET ORAL 3 TIMES DAILY
Status: DISCONTINUED | OUTPATIENT
Start: 2021-01-01 | End: 2021-01-01 | Stop reason: HOSPADM

## 2021-01-01 RX ORDER — AMLODIPINE BESYLATE 5 MG/1
5 TABLET ORAL DAILY
Status: DISCONTINUED | OUTPATIENT
Start: 2021-01-01 | End: 2021-01-01 | Stop reason: HOSPADM

## 2021-01-01 RX ORDER — SODIUM CHLORIDE 9 MG/ML
125 INJECTION, SOLUTION INTRAVENOUS CONTINUOUS
Status: DISCONTINUED | OUTPATIENT
Start: 2021-01-01 | End: 2021-01-01

## 2021-01-01 RX ORDER — BACLOFEN 10 MG/1
10 TABLET ORAL 3 TIMES DAILY PRN
Status: DISCONTINUED | OUTPATIENT
Start: 2021-01-01 | End: 2021-01-01 | Stop reason: HOSPADM

## 2021-01-01 RX ORDER — DOXYCYCLINE HYCLATE 100 MG/1
100 CAPSULE ORAL EVERY 12 HOURS SCHEDULED
COMMUNITY
End: 2022-01-01

## 2021-01-01 RX ORDER — FENTANYL 75 UG/H
1 PATCH TRANSDERMAL
Qty: 1 PATCH | Refills: 0 | Status: SHIPPED | OUTPATIENT
Start: 2021-01-01 | End: 2021-01-01

## 2021-01-01 RX ORDER — CEFDINIR 300 MG/1
300 CAPSULE ORAL EVERY 12 HOURS SCHEDULED
Qty: 14 CAPSULE | Refills: 0
Start: 2021-01-01 | End: 2021-01-01 | Stop reason: HOSPADM

## 2021-01-01 RX ORDER — ONDANSETRON 4 MG/1
4 TABLET, ORALLY DISINTEGRATING ORAL ONCE
Status: COMPLETED | OUTPATIENT
Start: 2021-01-01 | End: 2021-01-01

## 2021-01-01 RX ORDER — ACETAMINOPHEN 325 MG/1
650 TABLET ORAL EVERY 6 HOURS PRN
Status: DISCONTINUED | OUTPATIENT
Start: 2021-01-01 | End: 2021-01-01 | Stop reason: HOSPADM

## 2021-01-01 RX ORDER — ONDANSETRON 2 MG/ML
4 INJECTION INTRAMUSCULAR; INTRAVENOUS ONCE
Status: COMPLETED | OUTPATIENT
Start: 2021-01-01 | End: 2021-01-01

## 2021-01-01 RX ORDER — FENTANYL 75 UG/H
1 PATCH TRANSDERMAL
Status: DISCONTINUED | OUTPATIENT
Start: 2021-01-01 | End: 2021-01-01 | Stop reason: HOSPADM

## 2021-01-01 RX ORDER — ANORECTAL OINTMENT 15.7; .44; 24; 20.6 G/100G; G/100G; G/100G; G/100G
OINTMENT TOPICAL 2 TIMES DAILY
COMMUNITY
End: 2022-01-01

## 2021-01-01 RX ORDER — CEFAZOLIN SODIUM 2 G/50ML
2000 SOLUTION INTRAVENOUS ONCE
Status: COMPLETED | OUTPATIENT
Start: 2021-01-01 | End: 2021-01-01

## 2021-01-01 RX ORDER — ONDANSETRON 4 MG/1
4 TABLET, ORALLY DISINTEGRATING ORAL EVERY 6 HOURS PRN
Qty: 12 TABLET | Refills: 0 | Status: SHIPPED | OUTPATIENT
Start: 2021-01-01 | End: 2022-01-01

## 2021-01-01 RX ORDER — BUSPIRONE HYDROCHLORIDE 5 MG/1
5 TABLET ORAL 3 TIMES DAILY
COMMUNITY

## 2021-01-01 RX ADMIN — ONDANSETRON 4 MG: 2 INJECTION INTRAMUSCULAR; INTRAVENOUS at 02:14

## 2021-01-01 RX ADMIN — CEFTRIAXONE SODIUM 2000 MG: 10 INJECTION, POWDER, FOR SOLUTION INTRAVENOUS at 18:33

## 2021-01-01 RX ADMIN — AMLODIPINE BESYLATE 5 MG: 5 TABLET ORAL at 08:13

## 2021-01-01 RX ADMIN — FENTANYL 1 PATCH: 75 PATCH TRANSDERMAL at 17:38

## 2021-01-01 RX ADMIN — CLONIDINE HYDROCHLORIDE 0.1 MG: 0.1 TABLET ORAL at 08:13

## 2021-01-01 RX ADMIN — BUSPIRONE HYDROCHLORIDE 5 MG: 5 TABLET ORAL at 08:27

## 2021-01-01 RX ADMIN — CEFDINIR 300 MG: 300 CAPSULE ORAL at 09:26

## 2021-01-01 RX ADMIN — SODIUM CHLORIDE 75 ML/HR: 0.9 INJECTION, SOLUTION INTRAVENOUS at 15:14

## 2021-01-01 RX ADMIN — BUSPIRONE HYDROCHLORIDE 5 MG: 5 TABLET ORAL at 08:28

## 2021-01-01 RX ADMIN — APIXABAN 5 MG: 5 TABLET, FILM COATED ORAL at 17:26

## 2021-01-01 RX ADMIN — Medication 250 MG: at 08:07

## 2021-01-01 RX ADMIN — FUROSEMIDE 80 MG: 40 TABLET ORAL at 07:48

## 2021-01-01 RX ADMIN — CEFDINIR 300 MG: 300 CAPSULE ORAL at 20:34

## 2021-01-01 RX ADMIN — Medication 250 MG: at 09:22

## 2021-01-01 RX ADMIN — Medication 250 MG: at 08:28

## 2021-01-01 RX ADMIN — SODIUM CHLORIDE 125 ML/HR: 0.9 INJECTION, SOLUTION INTRAVENOUS at 14:13

## 2021-01-01 RX ADMIN — CLONIDINE HYDROCHLORIDE 0.1 MG: 0.1 TABLET ORAL at 16:23

## 2021-01-01 RX ADMIN — CEFDINIR 300 MG: 300 CAPSULE ORAL at 08:07

## 2021-01-01 RX ADMIN — BACLOFEN 10 MG: 10 TABLET ORAL at 21:18

## 2021-01-01 RX ADMIN — CEFDINIR 300 MG: 300 CAPSULE ORAL at 07:52

## 2021-01-01 RX ADMIN — BUSPIRONE HYDROCHLORIDE 5 MG: 5 TABLET ORAL at 15:49

## 2021-01-01 RX ADMIN — AMOXICILLIN AND CLAVULANATE POTASSIUM 1 TABLET: 875; 125 TABLET, FILM COATED ORAL at 04:16

## 2021-01-01 RX ADMIN — BUSPIRONE HYDROCHLORIDE 5 MG: 5 TABLET ORAL at 08:54

## 2021-01-01 RX ADMIN — CEFDINIR 300 MG: 300 CAPSULE ORAL at 21:18

## 2021-01-01 RX ADMIN — CLONIDINE HYDROCHLORIDE 0.1 MG: 0.1 TABLET ORAL at 15:49

## 2021-01-01 RX ADMIN — CLONIDINE HYDROCHLORIDE 0.1 MG: 0.1 TABLET ORAL at 20:40

## 2021-01-01 RX ADMIN — ACETAMINOPHEN 650 MG: 325 TABLET, FILM COATED ORAL at 21:18

## 2021-01-01 RX ADMIN — Medication 250 MG: at 18:09

## 2021-01-01 RX ADMIN — IOHEXOL 100 ML: 350 INJECTION, SOLUTION INTRAVENOUS at 02:38

## 2021-01-01 RX ADMIN — CEFDINIR 300 MG: 300 CAPSULE ORAL at 08:26

## 2021-01-01 RX ADMIN — BUSPIRONE HYDROCHLORIDE 5 MG: 5 TABLET ORAL at 20:34

## 2021-01-01 RX ADMIN — BUSPIRONE HYDROCHLORIDE 5 MG: 5 TABLET ORAL at 08:07

## 2021-01-01 RX ADMIN — BACLOFEN 10 MG: 10 TABLET ORAL at 04:38

## 2021-01-01 RX ADMIN — FENTANYL 1 PATCH: 75 PATCH TRANSDERMAL at 17:44

## 2021-01-01 RX ADMIN — CLONIDINE HYDROCHLORIDE 0.1 MG: 0.1 TABLET ORAL at 16:35

## 2021-01-01 RX ADMIN — CLONIDINE HYDROCHLORIDE 0.1 MG: 0.1 TABLET ORAL at 08:35

## 2021-01-01 RX ADMIN — FUROSEMIDE 80 MG: 40 TABLET ORAL at 08:13

## 2021-01-01 RX ADMIN — AMLODIPINE BESYLATE 5 MG: 5 TABLET ORAL at 08:35

## 2021-01-01 RX ADMIN — BACLOFEN 10 MG: 10 TABLET ORAL at 17:20

## 2021-01-01 RX ADMIN — BUSPIRONE HYDROCHLORIDE 5 MG: 5 TABLET ORAL at 17:36

## 2021-01-01 RX ADMIN — CEFAZOLIN SODIUM 2000 MG: 2 SOLUTION INTRAVENOUS at 17:37

## 2021-01-01 RX ADMIN — AMLODIPINE BESYLATE 5 MG: 5 TABLET ORAL at 09:22

## 2021-01-01 RX ADMIN — CEFDINIR 300 MG: 300 CAPSULE ORAL at 21:27

## 2021-01-01 RX ADMIN — CLONIDINE HYDROCHLORIDE 0.1 MG: 0.1 TABLET ORAL at 21:13

## 2021-01-01 RX ADMIN — BUSPIRONE HYDROCHLORIDE 5 MG: 5 TABLET ORAL at 20:39

## 2021-01-01 RX ADMIN — CLONIDINE HYDROCHLORIDE 0.1 MG: 0.1 TABLET ORAL at 09:02

## 2021-01-01 RX ADMIN — APIXABAN 5 MG: 5 TABLET, FILM COATED ORAL at 17:00

## 2021-01-01 RX ADMIN — BUSPIRONE HYDROCHLORIDE 5 MG: 5 TABLET ORAL at 16:23

## 2021-01-01 RX ADMIN — Medication 250 MG: at 17:00

## 2021-01-01 RX ADMIN — AMLODIPINE BESYLATE 5 MG: 5 TABLET ORAL at 09:02

## 2021-01-01 RX ADMIN — CEFDINIR 300 MG: 300 CAPSULE ORAL at 21:54

## 2021-01-01 RX ADMIN — APIXABAN 5 MG: 5 TABLET, FILM COATED ORAL at 18:33

## 2021-01-01 RX ADMIN — FENTANYL 1 PATCH: 75 PATCH TRANSDERMAL at 18:09

## 2021-01-01 RX ADMIN — CLONIDINE HYDROCHLORIDE 0.1 MG: 0.1 TABLET ORAL at 08:30

## 2021-01-01 RX ADMIN — APIXABAN 5 MG: 5 TABLET, FILM COATED ORAL at 08:35

## 2021-01-01 RX ADMIN — BISACODYL 10 MG: 5 TABLET, COATED ORAL at 04:16

## 2021-01-01 RX ADMIN — BUSPIRONE HYDROCHLORIDE 5 MG: 5 TABLET ORAL at 21:15

## 2021-01-01 RX ADMIN — APIXABAN 5 MG: 5 TABLET, FILM COATED ORAL at 17:18

## 2021-01-01 RX ADMIN — AMLODIPINE BESYLATE 5 MG: 5 TABLET ORAL at 08:07

## 2021-01-01 RX ADMIN — CLONIDINE HYDROCHLORIDE 0.1 MG: 0.1 TABLET ORAL at 08:07

## 2021-01-01 RX ADMIN — BUSPIRONE HYDROCHLORIDE 5 MG: 5 TABLET ORAL at 21:54

## 2021-01-01 RX ADMIN — CLONIDINE HYDROCHLORIDE 0.1 MG: 0.1 TABLET ORAL at 16:30

## 2021-01-01 RX ADMIN — BUSPIRONE HYDROCHLORIDE 5 MG: 5 TABLET ORAL at 20:40

## 2021-01-01 RX ADMIN — AMLODIPINE BESYLATE 5 MG: 5 TABLET ORAL at 08:26

## 2021-01-01 RX ADMIN — SODIUM CHLORIDE 75 ML/HR: 0.9 INJECTION, SOLUTION INTRAVENOUS at 03:14

## 2021-01-01 RX ADMIN — BUSPIRONE HYDROCHLORIDE 5 MG: 5 TABLET ORAL at 21:19

## 2021-01-01 RX ADMIN — BUSPIRONE HYDROCHLORIDE 5 MG: 5 TABLET ORAL at 21:12

## 2021-01-01 RX ADMIN — BACLOFEN 10 MG: 10 TABLET ORAL at 22:12

## 2021-01-01 RX ADMIN — BUSPIRONE HYDROCHLORIDE 5 MG: 5 TABLET ORAL at 16:35

## 2021-01-01 RX ADMIN — APIXABAN 5 MG: 5 TABLET, FILM COATED ORAL at 17:36

## 2021-01-01 RX ADMIN — CLONIDINE HYDROCHLORIDE 0.1 MG: 0.1 TABLET ORAL at 20:39

## 2021-01-01 RX ADMIN — BACLOFEN 10 MG: 10 TABLET ORAL at 21:13

## 2021-01-01 RX ADMIN — BUSPIRONE HYDROCHLORIDE 5 MG: 5 TABLET ORAL at 07:48

## 2021-01-01 RX ADMIN — BUSPIRONE HYDROCHLORIDE 5 MG: 5 TABLET ORAL at 15:48

## 2021-01-01 RX ADMIN — BACLOFEN 10 MG: 10 TABLET ORAL at 13:00

## 2021-01-01 RX ADMIN — CLONIDINE HYDROCHLORIDE 0.1 MG: 0.1 TABLET ORAL at 09:10

## 2021-01-01 RX ADMIN — BACLOFEN 10 MG: 10 TABLET ORAL at 11:53

## 2021-01-01 RX ADMIN — CEFDINIR 300 MG: 300 CAPSULE ORAL at 08:17

## 2021-01-01 RX ADMIN — CLONIDINE HYDROCHLORIDE 0.1 MG: 0.1 TABLET ORAL at 08:54

## 2021-01-01 RX ADMIN — FUROSEMIDE 80 MG: 40 TABLET ORAL at 08:54

## 2021-01-01 RX ADMIN — BUSPIRONE HYDROCHLORIDE 5 MG: 5 TABLET ORAL at 21:28

## 2021-01-01 RX ADMIN — BUSPIRONE HYDROCHLORIDE 5 MG: 5 TABLET ORAL at 17:37

## 2021-01-01 RX ADMIN — APIXABAN 5 MG: 5 TABLET, FILM COATED ORAL at 18:09

## 2021-01-01 RX ADMIN — BACLOFEN 10 MG: 10 TABLET ORAL at 21:25

## 2021-01-01 RX ADMIN — BUSPIRONE HYDROCHLORIDE 5 MG: 5 TABLET ORAL at 22:12

## 2021-01-01 RX ADMIN — AMLODIPINE BESYLATE 5 MG: 5 TABLET ORAL at 08:54

## 2021-01-01 RX ADMIN — CLONIDINE HYDROCHLORIDE 0.1 MG: 0.1 TABLET ORAL at 17:40

## 2021-01-01 RX ADMIN — APIXABAN 5 MG: 5 TABLET, FILM COATED ORAL at 08:54

## 2021-01-01 RX ADMIN — CLONIDINE HYDROCHLORIDE 0.1 MG: 0.1 TABLET ORAL at 17:37

## 2021-01-01 RX ADMIN — AMLODIPINE BESYLATE 5 MG: 5 TABLET ORAL at 09:10

## 2021-01-01 RX ADMIN — CEFDINIR 300 MG: 300 CAPSULE ORAL at 20:40

## 2021-01-01 RX ADMIN — BUSPIRONE HYDROCHLORIDE 5 MG: 5 TABLET ORAL at 09:02

## 2021-01-01 RX ADMIN — CLONIDINE HYDROCHLORIDE 0.1 MG: 0.1 TABLET ORAL at 15:54

## 2021-01-01 RX ADMIN — Medication 250 MG: at 17:18

## 2021-01-01 RX ADMIN — CLONIDINE HYDROCHLORIDE 0.1 MG: 0.1 TABLET ORAL at 16:50

## 2021-01-01 RX ADMIN — BUSPIRONE HYDROCHLORIDE 5 MG: 5 TABLET ORAL at 15:54

## 2021-01-01 RX ADMIN — CLONIDINE HYDROCHLORIDE 0.1 MG: 0.1 TABLET ORAL at 20:34

## 2021-01-01 RX ADMIN — CEFDINIR 300 MG: 300 CAPSULE ORAL at 20:39

## 2021-01-01 RX ADMIN — CLONIDINE HYDROCHLORIDE 0.1 MG: 0.1 TABLET ORAL at 21:15

## 2021-01-01 RX ADMIN — APIXABAN 5 MG: 5 TABLET, FILM COATED ORAL at 17:44

## 2021-01-01 RX ADMIN — APIXABAN 5 MG: 5 TABLET, FILM COATED ORAL at 08:07

## 2021-01-01 RX ADMIN — AMLODIPINE BESYLATE 5 MG: 5 TABLET ORAL at 07:48

## 2021-01-01 RX ADMIN — APIXABAN 5 MG: 5 TABLET, FILM COATED ORAL at 08:13

## 2021-01-01 RX ADMIN — BUSPIRONE HYDROCHLORIDE 5 MG: 5 TABLET ORAL at 16:30

## 2021-01-01 RX ADMIN — FUROSEMIDE 80 MG: 40 TABLET ORAL at 08:07

## 2021-01-01 RX ADMIN — CLONIDINE HYDROCHLORIDE 0.1 MG: 0.1 TABLET ORAL at 21:54

## 2021-01-01 RX ADMIN — AMLODIPINE BESYLATE 5 MG: 5 TABLET ORAL at 08:29

## 2021-01-01 RX ADMIN — CLONIDINE HYDROCHLORIDE 0.1 MG: 0.1 TABLET ORAL at 16:53

## 2021-01-01 RX ADMIN — BACLOFEN 10 MG: 10 TABLET ORAL at 12:45

## 2021-01-01 RX ADMIN — Medication 250 MG: at 17:36

## 2021-01-01 RX ADMIN — APIXABAN 5 MG: 5 TABLET, FILM COATED ORAL at 17:37

## 2021-01-01 RX ADMIN — APIXABAN 5 MG: 5 TABLET, FILM COATED ORAL at 17:23

## 2021-01-01 RX ADMIN — BACLOFEN 10 MG: 10 TABLET ORAL at 17:29

## 2021-01-01 RX ADMIN — APIXABAN 5 MG: 5 TABLET, FILM COATED ORAL at 09:10

## 2021-01-01 RX ADMIN — CLONIDINE HYDROCHLORIDE 0.1 MG: 0.1 TABLET ORAL at 07:48

## 2021-01-01 RX ADMIN — BUSPIRONE HYDROCHLORIDE 5 MG: 5 TABLET ORAL at 08:13

## 2021-01-01 RX ADMIN — CEFTRIAXONE SODIUM 2000 MG: 10 INJECTION, POWDER, FOR SOLUTION INTRAVENOUS at 19:20

## 2021-01-01 RX ADMIN — APIXABAN 5 MG: 5 TABLET, FILM COATED ORAL at 09:02

## 2021-01-01 RX ADMIN — FENTANYL 1 PATCH: 75 PATCH TRANSDERMAL at 17:00

## 2021-01-01 RX ADMIN — CLONIDINE HYDROCHLORIDE 0.1 MG: 0.1 TABLET ORAL at 09:22

## 2021-01-01 RX ADMIN — BACLOFEN 10 MG: 10 TABLET ORAL at 18:43

## 2021-01-01 RX ADMIN — BUSPIRONE HYDROCHLORIDE 5 MG: 5 TABLET ORAL at 15:58

## 2021-01-01 RX ADMIN — BUSPIRONE HYDROCHLORIDE 5 MG: 5 TABLET ORAL at 08:35

## 2021-01-01 RX ADMIN — CLONIDINE HYDROCHLORIDE 0.1 MG: 0.1 TABLET ORAL at 08:28

## 2021-01-01 RX ADMIN — BUSPIRONE HYDROCHLORIDE 5 MG: 5 TABLET ORAL at 16:50

## 2021-01-01 RX ADMIN — APIXABAN 5 MG: 5 TABLET, FILM COATED ORAL at 09:23

## 2021-01-01 RX ADMIN — BACLOFEN 10 MG: 10 TABLET ORAL at 08:35

## 2021-01-01 RX ADMIN — BUSPIRONE HYDROCHLORIDE 5 MG: 5 TABLET ORAL at 09:10

## 2021-01-01 RX ADMIN — BACLOFEN 10 MG: 10 TABLET ORAL at 03:00

## 2021-01-01 RX ADMIN — ONDANSETRON 4 MG: 4 TABLET, ORALLY DISINTEGRATING ORAL at 07:53

## 2021-01-01 RX ADMIN — APIXABAN 5 MG: 5 TABLET, FILM COATED ORAL at 08:28

## 2021-01-01 RX ADMIN — Medication 250 MG: at 09:02

## 2021-01-01 RX ADMIN — Medication 250 MG: at 10:13

## 2021-01-01 RX ADMIN — BUSPIRONE HYDROCHLORIDE 5 MG: 5 TABLET ORAL at 16:53

## 2021-01-01 RX ADMIN — APIXABAN 5 MG: 5 TABLET, FILM COATED ORAL at 07:48

## 2021-01-01 RX ADMIN — CLONIDINE HYDROCHLORIDE 0.1 MG: 0.1 TABLET ORAL at 21:19

## 2021-01-01 RX ADMIN — FUROSEMIDE 80 MG: 40 TABLET ORAL at 09:02

## 2021-01-01 RX ADMIN — BUSPIRONE HYDROCHLORIDE 5 MG: 5 TABLET ORAL at 09:22

## 2021-01-01 RX ADMIN — APIXABAN 5 MG: 5 TABLET, FILM COATED ORAL at 08:26

## 2021-06-28 NOTE — ASSESSMENT & PLAN NOTE
· Acute kidney injury with creatinine 1 4  · Possibly secondary to recently being on Bactrim  · Will check urinalysis  · Hold diuretics  · Monitor renal function closely

## 2021-06-28 NOTE — ASSESSMENT & PLAN NOTE
Wt Readings from Last 3 Encounters:   06/28/21 115 kg (253 lb 15 5 oz)   10/09/20 114 kg (251 lb 12 3 oz)   07/01/19 103 kg (226 lb 6 6 oz)       · Will hold Lasix in setting of acute kidney injury  · Monitor I&Os

## 2021-06-28 NOTE — ED PROVIDER NOTES
History  Chief Complaint   Patient presents with    Wound Check     patient with b/l lower leg wounds  does not see wound care but follows physician at [facility] for wounds  per ems physician evaluated today and noted increased drainage to left  patient states pain in both     Patient is an 79 y/o female with a PMH of a fib on eliquis, HTN, HLD, CHF, anxiety who presents from assisted living [facility] for evaluation of bilateral leg wounds  She has recurrent bilateral lower extremity wounds  She sees her PCP at [facility] for these wounds  She states that the wounds have been present for over a month  She states there has been worsening redness, swelling, warmth and tenderness surrounding the wounds  She has been on multiple antibiotics over the last 1-2 months but has not been able to tolerate most due to GI issues or rash  Discussed with Patient care coordinator at [facility]- they state that she has had recurrent lower extremity wounds and this has been present for over a month  Patient is wheelchair bound  At one point she did see wound care however the patient was noncompliant with their instructions  Her PCP has been prescribing her antibiotics however she has been able to tolerate most   She was placed on Bactrim June 8th and taken off this medication on June 9th due to nausea /vomiting  She was started on doxycycline 06/23/2021 and has been taking this without improvement (does note that patient refused to take this medication yesterday and today due to nausea)  They states that there has been worsening right-sided lower extremity redness, swelling, warmth and pain surrounding a superficial wound/ulceration to the lateral aspect of the ankle area  Patient has chronic left-sided lower extremity wound that always drains a small amount  There is chronic skin changes surrounding this  There is some increase in the redness to this area    Patient denies fever, chills, vomiting, diarrhea, chest pain, shortness breath abdominal pain, numbness, weakness  Allergies sulfa, azithromycin, cephalexin, ciprofloxacin, vancomycin               Prior to Admission Medications   Prescriptions Last Dose Informant Patient Reported? Taking?    Calcium Carbonate Antacid (TUMS ULTRA PO)   Yes No   Sig: Take 2 tablets by mouth 2 (two) times a day   Diclofenac Sodium (VOLTAREN) 1 %   Yes Yes   Sig: Apply 2 g topically 2 (two) times a day   Emollient (Cathye Deem) LOTN   Yes No   Sig: Apply topically 2 (two) times a day   Polyethyl Glycol-Propyl Glycol (SYSTANE) 0 4-0 3 % GEL  Self Yes Yes   Sig: Apply to eye   Wound Dressings (Triad Hydrophilic Wound Dressi) PSTE   Yes Yes   Sig: Apply topically as needed   amLODIPine (NORVASC) 5 mg tablet   Yes Yes   Sig: Take 5 mg by mouth daily   apixaban (ELIQUIS) 5 mg  Self Yes Yes   Sig: Take 5 mg by mouth 2 (two) times a day   bisacodyl (FLEET) 10 MG/30ML ENEM   Yes Yes   Sig: Insert 10 mg into the rectum once   busPIRone (BUSPAR) 5 mg tablet   Yes Yes   Sig: Take 5 mg by mouth 3 (three) times a day   cholecalciferol (VITAMIN D3) 1,000 units tablet   Yes No   Sig: Take 1,000 Units by mouth daily   cloNIDine (CATAPRES) 0 2 mg tablet  Self Yes Yes   Sig: Take 0 1 mg by mouth 3 (three) times a day     diphenhydrAMINE (BENADRYL) 25 mg tablet   Yes No   Sig: Take 12 5 mg by mouth daily at bedtime   doxycycline hyclate (VIBRAMYCIN) 100 mg capsule   Yes Yes   Sig: Take 100 mg by mouth every 12 (twelve) hours   fentaNYL (DURAGESIC) 75 mcg/hr   Yes Yes   Sig: Place 1 patch on the skin every third day   furosemide (LASIX) 80 mg tablet   Yes Yes   Sig: Take 80 mg by mouth 4 (four) times a week   ipratropium (ATROVENT) 0 02 % nebulizer solution  Self Yes No   Sig: Take 0 5 mg by nebulization 4 (four) times a day   levalbuterol (XOPENEX) 1 25 mg/3 mL nebulizer solution  Self Yes No   Sig: Take 1 25 mg by nebulization 3 (three) times a day   melatonin 3 mg  Self Yes No   Sig: Take 3 mg by mouth daily at bedtime   menthol-zinc oxide (Calmoseptine) 0 44-20 6 % OINT   Yes No   Sig: Apply topically as needed   menthol-zinc oxide (Calmoseptine) 0 44-20 6 % OINT   Yes Yes   Sig: Apply topically 2 (two) times a day   nystatin (MYCOSTATIN) powder  Self Yes No   Sig: Apply topically as needed    polyethylene glycol (GLYCOLAX) powder   No No   Sig: Take 17 g by mouth 4 (four) times a day Take 4 times per day until a satisfying bowel movement and then switch to 1 time per day  senna (SENOKOT) 8 6 MG tablet   Yes No   Sig: Take 1 tablet by mouth 2 (two) times a day as needed for constipation   white petrolatum-mineral oil (EUCERIN,HYDROCERIN) cream  Self Yes No   Sig: Apply topically as needed      Facility-Administered Medications: None       Past Medical History:   Diagnosis Date    Atrial fibrillation (HCC)     Hyperlipidemia     Hypertension     PONV (postoperative nausea and vomiting)        Past Surgical History:   Procedure Laterality Date    ANKLE FRACTURE SURGERY Left     CHOLECYSTECTOMY      JOINT REPLACEMENT Bilateral     TKR    REPLACEMENT TOTAL KNEE BILATERAL Bilateral        History reviewed  No pertinent family history  I have reviewed and agree with the history as documented  E-Cigarette/Vaping     E-Cigarette/Vaping Substances     Social History     Tobacco Use    Smoking status: Never Smoker    Smokeless tobacco: Never Used   Substance Use Topics    Alcohol use: No    Drug use: No       Review of Systems   Constitutional: Negative for chills and fever  Respiratory: Negative for shortness of breath  Cardiovascular: Negative for chest pain  Gastrointestinal: Negative for abdominal pain, diarrhea and vomiting  Skin: Positive for color change and wound  Neurological: Negative for weakness and numbness  All other systems reviewed and are negative  Physical Exam  Physical Exam  Vitals and nursing note reviewed     Constitutional:       General: She is not in acute distress  Appearance: Normal appearance  She is obese  She is not ill-appearing, toxic-appearing or diaphoretic  HENT:      Head: Normocephalic and atraumatic  Right Ear: External ear normal       Left Ear: External ear normal    Eyes:      Conjunctiva/sclera: Conjunctivae normal    Cardiovascular:      Rate and Rhythm: Normal rate  Rhythm irregular  Heart sounds: Murmur heard  Pulmonary:      Effort: Pulmonary effort is normal  No respiratory distress  Breath sounds: Normal breath sounds  No stridor  No wheezing or rhonchi  Abdominal:      General: Bowel sounds are normal  There is no distension  Palpations: Abdomen is soft  Tenderness: There is no abdominal tenderness  There is no guarding  Musculoskeletal:         General: Normal range of motion  Cervical back: Normal range of motion  Skin:     General: Skin is warm and dry  Comments: Bilateral lower extremities with open wounds noted to the lateral aspects  There is some chronic skin changes to the left lower extremity with some erythema  There is some mild serous drainage from the left lower extremity wound  Right lower extremity wound with superficial ulcerations/open wounds  There is significant surrounding cellulitis  Neurovascular intact distally  Capillary refill intact  See picture in media section of chart of bilateral lower extremity wounds and cellulitis  Doppler pedal pulses obtained by RN    Neurological:      Mental Status: She is alert     Psychiatric:         Mood and Affect: Mood normal          Vital Signs  ED Triage Vitals   Temperature Pulse Respirations Blood Pressure SpO2   06/28/21 1145 06/28/21 1145 06/28/21 1145 06/28/21 1145 06/28/21 1145   99 1 °F (37 3 °C) 82 16 140/71 98 %      Temp Source Heart Rate Source Patient Position - Orthostatic VS BP Location FiO2 (%)   06/28/21 1145 06/28/21 1145 06/28/21 1342 06/28/21 1342 --   Oral Monitor Lying Right arm       Pain Score 06/28/21 1342       6           Vitals:    06/28/21 1145 06/28/21 1342   BP: 140/71 141/51   Pulse: 82 72   Patient Position - Orthostatic VS:  Lying         Visual Acuity      ED Medications  Medications   sodium chloride 0 9 % infusion (75 mL/hr Intravenous New Bag 6/28/21 1514)   amLODIPine (NORVASC) tablet 5 mg (has no administration in time range)   apixaban (ELIQUIS) tablet 5 mg (5 mg Oral Given 6/28/21 1737)   busPIRone (BUSPAR) tablet 5 mg (5 mg Oral Given 6/28/21 1737)   cloNIDine (CATAPRES) tablet 0 1 mg (0 1 mg Oral Given 6/28/21 1737)   fentaNYL (DURAGESIC) 75 mcg/hr TD 72 hr patch 1 patch (1 patch Transdermal Medication Applied 6/28/21 1738)   acetaminophen (TYLENOL) tablet 650 mg (has no administration in time range)   levalbuterol (XOPENEX) inhalation solution 1 25 mg (has no administration in time range)   ipratropium (ATROVENT) 0 02 % inhalation solution 0 5 mg (has no administration in time range)   ceFAZolin (ANCEF) IVPB (premix in dextrose) 2,000 mg 50 mL (2,000 mg Intravenous New Bag 6/28/21 1737)       Diagnostic Studies  Results Reviewed     Procedure Component Value Units Date/Time    Blood culture #1 [[id number]] Collected: 06/28/21 1241    Lab Status: Preliminary result Specimen: Blood from Arm, Right Updated: 06/28/21 1901     Blood Culture Received in Microbiology Lab  Culture in Progress  Blood culture #2 [[id number]] Collected: 06/28/21 1241    Lab Status: Preliminary result Specimen: Blood from Arm, Left Updated: 06/28/21 1901     Blood Culture Received in Microbiology Lab  Culture in Progress      Protime-INR [[id number]]  (Abnormal) Collected: 06/28/21 1241    Lab Status: Final result Specimen: Blood from Arm, Left Updated: 06/28/21 1329     Protime 16 0 seconds      INR 1 31    APTT [[id number]]  (Normal) Collected: 06/28/21 1241    Lab Status: Final result Specimen: Blood from Arm, Left Updated: 06/28/21 1329     PTT 32 seconds     Comprehensive metabolic panel [[id number]] (Abnormal) Collected: 06/28/21 1241    Lab Status: Final result Specimen: Blood from Arm, Left Updated: 06/28/21 1327     Sodium 143 mmol/L      Potassium 4 8 mmol/L      Chloride 103 mmol/L      CO2 31 mmol/L      ANION GAP 9 mmol/L      BUN 43 mg/dL      Creatinine 1 40 mg/dL      Glucose 107 mg/dL      Calcium 9 5 mg/dL      AST 14 U/L      ALT 27 U/L      Alkaline Phosphatase 87 U/L      Total Protein 7 7 g/dL      Albumin 3 9 g/dL      Total Bilirubin 0 33 mg/dL      eGFR 34 ml/min/1 73sq m     Narrative:      National Kidney Disease Foundation guidelines for Chronic Kidney Disease (CKD):     Stage 1 with normal or high GFR (GFR > 90 mL/min/1 73 square meters)    Stage 2 Mild CKD (GFR = 60-89 mL/min/1 73 square meters)    Stage 3A Moderate CKD (GFR = 45-59 mL/min/1 73 square meters)    Stage 3B Moderate CKD (GFR = 30-44 mL/min/1 73 square meters)    Stage 4 Severe CKD (GFR = 15-29 mL/min/1 73 square meters)    Stage 5 End Stage CKD (GFR <15 mL/min/1 73 square meters)  Note: GFR calculation is accurate only with a steady state creatinine    CBC and differential [[id number]]  (Abnormal) Collected: 06/28/21 1241    Lab Status: Final result Specimen: Blood from Arm, Left Updated: 06/28/21 1309     WBC 7 35 Thousand/uL      RBC 4 21 Million/uL      Hemoglobin 13 2 g/dL      Hematocrit 41 1 %      MCV 98 fL      MCH 31 4 pg      MCHC 32 1 g/dL      RDW 12 7 %      MPV 8 6 fL      Platelets 601 Thousands/uL      nRBC 0 /100 WBCs      Neutrophils Relative 67 %      Immat GRANS % 1 %      Lymphocytes Relative 16 %      Monocytes Relative 11 %      Eosinophils Relative 4 %      Basophils Relative 1 %      Neutrophils Absolute 4 96 Thousands/µL      Immature Grans Absolute 0 04 Thousand/uL      Lymphocytes Absolute 1 15 Thousands/µL      Monocytes Absolute 0 81 Thousand/µL      Eosinophils Absolute 0 31 Thousand/µL      Basophils Absolute 0 08 Thousands/µL                  XR tibia fibula 2 views LEFT   Final Result by Delaney Courtney MD (06/28 1355)      Healed left ankle fracture, status post ORIF  Lucency noted around the superior syndesmotic screw but otherwise hardware appears intact  Prior arthrodesis of the right 1st TMT joint  Moderate degenerative changes in the left ankle  Large plantar calcaneal spurs  No acute osseous abnormalities  Workstation performed: [id number]         XR tibia fibula 2 views RIGHT   Final Result by Delaney Courtney MD (06/28 1355)      Healed left ankle fracture, status post ORIF  Lucency noted around the superior syndesmotic screw but otherwise hardware appears intact  Prior arthrodesis of the right 1st TMT joint  Moderate degenerative changes in the left ankle  Large plantar calcaneal spurs  No acute osseous abnormalities  Workstation performed: [id number]                    Procedures  Procedures         ED Course                                           MDM  Number of Diagnoses or Management Options  ANSELMO (acute kidney injury) ([facility] [address] )  Lower extremity cellulitis  Non-healing wound of lower extremity  Diagnosis management comments: Will check basic labs, blood cultures and x-rays  Patient does not meet SIRS criteria, vital stable and patient nontoxic and in no acute distress  No leukocytosis  Creatinine 1 40, BUN 43  This is elevated from her baseline and likely represents an ANSELMO  X-rays without acute abnormalities  Discussed all results with patient  Discussed that she would need admission for her cellulitis that has failed outpatient antibiotics and ANSELMO  Will start gentle fluids as she appears to have a history of CHF and does not meet sepsis criteria  Patient has multiple drug allergies listed in her chart  Patient has no information regarding the type of reaction to each antibiotic  She states most were nausea, vomiting or a rash but can not state which reaction was to which medication    Attempted to contact her patient care coordinator at [facility] however they do not have this information either  Discussed with pharmacy regarding antibiotic choice  Patient has been on Ancef in the past last year during an admission for similar  Discussed with pharmacist, Luz Mcneill who states that even if patient has an allergy to PO keflex/cephalexin she would still be able to receive IV ancef as there should not be cross reactivity  Discussed with Dr Cayla Carrero for admission and to discuss antibiotic choice- will admit inpatient, medsurg  Continue with IVF 75ml/hr for ANSELMO and ancef for cellulitis  Discussed plan with patient and daughter who state understanding and agree with plan          Amount and/or Complexity of Data Reviewed  Clinical lab tests: ordered and reviewed  Tests in the radiology section of CPT®: reviewed and ordered  Discuss the patient with other providers: yes    Patient Progress  Patient progress: stable      Disposition  Final diagnoses:   Lower extremity cellulitis - bilateral   [facility]   Non-healing wound of lower extremity - bilateral     Time reflects when diagnosis was documented in both MDM as applicable and the Disposition within this note     Time User Action Codes Description Comment    6/28/2021  2:11 PM Tracy Honour Add [C18 [id number]] Open wound of left lower extremity, initial encounter     6/28/2021  2:11 PM Tracy our Remove [[id number]] Open wound of left lower extremity, initial encounter     6/28/2021  2:12 PM Ruperto Harris [[id number]] Lower extremity cellulitis     6/28/2021  2:12 PM Marco Kerns [[id number]] Lower extremity cellulitis bilateral    6/28/2021  2:12 PM Tracy our Add [[id number] [id number]] [facility]     6/28/2021  2:12 PM Tracy Honour Add [[id number]] Non-healing wound of lower extremity     6/28/2021  2:12 PM Tracy Honour Modify [[id number]] Non-healing wound of lower extremity bilateral      ED Disposition     ED Disposition Condition Date/Time Comment    Admit Stable Mon Jun 28, 2021  3:05 PM Case was discussed with Dr Armando Todd and the patient's admission status was agreed to be Admission Status: inpatient status to the service of Dr Armando Todd           Follow-up Information    None         Current Discharge Medication List      CONTINUE these medications which have NOT CHANGED    Details   amLODIPine (NORVASC) 5 mg tablet Take 5 mg by mouth daily      apixaban (ELIQUIS) 5 mg Take 5 mg by mouth 2 (two) times a day      bisacodyl (FLEET) 10 MG/30ML ENEM Insert 10 mg into the rectum once      busPIRone (BUSPAR) 5 mg tablet Take 5 mg by mouth 3 (three) times a day      cloNIDine (CATAPRES) 0 2 mg tablet Take 0 1 mg by mouth 3 (three) times a day        Diclofenac Sodium (VOLTAREN) 1 % Apply 2 g topically 2 (two) times a day      doxycycline hyclate (VIBRAMYCIN) 100 mg capsule Take 100 mg by mouth every 12 (twelve) hours      fentaNYL (DURAGESIC) 75 mcg/hr Place 1 patch on the skin every third day      furosemide (LASIX) 80 mg tablet Take 80 mg by mouth 4 (four) times a week      !! menthol-zinc oxide (Calmoseptine) 0 44-20 6 % OINT Apply topically 2 (two) times a day      Polyethyl Glycol-Propyl Glycol (SYSTANE) 0 4-0 3 % GEL Apply to eye      Wound Dressings (Triad Hydrophilic Wound Dressi) PSTE Apply topically as needed      Calcium Carbonate Antacid (TUMS ULTRA PO) Take 2 tablets by mouth 2 (two) times a day      cholecalciferol (VITAMIN D3) 1,000 units tablet Take 1,000 Units by mouth daily      diphenhydrAMINE (BENADRYL) 25 mg tablet Take 12 5 mg by mouth daily at bedtime      Emollient (Minerin) LOTN Apply topically 2 (two) times a day      ipratropium (ATROVENT) 0 02 % nebulizer solution Take 0 5 mg by nebulization 4 (four) times a day      levalbuterol (XOPENEX) 1 25 mg/3 mL nebulizer solution Take 1 25 mg by nebulization 3 (three) times a day      melatonin 3 mg Take 3 mg by mouth daily at bedtime      !! menthol-zinc oxide (Calmoseptine) 0 44-20 6 % OINT Apply topically as needed      nystatin (MYCOSTATIN) powder Apply topically as needed       polyethylene glycol (GLYCOLAX) powder Take 17 g by mouth 4 (four) times a day Take 4 times per day until a satisfying bowel movement and then switch to 1 time per day  Qty: 500 g, Refills: 0    Associated Diagnoses: Constipation      senna (SENOKOT) 8 6 MG tablet Take 1 tablet by mouth 2 (two) times a day as needed for constipation      white petrolatum-mineral oil (EUCERIN,HYDROCERIN) cream Apply topically as needed       !! - Potential duplicate medications found  Please discuss with provider  No discharge procedures on file      PDMP Review     None          ED Provider  Electronically Signed by           Sean Mcarthur PA-C  06/28/21 9262

## 2021-06-28 NOTE — ASSESSMENT & PLAN NOTE
This is a 49-year-old female with history of atrial fibrillation on Eliquis, hypertension, hyperlipidemia, CHF, anxiety presented from [address] [place] [address] for evaluation of bilateral leg wounds  Patient is mainly wheelchair bound  Has been having lower extremity cellulitis for the past several weeks  Was initially placed on Bactrim 06/08/2021 and was taken off this medication due to nausea and vomiting  She was then started on doxycycline 06/23/2021 without improvement      · Right and left tibia fibula x-ray negative for any acute osseous abnormalities, history of left ankle fracture status post ORIF  · Will start patient on Ancef  · Follow-up cultures  · Wound care evaluation

## 2021-06-28 NOTE — H&P
Elisadi 26 1933, 80 y o  female MRN: [id number]  Unit/Bed#: E5 -01 Encounter: [id number]  Primary Care Provider: No primary care provider on file  Date and time admitted to hospital: 6/28/2021 11:37 AM    * Cellulitis of lower extremity  Assessment & Plan  This is a 59-year-old female with history of atrial fibrillation on Eliquis, hypertension, hyperlipidemia, CHF, anxiety presented from [address] [place] [address] for evaluation of bilateral leg wounds  Patient is mainly wheelchair bound  Has been having lower extremity cellulitis for the past several weeks  Was initially placed on Bactrim 06/08/2021 and was taken off this medication due to nausea and vomiting  She was then started on doxycycline 06/23/2021 without improvement      · Right and left tibia fibula x-ray negative for any acute osseous abnormalities, history of left ankle fracture status post ORIF  · Will start patient on Ancef  · Follow-up cultures  · Wound care evaluation    Chronic diastolic heart failure (HCC)  Assessment & Plan  Wt Readings from Last 3 Encounters:   06/28/21 115 kg (253 lb 15 5 oz)   10/09/20 114 kg (251 lb 12 3 oz)   07/01/19 103 kg (226 lb 6 6 oz)       · Will hold Lasix in setting of acute kidney injury  · Monitor I&Os      Paroxysmal atrial fibrillation (HCC)  Assessment & Plan  · Continue Eliquis for anticoagulation    ANSELMO (acute kidney injury) ([facility] [address] )  Assessment & Plan  · Acute kidney injury with creatinine 1 4  · Possibly secondary to recently being on Bactrim  · Will check urinalysis  · Hold diuretics  · Monitor renal function closely    Morbid (severe) obesity due to excess calories (HCC)  Assessment & Plan  · Can benefit from diet and lifestyle modifications    Hypertension  Assessment & Plan  · Continue Norvasc and clonidine        VTE Prophylaxis: Apixaban (Eliquis)  / sequential compression device and reason for no mechanical VTE prophylaxis cellulitis   Code Status: FULL  POLST: There is no POLST form on file for this patient (pre-hospital)    Anticipated Length of Stay:  Patient will be admitted on an Inpatient basis with an anticipated length of stay of  greater than 2 midnights  Justification for Hospital Stay:  Lower extremity cellulitis    Total Time for Visit, including Counseling / Coordination of Care: 45 minutes  Greater than 50% of this total time spent on direct patient counseling and coordination of care  Chief Complaint:   Bilateral lower extremity wounds    History of Present Illness:    Yelena Sportsman is a 80 y o  female who presents with bilateral leg wounds  Patient has history of atrial fibrillation on Eliquis, hypertension, hyperlipidemia, CHF, anxiety presented from [facility] for evaluation of bilateral leg wounds  Patient is mainly wheelchair bound  Has been having lower extremity cellulitis for the past several weeks  Was initially placed on Bactrim 06/08/2021 and was taken off this medication due to nausea and vomiting  She was then started on doxycycline 06/23/2021 without improvement  Denies any chest pain, palpitations, dyspnea, fever, chills  Review of Systems:    Review of Systems   Constitutional: Negative  HENT: Negative  Eyes: Negative  Respiratory: Negative  Cardiovascular: Negative  Gastrointestinal: Negative  Endocrine: Negative  Genitourinary: Negative  Musculoskeletal: Negative  Skin: Positive for color change, rash and wound  Neurological: Negative  Hematological: Negative  Psychiatric/Behavioral: Negative          Past Medical and Surgical History:     Past Medical History:   Diagnosis Date    Atrial fibrillation ([address] )     Hyperlipidemia     Hypertension     PONV (postoperative nausea and vomiting)        Past Surgical History:   Procedure Laterality Date    ANKLE FRACTURE SURGERY Left     CHOLECYSTECTOMY      JOINT REPLACEMENT Bilateral     TKR    REPLACEMENT TOTAL KNEE BILATERAL Bilateral        Meds/Allergies:    Prior to Admission medications    Medication Sig Start Date End Date Taking?  Authorizing Provider   amLODIPine (NORVASC) 5 mg tablet Take 5 mg by mouth daily   Yes Historical Provider, MD   apixaban (ELIQUIS) 5 mg Take 5 mg by mouth 2 (two) times a day   Yes Historical Provider, MD   bisacodyl (FLEET) 10 MG/30ML ENEM Insert 10 mg into the rectum once   Yes Historical Provider, MD   busPIRone (BUSPAR) 5 mg tablet Take 5 mg by mouth 3 (three) times a day   Yes Historical Provider, MD   cloNIDine (CATAPRES) 0 2 mg tablet Take 0 1 mg by mouth 3 (three) times a day     Yes Historical Provider, MD   Diclofenac Sodium (VOLTAREN) 1 % Apply 2 g topically 2 (two) times a day   Yes Historical Provider, MD   doxycycline hyclate (VIBRAMYCIN) 100 mg capsule Take 100 mg by mouth every 12 (twelve) hours   Yes Historical Provider, MD   fentaNYL (DURAGESIC) 75 mcg/hr Place 1 patch on the skin every third day   Yes Historical Provider, MD   furosemide (LASIX) 80 mg tablet Take 80 mg by mouth 4 (four) times a week   Yes Historical Provider, MD   menthol-zinc oxide (Calmoseptine) 0 44-20 6 % OINT Apply topically 2 (two) times a day   Yes Historical Provider, MD   Polyethyl Glycol-Propyl Glycol (SYSTANE) 0 4-0 3 % GEL Apply to eye   Yes Historical Provider, MD   Wound Dressings (Triad Hydrophilic Wound Dressi) PSTE Apply topically as needed   Yes Historical Provider, MD   Calcium Carbonate Antacid (TUMS ULTRA PO) Take 2 tablets by mouth 2 (two) times a day    Historical Provider, MD   cholecalciferol (VITAMIN D3) 1,000 units tablet Take 1,000 Units by mouth daily    Historical Provider, MD   diphenhydrAMINE (BENADRYL) 25 mg tablet Take 12 5 mg by mouth daily at bedtime    Historical Provider, MD   Emollient (Minerin) LOTN Apply topically 2 (two) times a day    Historical Provider, MD   ipratropium (ATROVENT) 0 02 % nebulizer solution Take 0 5 mg by nebulization 4 (four) times a day    Historical Provider, MD   levalbuterol (XOPENEX) 1 25 mg/3 mL nebulizer solution Take 1 25 mg by nebulization 3 (three) times a day    Historical Provider, MD   melatonin 3 mg Take 3 mg by mouth daily at bedtime    Historical Provider, MD   menthol-zinc oxide (Calmoseptine) 0 44-20 6 % OINT Apply topically as needed    Historical Provider, MD   nystatin (MYCOSTATIN) powder Apply topically as needed     Historical Provider, MD   polyethylene glycol (GLYCOLAX) powder Take 17 g by mouth 4 (four) times a day Take 4 times per day until a satisfying bowel movement and then switch to 1 time per day  7/1/19   Natalie Gordillo MD   senna (SENOKOT) 8 6 MG tablet Take 1 tablet by mouth 2 (two) times a day as needed for constipation    Historical Provider, MD   white petrolatum-mineral oil (EUCERIN,HYDROCERIN) cream Apply topically as needed    Historical Provider, MD   hydroxychloroquine (PLAQUENIL) 200 mg tablet Take 200 mg by mouth daily with breakfast  6/28/21  Historical Provider, MD   lisinopril (ZESTRIL) 40 mg tablet Take 40 mg by mouth daily  6/28/21  Historical Provider, MD   methylPREDNISolone 4 MG tablet therapy pack Use as directed on package 10/13/20 6/28/21  Navid Joe DO   oxyCODONE-acetaminophen (PERCOCET) 5-325 mg per tablet Take 1 tablet by mouth every 8 (eight) hours as needed for moderate pain  6/28/21  Historical Provider, MD   potassium chloride (K-DUR,KLOR-CON) 20 mEq tablet Take 20 mEq by mouth 2 (two) times a day  6/28/21  Historical Provider, MD     I have reviewed home medications with patient family member  Allergies:    Allergies   Allergen Reactions    Azithromycin Other (See Comments)     unknown    Cephalexin Other (See Comments)     unknown    Ciprofloxacin Other (See Comments)     unknown    Sulfa Antibiotics Other (See Comments)     unknown    Vancomycin Rash       Social History:     Social History     Substance and Sexual Activity   Alcohol Use No     Social History     Tobacco Use   Smoking Status Never Smoker   Smokeless Tobacco Never Used     Social History     Substance and Sexual Activity   Drug Use No       Family History:    History reviewed  No pertinent family history  Physical Exam:     Vitals:   Blood Pressure: 141/51 (06/28/21 1342)  Pulse: 72 (06/28/21 1342)  Temperature: 99 1 °F (37 3 °C) (06/28/21 1145)  Temp Source: Oral (06/28/21 1145)  Respirations: 16 (06/28/21 1342)  Weight - Scale: 115 kg (253 lb 15 5 oz) (06/28/21 1145)  SpO2: 100 % (06/28/21 1342)    Constitutional: Patient is oriented to person, place and time, no acute distress  HEENT:  Normocephalic, atraumatic  Cardiovascular: Normal S1S2, RRR, No murmurs/rubs/gallops appreciated  Pulmonary:  Bilateral air entry, No rhonchi/rales/wheezing appreciated  Abdominal: Soft, Bowel sounds present, Non-tender, Non-distended  Extremities:  No cyanosis, clubbing or edema  Neurological: Cranial nerves II-XII grossly intact, sensation intact, otherwise no focal neurological symptoms  Skin:  Bilateral lower extremity erythema with areas of open wounds and serous drainage    Additional Data:     Lab Results: I have personally reviewed pertinent reports  Results from last 7 days   Lab Units 06/28/21  1241   WBC Thousand/uL 7 35   HEMOGLOBIN g/dL 13 2   HEMATOCRIT % 41 1   PLATELETS Thousands/uL 308   NEUTROS PCT % 67   LYMPHS PCT % 16   MONOS PCT % 11   EOS PCT % 4     Results from last 7 days   Lab Units 06/28/21  1241   POTASSIUM mmol/L 4 8   CHLORIDE mmol/L 103   CO2 mmol/L 31   BUN mg/dL 43*   CREATININE mg/dL 1 40*   CALCIUM mg/dL 9 5   ALK PHOS U/L 87   ALT U/L 27   AST U/L 14     Results from last 7 days   Lab Units 06/28/21  1241   INR  1 31*       Imaging: I have personally reviewed pertinent reports  XR tibia fibula 2 views LEFT    Result Date: 6/28/2021  Narrative: LEFT TIBIA AND FIBULA; RIGHT TIB/FIB INDICATION:   leg wounds, cellulitis   COMPARISON:  None VIEWS: XR TIBIA FIBULA 2 VW LEFT, XR TIBIA FIBULA 2 VW RIGHT Images: 8 FINDINGS: (Right tibia/fibula, left tibia/fibula) There are bilateral total knee arthroplasties which appear intact and without evident complication  Healed bimalleolar fracture on the left  There is lucency around the superior syndesmotic screw but hardware is otherwise without evidence for complication  There has been a previous arthrodesis of the right 1st TMT joint  Hardware appears intact  There is no acute fracture or dislocation  Moderate degenerative changes at the left ankle  Bilateral large plantar calcaneal spurs  No lytic or blastic osseous lesion  No obvious soft tissue ulcers  Impression: Healed left ankle fracture, status post ORIF  Lucency noted around the superior syndesmotic screw but otherwise hardware appears intact  Prior arthrodesis of the right 1st TMT joint  Moderate degenerative changes in the left ankle  Large plantar calcaneal spurs  No acute osseous abnormalities  Workstation performed: [id number]     XR tibia fibula 2 views RIGHT    Result Date: 6/28/2021  Narrative: LEFT TIBIA AND FIBULA; RIGHT TIB/FIB INDICATION:   leg wounds, cellulitis  COMPARISON:  None VIEWS:  XR TIBIA FIBULA 2 VW LEFT, XR TIBIA FIBULA 2 VW RIGHT Images: 8 FINDINGS: (Right tibia/fibula, left tibia/fibula) There are bilateral total knee arthroplasties which appear intact and without evident complication  Healed bimalleolar fracture on the left  There is lucency around the superior syndesmotic screw but hardware is otherwise without evidence for complication  There has been a previous arthrodesis of the right 1st TMT joint  Hardware appears intact  There is no acute fracture or dislocation  Moderate degenerative changes at the left ankle  Bilateral large plantar calcaneal spurs  No lytic or blastic osseous lesion  No obvious soft tissue ulcers  Impression: Healed left ankle fracture, status post ORIF    Lucency noted around the superior syndesmotic screw but otherwise hardware appears intact  Prior arthrodesis of the right 1st TMT joint  Moderate degenerative changes in the left ankle  Large plantar calcaneal spurs  No acute osseous abnormalities  Workstation performed: [id number]       Allscripts / Epic Records Reviewed: Yes     ** Please Note: This note has been constructed using a voice recognition system   **

## 2021-06-28 NOTE — ED NOTES
Pt requesting legs to be elevated  Pillow placed under legs at this time for comfort        Claudene Barrett Bushek  06/28/21 [id number]

## 2021-06-28 NOTE — PLAN OF CARE
Problem: MOBILITY - ADULT  Goal: Maintain or return to baseline ADL function  Description: INTERVENTIONS:  -  Assess patient's ability to carry out ADLs; assess patient's baseline for ADL function and identify physical deficits which impact ability to perform ADLs (bathing, care of mouth/teeth, toileting, grooming, dressing, etc )  - Assess/evaluate cause of self-care deficits   - Assess range of motion  - Assess patient's mobility; develop plan if impaired  - Assess patient's need for assistive devices and provide as appropriate  - Encourage maximum independence but intervene and supervise when necessary  - Involve family in performance of ADLs  - Assess for home care needs following discharge   - Consider OT consult to assist with ADL evaluation and planning for discharge  - Provide patient education as appropriate  Outcome: Progressing  Goal: Maintains/Returns to pre admission functional level  Description: INTERVENTIONS:  - Perform BMAT or MOVE assessment daily    - Set and communicate daily mobility goal to care team and patient/family/caregiver  - Collaborate with rehabilitation services on mobility goals if consulted  - Perform Range of Motion 3 times a day  - Reposition patient every 2 hours    - Out of bed for toileting  - Record patient progress and toleration of activity level   Outcome: Progressing     Problem: Prexisting or High Potential for Compromised Skin Integrity  Goal: Skin integrity is maintained or improved  Description: INTERVENTIONS:  - Identify patients at risk for skin breakdown  - Assess and monitor skin integrity  - Assess and monitor nutrition and hydration status  - Monitor labs   - Assess for incontinence   - Turn and reposition patient  - Assist with mobility/ambulation  - Relieve pressure over bony prominences  - Avoid friction and shearing  - Provide appropriate hygiene as needed including keeping skin clean and dry  - Evaluate need for skin moisturizer/barrier cream  - Collaborate with interdisciplinary team   - Patient/family teaching  - Consider wound care consult   Outcome: Progressing     Problem: Nutrition/Hydration-ADULT  Goal: Nutrient/Hydration intake appropriate for improving, restoring or maintaining nutritional needs  Description: Monitor and assess patient's nutrition/hydration status for malnutrition  Collaborate with interdisciplinary team and initiate plan and interventions as ordered  Monitor patient's weight and dietary intake as ordered or per policy  Utilize nutrition screening tool and intervene as necessary  Determine patient's food preferences and provide high-protein, high-caloric foods as appropriate       INTERVENTIONS:  - Monitor oral intake, urinary output, labs, and treatment plans  - Assess nutrition and hydration status and recommend course of action  - Evaluate amount of meals eaten  - Assist patient with eating if necessary   - Allow adequate time for meals  - Recommend/ encourage appropriate diets, oral nutritional supplements, and vitamin/mineral supplements  - Order, calculate, and assess calorie counts as needed  - Recommend, monitor, and adjust tube feedings and TPN/PPN based on assessed needs  - Assess need for intravenous fluids  - Provide specific nutrition/hydration education as appropriate  - Include patient/family/caregiver in decisions related to nutrition  Outcome: Progressing     Problem: MOBILITY - ADULT  Goal: Maintain or return to baseline ADL function  Description: INTERVENTIONS:  -  Assess patient's ability to carry out ADLs; assess patient's baseline for ADL function and identify physical deficits which impact ability to perform ADLs (bathing, care of mouth/teeth, toileting, grooming, dressing, etc )  - Assess/evaluate cause of self-care deficits   - Assess range of motion  - Assess patient's mobility; develop plan if impaired  - Assess patient's need for assistive devices and provide as appropriate  - Encourage maximum independence but intervene and supervise when necessary  - Involve family in performance of ADLs  - Assess for home care needs following discharge   - Consider OT consult to assist with ADL evaluation and planning for discharge  - Provide patient education as appropriate  6/28/2021 1911 by Dorothea Lopez RN  Outcome: Progressing  6/28/2021 1911 by Dorothea Lopez RN  Outcome: Progressing  Goal: Maintains/Returns to pre admission functional level  Description: INTERVENTIONS:  - Perform BMAT or MOVE assessment daily    - Set and communicate daily mobility goal to care team and patient/family/caregiver  - Collaborate with rehabilitation services on mobility goals if consulted  - Out of bed for toileting  - Record patient progress and toleration of activity level   6/28/2021 1911 by Dorothea Lopez RN  Outcome: Progressing  6/28/2021 1911 by Dorothea Lopez RN  Outcome: Progressing     Problem: Prexisting or High Potential for Compromised Skin Integrity  Goal: Skin integrity is maintained or improved  Description: INTERVENTIONS:  - Identify patients at risk for skin breakdown  - Assess and monitor skin integrity  - Assess and monitor nutrition and hydration status  - Monitor labs   - Assess for incontinence   - Turn and reposition patient  - Assist with mobility/ambulation  - Relieve pressure over bony prominences  - Avoid friction and shearing  - Provide appropriate hygiene as needed including keeping skin clean and dry  - Evaluate need for skin moisturizer/barrier cream  - Collaborate with interdisciplinary team   - Patient/family teaching  - Consider wound care consult   6/28/2021 1911 by Dorothea Lopez RN  Outcome: Progressing  6/28/2021 1911 by Dorothea Lopez RN  Outcome: Progressing     Problem: Nutrition/Hydration-ADULT  Goal: Nutrient/Hydration intake appropriate for improving, restoring or maintaining nutritional needs  Description: Monitor and assess patient's nutrition/hydration status for malnutrition  Collaborate with interdisciplinary team and initiate plan and interventions as ordered  Monitor patient's weight and dietary intake as ordered or per policy  Utilize nutrition screening tool and intervene as necessary  Determine patient's food preferences and provide high-protein, high-caloric foods as appropriate       INTERVENTIONS:  - Monitor oral intake, urinary output, labs, and treatment plans  - Assess nutrition and hydration status and recommend course of action  - Evaluate amount of meals eaten  - Assist patient with eating if necessary   - Allow adequate time for meals  - Recommend/ encourage appropriate diets, oral nutritional supplements, and vitamin/mineral supplements  - Order, calculate, and assess calorie counts as needed  - Recommend, monitor, and adjust tube feedings and TPN/PPN based on assessed needs  - Assess need for intravenous fluids  - Provide specific nutrition/hydration education as appropriate  - Include patient/family/caregiver in decisions related to nutrition  6/28/2021 1911 by Shannan Ring RN  Outcome: Progressing  6/28/2021 1911 by Shannan Ring RN  Outcome: Progressing     Problem: PAIN - ADULT  Goal: Verbalizes/displays adequate comfort level or baseline comfort level  Description: Interventions:  - Encourage patient to monitor pain and request assistance  - Assess pain using appropriate pain scale  - Administer analgesics based on type and severity of pain and evaluate response  - Implement non-pharmacological measures as appropriate and evaluate response  - Consider cultural and social influences on pain and pain management  - Notify physician/advanced practitioner if interventions unsuccessful or patient reports new pain  Outcome: Progressing     Problem: INFECTION - ADULT  Goal: Absence or prevention of progression during hospitalization  Description: INTERVENTIONS:  - Assess and monitor for signs and symptoms of infection  - Monitor lab/diagnostic results  - Monitor all insertion sites, i e  indwelling lines, tubes, and drains  - Monitor endotracheal if appropriate and nasal secretions for changes in amount and color  - [place] appropriate cooling/warming therapies per order  - Administer medications as ordered  - Instruct and encourage patient and family to use good hand hygiene technique  - Identify and instruct in appropriate isolation precautions for identified infection/condition  Outcome: Progressing  Goal: Absence of fever/infection during neutropenic period  Description: INTERVENTIONS:  - Monitor WBC    Outcome: Progressing     Problem: SAFETY ADULT  Goal: Maintain or return to baseline ADL function  Description: INTERVENTIONS:  -  Assess patient's ability to carry out ADLs; assess patient's baseline for ADL function and identify physical deficits which impact ability to perform ADLs (bathing, care of mouth/teeth, toileting, grooming, dressing, etc )  - Assess/evaluate cause of self-care deficits   - Assess range of motion  - Assess patient's mobility; develop plan if impaired  - Assess patient's need for assistive devices and provide as appropriate  - Encourage maximum independence but intervene and supervise when necessary  - Involve family in performance of ADLs  - Assess for home care needs following discharge   - Consider OT consult to assist with ADL evaluation and planning for discharge  - Provide patient education as appropriate  6/28/2021 1911 by Lalo Vaughn RN  Outcome: Progressing  6/28/2021 1911 by Lalo Vaughn RN  Outcome: Progressing  Goal: Maintains/Returns to pre admission functional level  Description: INTERVENTIONS:  - Perform BMAT or MOVE assessment daily    - Set and communicate daily mobility goal to care team and patient/family/caregiver     - Collaborate with rehabilitation services on mobility goals if consulted  - Out of bed for toileting  - Record patient progress and toleration of activity level   6/28/2021 1911 by Lalo Vaughn RN  Outcome: Progressing  6/28/2021 1911 by Austin Cook RN  Outcome: Progressing  Goal: Patient will remain free of falls  Description: INTERVENTIONS:  - Educate patient/family on patient safety including physical limitations  - Instruct patient to call for assistance with activity   - Consult OT/PT to assist with strengthening/mobility   - Keep Call bell within reach  - Keep bed low and locked with side rails adjusted as appropriate  - Keep care items and personal belongings within reach  - Initiate and maintain comfort rounds  - Make Fall Risk Sign visible to staff  - Offer Toileting in advance of need  - Initiate/Maintain bed alarm  - Obtain necessary fall risk management equipment  - Apply yellow socks and bracelet for high fall risk patients  - Consider moving patient to room near nurses station  Outcome: Progressing     Problem: DISCHARGE PLANNING  Goal: Discharge to home or other facility with appropriate resources  Description: INTERVENTIONS:  - Identify barriers to discharge w/patient and caregiver  - Arrange for needed discharge resources and transportation as appropriate  - Identify discharge learning needs (meds, wound care, etc )  - Arrange for interpretive services to assist at discharge as needed  - Refer to Case Management Department for coordinating discharge planning if the patient needs post-hospital services based on physician/advanced practitioner order or complex needs related to functional status, cognitive ability, or social support system  Outcome: Progressing     Problem: Knowledge Deficit  Goal: Patient/family/caregiver demonstrates understanding of disease process, treatment plan, medications, and discharge instructions  Description: Complete learning assessment and assess knowledge base    Interventions:  - Provide teaching at level of understanding  - Provide teaching via preferred learning methods  Outcome: Progressing     Problem: SKIN/TISSUE INTEGRITY - ADULT  Goal: Skin Integrity remains intact(Skin Breakdown Prevention)  Description: Assess:  -Perform Dionte assessment   -Clean and moisturize skin   -Inspect skin when repositioning, toileting, and assisting with ADLS  -Assess under medical devices   -Assess extremities for adequate circulation and sensation     Bed Management:  -Have minimal linens on bed & keep smooth, unwrinkled  -Change linens as needed when moist or perspiring  -Avoid sitting or lying in one position for more than 2 hours while in bed  -Keep HOB at 30 degrees     Toileting:  -Offer bedside commode  -Assess for incontinence every 2  -Use incontinent care products after each incontinent episode     Activity:  -Turn and reposition patient every 2 Hours  -Use appropriate equipment to lift or move patient in bed    Skin Care:  -Avoid use of baby powder, tape, friction and shearing, hot water or constrictive clothing  -Relieve pressure over bony prominences   -Do not massage red bony areas    Next Steps:  -Teach patient strategies to minimize risks   -Consider consults to  interdisciplinary teams   Outcome: Progressing  Goal: Incision(s), wounds(s) or drain site(s) healing without S/S of infection  Description: INTERVENTIONS  - Assess and document dressing, incision, wound bed, drain sites and surrounding tissue  - Provide patient and family education  - Perform skin care/dressing changes   Outcome: Progressing

## 2021-06-29 NOTE — ASSESSMENT & PLAN NOTE
This is a 26-year-old female with history of atrial fibrillation on Eliquis, hypertension, hyperlipidemia, CHF, anxiety presented from [address] [place] [address] for evaluation of bilateral leg wounds  Patient is mainly wheelchair bound  Has been having lower extremity cellulitis for the past several weeks  Was initially placed on Bactrim 06/08/2021 and was taken off this medication due to nausea and vomiting  She was then started on doxycycline 06/23/2021 without improvement      · Right and left tibia fibula x-ray negative for any acute osseous abnormalities, history of left ankle fracture status post ORIF  · Continue IV abx, rocephin  · Wound care evaluation

## 2021-06-29 NOTE — ASSESSMENT & PLAN NOTE
Wt Readings from Last 3 Encounters:   06/28/21 115 kg (253 lb 15 5 oz)   10/09/20 114 kg (251 lb 12 3 oz)   07/01/19 103 kg (226 lb 6 6 oz)     · Resume lasix tomorrow

## 2021-06-29 NOTE — PROGRESS NOTES
[facility]  Progress Note - Fabiano Dalton 1933, 80 y o  female MRN: [id number]  Unit/Bed#: E5 -01 Encounter: [id number]  Primary Care Provider: No primary care provider on file  Date and time admitted to hospital: 6/28/2021 11:37 AM    Chronic diastolic heart failure (HCC)  Assessment & Plan  Wt Readings from Last 3 Encounters:   06/28/21 115 kg (253 lb 15 5 oz)   10/09/20 114 kg (251 lb 12 3 oz)   07/01/19 103 kg (226 lb 6 6 oz)     · Resume lasix tomorrow      Paroxysmal atrial fibrillation (HCC)  Assessment & Plan  · Continue Eliquis for anticoagulation    ANSELMO (acute kidney injury) ([facility] [address] )  Assessment & Plan  · Now resolved    Hypertension  Assessment & Plan  · Continue Norvasc and clonidine    * Cellulitis of lower extremity  Assessment & Plan  This is a 42-year-old female with history of atrial fibrillation on Eliquis, hypertension, hyperlipidemia, CHF, anxiety presented from [address] [place] [address] for evaluation of bilateral leg wounds  Patient is mainly wheelchair bound  Has been having lower extremity cellulitis for the past several weeks  Was initially placed on Bactrim 06/08/2021 and was taken off this medication due to nausea and vomiting  She was then started on doxycycline 06/23/2021 without improvement  · Right and left tibia fibula x-ray negative for any acute osseous abnormalities, history of left ankle fracture status post ORIF  · Continue IV abx, rocephin  · Wound care evaluation        VTE Pharmacologic Prophylaxis:   Pharmacologic: Apixaban (Eliquis)  Mechanical VTE Prophylaxis in Place: Yes    Patient Centered Rounds: I have performed bedside rounds with nursing staff today  Discussions with Specialists or Other Care Team Provider: None    Education and Discussions with Family / Patient: Patient    Time Spent for Care: 30 minutes  More than 50% of total time spent on counseling and coordination of care as described above      Current Length of Stay: 1 day(s)    Current Patient Status: Inpatient   Certification Statement: The patient will continue to require additional inpatient hospital stay due to IV abx    Discharge Plan: 1-2 days    Code Status: Level 1 - Full Code      Subjective:   No events overnight  Reports her legs has improved  Denies any chest pains    Objective:     Vitals:   Temp (24hrs), Av 7 °F (37 1 °C), Min:98 6 °F (37 °C), Max:98 7 °F (37 1 °C)    Temp:  [98 6 °F (37 °C)-98 7 °F (37 1 °C)] 98 7 °F (37 1 °C)  HR:  [57-81] 70  Resp:  [18] 18  BP: (109-150)/(48-77) 150/77  SpO2:  [95 %-96 %] 95 %  Body mass index is 44 99 kg/m²  Input and Output Summary (last 24 hours): Intake/Output Summary (Last 24 hours) at 2021 1828  Last data filed at 2021 1501  Gross per 24 hour   Intake 720 ml   Output 725 ml   Net -5 ml       Physical Exam:     Physical Exam  Vitals and nursing note reviewed  Constitutional:       Appearance: Normal appearance  She is normal weight  HENT:      Head: Normocephalic and atraumatic  Eyes:      General: No scleral icterus  Conjunctiva/sclera: Conjunctivae normal    Cardiovascular:      Rate and Rhythm: Normal rate and regular rhythm  Heart sounds: Normal heart sounds  Pulmonary:      Breath sounds: Normal breath sounds  No wheezing or rhonchi  Abdominal:      General: Bowel sounds are normal  There is no distension  Palpations: Abdomen is soft  Tenderness: There is no abdominal tenderness  Musculoskeletal:         General: No swelling  Normal range of motion  Right lower leg: No edema  Left lower leg: No edema  Skin:     General: Skin is warm and dry  Findings: Erythema present  Comments: Lower extremity wounds   Neurological:      General: No focal deficit present  Mental Status: She is alert  Mental status is at baseline             Additional Data:     Labs:    Results from last 7 days   Lab Units 21  0511   WBC Thousand/uL 7 76   HEMOGLOBIN g/dL 11 8   HEMATOCRIT % 36 6   PLATELETS Thousands/uL 269   NEUTROS PCT % 68   LYMPHS PCT % 16   MONOS PCT % 11   EOS PCT % 3     Results from last 7 days   Lab Units 06/29/21  0511   SODIUM mmol/L 142   POTASSIUM mmol/L 4 2   CHLORIDE mmol/L 107   CO2 mmol/L 25   BUN mg/dL 33*   CREATININE mg/dL 1 14   ANION GAP mmol/L 10   CALCIUM mg/dL 9 1   ALBUMIN g/dL 3 2*   TOTAL BILIRUBIN mg/dL 0 45   ALK PHOS U/L 68   ALT U/L 15   AST U/L 11   GLUCOSE RANDOM mg/dL 91     Results from last 7 days   Lab Units 06/28/21  1241   INR  1 31*                       * I Have Reviewed All Lab Data Listed Above  * Additional Pertinent Lab Tests Reviewed: [address] Admission Reviewed    Imaging:    XR tibia fibula 2 views LEFT    Result Date: 6/28/2021  Impression: Healed left ankle fracture, status post ORIF  Lucency noted around the superior syndesmotic screw but otherwise hardware appears intact  Prior arthrodesis of the right 1st TMT joint  Moderate degenerative changes in the left ankle  Large plantar calcaneal spurs  No acute osseous abnormalities  Workstation performed: [id number]     XR tibia fibula 2 views RIGHT    Result Date: 6/28/2021  Impression: Healed left ankle fracture, status post ORIF  Lucency noted around the superior syndesmotic screw but otherwise hardware appears intact  Prior arthrodesis of the right 1st TMT joint  Moderate degenerative changes in the left ankle  Large plantar calcaneal spurs  No acute osseous abnormalities  Workstation performed: [id number]     Recent Cultures (last 7 days):     Results from last 7 days   Lab Units 06/28/21  1241   BLOOD CULTURE  No Growth at 24 hrs  No Growth at 24 hrs         Last 24 Hours Medication List:   Current Facility-Administered Medications   Medication Dose Route Frequency Provider Last Rate    acetaminophen  650 mg Oral Q6H PRN Elke Gao MD      amLODIPine  5 mg Oral Daily Elke Gao MD      apixaban 5 mg Oral BID Sourav Ernandez MD      baclofen  10 mg Oral TID PRN Jessica Johnson MD      busPIRone  5 mg Oral TID Sourav Ernandez MD      cefTRIAXone  2,000 mg Intravenous Q24H Jessica Johnson MD      cloNIDine  0 1 mg Oral TID Sourav Ernandez MD      fentaNYL  1 patch Transdermal Q72H Sourav Ernandez MD      ipratropium  0 5 mg Nebulization Q6H PRN Sourav Ernandez MD      levalbuterol  1 25 mg Nebulization Q8H PRN Suorav Ernandez MD          Today, Patient Was Seen By: Jessica Johnson MD    ** Please Note: Dictation voice to text software may have been used in the creation of this document   **

## 2021-06-29 NOTE — CASE MANAGEMENT
Patient here with cellulitis of lower extremity on IV antibiotics  Patient is pending wound care consult and medical clearance  Patient is from [facility] facility  CM will continue to follow

## 2021-06-29 NOTE — UTILIZATION REVIEW
Initial Clinical Review    Admission: Date/Time/Statement:   Admission Orders (From admission, onward)     Ordered        06/28/21 1506  INPATIENT ADMISSION  Once                   Orders Placed This Encounter   Procedures    INPATIENT ADMISSION     Standing Status:   Standing     Number of Occurrences:   1     Order Specific Question:   Level of Care     Answer:   Med Surg [16]     Order Specific Question:   Estimated length of stay     Answer:   More than 2 Midnights     Order Specific Question:   Certification     Answer:   I certify that inpatient services are medically necessary for this patient for a duration of greater than two midnights  See H&P and MD Progress Notes for additional information about the patient's course of treatment  ED Arrival Information     Expected Arrival Acuity    - 6/28/2021 11:36 Urgent         Means of arrival Escorted by Service Admission type    Ambulance [facility] General Medicine Urgent         Arrival complaint    Leg Pain        Chief Complaint   Patient presents with    Wound Check     patient with b/l lower leg wounds  does not see wound care but follows physician at [facility] place for wounds  per ems physician evaluated today and noted increased drainage to left  patient states pain in both       Initial Presentation: 80  Y O female  P[place] via  [place]S [place] with bilateral  Leg wounds  Has been treated for lower extremity cellulitis for past several weeks  Was started on po antibiotics  On  6/8, but taken off due to nausea and vomiting  Started on a different antibiotic on 6/23 without improvement  PMH  Is HTN, afib  On Eliquis, CHF, anxiety and  Mainly  W/c  Bound  Labs reveal   Creatinine  1 4  Xray  B/L tibia shows no acute abnormalities, has H/O  Left ankle  Fracture, S/P  ORIF    Admit  IP with Cellulitis of  Lower extremity and plan is   Monitor labs, follow  Up cultures,   CHRISTI, wound care consult, hold lasix due to ANSELMO and continue Eliquis  Date:    6/29        Day 2:   Continue   IV  Rocephin daily  Erythema   Persists  LE  Wounds  Unchanged  Continue current meds  Plan to resume  Lasix   6/30  Wound care  Consult pending  ED Triage Vitals   Temperature Pulse Respirations Blood Pressure SpO2   06/28/21 1145 06/28/21 1145 06/28/21 1145 06/28/21 1145 06/28/21 1145   99 1 °F (37 3 °C) 82 16 140/71 98 %      Temp Source Heart Rate Source Patient Position - Orthostatic VS BP Location FiO2 (%)   06/28/21 1145 06/28/21 1145 06/28/21 1342 06/28/21 1342 --   Oral Monitor Lying Right arm       Pain Score       06/28/21 1342       6          Wt Readings from Last 1 Encounters:   06/28/21 115 kg (253 lb 15 5 oz)     Additional Vital Signs:   98 7 °F (37 1 °C)  72  18  116/69  85  96 %  --  --     06/28/21 23:58:21  98 6 °F (37 °C)  74  18  109/55  73  96 %  --  --   06/28/21 21:11:16  --  81  --  124/66  85  96 %  --  --   06/28/21 1342  --  72  16  141/51  --  100 %  None (Room air)  Lying   06/28/21 1145  99 1 °F (37 3 °C)  82  16  140/71  --  98 %  --           Pertinent Labs/Diagnostic Test Results:   X ray  L tib/fib   ( 6/28)     Healed left ankle fracture, status post ORIF   Lucency noted around the superior syndesmotic screw but otherwise hardware appears intact   Prior arthrodesis of the right 1st TMT joint  Moderate degenerative changes in the left ankle  Large plantar calcaneal spurs  No acute osseous abnormalities  X ray  R  Tib/fib   ( 6/28)     Healed left ankle fracture, status post ORIF   Lucency noted around the superior syndesmotic screw but otherwise hardware appears intact   Prior arthrodesis of the right 1st TMT joint  Moderate degenerative changes in the left ankle  Large plantar calcaneal spurs  No acute osseous abnormalities         Results from last 7 days   Lab Units 06/29/21  0511 06/28/21  1241   WBC Thousand/uL 7 76 7 35   HEMOGLOBIN g/dL 11 8 13 2   HEMATOCRIT % 36 6 41 1 PLATELETS Thousands/uL 269 308   NEUTROS ABS Thousands/µL 5 35 4 96         Results from last 7 days   Lab Units 06/29/21  0511 06/28/21  1241   SODIUM mmol/L 142 143   POTASSIUM mmol/L 4 2 4 8   CHLORIDE mmol/L 107 103   CO2 mmol/L 25 31   ANION GAP mmol/L 10 9   BUN mg/dL 33* 43*   CREATININE mg/dL 1 14 1 40*   EGFR ml/min/1 73sq m 43 34   CALCIUM mg/dL 9 1 9 5     Results from last 7 days   Lab Units 06/29/21  0511 06/28/21  1241   AST U/L 11 14   ALT U/L 15 27   ALK PHOS U/L 68 87   TOTAL PROTEIN g/dL 6 7 7 7   ALBUMIN g/dL 3 2* 3 9   TOTAL BILIRUBIN mg/dL 0 45 0 33         Results from last 7 days   Lab Units 06/29/21  0511 06/28/21  1241   GLUCOSE RANDOM mg/dL 91 107           Results from last 7 days   Lab Units 06/28/21  1241   PROTIME seconds 16 0*   INR  1 31*   PTT seconds 32           Results from last 7 days   Lab Units 06/28/21  2237   CLARITY UA  Clear   COLOR UA  Light Yellow   SPEC GRAV UA  <=1 005   PH UA  6 5   GLUCOSE UA mg/dl Negative   KETONES UA mg/dl Negative   BLOOD UA  Negative   PROTEIN UA mg/dl Negative   NITRITE UA  Negative   BILIRUBIN UA  Negative   UROBILINOGEN UA E U /dl 0 2   LEUKOCYTES UA  Negative                                 Results from last 7 days   Lab Units 06/28/21  1241   BLOOD CULTURE  Received in Microbiology Lab  Culture in Progress  Received in Microbiology Lab  Culture in Progress                 ED Treatment:   Medication Administration from 06/28/2021 1136 to 06/28/2021 1612       Date/Time Order Dose Route Action Comments     06/28/2021 1513 sodium chloride 0 9 % infusion 0 mL/hr Intravenous Stopped      06/28/2021 1413 sodium chloride 0 9 % infusion 125 mL/hr Intravenous New Bag      06/28/2021 1514 sodium chloride 0 9 % infusion 75 mL/hr Intravenous New Bag         Present on Admission:   Cellulitis of lower extremity   Hypertension   Morbid (severe) obesity due to excess calories (HCC)   Hyperlipidemia   [facility]SELMO [facility]) ([facility] [address] )   Paroxysmal atrial fibrillation (HCC)   Chronic diastolic heart failure (HCC)   Nonrheumatic aortic valve stenosis      Admitting Diagnosis: ANSELMO (acute kidney injury) ([facility] [address] ) [N17 9]  Lower extremity cellulitis [L03 119]  Visit for wound check [Z51 89]  Non-healing wound of lower extremity [S81 399A]  Age/Sex: 80 y o  female  Admission Orders:  Scheduled Medications:  amLODIPine, 5 mg, Oral, Daily  apixaban, 5 mg, Oral, BID  busPIRone, 5 mg, Oral, TID  cloNIDine, 0 1 mg, Oral, TID  fentaNYL, 1 patch, Transdermal, Q72H  IV  Rocephin     Q  24 hrs  IV  Ancef  X 1  Dose  (  6/29)        Continuous IV Infusions:  IVF  75/hr - d/c   6/29    @  0945     PRN Meds:  acetaminophen, 650 mg, Oral, Q6H PRN  baclofen, 10 mg, Oral, TID PRN  ipratropium, 0 5 mg, Nebulization, Q6H PRN  levalbuterol, 1 25 mg, Nebulization, Q8H PRN            Network Utilization Review Department  ATTENTION: Please call with any questions or concerns to [phone] and carefully listen to the prompts so that you are directed to the right person  All voicemails are confidential   Jonny Lo all requests for admission clinical reviews, approved or denied determinations and any other requests to dedicated fax number below belonging to the campus where the patient is receiving treatment   List of dedicated fax numbers for the Facilities:  [address] DENIALS (Administrative/Medical Necessity) [phone]   [address] (Maternity/NICU/Pediatrics) [phone]   [address] Mariaa Nunez [phone] [address]   Rommel Victor [address]  [place] [address] [address] [place] [address] [phone]

## 2021-06-30 NOTE — DISCHARGE INSTR - OTHER ORDERS
Wound Care Plan:   1-Hydraguard lotion to bilateral heels twice daily  2-Elevate lower extremities as often as possible  Ensure heels float off of bed/chair surface to offload pressure  3-Offloading air cushion in chair when out of bed  4-Moisturize skin daily with skin nourishing cream   5-Turn/reposition every 2 hours while in bed, or when medically stable, using positioning wedges; and weight shift frequently while in chair for pressure re-distribution on skin  6-Sacrum--cleanse with soap and water, pat dry  Apply Calazime paste to open area along gluteal cleft and Hydraguard lotion to bilateral buttocks and intact skin on sacrum TID and PRN with incontinence care  7-Right leg--cleanse with saline, pat dry  Apply adaptic to then  maxorb to wound bed, Cover with ABD and wrap with angy  Change dressing every other day

## 2021-06-30 NOTE — PROGRESS NOTES
[facility]  Progress Note - Kera Ferguson 1933, 80 y o  female MRN: [id number]  Unit/Bed#: E5 -01 Encounter: [id number]  Primary Care Provider: No primary care provider on file  Date and time admitted to hospital: 6/28/2021 11:37 AM    Chronic diastolic heart failure (HCC)  Assessment & Plan  Wt Readings from Last 3 Encounters:   06/28/21 115 kg (253 lb 15 5 oz)   10/09/20 114 kg (251 lb 12 3 oz)   07/01/19 103 kg (226 lb 6 6 oz)     · Resume lasix      Paroxysmal atrial fibrillation (HCC)  Assessment & Plan  · Continue Eliquis for anticoagulation    ANSELMO (acute kidney injury) ([facility] [address] )  Assessment & Plan  · Now resolved    Hypertension  Assessment & Plan  · Continue Norvasc and clonidine    * Cellulitis of lower extremity  Assessment & Plan  Presented from [address] [place] [address] for evaluation of bilateral leg wounds  Patient is mainly wheelchair bound  Has been treated with Bactrim 06/08/2021 but did not tolerated  She was then started on doxycycline 06/23/2021 without improvement  · Right and left tibia fibula x-ray negative for any acute osseous abnormalities, history of left ankle fracture status post ORIF  · Continue IV abx, rocephin  · Wound care evaluation  · Likely discharge tomorrow        VTE Pharmacologic Prophylaxis:   Pharmacologic: Apixaban (Eliquis)  Mechanical VTE Prophylaxis in Place: Yes    Patient Centered Rounds: I have performed bedside rounds with nursing staff today  Discussions with Specialists or Other Care Team Provider: None    Education and Discussions with Family / Patient: Patient, daughter    Time Spent for Care: 30 minutes  More than 50% of total time spent on counseling and coordination of care as described above      Current Length of Stay: 2 day(s)    Current Patient Status: Inpatient   Certification Statement: The patient will continue to require additional inpatient hospital stay due to IV abx    Discharge Plan: Tomorrow    Code Status: Level 1 - Full Code      Subjective:   No events overnight  Has some complaints about her neck pain which is chronic  Denies any fevers or chest pains  Objective:     Vitals:   Temp (24hrs), Av 7 °F (37 1 °C), Min:98 7 °F (37 1 °C), Max:98 7 °F (37 1 °C)    Temp:  [98 7 °F (37 1 °C)] 98 7 °F (37 1 °C)  HR:  [50-75] 75  Resp:  [18] 18  BP: (102-155)/() 125/91  SpO2:  [95 %-97 %] 97 %  Body mass index is 44 99 kg/m²  Input and Output Summary (last 24 hours): Intake/Output Summary (Last 24 hours) at 2021 1446  Last data filed at 2021 1300  Gross per 24 hour   Intake 360 ml   Output 825 ml   Net -465 ml       Physical Exam:     Physical Exam  Vitals and nursing note reviewed  Constitutional:       Appearance: Normal appearance  She is normal weight  HENT:      Head: Normocephalic and atraumatic  Eyes:      General: No scleral icterus  Conjunctiva/sclera: Conjunctivae normal    Cardiovascular:      Rate and Rhythm: Normal rate and regular rhythm  Heart sounds: Normal heart sounds  Pulmonary:      Breath sounds: Normal breath sounds  No wheezing or rhonchi  Abdominal:      General: Bowel sounds are normal  There is no distension  Palpations: Abdomen is soft  Tenderness: There is no abdominal tenderness  Musculoskeletal:         General: No swelling  Normal range of motion  Right lower leg: No edema  Left lower leg: No edema  Skin:     General: Skin is warm and dry  Findings: Erythema present  Comments: Lower extremity wounds   Neurological:      General: No focal deficit present  Mental Status: She is alert  Mental status is at baseline             Additional Data:     Labs:    Results from last 7 days   Lab Units 21  0445   WBC Thousand/uL 7 27   HEMOGLOBIN g/dL 11 9   HEMATOCRIT % 36 6   PLATELETS Thousands/uL 259   NEUTROS PCT % 63   LYMPHS PCT % 20   MONOS PCT % 11   EOS PCT % 4     Results from last 7 days   Lab Units 06/30/21  0445 06/29/21  0511   SODIUM mmol/L 142 142   POTASSIUM mmol/L 4 4 4 2   CHLORIDE mmol/L 107 107   CO2 mmol/L 29 25   BUN mg/dL 27* 33*   CREATININE mg/dL 1 01 1 14   ANION GAP mmol/L 6 10   CALCIUM mg/dL 9 2 9 1   ALBUMIN g/dL  --  3 2*   TOTAL BILIRUBIN mg/dL  --  0 45   ALK PHOS U/L  --  68   ALT U/L  --  15   AST U/L  --  11   GLUCOSE RANDOM mg/dL 90 91     Results from last 7 days   Lab Units 06/28/21  1241   INR  1 31*                       * I Have Reviewed All Lab Data Listed Above  * Additional Pertinent Lab Tests Reviewed: [address] Admission Reviewed    Imaging:    XR tibia fibula 2 views LEFT    Result Date: 6/28/2021  Impression: Healed left ankle fracture, status post ORIF  Lucency noted around the superior syndesmotic screw but otherwise hardware appears intact  Prior arthrodesis of the right 1st TMT joint  Moderate degenerative changes in the left ankle  Large plantar calcaneal spurs  No acute osseous abnormalities  Workstation performed: [id number]     XR tibia fibula 2 views RIGHT    Result Date: 6/28/2021  Impression: Healed left ankle fracture, status post ORIF  Lucency noted around the superior syndesmotic screw but otherwise hardware appears intact  Prior arthrodesis of the right 1st TMT joint  Moderate degenerative changes in the left ankle  Large plantar calcaneal spurs  No acute osseous abnormalities  Workstation performed: [id number]       Recent Cultures (last 7 days):     Results from last 7 days   Lab Units 06/28/21  1241   BLOOD CULTURE  No Growth at 24 hrs  No Growth at 24 hrs         Last 24 Hours Medication List:   Current Facility-Administered Medications   Medication Dose Route Frequency Provider Last Rate    acetaminophen  650 mg Oral Q6H PRN Presley Griggs MD      amLODIPine  5 mg Oral Daily Presley Griggs MD      apixaban  5 mg Oral BID Presley Griggs MD      baclofen  10 mg Oral TID PRN Jenae Figueroa MD      busPIRone 5 mg Oral TID Duane Diss, MD      cefTRIAXone  2,000 mg Intravenous Q24H Amaris Stevens MD 2,000 mg (06/29/21 1920)    cloNIDine  0 1 mg Oral TID Duane Diss, MD      fentaNYL  1 patch Transdermal Q72H Duane Diss, MD      furosemide  80 mg Oral Q48H Amaris Stevens MD      ipratropium  0 5 mg Nebulization Q6H PRN Duane Diss, MD      levalbuterol  1 25 mg Nebulization Q8H PRN Duane Diss, MD          Today, Patient Was Seen By: Amaris Stevens MD    ** Please Note: Dictation voice to text software may have been used in the creation of this document   **

## 2021-06-30 NOTE — ASSESSMENT & PLAN NOTE
Presented from [address] [place] [address] for evaluation of bilateral leg wounds  Patient is mainly wheelchair bound  Has been treated with Bactrim 06/08/2021 but did not tolerated  She was then started on doxycycline 06/23/2021 without improvement      · Right and left tibia fibula x-ray negative for any acute osseous abnormalities, history of left ankle fracture status post ORIF  · Continue IV abx, rocephin  · Wound care evaluation  · Likely discharge tomorrow

## 2021-06-30 NOTE — PHYSICAL THERAPY NOTE
PHYSICAL THERAPY EVALUATION  NAME:  Tanya Vieyra  DATE: 06/30/21    AGE:   80 y o  Mrn:   [id number]  ADMIT DX:  ANSELMO (acute kidney injury) ([facility] [address] ) [N17 9]  Lower extremity cellulitis [L03 119]  Visit for wound check [Z51 89]  Non-healing wound of lower extremity [S89 919A]    Past Medical History:   Diagnosis Date    [facility]     Hyperlipidemia     Hypertension     PONV (postoperative nausea and vomiting)      Length Of Stay: 2  Performed at least 2 patient identifiers during session: Name and Birthday  PHYSICAL THERAPY EVALUATION :        06/30/21 1045   Note Type   Note type Evaluation   Pain Assessment   Pain Assessment Tool 0-10   Pain Score 3   Pain Location/Orientation Location: [facility]   Hospital Pain Intervention(s) Ambulation/increased activity;Repositioned   Home Living   Type of Home Assisted living  [facility])   Home Layout One level;Performs ADLs on one level; Able to live on main level with bedroom/bathroom   Bathroom Shower/Tub Walk-in shower   Bathroom Toilet Raised   Bathroom Equipment Grab bars in shower; Shower chair;Commode;Grab bars around toilet   [address] [facility]; Wheelchair-manual;Hospital bed   Additional Comments Pt states she lives at [address] [place] [address], completes transfers with A x 1 using RW, bed mobility and ADLs  She reports she uses B/L UE and LEs to propel W/C   Prior Function   Level of [place] Needs assistance with IADLs; Needs assistance with ADLs and functional mobility   Lives With Facility staff   Receives Help From Personal care attendant   ADL Assistance Needs assistance  (setup grooming and UB ADLs, assist LB ADLS)   IADLs Needs assistance   Falls in the last 6 months 0   Vocational Retired   Comments Pt requires assistance for transfers, bed mobility, ADLs, and IADLs, is able to propel own W/C to dining campbell   Restrictions/Precautions   Weight Bearing Precautions Per Order No   Other Precautions Chair Alarm; Bed Alarm;Multiple lines;O2;Fall Risk;Pain   Cognition   Overall Cognitive Status WFL   Orientation Level Oriented X4   Following Commands Follows one step commands without difficulty   RLE Assessment   RLE Assessment WFL  (3+/5 strength)   LLE Assessment   LLE Assessment WFL  (3+/5 strength)   Coordination   Sensation WFL   Light Touch   RLE Light Touch Grossly intact   LLE Light Touch Grossly intact   Bed Mobility   Supine to Sit 3  Moderate assistance   Additional items Assist x 2; Increased time required;Verbal cues;LE management;HOB elevated; Bedrails  (trunk management)   Additional Comments Pt required assistance with trunk management, VC for hand placement and technique   Transfers   Sit to Stand 2  Maximal assistance   Additional items Assist x 2; Increased time required;Verbal cues   Stand to Sit 2  Maximal assistance   Additional items Assist x 2; Increased time required;Verbal cues   Additional Comments Initially completed transfer with RW, pt attempted to progress LEs however unable to complete weight shift  Pt with increased forward trunk flexion, provided VC for upright posture and increased weight bearing through UEs using RW  Pt states aide moves her legs at home  Completed transfer to recliner using Quick move  Gait not assessed as pt is non-ambulatory at baseline  BP assessed post transfer at 125/91   Balance   Static Sitting Fair +   Dynamic Sitting Fair   Static Standing Poor   Dynamic Standing Poor -   Endurance Deficit   Endurance Deficit Yes   Endurance Deficit Description fatigue   Activity Tolerance   Activity Tolerance Patient limited by fatigue   Medical Staff Made Aware Rohini GU   Nurse Made Aware Saloni SMALL   Assessment   Prognosis Fair   Problem List Decreased strength;Decreased endurance; Impaired balance;Decreased mobility; Impaired judgement;Decreased safety awareness; Obesity;Pain   Barriers to Discharge Inaccessible home environment;Decreased caregiver support   Goals   Patient Goals "To get better"   STG Expiration Date 07/14/21   Plan   Treatment/Interventions Functional transfer training;LE strengthening/ROM; Therapeutic exercise; Endurance training;Patient/family training;Equipment eval/education; Bed mobility;Spoke to nursing;OT   PT Frequency Other (Comment)  (3-5x/wk)   Recommendation   PT Discharge Recommendation Return to facility with rehabilitation services   Equipment Recommended   (none anticipated)   PT - OK to Discharge   (when medically clear)   Additional Comments 2 At end of session pt seated in recliner with chair alarm engaged and all needs in reach   [facility] 8 in Bed Without Bedrails 2   Lying on Back to Sitting on Edge of Flat Bed 1   Moving Bed to Chair 1   Standing Up From Chair 1   Walk in Room 1   Climb 3-5 Stairs 1   Basic Mobility Inpatient Raw Score 7   Turning Head Towards Sound 4   Follow Simple Instructions 4   Low Function Basic Mobility Raw Score 15   Low Function Basic Mobility Standardized Score 23 9     The patient's AM-PAC Basic Mobility Inpatient Short Form Raw Score is 7  , Standardized Score is 23 9    A standardized score less than 42 9 suggests the patient may benefit from discharge to post-acute rehabilitation services  If facility is able to offer increased assistance, recommend returning with PT services  If unable to provide increased assistance, recommend STR  Please also refer to the recommendation of the Physical Therapist for safe discharge planning  (Please find full objective findings from PT assessment regarding body systems outlined above)  Assessment: Pt is a 80 y o  female seen for PT evaluation s/p admission to [address] [place] [address] on 6/28/2021 with Cellulitis of lower extremity  Order placed for PT services    Upon evaluation: Pt is presenting with impaired functional mobility due to pain, decreased strength, decreased endurance, impaired balance, decreased safety awareness, impaired judgment and fall risk requiring mod x 2 assistance for bed mobility and max x 2 assistance for transfers  Pt's clinical presentation is currently unstable/unpredictable given the functional mobility deficits above coupled with fall risks as indicated by AM-PAC 6-Clicks: 1/20 as well as impaired balance, polypharmacy, impaired judgement and decreased safety awareness and combined with medical complications of hypertension , abnormal renal lab values and need for input for mobility technique/safety  Pt's PMHx and comorbidities that may affect physical performance and progress include: CHF, A fib, HTN, obesity and HLD  Personal factors affecting pt at time of IE include: anxiety, advanced age, inability to perform IADLs, inability to perform ADLs, inability to navigate level surfaces without external assistance, inability to ambulate household distances, inability to navigate community distances and limited insight into impairments  Pt will benefit from continued skilled PT services to address deficits as defined above and to maximize level of functional mobility to facilitate return toward PLOF and improved QOL  From PT/mobility standpoint, recommendation at time of d/c would be return to facility with PT services, should facility be unable to provide increased assistance, recommend STR pending progress in order to reduce fall risk and maximize pt's functional independence and consistency with mobility in order to facilitate return to PLOF  Recommend trial with walker next 1-2 sessions, ther ex next 1-2 sessions and trial with quick move next 1-2 sessions  Goals: Pt will: Perform bed mobility tasks with modified I to reposition in bed and prepare for transfers  Pt will perform transfers with consistent min A of 1 to increase Indep in home environment and decrease burden of care and prepare for ambulation  Increase bilateral LE strength 1/2 grade to facilitate independent mobility and Increase all balance 1/2 grade to decrease risk for falls  Marcelo Romo, PT,DPT

## 2021-06-30 NOTE — PLAN OF CARE
Problem: OCCUPATIONAL THERAPY ADULT  Goal: Performs self-care activities at highest level of function for planned discharge setting  See evaluation for individualized goals  Description: Treatment Interventions: ADL retraining, UE strengthening/ROM, Functional transfer training, Endurance training, Patient/family training, Cognitive reorientation, Equipment evaluation/education, Compensatory technique education, Energy conservation, Activityengagement          See flowsheet documentation for full assessment, interventions and recommendations  Note: Limitation: Decreased ADL status, Decreased Safe judgement during ADL, Decreased UE strength, Decreased cognition, Decreased endurance, Decreased self-care trans, Decreased high-level ADLs  Prognosis: Good, Fair  Assessment: Pt is a 80 y o  female seen for OT evaluation s/p admit to [facility] on 6/28/2021 w/ Cellulitis of lower extremity  Comorbidities affecting pt's functional performance at time of assessment include: paroxysmal atrial fibrillation, ANSELMO, HTN, CHF, h/o b/l TKR, anxiety, h/o left ankle ORIF  Personal factors affecting pt at time of IE include: increased pain in neck (chronic)  Prior to admission, pt was setup grooming and UB ADLs, assist LB ADLs, assist x 1 bed mobility, assist x1 SPT w/ RW, supervision w/c propulsion  Upon evaluation: Pt requires MOD assist x2 supine>sit bed mobility w/ increased time to complete, MAX assist x2 sit<>stand w/ VCs for hand placement (pt unable to perform SPT), MAX assist x2 w/ use of quick move, total assist toileting, MAX assist LB ADLs, MOD assist UB ADLs 2* the following deficits impacting occupational performance: increased pain, impaired balance, impaired functional reach, limited shoulder elevation, decreased STM, fall risk  Pt to benefit from continued skilled OT tx while in the hospital to address deficits as defined above and maximize level of functional independence w ADL's and functional mobility   Occupational Performance areas to address include: grooming, bathing/shower, toilet hygiene, dressing, health maintenance, functional mobility and clothing management  From OT standpoint, recommendation at time of d/c would be return to facility w/ OT services if [facility] staff able to provide increased assistance, if not STR  The patient's raw score on the AM-PAC Daily Activity inpatient short form is 12, standardized score is 30 6, less than 39 4  Patients at this level are likely to benefit from discharge to post-acute rehabilitation services  Please refer to the recommendation of the Occupational Therapist for safe discharge planning       OT Discharge Recommendation: Return to facility with rehabilitation services  OT - OK to Discharge:  (when medically stable)

## 2021-06-30 NOTE — OCCUPATIONAL THERAPY NOTE
Occupational Therapy Evaluation     Patient Name: Dionisio Fuentes  YZLZO'Y Date: 6/30/2021  Problem List  Principal Problem:    Cellulitis of lower extremity  Active Problems:    Hypertension    Morbid (severe) obesity due to excess calories (HCC)    Hyperlipidemia    ANSELMO (acute kidney injury) ([facility] [address] )    Paroxysmal atrial fibrillation (HCC)    Chronic diastolic heart failure (HCC)    Nonrheumatic aortic valve stenosis    Past Medical History  Past Medical History:   Diagnosis Date    Atrial fibrillation (HCC)     Hyperlipidemia     Hypertension     PONV (postoperative nausea and vomiting)      Past Surgical History  Past Surgical History:   Procedure Laterality Date    ANKLE FRACTURE SURGERY Left     CHOLECYSTECTOMY      JOINT REPLACEMENT Bilateral     TKR    REPLACEMENT TOTAL KNEE BILATERAL Bilateral              06/30/21 1111   OT Last Visit   OT Visit Date 06/30/21   Note Type   Note type Evaluation   Restrictions/Precautions   Weight Bearing Precautions Per Order No   Other Precautions Cognitive; Chair Alarm; Bed Alarm;Multiple lines;O2;Fall Risk;Pain   Pain Assessment   Pain Assessment Tool 0-10   Pain Score 3   Pain Location/Orientation Orientation: Bilateral;Location: Neck   Hospital Pain Intervention(s) Ambulation/increased activity;Repositioned; Emotional support   Home Living   Type of Home Assisted living  [facility])   Home Layout One level;Performs ADLs on one level; Able to live on main level with bedroom/bathroom   Bathroom Shower/Tub Walk-in shower   Bathroom Toilet Raised   Bathroom Equipment Grab bars in shower; Shower chair;Commode;Grab bars around toilet   [address] [place] [address]; Wheelchair-manual;Hospital bed   Additional Comments pt reports lives in detention and reports assist x1 w/ functional transfers and bed mobility and ADLs   Prior Function   Level of [place] Needs assistance with IADLs; Needs assistance with ADLs and functional mobility   Lives With Facility staff   Receives Help From Personal care attendant   ADL Assistance Needs assistance  (setup grooming and UB ADLs, assist LB ADLS)   IADLs Needs assistance   Falls in the last 6 months 0   Vocational Retired   Comments pt reports can propel w/c w/ UEs and LEs and assist w/ IADLs, reports propels self to dining campbell   Lifestyle   Autonomy per pt setup UB ADLs, assist w/ LB ADLs, assist w/ IADLs, assist x1 functional transfers SPT x1 to w/c, supervision w/c propulsion   Reciprocal Relationships facility staff   Service to Others retired office work   Intrinsic Gratification watching tv   [address] "I am okay"   ADL   Where Assessed Edge of bed   Eating Assistance 5  Supervision/Setup   Grooming Assistance 4  Minimal Assistance   [address] 4  Minimal Assistance   LB [address] 2  [address] [facility] [address] 3  Moderate Assistance    [facility] 2  Maximal [address]  2  Maximal [address] 2  Maximal Assistance   Bed Mobility   Supine to Sit 3  Moderate assistance   Additional items Assist x 2; Increased time required;LE management;Verbal cues; Bedrails;HOB elevated   Additional Comments increased time to complete   Transfers   Sit to Stand 2  Maximal assistance   Additional items Assist x 2; Increased time required;Verbal cues;Armrests   Stand to Sit 2  Maximal assistance   Additional items Assist x 2; Increased time required;Verbal cues;Armrests   Other 2  Maximal assistance   Additional items Assist x 2; Increased time required;Verbal cues  (quick move)   Additional Comments assist x2 sit<>Stand w/ RW and unable to perform SPT, required use of quick move w/ one therapist assisting patient and other maneuvering quick move   Balance   Static Sitting Fair +   Dynamic Sitting Fair   Static Standing Poor +   Dynamic Standing Poor   Activity Tolerance   Activity Tolerance Patient limited by fatigue;Treatment limited secondary to medical complications (Comment); Patient limited by pain   Nurse Made Aware appropriate to see per RNBebo   RUE Assessment   RUE Assessment WFL  (limited elevation 2* arthritis, distal 4-/5)   LUE Assessment   LUE Assessment WFL  (limited elevation 2* arthritis, distal 4-/5)   Hand Function   Gross Motor Coordination Functional   Fine Motor Coordination Functional   Sensation   Light Touch Partial deficits in the LLE;Partial deficits in the RLE   Proprioception   Proprioception No apparent deficits   Vision-Basic Assessment   Current Vision Wears glasses only for reading   Vision - Complex Assessment   Ocular [facility] Able to read clock/calendar on wall without difficulty   Cognition   Overall Cognitive Status Impaired   Arousal/Participation Responsive; Cooperative   Attention Attends with cues to redirect   Orientation Level Oriented to person;Oriented to place;Oriented to time  (oriented to month not specific date)   Memory Decreased short term memory;Decreased recall of recent events;Decreased recall of precautions   Following Commands Follows one step commands with increased time or repetition   Comments increased processing time, impaired insight and safety awareness   Assessment   Limitation Decreased ADL status; Decreased Safe judgement during ADL;Decreased UE strength;Decreased cognition;Decreased endurance;Decreased self-care trans;Decreased high-level ADLs   Prognosis Good;Fair   Assessment Pt is a 80 y o  female seen for OT evaluation s/p admit to [facility] on 6/28/2021 w/ Cellulitis of lower extremity  Comorbidities affecting pt's functional performance at time of assessment include: paroxysmal atrial fibrillation, ANSELMO, HTN, CHF, h/o b/l TKR, anxiety, h/o left ankle ORIF  Personal factors affecting pt at time of IE include: increased pain in neck (chronic)   Prior to admission, pt was setup grooming and UB ADLs, assist LB ADLs, assist x 1 bed mobility, assist x1 SPT w/ RW, supervision w/c propulsion  Upon evaluation: Pt requires MOD assist x2 supine>sit bed mobility w/ increased time to complete, MAX assist x2 sit<>stand w/ VCs for hand placement (pt unable to perform SPT), MAX assist x2 w/ use of quick move, total assist toileting, MAX assist LB ADLs, MOD assist UB ADLs 2* the following deficits impacting occupational performance: increased pain, impaired balance, impaired functional reach, limited shoulder elevation, decreased STM, fall risk  Pt to benefit from continued skilled OT tx while in the hospital to address deficits as defined above and maximize level of functional independence w ADL's and functional mobility  Occupational Performance areas to address include: grooming, bathing/shower, toilet hygiene, dressing, health maintenance, functional mobility and clothing management  From OT standpoint, recommendation at time of d/c would be return to facility w/ OT services if [facility] staff able to provide increased assistance, if not STR  The patient's raw score on the AM-PAC Daily Activity inpatient short form is 12, standardized score is 30 6, less than 39 4  Patients at this level are likely to benefit from discharge to post-acute rehabilitation services  Please refer to the recommendation of the Occupational Therapist for safe discharge planning  Goals   Patient Goals "to get better"   LTG Time Frame 10-14   Long Term Goal please see below goals   Plan   Treatment Interventions ADL retraining;UE strengthening/ROM; Functional transfer training; Endurance training;Patient/family training;Cognitive reorientation;Equipment evaluation/education; Compensatory technique education; Energy conservation; Activityengagement   Goal Expiration Date 07/14/21   OT Frequency 2-3x/wk   Recommendation   OT Discharge Recommendation Return to facility with rehabilitation services   OT - OK to Discharge   (when medically stable)   AM-PAC Daily Activity Inpatient   Lower Body Dressing 1   Bathing 2   Toileting 1   Upper Body Dressing 2   Grooming 3   Eating 3   Daily Activity Raw Score 12   Daily Activity Standardized Score (Calc for Raw Score >=11) 30 6   AM-PAC Applied Cognition Inpatient   Following a Speech/Presentation 4   Understanding Ordinary Conversation 4   Taking Medications 2   Remembering Where Things Are Placed or Put Away 3   Remembering List of 4-5 Errands 2   Taking Care of Complicated Tasks 2   Applied Cognition   Raw Score 17   Applied Cognition Standardized Score 36 52     Occupational Therapy Goals to be met in 10-14 days:  1) Pt will improve activity tolerance to G for 30 min txment sessions to enhance ADLs  2) Pt will complete UB ADLs/self care w/ setup and min assist LB ADLs  3) Pt will complete toileting w/ min assist w/ G hygiene/thoroughness using DME PRN  4) Pt will improve functional transfers on/off all surfaces using DME PRN w/ G balance/safety including toileting w/ min assist  5) Pt will perform functional mobility w/ w/c at supervision level in homelike environment and good safety   6) Pt will engage in ongoing cognitive assessment w/ G participation to A w/ safe d/c planning/recommendations  7) Pt will demonstrate G carryover of pt/caregiver education and training as appropriate w/ mod I  w/ G tolerance  8) Pt will engage in depression screen/leisure interest checklist w/ G participation to monitor s/s depression and ID 3 positive coping strategies to A w/ emotional regulation and management  9) Pt will demonstrate 100% carryover of E C  techniques w/ mod I t/o fx'l I/ADL/leisure tasks w/o cues s/p skilled education  10) Pt will tolerate bed mobility and EOB seated tasks w/ min A for 30 mins to engage in fx'l I/ADL/leisure tasks w/ min A w/ min cues  11) Pt will demonstrate improved b/l UE strength by 1 MMT grade to enhance ADLS and functional transfers     Documentation completed by: Yen Blanchard MS, OTR/GAGANDEEP

## 2021-06-30 NOTE — WOUND OSTOMY CARE
Consult Note - Wound   Pipo Santiago 80 y o  female MRN: [id number]  Unit/Bed#: E5 -01 Encounter: [id number]      History and Present Illness:  80year old female presented to the hospital from [address] for evaluation of lower extremity wounds  Patient's history significant for CHF and recent lower extremity cellulitis  Patient reports having a pressure injury to her sacrum in the past--was treated at wound center  Assessment Findings:   Patient is agreeable to assessment and photography  She requires assist x 2 to turn in bed  She is incontinent at times of bowel and bladder  Bilateral buttocks and heels are intact  Lower extremities scaly with chronic venous changes and mild edema  1   Two partial thickness wounds to right lower pretibial area--patient is unsure how she got these wounds  Appear to be venous vs traumatic  Scant serosanguinous drainage  No induration or excessive warmth to touch  See flowsheet and media for wound details  Wound Care Plan:   1-Hydraguard lotion to bilateral heels twice daily  2-Elevate lower extremities as often as possible  Ensure heels float off of bed/chair surface to offload pressure  3-Offloading air cushion in chair when out of bed  4-Moisturize skin daily with skin nourishing cream   5-Turn/reposition every 2 hours while in bed, or when medically stable, using positioning wedges; and weight shift frequently while in chair for pressure re-distribution on skin  6-Sacrum--cleanse with soap and water, pat dry  Apply Calazime paste to open area along gluteal cleft and Hydraguard lotion to bilateral buttocks and intact skin on sacrum TID and PRN with incontinence care  7-Right leg--cleanse with saline, pat dry  Apply Dermagra to wound beds  Cover with ABD and wrap with angy  Change dressing every other day  Wound care team to follow  Plan of care reviewed with primary RN      Wound 06/28/21 Pretibial Right;Distal;Lateral (Active)   Wound Image   06/30/21 1332   Wound Description Beefy red 06/30/21 1332   Melanie-wound Assessment Intact 06/30/21 1332   Wound Length (cm) 2 3 cm 06/30/21 1332   Wound Width (cm) 0 8 cm 06/30/21 1332   Wound Depth (cm) 0 1 cm 06/30/21 1332   Wound Surface Area (cm^2) 1 84 cm^2 06/30/21 1332   Wound Volume (cm^3) 0 184 cm^3 06/30/21 1332   Calculated Wound Volume (cm^3) 0 18 cm^3 06/30/21 1332   Drainage Amount Scant 06/30/21 1332   Drainage Description Serosanguineous 06/30/21 1332   Non-staged Wound Description Partial thickness 06/30/21 1332   Treatments Cleansed;Elevated 06/30/21 1332   Dressing Kim Ohs gauze;Dry dressing 06/30/21 1332   Dressing Changed Changed 06/30/21 1332   Patient Tolerance Tolerated well 06/30/21 1332   Dressing Status Clean;Dry; Intact 06/30/21 1332       Wound 06/28/21 Pretibial Proximal;Right (Active)   Wound Image   06/30/21 1330   Wound Description Pink 06/30/21 1330   Melanie-wound Assessment Clean;Dry; Intact 06/30/21 1330   Wound Length (cm) 0 6 cm 06/30/21 1330   Wound Width (cm) 0 5 cm 06/30/21 1330   Wound Depth (cm) 0 1 cm 06/30/21 1330   Wound Surface Area (cm^2) 0 3 cm^2 06/30/21 1330   Wound Volume (cm^3) 0 03 cm^3 06/30/21 1330   Calculated Wound Volume (cm^3) 0 03 cm^3 06/30/21 1330   Drainage Amount Scant 06/30/21 1330   Drainage Description Serosanguineous 06/30/21 1330   Non-staged Wound Description Partial thickness 06/30/21 1330   Treatments Cleansed 06/30/21 1330   Dressing Dermagran gauze;Dry dressing 06/30/21 1330   Dressing Changed Changed 06/30/21 1330   Patient Tolerance Tolerated well 06/30/21 1330   Dressing Status Clean;Dry; Intact 06/30/21 1330       Wound 06/30/21 MASD Sacrum (Active)   Wound Image   06/30/21 1326   Wound Description Beefy red 06/30/21 1326   Melanie-wound Assessment Maceration;Fragile 06/30/21 1326   Wound Length (cm) 5 5 cm 06/30/21 1326   Wound Width (cm) 0 5 cm 06/30/21 1326   Wound Depth (cm) 0 1 cm 06/30/21 1326   Wound Surface Area (cm^2) 2 75 cm^2 06/30/21 1326   Wound Volume (cm^3) 0 275 cm^3 06/30/21 1326   Calculated Wound Volume (cm^3) 0 28 cm^3 06/30/21 1326   Drainage Amount None 06/30/21 1326   Non-staged Wound Description Partial thickness 06/30/21 1326   Treatments Cleansed 06/30/21 1326   Dressing Protective barrier 06/30/21 1326   Patient Tolerance Tolerated well 06/30/21 [address] BSN, RN, [employer]

## 2021-06-30 NOTE — CASE MANAGEMENT
CM met with patient and explained CM role  Patient lives at [facility]  Denies HHC, MH and drugs/etoh  STR hx at [facility] and Son  PCP is Dr Rosa Armstrong  Patient receives medications and assistance w/ ADLs from custodial  Patient will require transport via BLS or WCV at discharge  Dgt Viki Stable is emergency contact at [phone]  CM reviewed d/c planning process including the following: identifying help at home, patient preference for d/c planning needs, Discharge Lounge, [facility] Meds to Bed program, availability of treatment team to discuss questions or concerns patient and/or family may have regarding understanding medications and recognizing signs and symptoms once discharged  CM also encouraged patient to follow up with all recommended appointments after discharge  Patient advised of importance for patient and family to participate in managing patients medical well being

## 2021-06-30 NOTE — PLAN OF CARE
Problem: PHYSICAL THERAPY ADULT  Goal: Performs mobility at highest level of function for planned discharge setting  See evaluation for individualized goals  Description: Treatment/Interventions: Functional transfer training, LE strengthening/ROM, Therapeutic exercise, Endurance training, Patient/family training, Equipment eval/education, Bed mobility, Spoke to nursing, OT  Equipment Recommended:  (none anticipated)       See flowsheet documentation for full assessment, interventions and recommendations  Note: Prognosis: Fair  Problem List: Decreased strength, Decreased endurance, Impaired balance, Decreased mobility, Impaired judgement, Decreased safety awareness, Obesity, Pain  Assessment: Pt is a 80 y o  female seen for PT evaluation s/p admission to [address] [place] [address] on 6/28/2021 with Cellulitis of lower extremity  Order placed for PT services  Upon evaluation: Pt is presenting with impaired functional mobility due to pain, decreased strength, decreased endurance, impaired balance, decreased safety awareness, impaired judgment and fall risk requiring mod x 2 assistance for bed mobility and max x 2 assistance for transfers  Pt's clinical presentation is currently unstable/unpredictable given the functional mobility deficits above coupled with fall risks as indicated by AM-PAC 6-Clicks: 5/19 as well as impaired balance, polypharmacy, impaired judgement and decreased safety awareness and combined with medical complications of hypertension , abnormal renal lab values and need for input for mobility technique/safety  Pt's PMHx and comorbidities that may affect physical performance and progress include: CHF, A fib, HTN, obesity and HLD   Personal factors affecting pt at time of IE include: anxiety, advanced age, inability to perform IADLs, inability to perform ADLs, inability to navigate level surfaces without external assistance, inability to ambulate household distances, inability to navigate community distances and limited insight into impairments  Pt will benefit from continued skilled PT services to address deficits as defined above and to maximize level of functional mobility to facilitate return toward PLOF and improved QOL  From PT/mobility standpoint, recommendation at time of d/c would be return to facility with PT services, should facility be unable to provide increased assistance, recommend STR pending progress in order to reduce fall risk and maximize pt's functional independence and consistency with mobility in order to facilitate return to PLOF  Recommend trial with walker next 1-2 sessions, ther ex next 1-2 sessions and trial with quick move next 1-2 sessions  Barriers to Discharge: Inaccessible home environment, Decreased caregiver support        PT Discharge Recommendation: Return to facility with rehabilitation services     PT - OK to Discharge:  (when medically clear)    See flowsheet documentation for full assessment

## 2021-06-30 NOTE — ASSESSMENT & PLAN NOTE
Wt Readings from Last 3 Encounters:   06/28/21 115 kg (253 lb 15 5 oz)   10/09/20 114 kg (251 lb 12 3 oz)   07/01/19 103 kg (226 lb 6 6 oz)     · Resume lasix

## 2021-07-01 NOTE — TRANSPORTATION MEDICAL NECESSITY
Section I - General Information    Name of Patient: Frankie Alexander                 : 1933    Medicare #: [id number]  Transport Date: 21 (PCS is valid for round trips on this date and for all repetitive trips in the 60-day range as noted below )  Origin: [address]                                                         Destination: [facility]  Is the pt's stay covered under Medicare Part A (PPS/DRG)   []     Closest appropriate facility? If no, why is transport to more distant facility required? Yes  If hospice pt, is this transport related to pt's terminal illness? NA       Section II - Medical Necessity Questionnaire  Ambulance transportation is medically necessary only if other means of transport are contraindicated or would be potentially harmful to the patient  To meet this requirement, the patient must either be "bed confined" or suffer from a condition such that transport by means other than ambulance is contraindicated by the patient's condition  The following questions must be answered by the medical professional signing below for this form to be valid:    1)  Describe the MEDICAL CONDITION (physical and/or mental) of this patient AT [address] [place] [address] that requires the patient to be transported in an ambulance and why transport by other means is contraindicated by the patient's condition: bed bound  2) Is the patient "bed confined" as defined below? Yes  To be "be confined" the patient must satisfy all three of the following conditions: (1) unable to get up from bed without Assistance; AND (2) unable to ambulate; AND (3) unable to sit in a chair or wheelchair  3) Can this patient safely be transported by car or wheelchair van (i e , seated during transport without a medical attendant or monitoring)?    No    4) In addition to completing questions 1-3 above, please check any of the following conditions that apply*:   *Note: supporting documentation for any boxes checked must be maintained in the patient's medical records  If hosp-hosp transfer, describe services needed at 2nd facility not available at 1st facility? Unable to tolerate seated position for time needed to transport       Section III - Signature of Physician or Healthcare Professional  I certify that the above information is true and correct based on my evaluation of this patient, and represent that the patient requires transport by ambulance and that other forms of transport are contraindicated  I understand that this information will be used by the Centers for Medicare and Medicaid Services (CMS) to support the determination of medical necessity for ambulance services, and I represent that I have personal knowledge of the patient's condition at time of transport  []  If this box is checked, I also certify that the patient is physically or mentally incapable of signing the ambulance service's claim and that the institution with which I am affiliated has furnished care, services, or assistance to the patient  My signature below is made on behalf of the patient pursuant to 42 CFR §424 36(b)(4)  In accordance with 42 CFR §424 37, the specific reason(s) that the patient is physically or mentally incapable of signing the claim form is as follows  Signature of Physician* or Healthcare Professional______________________________________________________________  Signature Date 07/01/21 (For scheduled repetitive transports, this form is not valid for transports performed more than 60 days after this date)    Printed Name & Credentials of Physician or Healthcare Professional (MD, , RN, etc )_Jihan Tracy RN  _______________________________  *Form must be signed by patient's attending physician for scheduled, repetitive transports   For non-repetitive, unscheduled ambulance transports, if unable to obtain the signature of the attending physician, any of the following may sign (choose appropriate option below)  [] Physician Assistant []  Clinical Nurse Specialist [x]  Registered Nurse  []  Nurse Practitioner  [x] Discharge Planner

## 2021-07-01 NOTE — PROGRESS NOTES
[address]  Progress Note - Lidia Carter 1933, 80 y o  female MRN: [id number]  Unit/Bed#: E5 -01 Encounter: [id number]  Primary Care Provider: No primary care provider on file  Date and time admitted to hospital: 6/28/2021 11:37 AM    Chronic diastolic heart failure (HCC)  Assessment & Plan  Wt Readings from Last 3 Encounters:   06/28/21 115 kg (253 lb 15 5 oz)   10/09/20 114 kg (251 lb 12 3 oz)   07/01/19 103 kg (226 lb 6 6 oz)     · Resume lasix      Paroxysmal atrial fibrillation (HCC)  Assessment & Plan  · Continue Eliquis for anticoagulation    Hypertension  Assessment & Plan  · Continue Norvasc and clonidine    * Cellulitis of lower extremity  Assessment & Plan  Presented from [address] [place] [address] for evaluation of bilateral leg wounds  Patient is mainly wheelchair bound  Has been treated with Bactrim 06/08/2021 but did not tolerated  She was then started on doxycycline 06/23/2021 without improvement  · Right and left tibia fibula x-ray negative for any acute osseous abnormalities, history of left ankle fracture status post ORIF  · Will transition patient to oral antibiotics Cefdinir  · Wound care following  · Will need outpatient wound care follow-up    Patient cleared for discharge, pending placement previous assisted living facility unable to take patient back        VTE Pharmacologic Prophylaxis:   Pharmacologic: Apixaban (Eliquis)  Mechanical VTE Prophylaxis in Place: Yes    Patient Centered Rounds: I have performed bedside rounds with nursing staff today  Discussions with Specialists or Other Care Team Provider:  None    Education and Discussions with Family / Patient:  The patient, daughter on phone    Time Spent for Care: 30 minutes  More than 50% of total time spent on counseling and coordination of care as described above      Current Length of Stay: 3 day(s)    Current Patient Status: Inpatient   Certification Statement: The patient will continue to require additional inpatient hospital stay due to Pending placement    Discharge Plan:  1-2 days    Code Status: Level 1 - Full Code      Subjective:   No events overnight  Denies any lower extremity pain  Continues to have neck pain has been chronic for patient  Objective:     Vitals:   Temp (24hrs), Av 5 °F (36 9 °C), Min:98 3 °F (36 8 °C), Max:98 6 °F (37 °C)    Temp:  [98 3 °F (36 8 °C)-98 6 °F (37 °C)] 98 6 °F (37 °C)  HR:  [56-61] 56  Resp:  [16-17] 16  BP: ()/(60-70) 90/70  SpO2:  [95 %-96 %] 96 %  Body mass index is 44 99 kg/m²  Input and Output Summary (last 24 hours): Intake/Output Summary (Last 24 hours) at 2021 1652  Last data filed at 2021 1224  Gross per 24 hour   Intake 480 ml   Output --   Net 480 ml       Physical Exam:     Physical Exam  Vitals and nursing note reviewed  Constitutional:       Appearance: Normal appearance  She is normal weight  HENT:      Head: Normocephalic and atraumatic  Eyes:      General: No scleral icterus  Conjunctiva/sclera: Conjunctivae normal    Cardiovascular:      Rate and Rhythm: Normal rate and regular rhythm  Heart sounds: Normal heart sounds  Pulmonary:      Breath sounds: Normal breath sounds  No wheezing or rhonchi  Abdominal:      General: Bowel sounds are normal  There is no distension  Palpations: Abdomen is soft  Tenderness: There is no abdominal tenderness  Musculoskeletal:         General: No swelling  Normal range of motion  Right lower leg: No edema  Left lower leg: No edema  Skin:     General: Skin is warm and dry  Findings: No erythema  Comments: Lower extremity wounds   Neurological:      General: No focal deficit present  Mental Status: She is alert  Mental status is at baseline             Additional Data:     Labs:    Results from last 7 days   Lab Units 21  0445   WBC Thousand/uL 7 27   HEMOGLOBIN g/dL 11 9   HEMATOCRIT % 36 6   PLATELETS Thousands/uL 259   NEUTROS PCT % 63   LYMPHS PCT % 20   MONOS PCT % 11   EOS PCT % 4     Results from last 7 days   Lab Units 06/30/21  0445 06/29/21  0511   SODIUM mmol/L 142 142   POTASSIUM mmol/L 4 4 4 2   CHLORIDE mmol/L 107 107   CO2 mmol/L 29 25   BUN mg/dL 27* 33*   CREATININE mg/dL 1 01 1 14   ANION GAP mmol/L 6 10   CALCIUM mg/dL 9 2 9 1   ALBUMIN g/dL  --  3 2*   TOTAL BILIRUBIN mg/dL  --  0 45   ALK PHOS U/L  --  68   ALT U/L  --  15   AST U/L  --  11   GLUCOSE RANDOM mg/dL 90 91     Results from last 7 days   Lab Units 06/28/21  1241   INR  1 31*                       * I Have Reviewed All Lab Data Listed Above  * Additional Pertinent Lab Tests Reviewed: [address] Admission Reviewed    Imaging:    XR tibia fibula 2 views LEFT    Result Date: 6/28/2021  Impression: Healed left ankle fracture, status post ORIF  Lucency noted around the superior syndesmotic screw but otherwise hardware appears intact  Prior arthrodesis of the right 1st TMT joint  Moderate degenerative changes in the left ankle  Large plantar calcaneal spurs  No acute osseous abnormalities  Workstation performed: [id number]     XR tibia fibula 2 views RIGHT    Result Date: 6/28/2021  Impression: Healed left ankle fracture, status post ORIF  Lucency noted around the superior syndesmotic screw but otherwise hardware appears intact  Prior arthrodesis of the right 1st TMT joint  Moderate degenerative changes in the left ankle  Large plantar calcaneal spurs  No acute osseous abnormalities  Workstation performed: [id number]       Recent Cultures (last 7 days):     Results from last 7 days   Lab Units 06/28/21  1241   BLOOD CULTURE  No Growth at 48 hrs  No Growth at 48 hrs         Last 24 Hours Medication List:   Current Facility-Administered Medications   Medication Dose Route Frequency Provider Last Rate    acetaminophen  650 mg Oral Q6H PRN Bonifacio Rodriguez MD      amLODIPine  5 mg Oral Daily Bonifacio Rodriguez MD     [facility] apixaban  5 mg Oral BID Krish Gonzalez MD      baclofen  10 mg Oral TID PRN Michelle Lucia MD      busPIRone  5 mg Oral TID Krish Gonzalez MD      cefdinir  300 mg Oral Q12H Azael Castro MD      cloNIDine  0 1 mg Oral TID Krish Gonzalez MD      fentaNYL  1 patch Transdermal Q72H Krish Gonzalez MD      furosemide  80 mg Oral Q48H Michelle Lucia MD      ipratropium  0 5 mg Nebulization Q6H PRN Krish Gonzalez MD      levalbuterol  1 25 mg Nebulization Q8H PRN Krish Gonzalez MD          Today, Patient Was Seen By: Michelle Lucia MD    ** Please Note: Dictation voice to text software may have been used in the creation of this document   **

## 2021-07-01 NOTE — ASSESSMENT & PLAN NOTE
Presented from [address] [place] [address] for evaluation of bilateral leg wounds  Patient is mainly wheelchair bound  Has been treated with Bactrim 06/08/2021 but did not tolerated  She was then started on doxycycline 06/23/2021 without improvement      · Right and left tibia fibula x-ray negative for any acute osseous abnormalities, history of left ankle fracture status post ORIF  · Will transition patient to oral antibiotics Cefdinir  · Wound care following  · Will need outpatient wound care follow-up    Patient cleared for discharge, pending placement previous assisted living facility unable to take patient back

## 2021-07-01 NOTE — CASE MANAGEMENT
Patient is medically cleared to discharge back to [facility]  Called [facility] to arrange [facility] transport  [facility] to transport at 1430  Called [facility] and spoke to Danielle, patient coordinator  He agrees with d/c plan and provided number for report and fax  Sent TT to Dr Shyanne English notifying him of d/c time  He agrees  Sent TT to Demond Ovalle RN, of d/c plans and number for report  He agrees  Called patient's daughter, Apolonia Guerin, to update her on d/c time frame  Unable to reach her  Left detailed VM with information and instructed her to return call with any questions or concerns  Completed S medical necessity form  Faxed to [facility]  Placed copy in chart  Update: 7/1/21 @ 1500  Received phone call from patient's personal care home, [facility]  Nursing administrator, Ernestine Pierre ([phone]) stated that she reviewed the faxed chart notes and determined that the patient requires a level of care that they are unable to provide  Prior to admission patient was wheelchair bound but able to get in and out of her wheelchair with an assist of one  At this time patient is requiring at least an assist of 2  [facility] is recommending STR at SNF level, and if patient can rehabilitate, they will revisit accepting her back  Spoke to patient about [facility]'s stance and that they are not willing to accept back  Patient was understandably upset  Discussed Freedom of Choice and process to transfer to STR  Patient wants to defer any decisions to her daughter, Apolonia Guerin  [facility] and updated her on [facility]'s decision and required next steps  Apolonia Bibianaabiodun was disappointed with [facility] and stated that she will call the facility's  to discuss situation  Apolonia Bibianaabiodun will call  back after she speaks to [facility] to continue discharge planning  7/1/21 @ 1615  Received return call from patient's daughter, Apolonia Guerin, who reports that she was unable to resolve [facility]'s concerns and that they will not accept patient to return to their facility with her current level of care needs   Apolonia Guerin is displeased with [facility] stance and is concerned that patient will never be independent enough to be accepted back  Candelaria Nolasco is unsure which STR facilities she would like referrals placed  She is going to discuss the situation with her aunt and will call CM back with choices  CM Department will continue to follow

## 2021-07-02 NOTE — PLAN OF CARE
Problem: PHYSICAL THERAPY ADULT  Goal: Performs mobility at highest level of function for planned discharge setting  See evaluation for individualized goals  Outcome: Progressing  Note: Prognosis: Fair  Problem List: Decreased strength, Decreased endurance, Decreased range of motion, Impaired balance, Decreased mobility, Impaired judgement, Decreased safety awareness, Obesity, Pain  Assessment: Pt seen for PT treatment session this date with interventions consisting of Therapeutic exercise consisting of: AROM and AAROM 10-30 reps B LE in sitting position and therapeutic activity consisting of training: supine<>sit transfers, sit<>stand transfers, static sitting tolerance at EOB for 20 minutes w/ B UE support, static standing tolerance for 1-3 x 3 minutes w/ B UE support and SPT with quick move  Pt agreeable to PT treatment session upon arrival, pt found supine in bed w/ HOB elevated, in no apparent distress  In comparison to previous session, pt with improvements in activity tolerance  Post session: pt returned back to recliner, chair alarm engaged, all needs in reach and RN notified of session findings/recommendations  Continue to recommend post acute rehabilitation services at time of d/c in order to maximize pt's functional independence and safety w/ mobility  Pt continues to be functioning below baseline level, and remains limited 2* factors listed above and including decreased strength, endurance & safe functional mobility  PT will continue to see pt during current hospitalization in order to address the deficits listed above and provide interventions consistent w/ POC in effort to achieve STGs  Barriers to Discharge: Inaccessible home environment, Decreased caregiver support        PT Discharge Recommendation: Post acute rehabilitation services     PT - OK to Discharge: Yes (when med cleared to STR)    See flowsheet documentation for full assessment

## 2021-07-02 NOTE — PHYSICAL THERAPY NOTE
07/02/21 1112   PT Last Visit   PT Visit Date 07/02/21   Note Type   Note Type Treatment   Pain Assessment   Pain Assessment Tool Pain Assessment not indicated - pt denies pain   Restrictions/Precautions   Weight Bearing Precautions Per Order No   Other Precautions Cognitive; Chair Alarm; Bed Alarm; Fall Risk;O2;Multiple lines;Pain   General   Chart Reviewed Yes   Family/Caregiver Present No   Cognition   Overall Cognitive Status Impaired   Arousal/Participation Responsive; Cooperative   Attention Attends with cues to redirect   Orientation Level Oriented X4   Memory Decreased short term memory;Decreased recall of recent events;Decreased recall of precautions   Following Commands Follows one step commands with increased time or repetition   Comments pt agreed to PT session, increased processing time, decreased safety awareness   Subjective   Subjective "They move my feet for me to transfer me to the chair at home "   Bed Mobility   Supine to Sit 2  Maximal assistance   Additional items Assist x 2;HOB elevated; Bedrails; Increased time required;Verbal cues;LE management   Additional Comments pt sat EOB ~20 min for LE ex as noted, resting between transfer attempts  BP seated /69, SPO2 97%   Transfers   Sit to Stand 2  Maximal assistance  (stood x 3 with RW & standing frame walker 1-2' each attempt)   Additional items Assist x 2; Increased time required;Verbal cues; Bedrails   Stand to Sit 2  Maximal assistance   Additional items Assist x 2; Increased time required;Verbal cues   Other 2  Maximal assistance   Additional items Assist x 2; Increased time required;Verbal cues  (quick move)   Additional Comments stood with quick move 2-3 min    Ambulation/Elevation   Gait pattern Improper Weight shift;Narrow NISREEN; Forward Flexion; Poor UE support;Decreased foot clearance   Gait Assistance 2  Maximal assist   Additional items Assist x 2;Verbal cues; Tactile cues   Assistive Device Rolling walker  (standing frame walker) Distance 1 step fwb/bwd   Stair Management Assistance Not tested   Balance   Static Sitting Fair +   Dynamic Sitting Fair   Static Standing Poor +   Dynamic Standing Poor   Ambulatory Poor -  (1 small step fwd/bwd-very poor UE support on walker)   Endurance Deficit   Endurance Deficit Yes   Activity Tolerance   Activity Tolerance Patient limited by fatigue;Treatment limited secondary to medical complications (Comment)   Medical Staff [facility]   Nurse Made Aware yes   Exercises   Hip Flexion Sitting;10 reps; Left;Right;AROM;AAROM   Knee AROM [employer]; Bilateral  (3 x 10)   Ankle Pumps Sitting;20 reps;Bilateral   Balance training  sitting EOB ~20 min, standing with walker 1-2 min x 3 reps, with quick move 2-3 min max x 2 assist   Assessment   Prognosis Fair   Problem List Decreased strength;Decreased endurance;Decreased range of motion; Impaired balance;Decreased mobility; Impaired judgement;Decreased safety awareness; Obesity;Pain   Assessment Pt seen for PT treatment session this date with interventions consisting of Therapeutic exercise consisting of: AROM and AAROM 10-30 reps B LE in sitting position and therapeutic activity consisting of training: supine<>sit transfers, sit<>stand transfers, static sitting tolerance at EOB for 20 minutes w/ B UE support, static standing tolerance for 1-3 x 3 minutes w/ B UE support and SPT with quick move  Pt agreeable to PT treatment session upon arrival, pt found supine in bed w/ HOB elevated, in no apparent distress  In comparison to previous session, pt with improvements in activity tolerance  Post session: pt returned back to recliner, chair alarm engaged, all needs in reach and RN notified of session findings/recommendations  Continue to recommend post acute rehabilitation services at time of d/c in order to maximize pt's functional independence and safety w/ mobility   Pt continues to be functioning below baseline level, and remains limited 2* factors listed above and including decreased strength, endurance & safe functional mobility  PT will continue to see pt during current hospitalization in order to address the deficits listed above and provide interventions consistent w/ POC in effort to achieve STGs  Barriers to Discharge Inaccessible home environment;Decreased caregiver support   Goals   Patient Goals to get home again   PT Treatment Day 1   Plan   Treatment/Interventions Functional transfer training;LE strengthening/ROM; Therapeutic exercise; Endurance training;Patient/family training;Equipment eval/education; Bed mobility;Gait training;Spoke to nursing;OT   Progress Slow progress, decreased activity tolerance   PT Frequency   (3-5 x week)   Recommendation   PT Discharge Recommendation Post acute rehabilitation services   PT - OK to Discharge Yes  (when med cleared to STR)   AM-PAC Basic Mobility Inpatient   Turning in Bed Without Bedrails 2   Lying on Back to Sitting on Edge of Flat Bed 1   Moving Bed to Chair 1   Standing Up From Chair 1   Walk in Room 1   Climb 3-5 Stairs 1   Basic Mobility Inpatient Raw Score 7   The patient's AM-PAC Basic Mobility Inpatient Short Form Low Function Raw Score 7 , Standardized Score is 23 9  A standardized score less 42 9 suggests the patient may benefit from discharge to post-acute rehab services   Please also refer to the recommendation of the Physical Therapist for safe discharge planning as discussed with Physical Therapist   Laverda Landau, PTA

## 2021-07-02 NOTE — ASSESSMENT & PLAN NOTE
Presented from [address] [place] [address] for evaluation of bilateral leg wounds  Patient is mainly wheelchair bound  Has been treated with Bactrim 06/08/2021 but did not tolerated  She was then started on doxycycline 06/23/2021 without improvement      · Right and left tibia fibula x-ray negative for any acute osseous abnormalities, history of left ankle fracture status post ORIF  · Will transition patient to oral antibiotics Cefdinir  · Wound care following  · Will need outpatient wound care follow-up    Patient cleared for discharge, pending placement previous assisted living facility unable to take patient back

## 2021-07-02 NOTE — PLAN OF CARE
Problem: OCCUPATIONAL THERAPY ADULT  Goal: Performs self-care activities at highest level of function for planned discharge setting  See evaluation for individualized goals  Description: Treatment Interventions: ADL retraining, UE strengthening/ROM, Functional transfer training, Endurance training, Patient/family training, Cognitive reorientation, Equipment evaluation/education, Compensatory technique education, Energy conservation, Activityengagement          See flowsheet documentation for full assessment, interventions and recommendations  Outcome: Progressing  Note: Limitation: Decreased ADL status, Decreased Safe judgement during ADL, Decreased UE strength, Decreased cognition, Decreased endurance, Decreased self-care trans, Decreased high-level ADLs  Prognosis: Good, Fair  Assessment: Pt was seen for skilled OT with focus on completion of self care tasks, bed mobility, sit to stand transfers, review of fall prevention, RW safety and review of current plan of care  Pt able to tolerate UE self care while long sitting in bed  Pt reports having increased A with bathing and dressing at her ALD  Max A for LE self care with limited ROM in BLE, functional reach  Trialed use of standing frame, bariatric walker with breaks following review of walker style used at Pt's facility  Pt with limited strength in BUE  Pt unable to advance BLE with use of RW with Max A x2 provided  Quick move device used with Max A required for sit to stand  Pt able to tolerate OOB positioning with quick move device  Pt reports having A with transfers and self care tasks daily stating, "They move my legs for me"  Educated Pt on need for safety with functional tasks instance  Pt currently requiring the assistance of 2 staff members to achieve sit to stand transfer and will require quick move device to achieve OOB positioning due to limited advancement of BLE    The patient's raw score on the AM-PAC Daily Activity inpatient short form is 12, standardized score is 30 6, less than 39 4  Patients at this level are likely to benefit from discharge to post-acute rehabilitation services        OT Discharge Recommendation: Post acute rehabilitation services (spoke with OTR Rohini regarding Pt's need for STR)  OT - OK to Discharge:  (when medically cleared to rehab  )

## 2021-07-02 NOTE — CASE MANAGEMENT
Patient's dgt Creta Patrick advises she does not know where to send her mom for rehab  CM emailed list of facility choices to [email or web address]  ALMA requested a call back with choices

## 2021-07-02 NOTE — PROGRESS NOTES
[facility]  Progress Note - Tony Speak 1933, 80 y o  female MRN: [id number]  Unit/Bed#: E5 -01 Encounter: [id number]  Primary Care Provider: No primary care provider on file  Date and time admitted to hospital: 6/28/2021 11:37 AM    Chronic diastolic heart failure (HCC)  Assessment & Plan  Wt Readings from Last 3 Encounters:   06/28/21 115 kg (253 lb 15 5 oz)   10/09/20 114 kg (251 lb 12 3 oz)   07/01/19 103 kg (226 lb 6 6 oz)     · Resume lasix      Paroxysmal atrial fibrillation (HCC)  Assessment & Plan  · Continue Eliquis for anticoagulation    ANSELMO (acute kidney injury) ([facility] [address] )  Assessment & Plan  · Now resolved    Morbid (severe) obesity due to excess calories ([facility] [address] )  Assessment & Plan  · Can benefit from diet and lifestyle modifications    Hypertension  Assessment & Plan  · Continue Norvasc and clonidine    * Cellulitis of lower extremity  Assessment & Plan  Presented from [address] [place] [address] for evaluation of bilateral leg wounds  Patient is mainly wheelchair bound  Has been treated with Bactrim 06/08/2021 but did not tolerated  She was then started on doxycycline 06/23/2021 without improvement  · Right and left tibia fibula x-ray negative for any acute osseous abnormalities, history of left ankle fracture status post ORIF  · Will transition patient to oral antibiotics Cefdinir  · Wound care following  · Will need outpatient wound care follow-up    Patient cleared for discharge, pending placement previous assisted living facility unable to take patient back        VTE Pharmacologic Prophylaxis:   Pharmacologic: Apixaban (Eliquis)  Mechanical VTE Prophylaxis in Place: Yes    Patient Centered Rounds: I have performed bedside rounds with nursing staff today  Discussions with Specialists or Other Care Team Provider:  None    Education and Discussions with Family / Patient:  Patient    Time Spent for Care: 30 minutes    More than 50% of total time spent on counseling and coordination of care as described above  Current Length of Stay: 4 day(s)    Current Patient Status: Inpatient   Certification Statement: The patient will continue to require additional inpatient hospital stay due to Pending placement    Discharge Plan:  Pending placement    Code Status: Level 1 - Full Code      Subjective:   No events overnight, no complaints  Patient was working with physical therapy and had difficult time getting up on her own  She continues require a significant amount of support  Objective:     Vitals:   Temp (24hrs), Av 4 °F (36 9 °C), Min:98 3 °F (36 8 °C), Max:98 5 °F (36 9 °C)    Temp:  [98 3 °F (36 8 °C)-98 5 °F (36 9 °C)] 98 5 °F (36 9 °C)  HR:  [60-79] 60  Resp:  [16-18] 18  BP: (125-144)/(61-79) 125/61  SpO2:  [94 %-95 %] 94 %  Body mass index is 44 99 kg/m²  Input and Output Summary (last 24 hours): Intake/Output Summary (Last 24 hours) at 2021 1603  Last data filed at 2021 1300  Gross per 24 hour   Intake 360 ml   Output --   Net 360 ml       Physical Exam:     Physical Exam  Vitals and nursing note reviewed  Constitutional:       Appearance: Normal appearance  She is normal weight  HENT:      Head: Normocephalic and atraumatic  Eyes:      General: No scleral icterus  Conjunctiva/sclera: Conjunctivae normal    Cardiovascular:      Rate and Rhythm: Normal rate and regular rhythm  Heart sounds: Normal heart sounds  Pulmonary:      Breath sounds: Normal breath sounds  No wheezing or rhonchi  Abdominal:      General: Bowel sounds are normal  There is no distension  Palpations: Abdomen is soft  Tenderness: There is no abdominal tenderness  Musculoskeletal:         General: No swelling  Normal range of motion  Right lower leg: No edema  Left lower leg: No edema  Skin:     General: Skin is warm and dry  Findings: No erythema        Comments: Lower extremity wounds   Neurological: General: No focal deficit present  Mental Status: She is alert  Mental status is at baseline  Additional Data:     Labs:    Results from last 7 days   Lab Units 06/30/21  0445   WBC Thousand/uL 7 27   HEMOGLOBIN g/dL 11 9   HEMATOCRIT % 36 6   PLATELETS Thousands/uL 259   NEUTROS PCT % 63   LYMPHS PCT % 20   MONOS PCT % 11   EOS PCT % 4     Results from last 7 days   Lab Units 06/30/21  0445 06/29/21  0511   SODIUM mmol/L 142 142   POTASSIUM mmol/L 4 4 4 2   CHLORIDE mmol/L 107 107   CO2 mmol/L 29 25   BUN mg/dL 27* 33*   CREATININE mg/dL 1 01 1 14   ANION GAP mmol/L 6 10   CALCIUM mg/dL 9 2 9 1   ALBUMIN g/dL  --  3 2*   TOTAL BILIRUBIN mg/dL  --  0 45   ALK PHOS U/L  --  68   ALT U/L  --  15   AST U/L  --  11   GLUCOSE RANDOM mg/dL 90 91     Results from last 7 days   Lab Units 06/28/21  1241   INR  1 31*                       * I Have Reviewed All Lab Data Listed Above  * Additional Pertinent Lab Tests Reviewed: [facility] Admission Reviewed    Imaging:    XR tibia fibula 2 views LEFT    Result Date: 6/28/2021  Impression: Healed left ankle fracture, status post ORIF  Lucency noted around the superior syndesmotic screw but otherwise hardware appears intact  Prior arthrodesis of the right 1st TMT joint  Moderate degenerative changes in the left ankle  Large plantar calcaneal spurs  No acute osseous abnormalities  Workstation performed: [id number]     XR tibia fibula 2 views RIGHT    Result Date: 6/28/2021  Impression: Healed left ankle fracture, status post ORIF  Lucency noted around the superior syndesmotic screw but otherwise hardware appears intact  Prior arthrodesis of the right 1st TMT joint  Moderate degenerative changes in the left ankle  Large plantar calcaneal spurs  No acute osseous abnormalities  Workstation performed: [id number]       Recent Cultures (last 7 days):     Results from last 7 days   Lab Units 06/28/21  1241   BLOOD CULTURE  No Growth at 72 hrs    No Growth at 72 hrs  Last 24 Hours Medication List:   Current Facility-Administered Medications   Medication Dose Route Frequency Provider Last Rate    acetaminophen  650 mg Oral Q6H PRN Brittney Acosta MD      amLODIPine  5 mg Oral Daily Brittney Acosta MD      apixaban  5 mg Oral BID Brittney Acosta MD      baclofen  10 mg Oral TID PRN Miguel Bravo MD      busPIRone  5 mg Oral TID Brittney Acosta MD      cefdinir  300 mg Oral Q12H Nakul Salinas MD      cloNIDine  0 1 mg Oral TID Brittney Acosta MD      fentaNYL  1 patch Transdermal Q72H Brittney Acosta MD      furosemide  80 mg Oral Q48H Miguel Bravo MD      ipratropium  0 5 mg Nebulization Q6H PRN Brittney Acosta MD      levalbuterol  1 25 mg Nebulization Q8H PRN Brittney Acosta MD          Today, Patient Was Seen By: Miguel Bravo MD    ** Please Note: Dictation voice to text software may have been used in the creation of this document   **

## 2021-07-02 NOTE — OCCUPATIONAL THERAPY NOTE
Occupational Therapy Treatment Note:         07/02/21 1025   OT Last Visit   OT Visit Date 07/02/21   Restrictions/Precautions   Weight Bearing Precautions Per Order No   Other Precautions Cognitive; Bed Alarm; Chair Alarm; Fall Risk;Multiple lines;Pain   Pain Assessment   Pain Assessment Tool 0-10   Pain Score No Pain   Pain Location/Orientation Location: Buttocks   ADL   Where Assessed Edge of bed   Grooming Assistance 5  Supervision/Setup   Grooming Deficit Setup   LB Bathing Assistance 2  Maximal Assistance   LB Bathing Deficit Setup;Steadying;Verbal cueing;Supervision/safety; Increased time to complete; Buttocks; Perineal area   LB Bathing Comments increased A for david care  Pt with limited stand tolerance warranting need for activity while supine  UB Dressing Assistance 5  Supervision/Setup   UB Dressing Deficit Setup   LB Dressing Assistance 2  Maximal Assistance   LB Dressing Deficit Setup;Steadying; Requires assistive device for steadying;Verbal cueing; Increased time to complete;Supervision/safety; Don/doff R sock; Don/doff L sock   LB Dressing Comments increased A required to don shoes  Pt reports having A at [facility] with all bathing/dressing  Bed Mobility   Supine to Sit 2  Maximal assistance   Additional items Assist x 2; Increased time required;Verbal cues;LE management; Bedrails   Transfers   Sit to Stand 2  Maximal assistance   Additional items Assist x 2;Armrests; Bedrails; Increased time required;Verbal cues   Stand to Sit 2  Maximal assistance   Additional items Assist x 2;Bedrails;Armrests; Increased time required;Verbal cues   Stand pivot Unable to assess   Additional items Verbal cues; Assist x 2; Increased time required  (quick move device used to achieve OOB positioning in bedside)   Additional Comments increased A for sit to stand with need for hand over hand A to achieve appropriate hand placement  Cognition   Overall Cognitive Status Impaired   Arousal/Participation Responsive; Cooperative   Attention Attends with cues to redirect   Orientation Level Oriented X4   Memory Decreased short term memory;Decreased recall of recent events;Decreased recall of precautions   Following Commands Follows one step commands without difficulty   Comments cues required for appropriate techs with functional transfers  Additional Activities   Additional Activities Other (Comment)  (reviewed current plan of care  )   Additional Activities Comments Pt reports having better understanding  Activity Tolerance   Activity Tolerance Patient limited by fatigue   Medical Staff Made Aware reported all findings to nursing staff  Assessment   Assessment Pt was seen for skilled OT with focus on completion of self care tasks, bed mobility, sit to stand transfers, review of fall prevention, RW safety and review of current plan of care  Pt able to tolerate UE self care while long sitting in bed  Pt reports having increased A with bathing and dressing at her ALD  Max A for LE self care with limited ROM in BLE, functional reach  Trialed use of standing frame, bariatric walker with breaks following review of walker style used at Pt's facility  Pt with limited strength in BUE  Pt unable to advance BLE with use of RW with Max A x2 provided  Quick move device used with Max A required for sit to stand  Pt able to tolerate OOB positioning with quick move device  Pt reports having A with transfers and self care tasks daily stating, "They move my legs for me"  Educated Pt on need for safety with functional tasks instance  Pt currently requiring the assistance of 2 staff members to achieve sit to stand transfer and will require quick move device to achieve OOB positioning due to limited advancement of BLE   The patient's raw score on the AM-PAC Daily Activity inpatient short form is 12, standardized score is 30 6, less than 39 4  Patients at this level are likely to benefit from discharge to post-acute rehabilitation services      Plan   Treatment Interventions ADL retraining;Functional transfer training;UE strengthening/ROM; Endurance training;Cognitive reorientation   Goal Expiration Date 07/14/21   OT Treatment Day 1   OT Frequency 2-3x/wk   Recommendation   OT Discharge Recommendation Post acute rehabilitation services  (spoke with Urszula Rodrigez regarding Pt's need for STR)   OT - OK to Discharge   (when medically cleared to rehab  )   AM-PAC Daily Activity Inpatient   Lower Body Dressing 1   Bathing 2   Toileting 1   Upper Body Dressing 2   Grooming 3   Eating 3   Daily Activity Raw Score 12   Daily Activity Standardized Score (Calc for Raw Score >=11) 30 6   AM-PAC Applied Cognition Inpatient   Following a Speech/Presentation 4   Understanding Ordinary Conversation 4   Taking Medications 2   Remembering Where Things Are Placed or Put Away 3   Remembering List of 4-5 Errands 2   Taking Care of Complicated Tasks 2   Applied Cognition Raw Score 17   Applied Cognition Standardized Score 36 52   Norm Arben [address]

## 2021-07-03 NOTE — ASSESSMENT & PLAN NOTE
Presented from [address] [place] [address] for evaluation of bilateral leg wounds  Patient is mainly wheelchair bound  Has been treated with Bactrim 06/08/2021 but did not tolerated  She was then started on doxycycline 06/23/2021 without improvement      · Right and left tibia fibula x-ray negative for any acute osseous abnormalities, history of left ankle fracture status post ORIF  · Will transition patient to oral antibiotics Cefdinir  · Wound care following  · Will need outpatient wound care follow-up    Patient cleared for discharge, pending placement previous assisted living facility unable to take patient back

## 2021-07-03 NOTE — PROGRESS NOTES
[address] [place] [address]  Progress Note - Rushie Fothergill 1933, 80 y o  female MRN: [id number]  Unit/Bed#: E5 -01 Encounter: [id number]  Primary Care Provider: No primary care provider on file  Date and time admitted to hospital: 6/28/2021 11:37 AM    Chronic diastolic heart failure (HCC)  Assessment & Plan  Wt Readings from Last 3 Encounters:   06/28/21 115 kg (253 lb 15 5 oz)   10/09/20 114 kg (251 lb 12 3 oz)   07/01/19 103 kg (226 lb 6 6 oz)     · Resume home dose of lasix      Paroxysmal atrial fibrillation (HCC)  Assessment & Plan  · Continue Eliquis for anticoagulation    ANSELMO (acute kidney injury) ([facility] [address] )  Assessment & Plan  · Now resolved    Morbid (severe) obesity due to excess calories ([facility] [address] )  Assessment & Plan  · Can benefit from diet and lifestyle modifications    Hypertension  Assessment & Plan  · Continue Norvasc and clonidine    * Cellulitis of lower extremity  Assessment & Plan  Presented from [address] [place] [address] for evaluation of bilateral leg wounds  Patient is mainly wheelchair bound  Has been treated with Bactrim 06/08/2021 but did not tolerated  She was then started on doxycycline 06/23/2021 without improvement  · Right and left tibia fibula x-ray negative for any acute osseous abnormalities, history of left ankle fracture status post ORIF  · Will transition patient to oral antibiotics Cefdinir  · Wound care following  · Will need outpatient wound care follow-up    Patient cleared for discharge, pending placement previous assisted living facility unable to take patient back        VTE Pharmacologic Prophylaxis:   Pharmacologic: Apixaban (Eliquis)  Mechanical VTE Prophylaxis in Place: Yes    Patient Centered Rounds: I have performed bedside rounds with nursing staff today  Discussions with Specialists or Other Care Team Provider: None    Education and Discussions with Family / Patient: Patient, daughter at bedside    Time Spent for Care: 30 minutes  More than 50% of total time spent on counseling and coordination of care as described above  Current Length of Stay: 5 day(s)    Current Patient Status: Inpatient   Certification Statement: The patient will continue to require additional inpatient hospital stay due to Pending rehab placement    Discharge Plan:  Monday/Tuesday    Code Status: Level 1 - Full Code      Subjective:   No events overnight  No complaints today  Objective:     Vitals:   Temp (24hrs), Av °F (36 7 °C), Min:97 6 °F (36 4 °C), Max:98 3 °F (36 8 °C)    Temp:  [97 6 °F (36 4 °C)-98 3 °F (36 8 °C)] 98 3 °F (36 8 °C)  HR:  [56-70] 56  Resp:  [18] 18  BP: (121-134)/(59-98) 121/61  SpO2:  [93 %-95 %] 94 %  Body mass index is 44 99 kg/m²  Input and Output Summary (last 24 hours):     No intake or output data in the 24 hours ending 21 1721    Physical Exam:     Physical Exam  Vitals and nursing note reviewed  Constitutional:       Appearance: Normal appearance  She is normal weight  HENT:      Head: Normocephalic and atraumatic  Eyes:      General: No scleral icterus  Conjunctiva/sclera: Conjunctivae normal    Cardiovascular:      Rate and Rhythm: Normal rate and regular rhythm  Heart sounds: Normal heart sounds  Pulmonary:      Breath sounds: Normal breath sounds  No wheezing or rhonchi  Abdominal:      General: Bowel sounds are normal  There is no distension  Palpations: Abdomen is soft  Tenderness: There is no abdominal tenderness  Musculoskeletal:         General: No swelling  Normal range of motion  Right lower leg: No edema  Left lower leg: No edema  Skin:     General: Skin is warm and dry  Findings: No erythema  Comments: Lower extremity wounds   Neurological:      General: No focal deficit present  Mental Status: She is alert  Mental status is at baseline             Additional Data:     Labs:    Results from last 7 days   Lab Units 21  0445   WBC Thousand/uL 7  27   HEMOGLOBIN g/dL 11 9   HEMATOCRIT % 36 6   PLATELETS Thousands/uL 259   NEUTROS PCT % 63   LYMPHS PCT % 20   MONOS PCT % 11   EOS PCT % 4     Results from last 7 days   Lab Units 06/30/21  0445 06/29/21  0511   SODIUM mmol/L 142 142   POTASSIUM mmol/L 4 4 4 2   CHLORIDE mmol/L 107 107   CO2 mmol/L 29 25   BUN mg/dL 27* 33*   CREATININE mg/dL 1 01 1 14   ANION GAP mmol/L 6 10   CALCIUM mg/dL 9 2 9 1   ALBUMIN g/dL  --  3 2*   TOTAL BILIRUBIN mg/dL  --  0 45   ALK PHOS U/L  --  68   ALT U/L  --  15   AST U/L  --  11   GLUCOSE RANDOM mg/dL 90 91     Results from last 7 days   Lab Units 06/28/21  1241   INR  1 31*                       * I Have Reviewed All Lab Data Listed Above  * Additional Pertinent Lab Tests Reviewed: [address] [address] Admission Reviewed    Imaging:    XR tibia fibula 2 views LEFT    Result Date: 6/28/2021  Impression: Healed left ankle fracture, status post ORIF  Lucency noted around the superior syndesmotic screw but otherwise hardware appears intact  Prior arthrodesis of the right 1st TMT joint  Moderate degenerative changes in the left ankle  Large plantar calcaneal spurs  No acute osseous abnormalities  Workstation performed: [id number]     XR tibia fibula 2 views RIGHT    Result Date: 6/28/2021  Impression: Healed left ankle fracture, status post ORIF  Lucency noted around the superior syndesmotic screw but otherwise hardware appears intact  Prior arthrodesis of the right 1st TMT joint  Moderate degenerative changes in the left ankle  Large plantar calcaneal spurs  No acute osseous abnormalities  Workstation performed: [id number]       Recent Cultures (last 7 days):     Results from last 7 days   Lab Units 06/28/21  1241   BLOOD CULTURE  No Growth After 4 Days  No Growth After 4 Days         Last 24 Hours Medication List:   Current Facility-Administered Medications   Medication Dose Route Frequency Provider Last Rate    acetaminophen  650 mg Oral Q6H PRN Presley Griggs MD      amLODIPine  5 mg Oral Daily Raine Carr MD      apixaban  5 mg Oral BID Raine Carr MD      baclofen  10 mg Oral TID PRN Nava Medeiros MD      busPIRone  5 mg Oral TID Raine Carr MD      cefdinir  300 mg Oral Q12H Niki Epps MD      cloNIDine  0 1 mg Oral TID Raine Carr MD      fentaNYL  1 patch Transdermal Q72H Raine Carr MD      furosemide  80 mg Oral Q48H Nava Medeiros MD      ipratropium  0 5 mg Nebulization Q6H PRN Raine Carr MD      levalbuterol  1 25 mg Nebulization Q8H PRN Raine Carr MD          Today, Patient Was Seen By: Nava Medeiros MD    ** Please Note: Dictation voice to text software may have been used in the creation of this document   **

## 2021-07-03 NOTE — ASSESSMENT & PLAN NOTE
Wt Readings from Last 3 Encounters:   06/28/21 115 kg (253 lb 15 5 oz)   10/09/20 114 kg (251 lb 12 3 oz)   07/01/19 103 kg (226 lb 6 6 oz)     · Resume home dose of lasix

## 2021-07-03 NOTE — CASE MANAGEMENT
Patient's dgt requesting [facility], [id number] [facility] for rehab  CM placed referrals in [facility]  Patient pending facility acceptance and insurance authorization

## 2021-07-04 NOTE — PROGRESS NOTES
[address] [facility]  Progress Note - Frankie Alexander 1933, 80 y o  female MRN: [id number]  Unit/Bed#: E5 -01 Encounter: [id number]  Primary Care Provider: No primary care provider on file  Date and time admitted to hospital: 6/28/2021 11:37 AM    Chronic diastolic heart failure (HCC)  Assessment & Plan  Wt Readings from Last 3 Encounters:   06/28/21 115 kg (253 lb 15 5 oz)   10/09/20 114 kg (251 lb 12 3 oz)   07/01/19 103 kg (226 lb 6 6 oz)     · Resume home dose of lasix 80 mg every other day      Paroxysmal atrial fibrillation (HCC)  Assessment & Plan  · Continue Eliquis for anticoagulation    ANSELMO (acute kidney injury) ([facility] [address] )  Assessment & Plan  · Now resolved    Morbid (severe) obesity due to excess calories ([facility] [address] )  Assessment & Plan  · Can benefit from diet and lifestyle modifications    Hypertension  Assessment & Plan  · Continue Norvasc and clonidine    * Cellulitis of lower extremity  Assessment & Plan  Presented from [address] [place] [address] for evaluation of bilateral leg wounds  Patient is mainly wheelchair bound  Has been treated with Bactrim 06/08/2021 but did not tolerated  She was then started on doxycycline 06/23/2021 without improvement  · Right and left tibia fibula x-ray negative for any acute osseous abnormalities, history of left ankle fracture status post ORIF  · Will transition patient to oral antibiotics Cefdinir  · Wound care following  · Will need outpatient wound care follow-up    Patient cleared for discharge, pending placement previous assisted living facility unable to take patient back        VTE Pharmacologic Prophylaxis:   Pharmacologic: Apixaban (Eliquis)  Mechanical VTE Prophylaxis in Place: Yes    Patient Centered Rounds: I have performed bedside rounds with nursing staff today      Discussions with Specialists or Other Care Team Provider: None    Education and Discussions with Family / Patient: Patient, daughter    Time Spent for Care: 27 minutes  More than 50% of total time spent on counseling and coordination of care as described above  Current Length of Stay: 6 day(s)    Current Patient Status: Inpatient   Certification Statement: The patient will continue to require additional inpatient hospital stay due to Pending rehab placement    Discharge Plan: Pending rehab placement    Code Status: Level 1 - Full Code      Subjective:   No events overnight  No complaints at this time  Objective:     Vitals:   Temp (24hrs), Av 1 °F (36 7 °C), Min:97 8 °F (36 6 °C), Max:98 3 °F (36 8 °C)    Temp:  [97 8 °F (36 6 °C)-98 3 °F (36 8 °C)] 97 8 °F (36 6 °C)  HR:  [56-61] 58  Resp:  [18] 18  BP: (121-141)/(61-82) 141/67  SpO2:  [92 %-94 %] 93 %  Body mass index is 44 99 kg/m²  Input and Output Summary (last 24 hours): Intake/Output Summary (Last 24 hours) at 2021 1311  Last data filed at 7/3/2021 2031  Gross per 24 hour   Intake --   Output 150 ml   Net -150 ml       Physical Exam:     Physical Exam  Vitals and nursing note reviewed  Constitutional:       Appearance: Normal appearance  She is normal weight  HENT:      Head: Normocephalic and atraumatic  Eyes:      General: No scleral icterus  Conjunctiva/sclera: Conjunctivae normal    Cardiovascular:      Rate and Rhythm: Normal rate and regular rhythm  Heart sounds: Normal heart sounds  Pulmonary:      Breath sounds: Normal breath sounds  No wheezing or rhonchi  Abdominal:      General: Bowel sounds are normal  There is no distension  Palpations: Abdomen is soft  Tenderness: There is no abdominal tenderness  Musculoskeletal:         General: No swelling  Normal range of motion  Right lower leg: No edema  Left lower leg: No edema  Skin:     General: Skin is warm and dry  Findings: No erythema  Comments: Lower extremity wounds   Neurological:      General: No focal deficit present  Mental Status: She is alert   Mental status is at baseline  Additional Data:     Labs:    Results from last 7 days   Lab Units 06/30/21  0445   WBC Thousand/uL 7 27   HEMOGLOBIN g/dL 11 9   HEMATOCRIT % 36 6   PLATELETS Thousands/uL 259   NEUTROS PCT % 63   LYMPHS PCT % 20   MONOS PCT % 11   EOS PCT % 4     Results from last 7 days   Lab Units 06/30/21  0445 06/29/21  0511   SODIUM mmol/L 142 142   POTASSIUM mmol/L 4 4 4 2   CHLORIDE mmol/L 107 107   CO2 mmol/L 29 25   BUN mg/dL 27* 33*   CREATININE mg/dL 1 01 1 14   ANION GAP mmol/L 6 10   CALCIUM mg/dL 9 2 9 1   ALBUMIN g/dL  --  3 2*   TOTAL BILIRUBIN mg/dL  --  0 45   ALK PHOS U/L  --  68   ALT U/L  --  15   AST U/L  --  11   GLUCOSE RANDOM mg/dL 90 91     Results from last 7 days   Lab Units 06/28/21  1241   INR  1 31*                        * I Have Reviewed All Lab Data Listed Above  * Additional Pertinent [facility] Tests [facility]    Imaging:    None    Recent Cultures (last 7 days):     Results from last 7 days   Lab Units 06/28/21  1241   BLOOD CULTURE  No Growth After 5 Days  No Growth After 5 Days         Last 24 Hours Medication List:   Current Facility-Administered Medications   Medication Dose Route Frequency Provider Last Rate    acetaminophen  650 mg Oral Q6H PRN Bonifacio Rodriguez MD      amLODIPine  5 mg Oral Daily Bonifacio Rodriguez MD      apixaban  5 mg Oral BID Bonifacio Rodriguez MD      baclofen  10 mg Oral TID PRN Sheridan Osorio MD      busPIRone  5 mg Oral TID Bonifacio Rodriguez MD      cefdinir  300 mg Oral Q12H Tanya Hester MD      cloNIDine  0 1 mg Oral TID Bonifacio Rodriguez MD      fentaNYL  1 patch Transdermal Q72H Bonifacio Rodriguez MD      furosemide  80 mg Oral Q48H Sheriadn Osorio MD      ipratropium  0 5 mg Nebulization Q6H PRN Bonifacio Rodriguez MD      levalbuterol  1 25 mg Nebulization Q8H PRN Bonifacio Rodriguez MD      saccharomyces boulardii  250 mg Oral BID Sheridan Osorio MD          Today, Patient Was Seen By: Sheridan Osorio MD    ** Please Note: Dictation voice to text software may have been used in the creation of this document   **

## 2021-07-04 NOTE — ASSESSMENT & PLAN NOTE
Wt Readings from Last 3 Encounters:   06/28/21 115 kg (253 lb 15 5 oz)   10/09/20 114 kg (251 lb 12 3 oz)   07/01/19 103 kg (226 lb 6 6 oz)     · Resume home dose of lasix 80 mg every other day

## 2021-07-04 NOTE — ASSESSMENT & PLAN NOTE
Presented from [address] [place] [address] for evaluation of bilateral leg wounds  Patient is mainly wheelchair bound  Has been treated with Bactrim 06/08/2021 but did not tolerated  She was then started on doxycycline 06/23/2021 without improvement      · Right and left tibia fibula x-ray negative for any acute osseous abnormalities, history of left ankle fracture status post ORIF  · Will transition patient to oral antibiotics Cefdinir  · Wound care following  · Will need outpatient wound care follow-up    Patient cleared for discharge, pending placement previous assisted living facility unable to take patient back

## 2021-07-05 NOTE — PROGRESS NOTES
[facility]  Progress Note - Emeka Vigil 1933, 80 y o  female MRN: [id number]  Unit/Bed#: E5 -01 Encounter: [id number]  Primary Care Provider: No primary care provider on file  Date and time admitted to hospital: 6/28/2021 11:37 AM    Chronic diastolic heart failure (HCC)  Assessment & Plan  Wt Readings from Last 3 Encounters:   06/28/21 115 kg (253 lb 15 5 oz)   10/09/20 114 kg (251 lb 12 3 oz)   07/01/19 103 kg (226 lb 6 6 oz)     · Resume home dose of lasix 80 mg every other day      Paroxysmal atrial fibrillation (HCC)  Assessment & Plan  · Continue Eliquis for anticoagulation    ANESLMO (acute kidney injury) ([facility] [address] )  Assessment & Plan  · Now resolved    Morbid (severe) obesity due to excess calories ([facility] [address] )  Assessment & Plan  · Can benefit from diet and lifestyle modifications    Hypertension  Assessment & Plan  · Continue Norvasc and clonidine    * Cellulitis of lower extremity  Assessment & Plan  Presented from [address] [place] [address] for evaluation of bilateral leg wounds  Patient is mainly wheelchair bound  Has been treated with Bactrim 06/08/2021 but did not tolerated  She was then started on doxycycline 06/23/2021 without improvement  · Right and left tibia fibula x-ray negative for any acute osseous abnormalities, history of left ankle fracture status post ORIF  · Continue with oral antibiotics Cefdinir  · Wound care following  · Will need outpatient wound care follow-up    Patient cleared for discharge, pending placement previous assisted living facility unable to take patient back        VTE Pharmacologic Prophylaxis: VTE Score: 7 High Risk (Score >/= 5) - Pharmacological DVT Prophylaxis Ordered: apixaban (Eliquis)  Sequential Compression Devices Ordered  Patient Centered Rounds: I performed bedside rounds with nursing staff today    Discussions with Specialists or Other Care Team Provider: case management    Education and Discussions with Family / Patient: Updated  (daughter) via phone  Left a message    Time Spent for Care: 30 minutes  More than 50% of total time spent on counseling and coordination of care as described above  Current Length of Stay: 7 day(s)  Current Patient Status: Inpatient   Certification Statement: The patient will continue to require additional inpatient hospital stay due to placement  Discharge Plan: Anticipate discharge in 24-48 hrs to rehab facility  Code Status: Level 1 - Full Code    Subjective:   Roz Connelly is doing well today  No complaints voiced  Objective:     Vitals:   Temp (24hrs), Av °F (36 7 °C), Min:97 6 °F (36 4 °C), Max:98 5 °F (36 9 °C)    Temp:  [97 6 °F (36 4 °C)-98 5 °F (36 9 °C)] 97 6 °F (36 4 °C)  HR:  [58-70] 70  Resp:  [16-18] 18  BP: (105-141)/(51-65) 127/64  SpO2:  [93 %-95 %] 94 %  Body mass index is 44 99 kg/m²  Input and Output Summary (last 24 hours): Intake/Output Summary (Last 24 hours) at 2021 1407  Last data filed at 2021 0854  Gross per 24 hour   Intake --   Output 550 ml   Net -550 ml       Physical Exam:   Physical Exam  Vitals and nursing note reviewed  Constitutional:       General: She is not in acute distress  Appearance: She is well-developed  Cardiovascular:      Rate and Rhythm: Normal rate and regular rhythm  Heart sounds: No murmur heard  Pulmonary:      Effort: Pulmonary effort is normal  No respiratory distress  Breath sounds: Normal breath sounds  Abdominal:      Palpations: Abdomen is soft  Tenderness: There is no abdominal tenderness  Skin:     General: Skin is warm and dry  Findings: Lesion present  Erythema: right leg cellulitis; dressed and clean  Neurological:      Mental Status: She is alert            Additional Data:     Labs:  Results from last 7 days   Lab Units 21  0356   WBC Thousand/uL 6 80   HEMOGLOBIN g/dL 13 4   HEMATOCRIT % 40 7   PLATELETS Thousands/uL 268   NEUTROS PCT % 58   LYMPHS PCT % 23   MONOS PCT % 12   EOS PCT % 6     Results from last 7 days   Lab Units 07/05/21  0356 06/29/21  0511   SODIUM mmol/L 141 142   POTASSIUM mmol/L 3 6 4 2   CHLORIDE mmol/L 104 107   CO2 mmol/L 28 25   BUN mg/dL 28* 33*   CREATININE mg/dL 1 10 1 14   ANION GAP mmol/L 9 10   CALCIUM mg/dL 8 9 9 1   ALBUMIN g/dL  --  3 2*   TOTAL BILIRUBIN mg/dL  --  0 45   ALK PHOS U/L  --  68   ALT U/L  --  15   AST U/L  --  11   GLUCOSE RANDOM mg/dL 91 91                       Lines/Drains:  Invasive Devices     None                       Imaging: Reviewed radiology reports from this admission including: xray(s)    Recent Cultures (last 7 days):         Last 24 Hours Medication List:   Current Facility-Administered Medications   Medication Dose Route Frequency Provider Last Rate    acetaminophen  650 mg Oral Q6H PRN Maikol Chaves MD      amLODIPine  5 mg Oral Daily Maikol Chaves MD      apixaban  5 mg Oral BID Maikol Chaves MD      baclofen  10 mg Oral TID PRN Cecilia Khan MD      busPIRone  5 mg Oral TID Maikol Chaves MD      cefdinir  300 mg Oral Q12H Melony Krishna MD      cloNIDine  0 1 mg Oral TID Maikol Chaves MD      fentaNYL  1 patch Transdermal Q72H Maikol Chaves MD      furosemide  80 mg Oral Q48H Cecilia Khan MD      ipratropium  0 5 mg Nebulization Q6H PRN Maikol Chaves MD      levalbuterol  1 25 mg Nebulization Q8H PRN Maikol Chaves MD      saccharomyces boulardii  250 mg Oral BID Cecilia Khan MD          Today, Patient Was Seen By: Beck Fiore MD    **Please Note: This note may have been constructed using a voice recognition system  **

## 2021-07-05 NOTE — PLAN OF CARE
Problem: MOBILITY - ADULT  Goal: Maintain or return to baseline ADL function  Description: INTERVENTIONS:  -  Assess patient's ability to carry out ADLs; assess patient's baseline for ADL function and identify physical deficits which impact ability to perform ADLs (bathing, care of mouth/teeth, toileting, grooming, dressing, etc )  - Assess/evaluate cause of self-care deficits   - Assess range of motion  - Assess patient's mobility; develop plan if impaired  - Assess patient's need for assistive devices and provide as appropriate  - Encourage maximum independence but intervene and supervise when necessary  - Involve family in performance of ADLs  - Assess for home care needs following discharge   - Consider OT consult to assist with ADL evaluation and planning for discharge  - Provide patient education as appropriate  Outcome: Progressing  Goal: Maintains/Returns to pre admission functional level  Description: INTERVENTIONS:  - Perform BMAT or MOVE assessment daily    - Set and communicate daily mobility goal to care team and patient/family/caregiver  - Collaborate with rehabilitation services on mobility goals if consulted  - Reposition patient every 2 hours    - Out of bed for toileting  - Record patient progress and toleration of activity level   Outcome: Progressing     Problem: Prexisting or High Potential for Compromised Skin Integrity  Goal: Skin integrity is maintained or improved  Description: INTERVENTIONS:  - Identify patients at risk for skin breakdown  - Assess and monitor skin integrity  - Assess and monitor nutrition and hydration status  - Monitor labs   - Assess for incontinence   - Turn and reposition patient  - Assist with mobility/ambulation  - Relieve pressure over bony prominences  - Avoid friction and shearing  - Provide appropriate hygiene as needed including keeping skin clean and dry  - Evaluate need for skin moisturizer/barrier cream  - Collaborate with interdisciplinary team   - Patient/family teaching  - Consider wound care consult   Outcome: Progressing     Problem: Nutrition/Hydration-ADULT  Goal: Nutrient/Hydration intake appropriate for improving, restoring or maintaining nutritional needs  Description: Monitor and assess patient's nutrition/hydration status for malnutrition  Collaborate with interdisciplinary team and initiate plan and interventions as ordered  Monitor patient's weight and dietary intake as ordered or per policy  Utilize nutrition screening tool and intervene as necessary  Determine patient's food preferences and provide high-protein, high-caloric foods as appropriate       INTERVENTIONS:  - Monitor oral intake, urinary output, labs, and treatment plans  - Assess nutrition and hydration status and recommend course of action  - Evaluate amount of meals eaten  - Assist patient with eating if necessary   - Allow adequate time for meals  - Recommend/ encourage appropriate diets, oral nutritional supplements, and vitamin/mineral supplements  - Order, calculate, and assess calorie counts as needed  - Recommend, monitor, and adjust tube feedings and TPN/PPN based on assessed needs  - Assess need for intravenous fluids  - Provide specific nutrition/hydration education as appropriate  - Include patient/family/caregiver in decisions related to nutrition  Outcome: Progressing     Problem: PAIN - ADULT  Goal: Verbalizes/displays adequate comfort level or baseline comfort level  Description: Interventions:  - Encourage patient to monitor pain and request assistance  - Assess pain using appropriate pain scale  - Administer analgesics based on type and severity of pain and evaluate response  - Implement non-pharmacological measures as appropriate and evaluate response  - Consider cultural and social influences on pain and pain management  - Notify physician/advanced practitioner if interventions unsuccessful or patient reports new pain  Outcome: Progressing     Problem: INFECTION - ADULT  Goal: Absence or prevention of progression during hospitalization  Description: INTERVENTIONS:  - Assess and monitor for signs and symptoms of infection  - Monitor lab/diagnostic results  - Monitor all insertion sites, i e  indwelling lines, tubes, and drains  - Monitor endotracheal if appropriate and nasal secretions for changes in amount and color  - [place] appropriate cooling/warming therapies per order  - Administer medications as ordered  - Instruct and encourage patient and family to use good hand hygiene technique  - Identify and instruct in appropriate isolation precautions for identified infection/condition  Outcome: Progressing  Goal: Absence of fever/infection during neutropenic period  Description: INTERVENTIONS:  - Monitor WBC    Outcome: Progressing     Problem: SAFETY ADULT  Goal: Maintain or return to baseline ADL function  Description: INTERVENTIONS:  -  Assess patient's ability to carry out ADLs; assess patient's baseline for ADL function and identify physical deficits which impact ability to perform ADLs (bathing, care of mouth/teeth, toileting, grooming, dressing, etc )  - Assess/evaluate cause of self-care deficits   - Assess range of motion  - Assess patient's mobility; develop plan if impaired  - Assess patient's need for assistive devices and provide as appropriate  - Encourage maximum independence but intervene and supervise when necessary  - Involve family in performance of ADLs  - Assess for home care needs following discharge   - Consider OT consult to assist with ADL evaluation and planning for discharge  - Provide patient education as appropriate  Outcome: Progressing  Goal: Maintains/Returns to pre admission functional level  Description: INTERVENTIONS:  - Perform BMAT or MOVE assessment daily    - Set and communicate daily mobility goal to care team and patient/family/caregiver     - Collaborate with rehabilitation services on mobility goals if consulted   Reposition patient every 2 hours  - Out of bed for toileting  - Record patient progress and toleration of activity level   Outcome: Progressing  Goal: Patient will remain free of falls  Description: INTERVENTIONS:  -  Assess patient's ability to carry out ADLs; assess patient's baseline for ADL function and identify physical deficits which impact ability to perform ADLs (bathing, care of mouth/teeth, toileting, grooming, dressing, etc )  - Assess/evaluate cause of self-care deficits   - Assess range of motion  - Assess patient's mobility; develop plan if impaired  - Assess patient's need for assistive devices and provide as appropriate  - Encourage maximum independence but intervene and supervise when necessary  - Involve family in performance of ADLs  - Assess for home care needs following discharge   - Consider OT consult to assist with ADL evaluation and planning for discharge  - Provide patient education as appropriate  Outcome: Progressing     Problem: DISCHARGE PLANNING  Goal: Discharge to home or other facility with appropriate resources  Description: INTERVENTIONS:  - Identify barriers to discharge w/patient and caregiver  - Arrange for needed discharge resources and transportation as appropriate  - Identify discharge learning needs (meds, wound care, etc )  - Arrange for interpretive services to assist at discharge as needed  - Refer to Case Management Department for coordinating discharge planning if the patient needs post-hospital services based on physician/advanced practitioner order or complex needs related to functional status, cognitive ability, or social support system  Outcome: Progressing     Problem: Knowledge Deficit  Goal: Patient/family/caregiver demonstrates understanding of disease process, treatment plan, medications, and discharge instructions  Description: Complete learning assessment and assess knowledge base    Interventions:  - Provide teaching at level of understanding  - Provide teaching via preferred learning methods  Outcome: Progressing     Problem: SKIN/TISSUE INTEGRITY - ADULT  Goal: Skin Integrity remains intact(Skin Breakdown Prevention)  Description: Assess:  -Perform Dionte assessment every shift  -Clean and moisturize skin every pt uses bed pan-Inspect skin when repositioning, toileting, and assisting with ADLS  -Assess extremities for adequate circulation and sensation     Bed Management:  -Have minimal linens on bed & keep smooth, unwrinkled  -Change linens as needed when moist or perspiring  -Avoid sitting or lying in one position for more than 2 hours while in bed  -Keep HOB at 30 degrees     Toileting:  -Offer bedside commode  -Assess for incontinence every hour    Activity:  -Turn and reposition patient every 2 Hours  -Use appropriate equipment to lift or move patient in bed  Skin Care:  -Avoid use of baby powder, tape, friction and shearing, hot water or constrictive clothing  -Do not massage red bony areas    Outcome: Progressing  Goal: Incision(s), wounds(s) or drain site(s) healing without S/S of infection  Description: INTERVENTIONS  - Assess and document dressing, incision, wound bed, drain sites and surrounding tissue  - Provide patient and family education  - Perform skin care/dressing changes every 48 hr  Outcome: Progressing     Problem: Potential for Falls  Goal: Patient will remain free of falls  Description: INTERVENTIONS:  -  Assess patient's ability to carry out ADLs; assess patient's baseline for ADL function and identify physical deficits which impact ability to perform ADLs (bathing, care of mouth/teeth, toileting, grooming, dressing, etc )  - Assess/evaluate cause of self-care deficits   - Assess range of motion  - Assess patient's mobility; develop plan if impaired  - Assess patient's need for assistive devices and provide as appropriate  - Encourage maximum independence but intervene and supervise when necessary  - Involve family in performance of ADLs  - Assess for home care needs following discharge   - Consider OT consult to assist with ADL evaluation and planning for discharge  - Provide patient education as appropriate  Outcome: Progressing

## 2021-07-05 NOTE — ASSESSMENT & PLAN NOTE
Presented from [address] [place] [address] for evaluation of bilateral leg wounds  Patient is mainly wheelchair bound  Has been treated with Bactrim 06/08/2021 but did not tolerated  She was then started on doxycycline 06/23/2021 without improvement      · Right and left tibia fibula x-ray negative for any acute osseous abnormalities, history of left ankle fracture status post ORIF  · Continue with oral antibiotics Cefdinir  · Wound care following  · Will need outpatient wound care follow-up    Patient cleared for discharge, pending placement previous assisted living facility unable to take patient back

## 2021-07-05 NOTE — OCCUPATIONAL THERAPY NOTE
OccupationalTherapy Progress Note     Patient Name: Jesus Srivastava  PPUEJ'D Date: 7/5/2021  Problem List  Principal Problem:    Cellulitis of lower extremity  Active Problems:    Hypertension    Morbid (severe) obesity due to excess calories (HCC)    Hyperlipidemia    ANSELMO (acute kidney injury) ([facility] )    Paroxysmal atrial fibrillation (HCC)    Chronic diastolic heart failure ([facility] )    Nonrheumatic aortic valve stenosis            07/05/21 1214   OT Last Visit   OT Visit Date 07/05/21   Note Type   Note Type Treatment   Restrictions/Precautions   Weight Bearing Precautions Per Order No   Other Precautions Chair Alarm; Bed Alarm;Cognitive; Fall Risk  (ratna)   Pain Assessment   Pain Assessment Tool Pain Assessment not indicated - pt denies pain   Pain Score No Pain   ADL   Where Assessed Chair   Grooming Assistance 5  Supervision/Setup   Grooming Deficit Setup;Supervision/safety; Increased time to complete   UB Dressing Assistance 4  Minimal Assistance   UB Dressing Deficit Setup;Verbal cueing;Supervision/safety; Increased time to complete   LB Dressing Assistance 2  Maximal Assistance   LB Dressing Deficit Setup;Verbal cueing;Supervision/safety; Increased time to complete; Don/doff R shoe;Don/doff L shoe   LB Dressing Comments impaired functional reach   Functional Standing Tolerance   Time 2 minutes   Activity w/ RW and then 1:30 minutes w/ use of quick move, flexed forward posture   Comments min assist x2 support steadying   Bed Mobility   Supine to Sit 2  Maximal assistance   Additional items Assist x 2; Increased time required;Verbal cues;LE management;HOB elevated; Bedrails   Additional Comments increased time to complete   Transfers   Sit to Stand 2  Maximal assistance   Additional items Assist x 2; Increased time required;Verbal cues   Stand to Sit 2  Maximal assistance   Additional items Assist x 2; Increased time required;Verbal cues   Other 3  Moderate assistance   Additional items Assist x 2; Increased time required;Verbal cues  (quick move w/ increased time to complete)   Additional Comments w/ increased time to complete, pt unable to weight shift to ambulate to chair, reports aides assist her w/ moving her LEs   Therapeutic Exercise - ROM   UE-ROM Yes   ROM- Right Upper Extremities   R Elbow AROM;Elbow extension;Elbow flexion  (forearm pronation/supination, punchouts)   R Weight/Reps/Sets 2 sets x 15 reps   RUE ROM Comment tolerated well   ROM - Left Upper Extremities    L Elbow AROM;Elbow flexion;Elbow extension  (forearm pronation/supination, punchouts)   L Weight/Reps/Sets 2 sets x 15 reps   LUE ROM Comment tolerated well   Cognition   Overall Cognitive Status Impaired   Arousal/Participation Responsive; Cooperative   Attention Attends with cues to redirect   Orientation Level Oriented to person;Oriented to place;Oriented to time   Memory Decreased recall of recent events;Decreased recall of precautions;Decreased short term memory   Following Commands Follows one step commands without difficulty   Comments pt cooperative, educated on benefit of OOB daily w/ NSG assist and to call nurses to get OOB to increase strength, endurance and confidence w/ task   Additional Activities   Additional Activities   (education on [facility] proper breathing)   Additional Activities Comments pt receptive   Activity Tolerance   Activity Tolerance Patient tolerated treatment well;Patient limited by fatigue   Medical Staff Made Aware appropriate to see per [place] SMALL   Assessment   Assessment Pt seen for skilled OT session focused on ADLs, functional transfers, bed mobility and UE exercises  Pt seated upright in bed at start of session  Pt w/ MAX assist x2 supine>sit bed  Mobility w/ increased time to reach EOB  Pt w/ setup for grooming of washing face and max assist to don/doff shoes  Pt w/ MAX assist x2 sit>stand from bed w/ increased time to complete w/ RW support (pt pushes up from RW)   Pt w/ 2 minute standing tolerance w/ min assist x2 steadying support w/ RW  Pt unable to complete SPT to chair and quick move utilized  Pt w/ MOD assist x2 sit>stand w/ use of quick move and cues for hand placement  Pt w/ MOD assist x2 stand>sit w/ quick move  Pt seated upright in chair and completed b/ l UE exercises seen above to increase strength and endurance for ADLs, functional transfers and mobility w/ cues for correct techniques  Pt progressed w/ grooming assist, and standing tolerance  Pt continues to be limited due to decreased strength and endurance, impaired balance, impaired activity tolerance, impaired functional reach decreased standing tolerance all causing a decline in ADLs, functional transfers and mobility  Recommend STR when medically stable  Will continue to follow to address OT POC  The patient's raw score on the AM-PAC Daily Activity inpatient short form is 12, standardized score is 30 6, less than 39 4  Patients at this level are likely to benefit from discharge to post-acute rehabilitation services  Please refer to the recommendation of the Occupational Therapist for safe discharge planning  Plan   Treatment Interventions ADL retraining;Functional transfer training;UE strengthening/ROM; Endurance training;Cognitive reorientation;Patient/family training;Equipment evaluation/education; Compensatory technique education; Activityengagement; Energy conservation   Goal Expiration Date 07/14/21   OT Treatment Day 2   OT Frequency 2-3x/wk   Recommendation   OT Discharge Recommendation Post acute rehabilitation services   OT - OK to Discharge   (to rehab when medically stable)   AM-PAC Daily Activity Inpatient   Lower Body Dressing 1   Bathing 2   Toileting 1   Upper Body Dressing 2   Grooming 3   Eating 3   Daily Activity Raw Score 12   Daily Activity Standardized Score (Calc for Raw Score >=11) 30 6   AM-[facility] Applied Cognition Inpatient   Following a Speech/Presentation 4   Understanding Ordinary Conversation 4   Taking Medications 2   Remembering Where Things Are Placed or Put Away 3   Remembering List of 4-5 Errands 2   Taking Care of Complicated Tasks 2   Applied Cognition Raw Score 17   Applied Cognition Standardized Score 36 52     Documentation completed by: Ivonne Wing MS, OTR/L

## 2021-07-05 NOTE — PLAN OF CARE
Problem: PHYSICAL THERAPY ADULT  Goal: Performs mobility at highest level of function for planned discharge setting  See evaluation for individualized goals  Description: Treatment/Interventions: Functional transfer training, LE strengthening/ROM, Therapeutic exercise, Endurance training, Patient/family training, Equipment eval/education, Bed mobility, Spoke to nursing, OT  Equipment Recommended:  (none anticipated)       See flowsheet documentation for full assessment, interventions and recommendations  Outcome: Progressing  Note: Prognosis: Fair  Problem List: Decreased strength, Decreased range of motion, Decreased endurance, Impaired balance, Decreased mobility, Decreased safety awareness, Impaired judgement, Obesity, Pain, Decreased skin integrity  Assessment: Pt  rec'd supine in bed, pt  offers no c/o pain  Agreeable to PT  Pt  Seen for PT treatment interventions focusing on bed mobility, transfers, static standing balance and tolerance activities, le ther x rom exercises  Pt  Is requiring max assist x2 for supine to sit, sit to stand transfers, with use of rw and mod assist x2 with use of quick move  Pt performed static standing trials for increased weightbearing activity to le's for strengthening, increased standing tolerance for activities of daily living and weighshfting in order to take and advance le for transfers from bed to chair  Pt requires max assist x2 for static standing with use of rw with cues for improved and upright standing posture and hand placement on walker  Pt demonstrates difficulty performing weightshifting and inability to advance le's to take steps to chair  Pt tolerated 2 minutes static standing with support of Rw, and and 1 minutes 30 secondsx1 and 30 seconds x1 with use of quick move device with min assist x1  Pt performs seated aa-arom to b/l le's x 10 reps    Pt is sowing improved tolerance to activity, improved standing tolerance and upright posture with use of quickmove vs rw  Encouragement required for use of ue's with bed mobility and scooting to edge of bed  Pt demonstrates fair(+) static sitting balance upon reaching EOB  PT requires max assist to scoot to edge of bed  Functional limitations due to impairments in strength, functional endurance, activiity tolerance, decreased safety, balance, posture, poor weightshifting ability and mobility and decreased ROM  PT remained seated out of bed in chair with chair alarm activated and call bell in reach  The patient's AM-PAC Basic Mobility Inpatient Short Form Low Function Raw Score 17 , Standardized Score is 27 46  A standardized score less 42 9 suggests the patient may benefit from discharge to post-acute rehab services  Please also refer to the recommendation of the Physical Therapist for safe discharge planning  Pt continues to be functioning below baseline level, and remains limited 2* factors listed above and including decreased strength, endurance & safe functional mobility  STR recommended at d/c in order to maximize functional outcomes and mobility  Barriers to Discharge: Inaccessible home environment, Decreased caregiver support        PT Discharge Recommendation: Post acute rehabilitation services     PT - OK to Discharge: Yes    See flowsheet documentation for full assessment

## 2021-07-05 NOTE — PLAN OF CARE
Problem: OCCUPATIONAL THERAPY ADULT  Goal: Performs self-care activities at highest level of function for planned discharge setting  See evaluation for individualized goals  Description: Treatment Interventions: ADL retraining, UE strengthening/ROM, Functional transfer training, Endurance training, Patient/family training, Cognitive reorientation, Equipment evaluation/education, Compensatory technique education, Energy conservation, Activityengagement          See flowsheet documentation for full assessment, interventions and recommendations  Note: Limitation: Decreased ADL status, Decreased Safe judgement during ADL, Decreased UE strength, Decreased cognition, Decreased endurance, Decreased self-care trans, Decreased high-level ADLs  Prognosis: Good, Fair  Assessment: Pt seen for skilled OT session focused on ADLs, functional transfers, bed mobility and UE exercises  Pt seated upright in bed at start of session  Pt w/ MAX assist x2 supine>sit bed  Mobility w/ increased time to reach EOB  Pt w/ setup for grooming of washing face and max assist to don/doff shoes  Pt w/ MAX assist x2 sit>stand from bed w/ increased time to complete w/ RW support (pt pushes up from RW)  Pt w/ 2 minute standing tolerance w/ min assist x2 steadying support w/ RW  Pt unable to complete SPT to chair and quick move utilized  Pt w/ MOD assist x2 sit>stand w/ use of quick move and cues for hand placement  Pt w/ MOD assist x2 stand>sit w/ quick move  Pt seated upright in chair and completed b/ l UE exercises seen above to increase strength and endurance for ADLs, functional transfers and mobility w/ cues for correct techniques  Pt progressed w/ grooming assist, and standing tolerance  Pt continues to be limited due to decreased strength and endurance, impaired balance, impaired activity tolerance, impaired functional reach decreased standing tolerance all causing a decline in ADLs, functional transfers and mobility   Recommend STR when medically stable  Will continue to follow to address OT POC  The patient's raw score on the AM-PAC Daily Activity inpatient short form is 12, standardized score is 30 6, less than 39 4  Patients at this level are likely to benefit from discharge to post-acute rehabilitation services  Please refer to the recommendation of the Occupational Therapist for safe discharge planning       OT Discharge Recommendation: Post acute rehabilitation services  OT - OK to Discharge:  (to rehab when medically stable)

## 2021-07-05 NOTE — PHYSICAL THERAPY NOTE
Physical Therapy Treatment Note       07/05/21 1142   Note Type   Note Type Treatment   Pain Assessment   Pain Assessment Tool Pain Assessment not indicated - pt denies pain   Pain Score No Pain   Restrictions/Precautions   Other Precautions Chair Alarm; Bed Alarm   Cognition   Overall Cognitive Status Impaired   Arousal/Participation Responsive; Cooperative; Alert   Attention Attends with cues to redirect   Orientation Level Oriented to time   Memory Decreased recall of precautions;Decreased recall of recent events;Decreased short term memory   Following Commands Follows one step commands without difficulty   Subjective   Subjective " i WAS ON THE PHONE WITH MY Dionisio 2  '     Bed Mobility   Supine to Sit 2  Maximal assistance   Additional items Assist x 2;HOB elevated; Increased time required;Verbal cues;LE management   Additional Comments INCREASD TIME TO PERFORM AND COMPLETE   Transfers   Sit to Stand 2  Maximal assistance   Additional items Assist x 2; Increased time required;Verbal cues; Bedrails  (WITH RW)   Stand to Sit 2  Maximal assistance  (WITH RW)   Additional items Assist x 2; Increased time required;Verbal cues; Bedrails   Other 3  Moderate assistance   Additional items Assist x 2; Increased time required;Verbal cues  (WITH USE OF QUICKMOVE)   Additional Comments PT  PERFORMED STANDING TRIALS WITH USE OF RW WITH MAX ASSIST X2 CUES FOR IMPROVED POSTURE, HAND PLACEMENT, ATTMEPTED TO TRASNFER PT FROM BED TO CHAIR HOWEVER PT UNABLE TO SHIFT WEIGHT TO TAKE STEPS TO CHAIR, PT REPORTS HER AIDES ASSIST HER WITH MOVING HER LEGS  PT PERFORMS STATIC STANDING WITH SUPPORT OF RW WITH MAX ASSIST X2 X 2 MINUTES  STANDING TOLERANCE ACTIVITES PERFORMED WITH USE OF QUICKMOVE WITH MIN ASSSIST X1 X 1 MINUTES 30 SECONDS X1 AND 30 SECONDS X1      Balance   Static Sitting Fair +   Dynamic Sitting Fair -   Static Standing Poor  (WITH SUPPORT OF rW AND POOR (+) WITH USE OF QUICK MOVE  )   Dynamic Standing Poor Endurance Deficit   Endurance Deficit Yes   Endurance Deficit Description FATIGUE   Activity Tolerance   Activity Tolerance Patient limited by fatigue   Medical Staff Made Aware OT   Nurse Made Aware DEON SMALL   Hip Flexion Sitting;10 reps;AAROM; Right;Left   Hip Adduction Sitting;Bilateral  (ISOMETRIC HIP ADD   )   Knee AROM Long Arc Quad Sitting;10 reps;AROM; Right;Left   Ankle Pumps Sitting;AROM; Bilateral;15 reps   Assessment   Prognosis Fair   Problem List Decreased strength;Decreased range of motion;Decreased endurance; Impaired balance;Decreased mobility; Decreased safety awareness; Impaired judgement;Obesity;Pain;Decreased skin integrity   Assessment Pt  rec'd supine in bed, pt  offers no c/o pain  Agreeable to PT  Pt  Seen for PT treatment interventions focusing on bed mobility, transfers, static standing balance and tolerance activities, le ther x rom exercises  Pt  Is requiring max assist x2 for supine to sit, sit to stand transfers, with use of rw and mod assist x2 with use of quick move  Pt performed static standing trials for increased weightbearing activity to le's for strengthening, increased standing tolerance for activities of daily living and weighshfting in order to take and advance le for transfers from bed to chair  Pt requires max assist x2 for static standing with use of rw with cues for improved and upright standing posture and hand placement on walker  Pt demonstrates difficulty performing weightshifting and inability to advance le's to take steps to chair  Pt tolerated 2 minutes static standing with support of Rw, and and 1 minutes 30 secondsx1 and 30 seconds x1 with use of quick move device with min assist x1  Pt performs seated aa-arom to b/l le's x 10 reps  Pt is sowing improved tolerance to activity, improved standing tolerance and upright posture with use of quickmove vs rw  Encouragement required for use of ue's with bed mobility and scooting to edge of bed    Pt demonstrates fair(+) static sitting balance upon reaching EOB  PT requires max assist to scoot to edge of bed  Functional limitations due to impairments in strength, functional endurance, activiity tolerance, decreased safety, balance, posture, poor weightshifting ability and mobility and decreased ROM  PT remained seated out of bed in chair with chair alarm activated and call bell in reach  The patient's AM-PAC Basic Mobility Inpatient Short Form Low Function Raw Score 17 , Standardized Score is 27 46  A standardized score less 42 9 suggests the patient may benefit from discharge to post-acute rehab services  Please also refer to the recommendation of the Physical Therapist for safe discharge planning  Pt continues to be functioning below baseline level, and remains limited 2* factors listed above and including decreased strength, endurance & safe functional mobility  STR recommended at d/c in order to maximize functional outcomes and mobility  Goals   Patient Goals " TO GET BETTER "     STG Expiration Date 07/14/21   PT Treatment Day 2   Plan   Treatment/Interventions Functional transfer training;LE strengthening/ROM; Therapeutic exercise; Endurance training;Patient/family training;Equipment eval/education; Bed mobility;Spoke to nursing;OT   Progress Slow progress, cognitive deficits   PT Frequency Other (Comment)  (3-5X/ WEEK)   Recommendation   PT Discharge Recommendation Post acute rehabilitation services   PT - OK to Discharge Yes   AM-PAC Basic Mobility Inpatient   Turning in Bed Without Bedrails 2   Lying on Back to Sitting on Edge of Flat Bed 2   Moving Bed to Chair 1   Standing Up From Chair 2   Walk in Room 1   Climb 3-5 Stairs 1   Basic Mobility Inpatient Raw Score 9   Turning Head Towards Sound 4   Follow Simple Instructions 4   Low Function Basic Mobility Raw Score 17   Low Function Basic Mobility Standardized Score 27 46     Zhen Goldman, PTA

## 2021-07-06 NOTE — UTILIZATION REVIEW
Continued Stay Review    Date:     FOR  7/3/21                          Current Patient Class:    Inpatient  Current Level of Care:   Med surg    HPI:87 y o  female initially admitted on    6/28  With  Cellulitis  LLE    Assessment/Plan:   7/3   Continue  PT/OT  Mainly  W/c  Bound  ANSELMO resolved  Continue current meds  Transitioned to po antibiotics  Vital Signs:   98-56-18       121/61   Sats  94  %    Pertinent Labs/Diagnostic Results:       Results from last 7 days   Lab Units 07/05/21  0356 06/30/21  0445   WBC Thousand/uL 6 80 7 27   HEMOGLOBIN g/dL 13 4 11 9   HEMATOCRIT % 40 7 36 6   PLATELETS Thousands/uL 268 259   NEUTROS ABS Thousands/µL 3 90 4 67         Results from last 7 days   Lab Units 07/05/21  0356 06/30/21  0445   SODIUM mmol/L 141 142   POTASSIUM mmol/L 3 6 4 4   CHLORIDE mmol/L 104 107   CO2 mmol/L 28 29   ANION GAP mmol/L 9 6   BUN mg/dL 28* 27*   CREATININE mg/dL 1 10 1 01   EGFR ml/min/1 73sq m 45 50   CALCIUM mg/dL 8 9 9 2             Results from last 7 days   Lab Units 07/05/21  0356 06/30/21  0445   GLUCOSE RANDOM mg/dL 91 90                       Medications:   Scheduled Medications:  amLODIPine, 5 mg, Oral, Daily  apixaban, 5 mg, Oral, BID  busPIRone, 5 mg, Oral, TID  cefdinir, 300 mg, Oral, Q12H ERICK  cloNIDine, 0 1 mg, Oral, TID  fentaNYL, 1 patch, Transdermal, Q72H  furosemide, 80 mg, Oral, Q48H  saccharomyces boulardii, 250 mg, Oral, BID      Continuous IV Infusions:     PRN Meds:  acetaminophen, 650 mg, Oral, Q6H PRN  baclofen, 10 mg, Oral, TID PRN  ipratropium, 0 5 mg, Nebulization, Q6H PRN  levalbuterol, 1 25 mg, Nebulization, Q8H PRN        Discharge Plan:    D    Network Utilization Review Department  ATTENTION: Please call with any questions or concerns to [phone] and carefully listen to the prompts so that you are directed to the right person   All voicemails are confidential   Johanna Etienne all requests for admission clinical reviews, approved or denied determinations and any other requests to dedicated fax number below belonging to the campus where the patient is receiving treatment   List of dedicated fax numbers for the Facilities:  [address] [place] [address] DENIALS (Administrative/Medical Necessity) [phone]   [address] (Maternity/NICU/Pediatrics) [phone]   [address] [place] [address] [address] [place] [address] [address] [place] [address] [address] [facility] [place] [address] [phone]

## 2021-07-06 NOTE — PROGRESS NOTES
[facility] [place]  Progress Note - Tu Morrison 1933, 80 y o  female MRN: [id number]  Unit/Bed#: E5 -01 Encounter: [id number]  Primary Care Provider: No primary care provider on file  Date and time admitted to hospital: 6/28/2021 11:37 AM    Chronic diastolic heart failure (HCC)  Assessment & Plan  Wt Readings from Last 3 Encounters:   06/28/21 115 kg (253 lb 15 5 oz)   10/09/20 114 kg (251 lb 12 3 oz)   07/01/19 103 kg (226 lb 6 6 oz)     · Resume home dose of lasix 80 mg every other day      Paroxysmal atrial fibrillation (HCC)  Assessment & Plan  · Continue Eliquis for anticoagulation    ANSELMO (acute kidney injury) ([facility] )  Assessment & Plan  · Now resolved    Morbid (severe) obesity due to excess calories ([facility] )  Assessment & Plan  · Can benefit from diet and lifestyle modifications    Hypertension  Assessment & Plan  · Continue Norvasc and clonidine    * Cellulitis of lower extremity  Assessment & Plan  Presented from [address] [place] [address] for evaluation of bilateral leg wounds  Patient is mainly wheelchair bound  Has been treated with Bactrim 06/08/2021 but did not tolerated  She was then started on doxycycline 06/23/2021 without improvement  · Right and left tibia fibula x-ray negative for any acute osseous abnormalities, history of left ankle fracture status post ORIF  · Continue with oral antibiotics Cefdinir to complete a total of 7 days  · Wound care following  · Will need outpatient wound care follow-up    Patient cleared for discharge, pending placement previous assisted living facility unable to take patient back          VTE Pharmacologic Prophylaxis: VTE Score: 7 High Risk (Score >/= 5) - Pharmacological DVT Prophylaxis Ordered: apixaban (Eliquis)  Sequential Compression Devices Ordered  Patient Centered Rounds: I performed bedside rounds with nursing staff today    Discussions with Specialists or Other Care Team Provider:     Education and Discussions with Family / Patient: Attempted to update  (daughter) via phone  Left voicemail  Time Spent for Care: 20 minutes  More than 50% of total time spent on counseling and coordination of care as described above  Current Length of Stay: 8 day(s)  Current Patient Status: Inpatient   Certification Statement: The patient will continue to require additional inpatient hospital stay due to placement  Discharge Plan: Anticipate discharge in 24-48 hrs to rehab facility  Code Status: Level 1 - Full Code    Subjective:   Patient was seen and examined  She denies any complaints today  Objective:     Vitals:   Temp (24hrs), Av °F (36 7 °C), Min:97 7 °F (36 5 °C), Max:98 3 °F (36 8 °C)    Temp:  [97 7 °F (36 5 °C)-98 3 °F (36 8 °C)] 98 3 °F (36 8 °C)  HR:  [54-74] 54  Resp:  [16-18] 18  BP: (106-124)/(54-79) 124/79  SpO2:  [93 %-97 %] 95 %  Body mass index is 44 99 kg/m²  Input and Output Summary (last 24 hours): Intake/Output Summary (Last 24 hours) at 2021 1317  Last data filed at 2021 1211  Gross per 24 hour   Intake 360 ml   Output 650 ml   Net -290 ml       Physical Exam:   Physical Exam  Vitals and nursing note reviewed  Constitutional:       General: She is not in acute distress  Appearance: She is well-developed  Cardiovascular:      Rate and Rhythm: Normal rate and regular rhythm  Heart sounds: No murmur heard  Pulmonary:      Effort: Pulmonary effort is normal  No respiratory distress  Breath sounds: Normal breath sounds  Abdominal:      Palpations: Abdomen is soft  Tenderness: There is no abdominal tenderness  Skin:     General: Skin is warm and dry  Findings: Lesion (Right lower extremity dressed; does not appear to be erythematous or swollen; left foot appears to have chronic skin discoloration) present  Neurological:      Mental Status: She is alert            Additional Data:     Labs:  Results from last 7 days   Lab Units 07/05/21  0356   WBC Thousand/uL 6 80   HEMOGLOBIN g/dL 13 4   HEMATOCRIT % 40 7   PLATELETS Thousands/uL 268   NEUTROS PCT % 58   LYMPHS PCT % 23   MONOS PCT % 12   EOS PCT % 6     Results from last 7 days   Lab Units 07/05/21  0356   SODIUM mmol/L 141   POTASSIUM mmol/L 3 6   CHLORIDE mmol/L 104   CO2 mmol/L 28   BUN mg/dL 28*   CREATININE mg/dL 1 10   ANION GAP mmol/L 9   CALCIUM mg/dL 8 9   GLUCOSE RANDOM mg/dL 91                       Lines/Drains:  Invasive Devices     None                       Imaging: No pertinent imaging reviewed  Recent Cultures (last 7 days):         Last 24 Hours Medication List:   Current Facility-Administered Medications   Medication Dose Route Frequency Provider Last Rate    acetaminophen  650 mg Oral Q6H PRN Sourav Ernandez MD      amLODIPine  5 mg Oral Daily Sourav Ernandez MD      apixaban  5 mg Oral BID Sourav Ernandez MD      baclofen  10 mg Oral TID PRN Jessica Johnson MD      busPIRone  5 mg Oral TID Sourav Ernandez MD      cefdinir  300 mg Oral Q12H [address] Harriet Titus MD      cloNIDine  0 1 mg Oral TID Sourav Ernandez MD      fentaNYL  1 patch Transdermal Q72H Sourav Ernandez MD      furosemide  80 mg Oral Q48H Jessica Johnson MD      ipratropium  0 5 mg Nebulization Q6H PRN Sourav Ernandez MD      levalbuterol  1 25 mg Nebulization Q8H PRN Sourav Ernandez MD      saccharomyces boulardii  250 mg Oral BID Jessica Johnson MD          Today, Patient Was Seen By: Harrite Titus MD    **Please Note: This note may have been constructed using a voice recognition system  **

## 2021-07-06 NOTE — CASE MANAGEMENT
Patient is pending acceptance and placement for STR  CM emailed financial application to patient's dgt Gardenia at [email or web address]  [email or web address] for [employer]  Dgt was not willing to provide additional SNF choices  CM awaiting responses from [facility] and [facility]  CM will continue to follow  Update:    Patient is accepted to [address] [place] [address]  Samantha Mcadams advises she would like to hear from [facility]  CM will follow  Update:    Mayo Hartman submitting for insurance authorization

## 2021-07-06 NOTE — ASSESSMENT & PLAN NOTE
Presented from [address] [place] [address] for evaluation of bilateral leg wounds  Patient is mainly wheelchair bound  Has been treated with Bactrim 06/08/2021 but did not tolerated  She was then started on doxycycline 06/23/2021 without improvement      · Right and left tibia fibula x-ray negative for any acute osseous abnormalities, history of left ankle fracture status post ORIF  · Continue with oral antibiotics Cefdinir to complete a total of 7 days  · Wound care following  · Will need outpatient wound care follow-up    Patient cleared for discharge, pending placement previous assisted living facility unable to take patient back

## 2021-07-06 NOTE — PLAN OF CARE
Problem: MOBILITY - ADULT  Goal: Maintain or return to baseline ADL function  Description: INTERVENTIONS:  -  Assess patient's ability to carry out ADLs; assess patient's baseline for ADL function and identify physical deficits which impact ability to perform ADLs (bathing, care of mouth/teeth, toileting, grooming, dressing, etc )  - Assess/evaluate cause of self-care deficits   - Assess range of motion  - Assess patient's mobility; develop plan if impaired  - Assess patient's need for assistive devices and provide as appropriate  - Encourage maximum independence but intervene and supervise when necessary  - Involve family in performance of ADLs  - Assess for home care needs following discharge   - Consider OT consult to assist with ADL evaluation and planning for discharge  - Provide patient education as appropriate  Outcome: Progressing  Goal: Maintains/Returns to pre admission functional level  Description: INTERVENTIONS:  - Perform BMAT or MOVE assessment daily    - Set and communicate daily mobility goal to care team and patient/family/caregiver     - Collaborate with rehabilitation services on mobility goals if consulted  - Record patient progress and toleration of activity level   Outcome: Progressing     Problem: Prexisting or High Potential for Compromised Skin Integrity  Goal: Skin integrity is maintained or improved  Description: INTERVENTIONS:  - Identify patients at risk for skin breakdown  - Assess and monitor skin integrity  - Assess and monitor nutrition and hydration status  - Monitor labs   - Assess for incontinence   - Turn and reposition patient  - Assist with mobility/ambulation  - Relieve pressure over bony prominences  - Avoid friction and shearing  - Provide appropriate hygiene as needed including keeping skin clean and dry  - Evaluate need for skin moisturizer/barrier cream  - Collaborate with interdisciplinary team   - Patient/family teaching  - Consider wound care consult   Outcome: Progressing     Problem: Nutrition/Hydration-ADULT  Goal: Nutrient/Hydration intake appropriate for improving, restoring or maintaining nutritional needs  Description: Monitor and assess patient's nutrition/hydration status for malnutrition  Collaborate with interdisciplinary team and initiate plan and interventions as ordered  Monitor patient's weight and dietary intake as ordered or per policy  Utilize nutrition screening tool and intervene as necessary  Determine patient's food preferences and provide high-protein, high-caloric foods as appropriate       INTERVENTIONS:  - Monitor oral intake, urinary output, labs, and treatment plans  - Assess nutrition and hydration status and recommend course of action  - Evaluate amount of meals eaten  - Assist patient with eating if necessary   - Allow adequate time for meals  - Recommend/ encourage appropriate diets, oral nutritional supplements, and vitamin/mineral supplements  - Order, calculate, and assess calorie counts as needed  - Recommend, monitor, and adjust tube feedings and TPN/PPN based on assessed needs  - Assess need for intravenous fluids  - Provide specific nutrition/hydration education as appropriate  - Include patient/family/caregiver in decisions related to nutrition  Outcome: Progressing     Problem: PAIN - ADULT  Goal: Verbalizes/displays adequate comfort level or baseline comfort level  Description: Interventions:  - Encourage patient to monitor pain and request assistance  - Assess pain using appropriate pain scale  - Administer analgesics based on type and severity of pain and evaluate response  - Implement non-pharmacological measures as appropriate and evaluate response  - Consider cultural and social influences on pain and pain management  - Notify physician/advanced practitioner if interventions unsuccessful or patient reports new pain  Outcome: Progressing     Problem: INFECTION - ADULT  Goal: Absence or prevention of progression during hospitalization  Description: INTERVENTIONS:  - Assess and monitor for signs and symptoms of infection  - Monitor lab/diagnostic results  - Monitor all insertion sites, i e  indwelling lines, tubes, and drains  - Monitor endotracheal if appropriate and nasal secretions for changes in amount and color  - Royal appropriate cooling/warming therapies per order  - Administer medications as ordered  - Instruct and encourage patient and family to use good hand hygiene technique  - Identify and instruct in appropriate isolation precautions for identified infection/condition  Outcome: Progressing  Goal: Absence of fever/infection during neutropenic period  Description: INTERVENTIONS:  - Monitor WBC    Outcome: Progressing     Problem: SAFETY ADULT  Goal: Maintain or return to baseline ADL function  Description: INTERVENTIONS:  -  Assess patient's ability to carry out ADLs; assess patient's baseline for ADL function and identify physical deficits which impact ability to perform ADLs (bathing, care of mouth/teeth, toileting, grooming, dressing, etc )  - Assess/evaluate cause of self-care deficits   - Assess range of motion  - Assess patient's mobility; develop plan if impaired  - Assess patient's need for assistive devices and provide as appropriate  - Encourage maximum independence but intervene and supervise when necessary  - Involve family in performance of ADLs  - Assess for home care needs following discharge   - Consider OT consult to assist with ADL evaluation and planning for discharge  - Provide patient education as appropriate  Outcome: Progressing  Goal: Maintains/Returns to pre admission functional level  Description: INTERVENTIONS:  - Perform BMAT or MOVE assessment daily    - Set and communicate daily mobility goal to care team and patient/family/caregiver     - Collaborate with rehabilitation services on mobility goals if consulted  - Record patient progress and toleration of activity level   Outcome: Progressing  Goal: Patient will remain free of falls  Description: INTERVENTIONS:  - Educate patient/family on patient safety including physical limitations  - Instruct patient to call for assistance with activity   - Consult OT/PT to assist with strengthening/mobility   - Keep Call bell within reach  - Keep bed low and locked with side rails adjusted as appropriate  - Keep care items and personal belongings within reach  - Initiate and maintain comfort rounds  - Make Fall Risk Sign visible to staff  - Apply yellow socks and bracelet for high fall risk patients  - Consider moving patient to room near nurses station  Outcome: Progressing     Problem: DISCHARGE PLANNING  Goal: Discharge to home or other facility with appropriate resources  Description: INTERVENTIONS:  - Identify barriers to discharge w/patient and caregiver  - Arrange for needed discharge resources and transportation as appropriate  - Identify discharge learning needs (meds, wound care, etc )  - Arrange for interpretive services to assist at discharge as needed  - Refer to Case Management Department for coordinating discharge planning if the patient needs post-hospital services based on physician/advanced practitioner order or complex needs related to functional status, cognitive ability, or social support system  Outcome: Progressing     Problem: Knowledge Deficit  Goal: Patient/family/caregiver demonstrates understanding of disease process, treatment plan, medications, and discharge instructions  Description: Complete learning assessment and assess knowledge base    Interventions:  - Provide teaching at level of understanding  - Provide teaching via preferred learning methods  Outcome: Progressing     Problem: SKIN/TISSUE INTEGRITY - ADULT  Goal: Skin Integrity remains intact(Skin Breakdown Prevention)  Description: Assess:    Bed Management:  -Have minimal linens on bed & keep smooth, unwrinkled  -Change linens as needed when moist or perspiring  -Toileting:  -Offer bedside commode  Activity:  -Skin Care:  -Avoid use of baby powder, tape, friction and shearing, hot water or constrictive clothing  --Do not massage red bony areas    Next Steps:  Outcome: Progressing  Goal: Incision(s), wounds(s) or drain site(s) healing without S/S of infection  Description: INTERVENTIONS  - Assess and document dressing, incision, wound bed, drain sites and surrounding tissue  - Provide patient and family education  Outcome: Progressing     Problem: Potential for Falls  Goal: Patient will remain free of falls  Description: INTERVENTIONS:  - Educate patient/family on patient safety including physical limitations  - Instruct patient to call for assistance with activity   - Consult OT/PT to assist with strengthening/mobility   - Keep Call bell within reach  - Keep bed low and locked with side rails adjusted as appropriate  - Keep care items and personal belongings within reach  - Initiate and maintain comfort rounds  - Make Fall Risk Sign visible to staff  -- Initiate/Maintain bedalarm  - Apply yellow socks and bracelet for high fall risk patients  - Consider moving patient to room near nurses station  Outcome: Progressing

## 2021-07-07 NOTE — ASSESSMENT & PLAN NOTE
Presented from [address] [place] [address] for evaluation of bilateral leg wounds  Patient is mainly wheelchair bound  Has been treated with Bactrim 06/08/2021 but did not tolerated  She was then started on doxycycline 06/23/2021 without improvement      · Right and left tibia fibula x-ray negative for any acute osseous abnormalities, history of left ankle fracture status post ORIF  · Continue with oral antibiotics Cefdinir to complete a total of 7 days  · Wound care following  · Will need outpatient wound care follow-up    Patient cleared for discharge, pending placement previous assisted living facility unable to take patient back

## 2021-07-07 NOTE — PLAN OF CARE
Problem: PHYSICAL THERAPY ADULT  Goal: Performs mobility at highest level of function for planned discharge setting  See evaluation for individualized goals  Description: Treatment/Interventions: Functional transfer training, LE strengthening/ROM, Therapeutic exercise, Endurance training, Patient/family training, Equipment eval/education, Bed mobility, Spoke to nursing, OT  Equipment Recommended:  (none anticipated)       See flowsheet documentation for full assessment, interventions and recommendations  Outcome: Progressing  Note: Prognosis: Fair  Problem List: Decreased strength, Decreased range of motion, Decreased endurance, Impaired balance, Decreased mobility, Obesity, Decreased skin integrity  Assessment: Pt  rec'd supine in bed, pt  offers no c/o pain  Agreeable to PT  Pt  Seen for PT treatment interventions focusing on bed mobility, transfers, static standing balance and tolerance activities, le ther x rom exercises  Pt  Is requiring max assist x2 for supine to sit and sit to stand transfers, with use of rw and min assist x2 with use of quick move  Pt performed static standing trials for increased weightbearing activity to le's for strengthening, increased standing tolerance for activities of daily living and weighshfting in order to take and advance le for transfers from bed to chair  Pt requires max assist x2 for static standing with use of rw with cues for improved and upright standing posture and hand placement on walker  Pt demonstrates difficulty performing weightshifting and inability to advance le's to take steps to chair  Overall nOted improved  standing tolerance 2 minutes with Rw and 4 minutes x1 and 1 minute x1 with use of quick move  Overall less assistance for sit to stand, static standing and improved standing posture with use of quickmove   Pt  Performs supine b/l le a-aarom x 10-15 reps to tolerance  Pt  Is requiring assistance to perform heel slides to b/l le's    Pt with limited ROm to b/l le's for hip abduction due to 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  Pt  Continues to require max assist to scoot to EOB and encouragement to use ue's for bed mobility and scooting  Functional limitations due to impairments in strength, functional endurance, activiity tolerance, decreased safety, balance, posture, poor weightshifting ability and mobility and decreased ROM  PT remained seated out of bed in chair with chair alarm activated and call bell in reach  The patient's AM-PAC Basic Mobility Inpatient Short Form Low Function Raw Score 17 , Standardized Score is 27 46  A standardized score less 42 9 suggests the patient may benefit from discharge to post-acute rehab services  Please also refer to the recommendation of the Physical Therapist for safe discharge planning  Pt continues to be functioning below baseline level, and remains limited 2* factors listed above and including decreased strength, endurance & safe functional mobility  STR recommended at d/c in order to maximize functional outcomes and mobility  Barriers to Discharge: Inaccessible home environment, Decreased caregiver support        PT Discharge Recommendation: Post acute rehabilitation services     PT - OK to Discharge: Yes    See flowsheet documentation for full assessment

## 2021-07-07 NOTE — PROGRESS NOTES
[address] [facility]  Progress Note - Hyacinth Hargrove 1933, 80 y o  female MRN: [id number]  Unit/Bed#: E5 -01 Encounter: [id number]  Primary Care Provider: No primary care provider on file  Date and time admitted to hospital: 6/28/2021 11:37 AM    Chronic diastolic heart failure (HCC)  Assessment & Plan  Wt Readings from Last 3 Encounters:   06/28/21 115 kg (253 lb 15 5 oz)   10/09/20 114 kg (251 lb 12 3 oz)   07/01/19 103 kg (226 lb 6 6 oz)     · Resume home dose of lasix 80 mg every other day      Paroxysmal atrial fibrillation (HCC)  Assessment & Plan  · Continue Eliquis for anticoagulation    ANSELMO (acute kidney injury) ([facility] [address] )  Assessment & Plan  · Now resolved    Morbid (severe) obesity due to excess calories ([facility] [address] )  Assessment & Plan  · Can benefit from diet and lifestyle modifications    Hypertension  Assessment & Plan  · Continue Norvasc and clonidine    * Cellulitis of lower extremity  Assessment & Plan  Presented from [address] [place] [address] for evaluation of bilateral leg wounds  Patient is mainly wheelchair bound  Has been treated with Bactrim 06/08/2021 but did not tolerated  She was then started on doxycycline 06/23/2021 without improvement  · Right and left tibia fibula x-ray negative for any acute osseous abnormalities, history of left ankle fracture status post ORIF  · Continue with oral antibiotics Cefdinir to complete a total of 7 days  · Wound care following  · Will need outpatient wound care follow-up    Patient cleared for discharge, pending placement previous assisted living facility unable to take patient back        VTE Pharmacologic Prophylaxis:   Pharmacologic: Apixaban (Eliquis)  Mechanical VTE Prophylaxis in Place: Yes    Patient Centered Rounds: I have performed bedside rounds with nursing staff today      Discussions with Specialists or Other Care Team Provider:  Case management    Education and Discussions with Family / Patient: Patient    Time Spent for Care: 20 minutes  More than 50% of total time spent on counseling and coordination of care as described above  Current Length of Stay: 9 day(s)    Current Patient Status: Inpatient   Certification Statement: The patient will continue to require additional inpatient hospital stay due to Placement    Discharge Plan: 24-48 hrs    Code Status: Level 1 - Full Code      Subjective:   Patient was seen and examined  She is working with physical therapy and is reported to have a good progress  Objective:     Vitals:   Temp (24hrs), Av 3 °F (36 8 °C), Min:98 3 °F (36 8 °C), Max:98 3 °F (36 8 °C)    Temp:  [98 3 °F (36 8 °C)] 98 3 °F (36 8 °C)  HR:  [63-64] 64  Resp:  [18] 18  BP: (109-140)/(64-80) 140/74  SpO2:  [94 %-95 %] 94 %  Body mass index is 44 99 kg/m²  Input and Output Summary (last 24 hours): Intake/Output Summary (Last 24 hours) at 2021 1336  Last data filed at 2021 2354  Gross per 24 hour   Intake --   Output 50 ml   Net -50 ml       Physical Exam:     Physical Exam  Constitutional:       Appearance: She is well-developed  She is obese  Cardiovascular:      Rate and Rhythm: Normal rate  Pulmonary:      Effort: Pulmonary effort is normal       Breath sounds: Normal breath sounds  Abdominal:      Palpations: Abdomen is soft  Skin:     General: Skin is warm  Findings: Lesion (Right lower extremity) present  No erythema  Neurological:      Mental Status: She is alert     Psychiatric:         Behavior: Behavior normal           Additional Data:     Labs:    Results from last 7 days   Lab Units 21  0356   WBC Thousand/uL 6 80   HEMOGLOBIN g/dL 13 4   HEMATOCRIT % 40 7   PLATELETS Thousands/uL 268   NEUTROS PCT % 58   LYMPHS PCT % 23   MONOS PCT % 12   EOS PCT % 6     Results from last 7 days   Lab Units 21  0356   SODIUM mmol/L 141   POTASSIUM mmol/L 3 6   CHLORIDE mmol/L 104   CO2 mmol/L 28   BUN mg/dL 28*   CREATININE mg/dL 1 10   ANION GAP mmol/L 9   CALCIUM mg/dL 8 9   GLUCOSE RANDOM mg/dL 91                           * I Have Reviewed All Lab Data Listed Above  * Additional Pertinent Lab Tests Reviewed: No New Labs Available For Today    Imaging:      Recent Cultures (last 7 days):           Last 24 Hours Medication List:   Current Facility-Administered Medications   Medication Dose Route Frequency Provider Last Rate    acetaminophen  650 mg Oral Q6H PRN Duane Diss, MD      amLODIPine  5 mg Oral Daily Duane Diss, MD      apixaban  5 mg Oral BID Duane Diss, MD      baclofen  10 mg Oral TID PRN Amaris Stevens MD      busPIRone  5 mg Oral TID Duane Diss, MD      cloNIDine  0 1 mg Oral TID Duane Diss, MD      fentaNYL  1 patch Transdermal Q72H Duane Diss, MD      furosemide  80 mg Oral Q48H Amaris Stevens MD      ipratropium  0 5 mg Nebulization Q6H PRN Duane Diss, MD      levalbuterol  1 25 mg Nebulization Q8H PRN Duane Diss, MD      saccharomyces boulardii  250 mg Oral BID Amaris Stevens MD          Today, Patient Was Seen By: Tito Rubin MD    ** Please Note: Dictation voice to text software may have been used in the creation of this document   **

## 2021-07-07 NOTE — PLAN OF CARE
Problem: OCCUPATIONAL THERAPY ADULT  Goal: Performs self-care activities at highest level of function for planned discharge setting  See evaluation for individualized goals  Description: Treatment Interventions: ADL retraining, UE strengthening/ROM, Functional transfer training, Endurance training, Patient/family training, Cognitive reorientation, Equipment evaluation/education, Compensatory technique education, Energy conservation, Activityengagement          See flowsheet documentation for full assessment, interventions and recommendations  Outcome: Progressing  Note: Limitation: Decreased ADL status, Decreased Safe judgement during ADL, Decreased UE strength, Decreased cognition, Decreased endurance, Decreased self-care trans, Decreased high-level ADLs  Prognosis: Good, Fair  Assessment: Pt was seen for skilled OT with focus on completion of self care tasks, bed mobility, functional transfers, review of comp breathing techs, BUE ROM exercises, trunk flexion/extension exercises and review of current plan of care  Pt agreeable to repositioning of bed as chair with focus on use of bed rails to support set pulling up to forward flex trunk with gentle stretch encouraged holding for 3 counts for 3 sets of 10 reps  Pt able to tolerate completion of BUE ROM exercises and neck ROM exercises due to noted stiffness  Pt completed 3 sets of 10 reps in all planes this tx session while long sitting  Max A x2 to achieve EOB positioning  Pt able to achieve safe sitting balance once stabilized with support of bed rail  Pt able to tolerate trial of sit to stand at [facility] with Max A x2 required  Pt unable to achieve upright positioning at RW and unable to advance BLE with appropriate weight shift due to adducted positioning of BLE/hip tightness  See above levels of A required for all functional tasks   Pt able to tolerate use of quick move device with improved stand tolerance at 4 mins with 3rd trial  Pt agreeable to OOB positioning in bedside chair with use of quick move device  Chair alarm activated upon termination of tx session  All findings reported to nursing staff  The patient's raw score on the AM-PAC Daily Activity inpatient short form is 12, standardized score is 30 6, less than 39 4  Patients at this level are likely to benefit from discharge to post-acute rehabilitation services  Please refer to the recommendation of the Occupational Therapist for safe discharge planning       OT Discharge Recommendation: Post acute rehabilitation services  OT - OK to Discharge: Yes (when medically cleared  )

## 2021-07-07 NOTE — PHYSICAL THERAPY NOTE
Physical Therapy Treatment Note       07/07/21 1037   Note Type   Note Type Treatment   Pain Assessment   Pain Assessment Tool Pain Assessment not indicated - pt denies pain   Pain Score No Pain   Restrictions/Precautions   Other Precautions Chair Alarm; Bed Alarm;Cognitive; Fall Risk  (ratna spo2 96%, hr 68 bpm on ra)   General   Chart Reviewed Yes   Family/Caregiver Present No   Cognition   Overall Cognitive Status WFL   Arousal/Participation Alert; Responsive; Cooperative   Attention Attends with cues to redirect   Orientation Level Oriented X4   Memory Decreased recall of precautions;Decreased recall of recent events;Decreased short term memory   Following Commands Follows one step commands without difficulty   Subjective   Subjective " I am Ok, I think,  I don't know "     Bed Mobility   Supine to Sit 2  Maximal assistance   Additional items Assist x 2; Increased time required;Verbal cues;LE management;HOB elevated   Transfers   Sit to Stand 2  Maximal assistance   Additional items Assist x 2; Increased time required;Verbal cues  (with use of rw)   Stand to Sit 3  Moderate assistance   Additional items Assist x 2; Increased time required;Verbal cues  (with use of Rw)   Other 4  Minimal assistance   Additional items Assist x 2; Increased time required;Verbal cues  (with use of quickmove   )   Additional Comments Pt performes sit to stand transfers with use of rw with max assist x2, static standing wtih Rw with max assist x2 x 2 minutes working on upright posture, ue wieghtbearing, weighthshifting, standign tolerance, x2 minutes, and sit to stand trials with use of quick move with min assist x2, static standing activities with support of quickmove and min assist x2 x4 minutes and 1 minute x1  Pt unable to weightshit with use of rw to take steps to chair       Balance   Static Sitting Fair +  (eob)   Static Standing Poor  (with rw and fair (-) with quickmove  )   Dynamic Standing Poor   Ambulatory Zero   Endurance Deficit   Endurance Deficit Yes   Endurance Deficit Description fatigue   Activity Tolerance   Activity Tolerance Patient limited by fatigue   Medical Staff Made Aware ot   Nurse Made Aware [place] SMALL   Exercises   Quad Sets Supine;15 reps;Bilateral   Heelslides Supine;10 reps;AAROM; Bilateral   Glute Sets Supine;15 reps;Bilateral   Knee AROM Short Arc Quad Supine;15 reps;AROM; Bilateral   Ankle Pumps Supine;15 reps;AROM; Bilateral   Balance training  sitting in longsittingwith support and without support x 10 minutes   Assessment   Prognosis Fair   Problem List Decreased strength;Decreased range of motion;Decreased endurance; Impaired balance;Decreased mobility;Obesity; Decreased skin integrity   Assessment Pt  rec'd supine in bed, pt  offers no c/o pain  Agreeable to PT   Pt  Seen for PT treatment interventions focusing on bed mobility, transfers, static standing balance and tolerance activities, le ther x rom exercises   Pt  Is requiring max assist x2 for supine to sit and sit to stand transfers, with use of rw and min assist x2 with use of quick move   Pt performed static standing trials for increased weightbearing activity to le's for strengthening, increased standing tolerance for activities of daily living and weighshfting in order to take and advance le for transfers from bed to chair   Pt requires max assist x2 for static standing with use of rw with cues for improved and upright standing posture and hand placement on walker  Pt demonstrates difficulty performing weightshifting and inability to advance le's to take steps to chair   Overall nOted improved  standing tolerance 2 minutes with Rw and 4 minutes x1 and 1 minute x1 with use of quick move  Overall less assistance for sit to stand, static standing and improved standing posture with use of quickmove   Pt  Performs supine b/l le a-aarom x 10-15 reps to tolerance  Pt  Is requiring assistance to perform heel slides to b/l le's    Pt with limited ROm to b/l le's for hip abduction due to 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  Pt  Continues to require max assist to scoot to EOB and encouragement to use ue's for bed mobility and scooting     Functional limitations due to impairments in strength, functional endurance, activiity tolerance, decreased safety, balance, posture, poor weightshifting ability and mobility and decreased ROM  PT remained seated out of bed in chair with chair alarm activated and call bell in reach  The patient's AM-PAC Basic Mobility Inpatient Short Form Low Function Raw Score 17 , Standardized Score is 27 46  A standardized score less 42 9 suggests the patient may benefit from discharge to post-acute rehab services  Please also refer to the recommendation of the Physical Therapist for safe discharge planning   Pt continues to be functioning below baseline level, and remains limited 2* factors listed above and including decreased strength, endurance & safe functional mobility   STR recommended at d/c in order to maximize functional outcomes and mobility  Goals   Patient Goals " I want to be able to transer from the bed to the chair or the w/c "     [facility] Expiration Date 07/14/21   PT Treatment Day 3   Plan   Treatment/Interventions Functional transfer training;LE strengthening/ROM; Therapeutic exercise; Endurance training;Patient/family training;Equipment eval/education; Bed mobility;Spoke to nursing;OT   Progress Slow progress, decreased activity tolerance   PT Frequency Other (Comment)  (3-5x/ week)   Recommendation   PT Discharge Recommendation Post acute rehabilitation services   PT - OK to Discharge Yes   AM-PAC Basic Mobility Inpatient   Turning in Bed Without Bedrails 2   Lying on Back to Sitting on Edge of Flat Bed 2   Moving Bed to Chair 1   Standing Up From Chair 2   Walk in Room 1   Climb 3-5 Stairs 1   Basic Mobility Inpatient Raw Score 9   Turning Head Towards Sound 4   Follow Simple Instructions 4   Low Function Basic Mobility Raw Score 17   Low Function Basic Mobility Standardized Score 27 46        07/07/21 1037   Note Type   Note Type Treatment   Pain Assessment   Pain Assessment Tool Pain Assessment not indicated - pt denies pain   Pain Score No Pain   Restrictions/Precautions   Other Precautions Chair Alarm; Bed Alarm;Cognitive; Fall Risk  (ratna spo2 96%, hr 68 bpm on ra)   General   Chart Reviewed Yes   Family/Caregiver Present No   Cognition   Overall Cognitive Status WFL   Arousal/Participation Alert; Responsive; Cooperative   Attention Attends with cues to redirect   Orientation Level Oriented X4   Memory Decreased recall of precautions;Decreased recall of recent events;Decreased short term memory   Following Commands Follows one step commands without difficulty   Subjective   Subjective " I am Ok, I think,  I don't know "     Bed Mobility   Supine to Sit 2  Maximal assistance   Additional items Assist x 2; Increased time required;Verbal cues;LE management;HOB elevated   Transfers   Sit to Stand 2  Maximal assistance   Additional items Assist x 2; Increased time required;Verbal cues  (with use of rw)   Stand to Sit 3  Moderate assistance   Additional items Assist x 2; Increased time required;Verbal cues  (with use of Rw)   Other 4  Minimal assistance   Additional items Assist x 2; Increased time required;Verbal cues  (with use of quickmove   )   Additional Comments Pt performes sit to stand transfers with use of rw with max assist x2, static standing wtih Rw with max assist x2 x 2 minutes working on upright posture, ue wieghtbearing, weighthshifting, standign tolerance, x2 minutes, and sit to stand trials with use of quick move with min assist x2, static standing activities with support of quickmove and min assist x2 x4 minutes and 1 minute x1  Pt unable to weightshit with use of rw to take steps to chair       Balance   Static Sitting Fair +  (eob)   Static Standing Poor  (with rw and fair (-) with quickmove  )   Dynamic Standing Poor   Ambulatory Zero Endurance Deficit   Endurance Deficit Yes   Endurance Deficit Description fatigue   Activity Tolerance   Activity Tolerance Patient limited by fatigue   Medical Staff Made Aware ot   Nurse Made Aware [place] SMALL Nails   Exercises   Quad Sets Supine;15 reps;Bilateral   Heelslides Supine;10 reps;AAROM; Bilateral   Glute Sets Supine;15 reps;Bilateral   Knee AROM Short Arc Quad Supine;15 reps;AROM; Bilateral   Ankle Pumps Supine;15 reps;AROM; Bilateral   Balance training  sitting in longsittingwith support and without support x 10 minutes   Assessment   Prognosis Fair   Problem List Decreased strength;Decreased range of motion;Decreased endurance; Impaired balance;Decreased mobility;Obesity; Decreased skin integrity   Assessment Pt  rec'd supine in bed, pt  offers no c/o pain  Agreeable to PT   Pt  Seen for PT treatment interventions focusing on bed mobility, transfers, static standing balance and tolerance activities, le ther x rom exercises   Pt  Is requiring max assist x2 for supine to sit and sit to stand transfers, with use of rw and min assist x2 with use of quick move   Pt performed static standing trials for increased weightbearing activity to le's for strengthening, increased standing tolerance for activities of daily living and weighshfting in order to take and advance le for transfers from bed to chair   Pt requires max assist x2 for static standing with use of rw with cues for improved and upright standing posture and hand placement on walker  Pt demonstrates difficulty performing weightshifting and inability to advance le's to take steps to chair   Overall nOted improved  standing tolerance 2 minutes with Rw and 4 minutes x1 and 1 minute x1 with use of quick move  Overall less assistance for sit to stand, static standing and improved standing posture with use of quickmove   Pt  Performs supine b/l le a-aarom x 10-15 reps to tolerance  Pt  Is requiring assistance to perform heel slides to b/l le's    Pt with limited ROm to b/l le's for hip abduction due to 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  Pt  Continues to require max assist to scoot to EOB and encouragement to use ue's for bed mobility and scooting     Functional limitations due to impairments in strength, functional endurance, activiity tolerance, decreased safety, balance, posture, poor weightshifting ability and mobility and decreased ROM  PT remained seated out of bed in chair with chair alarm activated and call bell in reach  The patient's AM-PAC Basic Mobility Inpatient Short Form Low Function Raw Score 17 , Standardized Score is 27 46  A standardized score less 42 9 suggests the patient may benefit from discharge to post-acute rehab services  Please also refer to the recommendation of the Physical Therapist for safe discharge planning   Pt continues to be functioning below baseline level, and remains limited 2* factors listed above and including decreased strength, endurance & safe functional mobility   STR recommended at d/c in order to maximize functional outcomes and mobility  Goals   Patient Goals " I want to be able to transer from the bed to the chair or the w/c "     [facility] Expiration Date 07/14/21   PT Treatment Day 3   Plan   Treatment/Interventions Functional transfer training;LE strengthening/ROM; Therapeutic exercise; Endurance training;Patient/family training;Equipment eval/education; Bed mobility;Spoke to nursing;OT   Progress Slow progress, decreased activity tolerance   PT Frequency Other (Comment)  (3-5x/ week)   Recommendation   PT Discharge Recommendation Post acute rehabilitation services   PT - OK to Discharge Yes   AM-PAC Basic Mobility Inpatient   Turning in Bed Without Bedrails 2   Lying on Back to Sitting on Edge of Flat Bed 2   Moving Bed to Chair 1   Standing Up From Chair 2   Walk in Room 1   Climb 3-5 Stairs 1   Basic Mobility Inpatient Raw Score 9   Turning Head Towards Sound 4   Follow Simple Instructions 4   Low Function Basic Mobility Raw Score 17   Low Function Basic Mobility Standardized Score 27 46       Jay Christina, ARMANDO

## 2021-07-07 NOTE — OCCUPATIONAL THERAPY NOTE
Occupational Therapy Treatment Note:         07/07/21 1140   OT Last Visit   OT Visit Date 07/07/21   Note Type   Note Type Treatment   Restrictions/Precautions   Weight Bearing Precautions Per Order No   Other Precautions Fall Risk;Pain;Cognitive; Bed Alarm; Chair Alarm   Pain Assessment   Pain Assessment Tool 0-10   Pain Score No Pain   Pain Location/Orientation Orientation: Mid;Location: Neck   ADL   Where Assessed Edge of bed   Grooming Assistance 5  Supervision/Setup   Grooming Deficit Setup;Verbal cueing;Supervision/safety; Increased time to complete   LB Bathing Assistance 2  Maximal Assistance   LB Bathing Deficit Setup;Steadying;Supervision/safety;Verbal cueing; Increased time to complete; Buttocks; Perineal area;Right lower leg including foot; Left lower leg including foot   LB Bathing Comments Pt reports having increased A at her facility with LE bathing/dressing  LB Dressing Assistance 2  Maximal Assistance   LB Dressing Deficit Setup;Steadying; Requires assistive device for steadying;Verbal cueing; Increased time to complete;Supervision/safety; Don/doff L sock; Don/doff R sock;Pull up over hips   LB Dressing Comments Pt unable to simulate clothing management due to limited stand tolerance and increased weakness  Toileting Assistance  Unable to assess   Functional Standing Tolerance   Time 4 mins   Activity static stand balance activitiy  Comments with use of quick move device  Bed Mobility   Supine to Sit 2  Maximal assistance   Additional items Assist x 2;Bedrails; Increased time required;Verbal cues;LE management   Additional Comments increased A    Transfers   Sit to Stand 2  Maximal assistance   Additional items Assist x 2;Bedrails; Increased time required;Verbal cues   Stand to Sit 3  Moderate assistance   Additional items Assist x 2;Bedrails; Increased time required;Verbal cues   Stand pivot Unable to assess  (quick move device used due to limited ability to advance ft )   Additional items Increased time required;Verbal cues;Armrests; Bedrails   Other 4  Minimal assistance   Additional items Assist x 2;Armrests; Bedrails; Increased time required;Verbal cues   Additional Comments Pt with improvement for sit to stand at quick move device  Therapeutic Exercise - ROM   UE-ROM Yes   ROM- Right Upper Extremities   R Shoulder AROM   R Elbow AROM   R Wrist AROM   R Position Seated;Supine   R Weight/Reps/Sets 3 sets of 10 reps  with focus on gentle stretch in neck with head turning and holding with in Pt's comfot levels due to arthritis  RUE ROM Comment Pt able to complete activities mirroring staff's image while long sitting in bed with enocuragement to complete trunk flex/exten exercises with support of bed rails  ROM - Left Upper Extremities    L Shoulder AROM   L Elbow AROM   L Wrist AROM   L Position Seated;Supine   L Weight/Reps/Sets 3 sets of 10 reps  LUE ROM Comment Pt able to complete activities mirroring staff's image while long sitting in bed with enocuragement to complete trunk flex/exten exercises with support of bed rails  Cognition   Overall Cognitive Status Impaired   Arousal/Participation Alert; Responsive; Cooperative   Attention Attends with cues to redirect   Orientation Level Oriented X4   Memory Decreased short term memory;Decreased recall of recent events;Decreased recall of precautions   Following Commands Follows one step commands without difficulty   Comments Pt able to make her needs known this tx session  Improved carry over of reviewd techs  Additional Activities   Additional Activities Other (Comment)  (reviewed compensatory breathing techs  )   Additional Activities Comments Pt able to return demonstration with good carry over  Activity Tolerance   Activity Tolerance Patient limited by fatigue   Medical Staff Made Aware reported all findings to nursing staff  Recommended use of quick move device to return Pt to supine positioning with A x2      Assessment   Assessment Pt was seen for skilled OT with focus on completion of self care tasks, bed mobility, functional transfers, review of comp breathing techs, BUE ROM exercises, trunk flexion/extension exercises and review of current plan of care  Pt agreeable to repositioning of bed as chair with focus on use of bed rails to support set pulling up to forward flex trunk with gentle stretch encouraged holding for 3 counts for 3 sets of 10 reps  Pt able to tolerate completion of BUE ROM exercises and neck ROM exercises due to noted stiffness  Pt completed 3 sets of 10 reps in all planes this tx session while long sitting  Max A x2 to achieve EOB positioning  Pt able to achieve safe sitting balance once stabilized with support of bed rail  Pt able to tolerate trial of sit to stand [place] [facility] x2 required  Pt unable to achieve upright positioning at  and unable to advance BLE with appropriate weight shift due to adducted positioning of BLE/hip tightness  See above levels of A required for all functional tasks  Pt able to tolerate use of quick move device with improved stand tolerance at 4 mins with 3rd trial  Pt agreeable to OOB positioning in bedside chair with use of quick move device  Chair alarm activated upon termination of tx session  All findings reported to nursing staff  The patient's raw score on the AM-PAC Daily Activity inpatient short form is 12, standardized score is 30 6, less than 39 4  Patients at this level are likely to benefit from discharge to post-acute rehabilitation services  Please refer to the recommendation of the Occupational Therapist for safe discharge planning  Plan   Treatment Interventions ADL retraining;Functional transfer training;UE strengthening/ROM; Endurance training;Cognitive reorientation;Patient/family training   Goal Expiration Date 07/17/21   OT Treatment Day 3   OT Frequency 2-3x/wk   Recommendation   OT Discharge Recommendation Post acute rehabilitation services   OT - OK to Discharge Yes  (when medically cleared   )   AM-PAC Daily Activity Inpatient   Lower Body Dressing 1   Bathing 2   Toileting 1   Upper Body Dressing 2   Grooming 3   Eating 3   Daily Activity Raw Score 12   Daily Activity Standardized Score (Calc for Raw Score >=11) 30 6   AM-PAC Applied Cognition Inpatient   Following a Speech/Presentation 4   Understanding Ordinary Conversation 4   Taking Medications 2   Remembering Where Things Are Placed or Put Away 3   Remembering List of 4-5 Errands 2   Taking Care of Complicated Tasks 2   Applied Cognition Raw Score 17   Applied Cognition Standardized Score 36 52   Charna Olszewski, [address]

## 2021-07-07 NOTE — WOUND OSTOMY CARE
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        2  Right lateral leg partial thickness wound         Heels intact    See flowsheets for details    Wound Care Plan:   1-Hydraguard lotion to bilateral heels twice daily  2-Elevate lower extremities as often as possible  Ensure heels float off of bed/chair surface to offload pressure  3-Offloading air cushion in chair when out of bed  4-Moisturize skin daily with skin nourishing cream   5-Turn/reposition every 2 hours while in bed, or when medically stable, using positioning wedges; and weight shift frequently while in chair for pressure re-distribution on skin  6-Sacrum--cleanse with soap and water, pat dry  Apply Calazime paste to open area along gluteal cleft and Hydraguard lotion to bilateral buttocks and intact skin on sacrum TID and PRN with incontinence care  7-Right leg--cleanse with saline, pat dry  Apply adaptic to then  maxorb to wound bed, Cover with ABD and wrap with angy  Change dressing every other day  Call or tigertext with any questions  Wound Care will continue to follow    Wound 06/28/21 Pretibial Right;Distal;Lateral (Active)   Wound Image   07/07/21 1139   Wound Description Clean;Bleeding;Fragile;Light purple;Pink 07/07/21 1139   Melanie-wound Assessment Clean;Dry; Intact;Fragile; Purple 07/07/21 1139   Wound Length (cm) 2 2 cm 07/07/21 1139   Wound Width (cm) 0 7 cm 07/07/21 1139   Wound Depth (cm) 0 1 cm 07/07/21 1139   Wound Surface Area (cm^2) 1 54 cm^2 07/07/21 1139   Wound Volume (cm^3) 0 154 cm^3 07/07/21 1139   Calculated Wound Volume (cm^3) 0 15 cm^3 07/07/21 1139   Change in Wound Size % 16 67 07/07/21 1139   Drainage Amount Small 07/07/21 1139   Drainage Description Bloody 07/07/21 1139   Non-staged Wound Description Partial thickness 07/07/21 1139   Treatments Cleansed 07/07/21 1139   Dressing Non adherent;Calcium Alginate;ABD 07/07/21 1139   Dressing Changed Changed 07/06/21 1047   Patient Tolerance Tolerated well 07/06/21 1047   Dressing Status Clean;Dry; Intact 07/07/21 0800       Wound 06/30/21 MASD Sacrum (Active)   Wound Image   07/07/21 1132   Wound Description Fragile;Pink;Slough; Yellow 07/07/21 1132   Melanie-wound Assessment Clean;Dry; Intact;Fragile;Pink 07/07/21 1132   Wound Length (cm) 9 cm 07/07/21 1132   Wound Width (cm) 0 5 cm 07/07/21 1132   Wound Depth (cm) 0 1 cm 07/07/21 1132   Wound Surface Area (cm^2) 4 5 cm^2 07/07/21 1132   Wound Volume (cm^3) 0 45 cm^3 07/07/21 1132   Calculated Wound Volume (cm^3) 0 45 cm^3 07/07/21 1132   Change in Wound Size % -60 71 07/07/21 1132   Drainage Amount None 07/06/21 2135   Non-staged Wound Description Partial thickness 07/07/21 1132   Treatments Cleansed 07/07/21 1132   Dressing Protective barrier 07/07/21 1132   Dressing Changed Changed 07/04/21 1015   Patient Tolerance Tolerated well 07/04/21 1015

## 2021-07-07 NOTE — CASE MANAGEMENT
Patient's insurance denied STR at [facility]  An appeal can be done by calling [phone] reference number [id number] deadline 4:30pm today  CM made SLIM aware  [facility] until next week  CM will continue to follow

## 2021-07-07 NOTE — PLAN OF CARE
Problem: MOBILITY - ADULT  Goal: Maintain or return to baseline ADL function  Description: INTERVENTIONS:  -  Assess patient's ability to carry out ADLs; assess patient's baseline for ADL function and identify physical deficits which impact ability to perform ADLs (bathing, care of mouth/teeth, toileting, grooming, dressing, etc )  - Assess/evaluate cause of self-care deficits   - Assess range of motion  - Assess patient's mobility; develop plan if impaired  - Assess patient's need for assistive devices and provide as appropriate  - Encourage maximum independence but intervene and supervise when necessary  - Involve family in performance of ADLs  - Assess for home care needs following discharge   - Consider OT consult to assist with ADL evaluation and planning for discharge  - Provide patient education as appropriate  Outcome: Progressing  Goal: Maintains/Returns to pre admission functional level  Description: INTERVENTIONS:  - Perform BMAT or MOVE assessment daily    - Set and communicate daily mobility goal to care team and patient/family/caregiver  - Collaborate with rehabilitation services on mobility goals if consulted  - Perform Range of Motion  times a day  - Reposition patient every  hours    - Dangle patient  times a day  - Stand patient  times a day  - Ambulate patient  times a day  - Out of bed to chair  times a day   - Out of bed for meals  times a day  - Out of bed for toileting  - Record patient progress and toleration of activity level   Outcome: Progressing     Problem: Prexisting or High Potential for Compromised Skin Integrity  Goal: Skin integrity is maintained or improved  Description: INTERVENTIONS:  - Identify patients at risk for skin breakdown  - Assess and monitor skin integrity  - Assess and monitor nutrition and hydration status  - Monitor labs   - Assess for incontinence   - Turn and reposition patient  - Assist with mobility/ambulation  - Relieve pressure over bony prominences  - Avoid friction and shearing  - Provide appropriate hygiene as needed including keeping skin clean and dry  - Evaluate need for skin moisturizer/barrier cream  - Collaborate with interdisciplinary team   - Patient/family teaching  - Consider wound care consult   Outcome: Progressing     Problem: Nutrition/Hydration-ADULT  Goal: Nutrient/Hydration intake appropriate for improving, restoring or maintaining nutritional needs  Description: Monitor and assess patient's nutrition/hydration status for malnutrition  Collaborate with interdisciplinary team and initiate plan and interventions as ordered  Monitor patient's weight and dietary intake as ordered or per policy  Utilize nutrition screening tool and intervene as necessary  Determine patient's food preferences and provide high-protein, high-caloric foods as appropriate       INTERVENTIONS:  - Monitor oral intake, urinary output, labs, and treatment plans  - Assess nutrition and hydration status and recommend course of action  - Evaluate amount of meals eaten  - Assist patient with eating if necessary   - Allow adequate time for meals  - Recommend/ encourage appropriate diets, oral nutritional supplements, and vitamin/mineral supplements  - Order, calculate, and assess calorie counts as needed  - Recommend, monitor, and adjust tube feedings and TPN/PPN based on assessed needs  - Assess need for intravenous fluids  - Provide specific nutrition/hydration education as appropriate  - Include patient/family/caregiver in decisions related to nutrition  Outcome: Progressing     Problem: PAIN - ADULT  Goal: Verbalizes/displays adequate comfort level or baseline comfort level  Description: Interventions:  - Encourage patient to monitor pain and request assistance  - Assess pain using appropriate pain scale  - Administer analgesics based on type and severity of pain and evaluate response  - Implement non-pharmacological measures as appropriate and evaluate response  - Consider cultural and social influences on pain and pain management  - Notify physician/advanced practitioner if interventions unsuccessful or patient reports new pain  Outcome: Progressing     Problem: INFECTION - ADULT  Goal: Absence or prevention of progression during hospitalization  Description: INTERVENTIONS:  - Assess and monitor for signs and symptoms of infection  - Monitor lab/diagnostic results  - Monitor all insertion sites, i e  indwelling lines, tubes, and drains  - Monitor endotracheal if appropriate and nasal secretions for changes in amount and color  - [place] appropriate cooling/warming therapies per order  - Administer medications as ordered  - Instruct and encourage patient and family to use good hand hygiene technique  - Identify and instruct in appropriate isolation precautions for identified infection/condition  Outcome: Progressing  Goal: Absence of fever/infection during neutropenic period  Description: INTERVENTIONS:  - Monitor WBC    Outcome: Progressing     Problem: SAFETY ADULT  Goal: Maintain or return to baseline ADL function  Description: INTERVENTIONS:  -  Assess patient's ability to carry out ADLs; assess patient's baseline for ADL function and identify physical deficits which impact ability to perform ADLs (bathing, care of mouth/teeth, toileting, grooming, dressing, etc )  - Assess/evaluate cause of self-care deficits   - Assess range of motion  - Assess patient's mobility; develop plan if impaired  - Assess patient's need for assistive devices and provide as appropriate  - Encourage maximum independence but intervene and supervise when necessary  - Involve family in performance of ADLs  - Assess for home care needs following discharge   - Consider OT consult to assist with ADL evaluation and planning for discharge  - Provide patient education as appropriate  Outcome: Progressing  Goal: Maintains/Returns to pre admission functional level  Description: INTERVENTIONS:  - Perform BMAT or MOVE assessment daily    - Set and communicate daily mobility goal to care team and patient/family/caregiver  - Collaborate with rehabilitation services on mobility goals if consulted  - Perform Range of Motion times a day  - Reposition patient every  hours    - Dangle patient  times a day  - Stand patient  times a day  - Ambulate patient  times a day  - Out of bed to chair  times a day   - Out of bed for meals  times a day  - Out of bed for toileting  - Record patient progress and toleration of activity level   Outcome: Progressing  Goal: Patient will remain free of falls  Description: INTERVENTIONS:  - Educate patient/family on patient safety including physical limitations  - Instruct patient to call for assistance with activity   - Consult OT/PT to assist with strengthening/mobility   - Keep Call bell within reach  - Keep bed low and locked with side rails adjusted as appropriate  - Keep care items and personal belongings within reach  - Initiate and maintain comfort rounds  - Make Fall Risk Sign visible to staff  - Offer Toileting every Hours, in advance of need  - Initiate/Maintain alarm  - Obtain necessary fall risk management equipment:   - Apply yellow socks and bracelet for high fall risk patients  - Consider moving patient to room near nurses station  Outcome: Progressing     Problem: DISCHARGE PLANNING  Goal: Discharge to home or other facility with appropriate resources  Description: INTERVENTIONS:  - Identify barriers to discharge w/patient and caregiver  - Arrange for needed discharge resources and transportation as appropriate  - Identify discharge learning needs (meds, wound care, etc )  - Arrange for interpretive services to assist at discharge as needed  - Refer to Case Management Department for coordinating discharge planning if the patient needs post-hospital services based on physician/advanced practitioner order or complex needs related to functional status, cognitive ability, or social support system  Outcome: Progressing     Problem: Knowledge Deficit  Goal: Patient/family/caregiver demonstrates understanding of disease process, treatment plan, medications, and discharge instructions  Description: Complete learning assessment and assess knowledge base    Interventions:  - Provide teaching at level of understanding  - Provide teaching via preferred learning methods  Outcome: Progressing     Problem: SKIN/TISSUE INTEGRITY - ADULT  Goal: Skin Integrity remains intact(Skin Breakdown Prevention)  Description: Assess:  -Perform Dionte assessment every   -Clean and moisturize skin every   -Inspect skin when repositioning, toileting, and assisting with ADLS  -Assess under medical devices such as  every   -Assess extremities for adequate circulation and sensation     Bed Management:  -Have minimal linens on bed & keep smooth, unwrinkled  -Change linens as needed when moist or perspiring  -Avoid sitting or lying in one position for more than  hours while in bed  -Keep HOB at degrees     Toileting:  -Offer bedside commode  -Assess for incontinence every   -Use incontinent care products after each incontinent episode such as     Activity:  -Mobilize patient  times a day  -Encourage activity and walks on unit  -Encourage or provide ROM exercises   -Turn and reposition patient every  Hours  -Use appropriate equipment to lift or move patient in bed  -Instruct/ Assist with weight shifting every  when out of bed in chair  -Consider limitation of chair time  hour intervals    Skin Care:  -Avoid use of baby powder, tape, friction and shearing, hot water or constrictive clothing  -Relieve pressure over bony prominences using   -Do not massage red bony areas    Next Steps:  -Teach patient strategies to minimize risks such as    -Consider consults to  interdisciplinary teams such as   Outcome: Progressing  Goal: Incision(s), wounds(s) or drain site(s) healing without S/S of infection  Description: INTERVENTIONS  - Assess and document dressing, incision, wound bed, drain sites and surrounding tissue  - Provide patient and family education  - Perform skin care/dressing changes every   Outcome: Progressing     Problem: Potential for Falls  Goal: Patient will remain free of falls  Description: INTERVENTIONS:  - Educate patient/family on patient safety including physical limitations  - Instruct patient to call for assistance with activity   - Consult OT/PT to assist with strengthening/mobility   - Keep Call bell within reach  - Keep bed low and locked with side rails adjusted as appropriate  - Keep care items and personal belongings within reach  - Initiate and maintain comfort rounds  - Make Fall Risk Sign visible to staff  - Offer Toileting every  Hours, in advance of need  - Initiate/Maintain alarm  - Obtain necessary fall risk management equipment:   - Apply yellow socks and bracelet for high fall risk patients  - Consider moving patient to room near nurses station  Outcome: Progressing

## 2021-07-08 NOTE — TRANSPORTATION MEDICAL NECESSITY
Section I - General Information    Name of Patient: Alexsander Cardona                 : 1933    Medicare #: [id number]  Transport Date: 21 (PCS is valid for round trips on this date and for all repetitive trips in the 60-day range as noted below )  Origin: [facility]                                                         Destination: [facility]  Is the pt's stay covered under Medicare Part A (PPS/DRG)   [x]     Closest appropriate facility? If no, why is transport to more distant facility required? Yes  If hospice pt, is this transport related to pt's terminal illness? NA       Section II - Medical Necessity Questionnaire  Ambulance transportation is medically necessary only if other means of transport are contraindicated or would be potentially harmful to the patient  To meet this requirement, the patient must either be "bed confined" or suffer from a condition such that transport by means other than ambulance is contraindicated by the patient's condition  The following questions must be answered by the medical professional signing below for this form to be valid:    1)  Describe the MEDICAL CONDITION (physical and/or mental) of this patient AT [address] [place] [address] that requires the patient to be transported in an ambulance and why transport by other means is contraindicated by the patient's condition: Bed bound, maximum and moderate assist, Decreased short term memory;Decreased recall of recent events;Decreased recall of precautions    2) Is the patient "bed confined" as defined below? Yes  To be "be confined" the patient must satisfy all three of the following conditions: (1) unable to get up from bed without Assistance; AND (2) unable to ambulate; AND (3) unable to sit in a chair or wheelchair  3) Can this patient safely be transported by car or wheelchair van (i e , seated during transport without a medical attendant or monitoring)?    No    4) In addition to completing questions 1-3 above, please check any of the following conditions that apply*:   *Note: supporting documentation for any boxes checked must be maintained in the patient's medical records  If hosp-hosp transfer, describe services needed at 2nd facility not available at 1st facility? Patient is confused  Medical attendant required   Unable to tolerate seated position for time needed to transport   Unable to sit in a chair or wheelchair due to decubitus ulcers or other wounds   Morbid obesity requires additional personnel/equipment to safely handle patient       Section III - Signature of Physician or Healthcare Professional  I certify that the above information is true and correct based on my evaluation of this patient, and represent that the patient requires transport by ambulance and that other forms of transport are contraindicated  I understand that this information will be used by the Centers for Medicare and Medicaid Services (CMS) to support the determination of medical necessity for ambulance services, and I represent that I have personal knowledge of the patient's condition at time of transport  []  If this box is checked, I also certify that the patient is physically or mentally incapable of signing the ambulance service's claim and that the institution with which I am affiliated has furnished care, services, or assistance to the patient  My signature below is made on behalf of the patient pursuant to 42 CFR §424 36(b)(4)  In accordance with 42 CFR §424 37, the specific reason(s) that the patient is physically or mentally incapable of signing the claim form is as follows:  Sheri Rojas Physician* or [address] [place] [address]____________________________________________________________  Signature Date 07/08/21 (For scheduled repetitive transports, this form is not valid for transports performed more than 60 days after this date)    Printed Name & Credentials of Physician or Healthcare Professional (MD, DO, RN, etc )_Mirian HUNT _______________________________  *Form must be signed by patient's attending physician for scheduled, repetitive transports   For non-repetitive, unscheduled ambulance transports, if unable to obtain the signature of the attending physician, any of the following may sign (choose appropriate option below)  [] Physician Assistant []  Clinical Nurse Specialist [x]  Registered Nurse  []  Nurse Practitioner  [x] Discharge Planner

## 2021-07-08 NOTE — CASE MANAGEMENT
Patient's insurance denied STR at [facility]  The appeal was also denied  [facility] has initiated auth 7/7/21  Medicare IMM form signed 7/7/21  CM will continue to follow  Update:    Auth obtained at [facility]  Patient has 5:30 pm pickup in BLS with [facility] PHONE NUMBER [phone]  CMN transport sheet in paper chart  Facility contacts entered in [facility] and RN at bedside aware of arrangements

## 2021-07-08 NOTE — PROGRESS NOTES
Attempted to call report to [facility] 4 times within an hour with no answer  Will leave transport team with a call back number

## 2021-07-09 NOTE — DISCHARGE SUMMARY
[address] [facility]  Discharge- Tony Meyer 1933, 80 y o  female MRN: [id number]  Unit/Bed#: E5 -01 Encounter: [id number]  Primary Care Provider: No primary care provider on file  Date and time admitted to hospital: 6/28/2021 11:37 AM    Chronic diastolic heart failure (HCC)  Assessment & Plan  Wt Readings from Last 3 Encounters:   06/28/21 115 kg (253 lb 15 5 oz)   10/09/20 114 kg (251 lb 12 3 oz)   07/01/19 103 kg (226 lb 6 6 oz)     · Resume home dose of lasix 80 mg every other day      Paroxysmal atrial fibrillation (HCC)  Assessment & Plan  · Continue Eliquis for anticoagulation    ANSELMO (acute kidney injury) ([address] )  Assessment & Plan  · Now resolved    Morbid (severe) obesity due to excess calories ([address] )  Assessment & Plan  · Can benefit from diet and lifestyle modifications    Hypertension  Assessment & Plan  · Continue Norvasc and clonidine        Discharge Summary - [facility]  Internal Medicine    Patient Information: Tony Meyer 80 y o  female MRN: [id number]  Unit/Bed#: E5 -01 Encounter: [id number]    Discharging Physician / Practitioner: Sirena Danielle MD  PCP: No primary care provider on file  Admission Date: 6/28/2021  Discharge Date: 07/08/21    Reason for Admission: cellulitis    Discharge Diagnoses:     Principal Problem:    Cellulitis of lower extremity  Active Problems:    Hypertension    Morbid (severe) obesity due to excess calories (HCC)    Hyperlipidemia    ANSELMO (acute kidney injury) ([address] )    Paroxysmal atrial fibrillation (HCC)    Chronic diastolic heart failure (HCC)    Nonrheumatic aortic valve stenosis  Resolved Problems:    * No resolved hospital problems  *      Consultations During Hospital Stay:  · Wound care    Procedures Performed:     · none    Significant Findings / Test Results:     X ray left fibula: Healed left ankle fracture, status post ORIF    Lucency noted around the superior syndesmotic screw but otherwise hardware appears intact  Prior arthrodesis of the right 1st TMT joint      Moderate degenerative changes in the left ankle      Large plantar calcaneal spurs      No acute osseous abnormalities  X ray right fibula:   Healed left ankle fracture, status post ORIF  Lucency noted around the superior syndesmotic screw but otherwise hardware appears intact  Prior arthrodesis of the right 1st TMT joint      Moderate degenerative changes in the left ankle      Large plantar calcaneal spurs      No acute osseous abnormalities    Incidental Findings:   · As above    Test Results Pending at Discharge (will require follow up): · None     Outpatient Tests Requested:  · None    Complications:  No    Hospital Course:     Yeni Shin is a 80 y o  female patient who originally presented to the hospital on 6/28/2021 due to bilateral leg wounds  Patient has history of atrial fibrillation on Eliquis, hypertension, hyperlipidemia, CHF, anxiety presented from [facility] for evaluation of bilateral leg wounds  Patient is mainly wheelchair bound  Has been having lower extremity cellulitis for the past several weeks  Was initially placed on Bactrim 06/08/2021 and was taken off this medication due to nausea and vomiting  She was then started on doxycycline 06/23/2021 without improvement  Denies any chest pain, palpitations, dyspnea, fever, chills  During hospital stay, patient had x-rays of bilateral lower extremities done to rule out any deeper infection  It was ruled out  She was treated with Na day course of antibiotics  Her infection has subsided significantly  She did not have any systemic signs of infection, such as fever leukocytosis  Patient continues to be bedbound and will be discharged to the rehabilitation facility at [place] to improve functional status  Condition at Discharge: stable     Discharge Day Visit / Exam:     Subjective:  Roz Connelly was seen and examined this morning   She is doing well, is being changed by nurses and states that she will likely be discharged today  Vitals: Blood Pressure: 141/72 (07/08/21 1522)  Pulse: 79 (07/08/21 1522)  Temperature: 98 1 °F (36 7 °C) (07/08/21 1522)  Temp Source: Oral (07/07/21 1503)  Respirations: 18 (07/08/21 0835)  Height: 5' 3" (160 cm) (06/29/21 1032)  Weight - Scale: 115 kg (253 lb 15 5 oz) (06/28/21 1145)  SpO2: 97 % (07/08/21 1522)  Exam:   Physical Exam  Constitutional:       Appearance: She is well-developed  She is obese  Cardiovascular:      Rate and Rhythm: Normal rate and regular rhythm  Pulmonary:      Effort: Pulmonary effort is normal       Breath sounds: Normal breath sounds  Abdominal:      General: There is no distension  Palpations: Abdomen is soft  Skin:     General: Skin is warm  Findings: Lesion (dressed right lower extremity; chronic left leg discoloration) present  No erythema  Neurological:      Mental Status: She is alert  Psychiatric:         Behavior: Behavior normal           Discussion with Family: daughter Luis Lopez    Discharge instructions/Information to patient and family:   See after visit summary for information provided to patient and family  Provisions for Follow-Up Care:  See after visit summary for information related to follow-up care and any pertinent home health orders  Disposition:     Home with VNA Services (Reminder: Complete face to face encounter)    For Discharges to [facility]:   · Not Applicable to this Patient - Not Applicable to this Patient    Planned Readmission: no     Discharge Statement:  I spent 40 minutes discharging the patient  This time was spent on the day of discharge  I had direct contact with the patient on the day of discharge  Greater than 50% of the total time was spent examining patient, answering all patient questions, arranging and discussing plan of care with patient as well as directly providing post-discharge instructions    Additional time then spent on discharge activities  Discharge Medications:  See after visit summary for reconciled discharge medications provided to patient and family        ** Please Note: This note has been constructed using a voice recognition system **

## 2021-07-09 NOTE — UTILIZATION REVIEW
Notification of Discharge   This is a Notification of Discharge from our facility [address]  Please be advised that this patient has been discharge from our facility  Below you will find the admission and discharge date and time including the patients disposition  UTILIZATION REVIEW CONTACT:  Christian Currie  Utilization   Network Utilization Review Department  Phone: [phone] x carefully listen to the prompts  All voicemails are confidential   Email: [email or web address]     PHYSICIAN ADVISORY SERVICES:  FOR [id number] REVIEW - MEDICAL NECESSITY DENIAL  Phone: [phone]  Fax: [phone]  Email: [email or web address]     PRESENTATION DATE: 6/28/2021 11:37 AM  OBERVATION ADMISSION DATE:   INPATIENT ADMISSION DATE: 6/28/21  3:06 PM   DISCHARGE DATE: 7/8/2021  6:03 PM  DISPOSITION: Non [facility] SNF/TCU/SNU Non [facility] SNF/TCU/SNU      IMPORTANT INFORMATION:  Send all requests for admission clinical reviews, approved or denied determinations and any other requests to dedicated fax number below belonging to the campus where the patient is receiving treatment   List of dedicated fax numbers:  [address] [place] [address] DENIALS (Administrative/Medical Necessity) [phone]   [address] [place] [address] (Maternity/NICU/Pediatrics) [phone]   Pablo Galvin [phone]   Duy Zhao [phone]   Lizzy Mckeon [phone]   [facility] [address] [place] [address] [phone]   [facility]  [phone] [address] [facility] [address]  [address] [facility] [address] [facility] [phone]

## 2021-07-22 NOTE — ED PROVIDER NOTES
History  Chief Complaint   Patient presents with    Abdominal Pain     Patient arrives via EMS from [facility]  patient reports abdominal pain, n/v, constipation  per patient, last bm 2/3 days ago but facility reports one day ago  3 episodes of vomiting today  77-year-old female with relevant past medical history significant for atrial fibrillation on Eliquis, CHF, hypertension, hyperlipidemia, arthritis who presents to the emergency department via EMS from [facility] for complaint of lower abdominal pain, constipation, and nausea and vomiting  Patient denies any previous issues with constipation, usually has 1 soft bowel movement daily  She states her last bowel movement was approximately 3 days ago however facility states last bowel movement was 1 day ago  She also endorses 3 episodes of nonbilious nonbloody vomiting  She reports aching lower abdominal pain, nonradiating  Denies any accompanying abdominal bloating, increased belching or flatulence, fever or chills, back pain, hematochezia or melena  She is urinating without issue and denies any UTI symptoms  States facility gave her 3 doses of Milk of mag, as well as an enema with no relief  Endorses rectal discomfort and feeling of "poop that comes out but then goes back in "  Abdominal surgical history is significant for cholecystectomy  Of note, patient is prescribed Duragesic patch  Prior to Admission Medications   Prescriptions Last Dose Informant Patient Reported? Taking?    Calcium Carbonate Antacid (TUMS ULTRA PO)   Yes No   Sig: Take 2 tablets by mouth 2 (two) times a day   Diclofenac Sodium (VOLTAREN) 1 %   Yes No   Sig: Apply 2 g topically 2 (two) times a day   Emollient (Minerin) LOTN   Yes No   Sig: Apply topically 2 (two) times a day   Polyethyl Glycol-Propyl Glycol (SYSTANE) 0 4-0 3 % GEL  Self Yes No   Sig: Apply to eye   Wound Dressings (Triad Hydrophilic Wound Dressi) PSTE   Yes No   Sig: Apply topically as needed   amLODIPine (NORVASC) 5 mg tablet   Yes No   Sig: Take 5 mg by mouth daily   apixaban (ELIQUIS) 5 mg  Self Yes No   Sig: Take 5 mg by mouth 2 (two) times a day   bisacodyl (FLEET) 10 MG/30ML ENEM   Yes No   Sig: Insert 10 mg into the rectum once   busPIRone (BUSPAR) 5 mg tablet   Yes No   Sig: Take 5 mg by mouth 3 (three) times a day   cholecalciferol (VITAMIN D3) 1,000 units tablet   Yes No   Sig: Take 1,000 Units by mouth daily   cloNIDine (CATAPRES) 0 2 mg tablet  Self Yes No   Sig: Take 0 1 mg by mouth 3 (three) times a day     diphenhydrAMINE (BENADRYL) 25 mg tablet   Yes No   Sig: Take 12 5 mg by mouth daily at bedtime   doxycycline hyclate (VIBRAMYCIN) 100 mg capsule   Yes No   Sig: Take 100 mg by mouth every 12 (twelve) hours   furosemide (LASIX) 80 mg tablet   Yes No   Sig: Take 80 mg by mouth 4 (four) times a week   ipratropium (ATROVENT) 0 02 % nebulizer solution  Self Yes No   Sig: Take 0 5 mg by nebulization 4 (four) times a day   levalbuterol (XOPENEX) 1 25 mg/3 mL nebulizer solution  Self Yes No   Sig: Take 1 25 mg by nebulization 3 (three) times a day   melatonin 3 mg  Self Yes No   Sig: Take 3 mg by mouth daily at bedtime   menthol-zinc oxide (Calmoseptine) 0 44-20 6 % OINT   Yes No   Sig: Apply topically as needed   menthol-zinc oxide (Calmoseptine) 0 44-20 6 % OINT   Yes No   Sig: Apply topically 2 (two) times a day   nystatin (MYCOSTATIN) powder  Self Yes No   Sig: Apply topically as needed    polyethylene glycol (GLYCOLAX) powder   No No   Sig: Take 17 g by mouth 4 (four) times a day Take 4 times per day until a satisfying bowel movement and then switch to 1 time per day     senna (SENOKOT) 8 6 MG tablet   Yes No   Sig: Take 1 tablet by mouth 2 (two) times a day as needed for constipation   white petrolatum-mineral oil (EUCERIN,HYDROCERIN) cream  Self Yes No   Sig: Apply topically as needed      Facility-Administered Medications: None       Past Medical History:   Diagnosis Date    Arthritis     Atrial fibrillation ([address] )     CHF (congestive heart failure) (HCC)     Hyperlipidemia     Hypertension     PONV (postoperative nausea and vomiting)     Renal disorder        Past Surgical History:   Procedure Laterality Date    ANKLE FRACTURE SURGERY Left     CHOLECYSTECTOMY      JOINT REPLACEMENT Bilateral     TKR    REPLACEMENT TOTAL KNEE BILATERAL Bilateral        Family History   Problem Relation Age of Onset    Alcohol abuse Neg Hx     Arthritis Neg Hx     Asthma Neg Hx     Birth defects Neg Hx     Cancer Neg Hx     COPD Neg Hx     Depression Neg Hx     Diabetes Neg Hx     Drug abuse Neg Hx     Early death Neg Hx     Hearing loss Neg Hx     Heart disease Neg Hx     Hyperlipidemia Neg Hx     Hypertension Neg Hx     Kidney disease Neg Hx     Learning disabilities Neg Hx     Mental illness Neg Hx     Mental retardation Neg Hx     Miscarriages / Stillbirths Neg Hx     Stroke Neg Hx     Vision loss Neg Hx      I have reviewed and agree with the history as documented  E-Cigarette/Vaping    E-Cigarette Use Never User      E-Cigarette/Vaping Substances    Nicotine No     THC No     CBD No     Flavoring No     Other No     Unknown No      Social History     Tobacco Use    Smoking status: Never Smoker    Smokeless tobacco: Never Used   Vaping Use    Vaping Use: Never used   Substance Use Topics    Alcohol use: Never    Drug use: Never       Review of Systems   Constitutional: Negative for activity change, appetite change, chills, fatigue and fever  Respiratory: Negative for shortness of breath  Cardiovascular: Negative for chest pain  Gastrointestinal: Positive for abdominal pain, constipation, nausea and vomiting  Negative for abdominal distention and blood in stool  Genitourinary: Negative for decreased urine volume, difficulty urinating, dysuria, flank pain, frequency, hematuria and urgency  Musculoskeletal: Negative for back pain     Skin: Negative for color change and rash    Neurological: Negative for dizziness, weakness, light-headedness and headaches  Hematological: Negative for adenopathy  All other systems reviewed and are negative  Physical Exam  Physical Exam  Vitals reviewed  Constitutional:       General: She is awake  She is not in acute distress  Appearance: Normal appearance  She is well-developed  She is not ill-appearing or toxic-appearing  HENT:      Head: Normocephalic and atraumatic  Mouth/Throat:      Lips: Pink  Mouth: Mucous membranes are moist       Pharynx: Oropharynx is clear  Uvula midline  Eyes:      Extraocular Movements: Extraocular movements intact  Conjunctiva/sclera: Conjunctivae normal       Pupils: Pupils are equal, round, and reactive to light  Cardiovascular:      Rate and Rhythm: Normal rate and regular rhythm  Pulses: Normal pulses  Pulmonary:      Effort: Pulmonary effort is normal       Breath sounds: Normal breath sounds and air entry  Abdominal:      General: Bowel sounds are normal  There is no distension  Palpations: Abdomen is soft  There is no hepatomegaly, splenomegaly or mass  Tenderness: There is abdominal tenderness in the suprapubic area  There is no guarding or rebound  Hernia: No hernia is present  Musculoskeletal:         General: Normal range of motion  Cervical back: Full passive range of motion without pain, normal range of motion and neck supple  Skin:     General: Skin is warm  Capillary Refill: Capillary refill takes less than 2 seconds  Findings: No erythema, lesion or rash  Neurological:      Mental Status: She is alert and oriented to person, place, and time  Psychiatric:         Behavior: Behavior is cooperative           Vital Signs  ED Triage Vitals [07/22/21 0146]   Temperature Pulse Respirations Blood Pressure SpO2   98 9 °F (37 2 °C) 81 16 140/83 96 %      Temp Source Heart Rate Source Patient Position - Orthostatic VS BP Location FiO2 (%)   Oral Monitor Lying Right arm --      Pain Score       --           Vitals:    07/22/21 0146 07/22/21 0422 07/22/21 0755   BP: 140/83 123/79 149/79   Pulse: 81 72 74   Patient Position - Orthostatic VS: Lying  Sitting         Visual Acuity      ED Medications  Medications   ondansetron (ZOFRAN) injection 4 mg (4 mg Intravenous Given 7/22/21 0214)   iohexol (OMNIPAQUE) 350 MG/ML injection (SINGLE-DOSE) 100 mL (100 mL Intravenous Given 7/22/21 0238)   amoxicillin-clavulanate (AUGMENTIN) 875-125 mg per tablet 1 tablet (1 tablet Oral Given 7/22/21 0416)   bisacodyl (DULCOLAX) EC tablet 10 mg (10 mg Oral Given 7/22/21 0416)   ondansetron (ZOFRAN-ODT) dispersible tablet 4 mg (4 mg Oral Given 7/22/21 0753)       Diagnostic Studies  Results Reviewed     Procedure Component Value Units Date/Time    Basic metabolic panel [[id number]]  (Abnormal) Collected: 07/22/21 0214    Lab Status: Final result Specimen: Blood from Hand, Left Updated: 07/22/21 0245     Sodium 145 mmol/L      Potassium 3 6 mmol/L      Chloride 103 mmol/L      CO2 34 mmol/L      ANION GAP 8 mmol/L      BUN 17 mg/dL      Creatinine 1 08 mg/dL      Glucose 143 mg/dL      Calcium 9 3 mg/dL      eGFR 46 ml/min/1 73sq m     Narrative:      [place] guidelines for Chronic Kidney Disease (CKD):     Stage 1 with normal or high GFR (GFR > 90 mL/min/1 73 square meters)    Stage 2 Mild CKD (GFR = 60-89 mL/min/1 73 square meters)    Stage 3A Moderate CKD (GFR = 45-59 mL/min/1 73 square meters)    Stage 3B Moderate CKD (GFR = 30-44 mL/min/1 73 square meters)    Stage 4 Severe CKD (GFR = 15-29 mL/min/1 73 square meters)    Stage 5 End Stage CKD (GFR <15 mL/min/1 73 square meters)  Note: GFR calculation is accurate only with a steady state creatinine    Hepatic function panel [[id number]]  (Abnormal) Collected: 07/22/21 0214    Lab Status: Final result Specimen: Blood from Hand, Left Updated: 07/22/21 0245     Total Bilirubin 0 63 mg/dL      Bilirubin, Direct 0 16 mg/dL      Alkaline Phosphatase 121 U/L      AST 18 U/L      ALT 28 U/L      Total Protein 7 5 g/dL      Albumin 3 6 g/dL     CBC and differential [[id number]]  (Abnormal) Collected: 07/22/21 0214    Lab Status: Final result Specimen: Blood from Hand, Left Updated: 07/22/21 0224     WBC 12 64 Thousand/uL      RBC 4 83 Million/uL      Hemoglobin 15 3 g/dL      Hematocrit 45 9 %      MCV 95 fL      MCH 31 7 pg      MCHC 33 3 g/dL      RDW 13 2 %      MPV 8 3 fL      Platelets 696 Thousands/uL      nRBC 0 /100 WBCs      Neutrophils Relative 85 %      Immat GRANS % 1 %      Lymphocytes Relative 5 %      Monocytes Relative 8 %      Eosinophils Relative 0 %      Basophils Relative 1 %      Neutrophils Absolute 10 80 Thousands/µL      Immature Grans Absolute 0 08 Thousand/uL      Lymphocytes Absolute 0 63 Thousands/µL      Monocytes Absolute 1 03 Thousand/µL      Eosinophils Absolute 0 04 Thousand/µL      Basophils Absolute 0 06 Thousands/µL                  CT abdomen pelvis with contrast   Final Result by Jackie Voss MD (07/22 3048)      Inflammatory change involving a segment of distal descending colon/sigmoid colon likely representing colitis or given corresponding presence of diverticula, acute uncomplicated diverticulitis  Follow-up with gastroenterology recommended  Workstation performed: [id number]                    Procedures  Procedures         ED Course  ED Course as of Jul 22 1916   Thu Jul 22, 2021   0350 CT showing inflammatory changes in distal sigmoid colon with presence of diverticula  Mild leukocytosis  Tolerating oral intake  Good candidate for outpatient treatment  Will treat as acute diverticulitis with Augmentin  Diet and stool softener discussed  SBIRT 22yo+      Most Recent Value   SBIRT (22 yo +)   In order to provide better care to our patients, we are screening all of our patients for alcohol and drug use   Would it be okay to ask you these screening questions? Yes Filed at: 07/22/2021 0447   Initial Alcohol Screen: US AUDIT-C    1  How often do you have a drink containing alcohol?  0 Filed at: 07/22/2021 0447   2  How many drinks containing alcohol do you have on a typical day you are drinking? 0 Filed at: 07/22/2021 0447   3b  FEMALE Any Age, or MALE 65+: How often do you have 4 or more drinks on one occassion? 0 Filed at: 07/22/2021 0447   Audit-C Score  0 Filed at: 07/22/2021 9516   JOHNY: How many times in the past year have you    Used an illegal drug or used a prescription medication for non-medical reasons? Never Filed at: 07/22/2021 0447                    MDM  Number of Diagnoses or Management Options  Abdominal pain  Diverticulitis  Nausea and vomiting  Diagnosis management comments: On exam, well-appearing female, no acute distress, nontoxic appearance, afebrile, vitals unremarkable, awake alert and oriented, abdomen soft and tender in the suprapubic region, no guarding or other peritoneal signs, no distention, no hepatosplenomegaly, + large umbilical hernia, remainder of exam unremarkable as above  Will proceed with CT abdomen and pelvis to assess for any secondary causes of constipation such as incarcerated hernia, SBO, ileus  If CT is unremarkable and this is primary constipation, will administer laxative regimen here          Amount and/or Complexity of Data Reviewed  Clinical lab tests: reviewed and ordered  Tests in the radiology section of CPT®: ordered and reviewed  Discussion of test results with the performing providers: yes  Decide to obtain previous medical records or to obtain history from someone other than the patient: yes  Obtain history from someone other than the patient: yes  Review and summarize past medical records: yes  Discuss the patient with other providers: yes  Independent visualization of images, tracings, or specimens: yes    Patient Progress  Patient progress: improved (See ED course note for dispo and plan  I reviewed and discussed all lab and imaging findings with the patient at bedside  I discussed emergency department return parameters  I answered any and all questions the patient had regarding emergency department course of evaluation and treatment  The patient verbalized understanding of and agreement with plan   )      Disposition  Final diagnoses:   Diverticulitis   Abdominal pain   Nausea and vomiting     Time reflects when diagnosis was documented in both MDM as applicable and the Disposition within this note     Time User Action Codes Description Comment    7/22/2021  4:29 AM Isabella Anita Add [K57 92] Diverticulitis     7/22/2021  4:29 AM Isabella Anita Add [R10 9] Abdominal pain     7/22/2021  4:29 AM Isabella Anita Add [R11 2] Nausea and vomiting       ED Disposition     ED Disposition Condition Date/Time Comment    Discharge Stable u Jul 22, 2021  4:28 AM Fiona Harrell discharge to home/self care              Follow-up Information     Follow up With Specialties Details Why Contact Info Additional [address] [facility] Emergency Department Emergency Medicine Go to  If symptoms worsen [facility] [id number]  [address] [facility] Emergency Department, [address]  , [place], [address] [facility], [address] [facility] [address] Primary Care Family Medicine Schedule an appointment as soon as possible for a visit in 1 day For further evaluation [address] [facility] [id number] [phone] [facility] [address] [place], [address], [place], [address] [facility], [address]   [address] [facility] Specialists [facility] Schedule an appointment as soon as possible for a visit in 1 week For further evaluation [address]  [facility] [address]  [phone] [facility] Gastroenterology Specialists [facility], [address], [address], [facility], [place], [address] [phone]          Discharge Medication List as of 7/22/2021  4:33 AM      START taking these medications    Details   amoxicillin-clavulanate (AUGMENTIN) 875-125 mg per tablet Take 1 tablet by mouth every 12 (twelve) hours for 10 days, Starting Thu 7/22/2021, Until Sun 8/1/2021, Normal      ondansetron (ZOFRAN-ODT) 4 mg disintegrating tablet Take 1 tablet (4 mg total) by mouth every 6 (six) hours as needed for nausea or vomiting for up to 3 days, Starting u 7/22/2021, Until Sun 7/25/2021 at 2359, Normal         CONTINUE these medications which have NOT CHANGED    Details   amLODIPine (NORVASC) 5 mg tablet Take 5 mg by mouth daily, Historical Med      apixaban (ELIQUIS) 5 mg Take 5 mg by mouth 2 (two) times a day, Historical Med      bisacodyl (FLEET) 10 MG/30ML ENEM Insert 10 mg into the rectum once, Historical Med      busPIRone (BUSPAR) 5 mg tablet Take 5 mg by mouth 3 (three) times a day, Historical Med      Calcium Carbonate Antacid (TUMS ULTRA PO) Take 2 tablets by mouth 2 (two) times a day, Historical Med      cholecalciferol (VITAMIN D3) 1,000 units tablet Take 1,000 Units by mouth daily, Historical Med      cloNIDine (CATAPRES) 0 2 mg tablet Take 0 1 mg by mouth 3 (three) times a day  , Historical Med      Diclofenac Sodium (VOLTAREN) 1 % Apply 2 g topically 2 (two) times a day, Historical Med      diphenhydrAMINE (BENADRYL) 25 mg tablet Take 12 5 mg by mouth daily at bedtime, Historical Med      doxycycline hyclate (VIBRAMYCIN) 100 mg capsule Take 100 mg by mouth every 12 (twelve) hours, Historical Med      Emollient (Minerin) LOTN Apply topically 2 (two) times a day, Historical Med      furosemide (LASIX) 80 mg tablet Take 80 mg by mouth 4 (four) times a week, Historical Med      ipratropium (ATROVENT) 0 02 % nebulizer solution Take 0 5 mg by nebulization 4 (four) times a day, Historical Med      levalbuterol (XOPENEX) 1 25 mg/3 mL nebulizer solution Take 1 25 mg by nebulization 3 (three) times a day, Historical Med      melatonin 3 mg Take 3 mg by mouth daily at bedtime, Historical Med      !! menthol-zinc oxide (Calmoseptine) 0 44-20 6 % OINT Apply topically as needed, Historical Med      !! menthol-zinc oxide (Calmoseptine) 0 44-20 6 % OINT Apply topically 2 (two) times a day, Historical Med      nystatin (MYCOSTATIN) powder Apply topically as needed , Historical Med      Polyethyl Glycol-Propyl Glycol (SYSTANE) 0 4-0 3 % GEL Apply to eye, Historical Med      polyethylene glycol (GLYCOLAX) powder Take 17 g by mouth 4 (four) times a day Take 4 times per day until a satisfying bowel movement and then switch to 1 time per day , Starting Mon 7/1/2019, Print      senna (SENOKOT) 8 6 MG tablet Take 1 tablet by mouth 2 (two) times a day as needed for constipation, Historical Med      white petrolatum-mineral oil (EUCERIN,HYDROCERIN) cream Apply topically as needed, Historical Med      Wound Dressings (Triad Hydrophilic Wound Dressi) PSTE Apply topically as needed, Historical Med       !! - Potential duplicate medications found  Please discuss with provider  No discharge procedures on file      PDMP Review     None          ED Provider  Electronically Signed by           Farrukh Bagley PA-C  07/22/21 1916

## 2021-07-22 NOTE — ASSESSMENT & PLAN NOTE
continue nystatin b i d  Bleeding that does not stop/Pain not relieved by Medications/Fever greater than (need to indicate Fahrenheit or Celsius)/Wound/Surgical Site with redness, or foul smelling discharge or pus/Nausea and vomiting that does not stop/Unable to urinate/Inability to tolerate liquids or foods

## 2021-07-22 NOTE — DISCHARGE INSTRUCTIONS
Take Dulcolax stool softener daily  Take antibiotics as directed  Follow diet as directed, until episode of diverticulosis resolves

## 2021-07-22 NOTE — ED NOTES
Called [facility] and spoke to John Gonzalez who will call back once transport back to [place] is scheduled        Alfredo Martinez RN  07/22/21 7477

## 2022-01-01 ENCOUNTER — HOSPITAL ENCOUNTER (INPATIENT)
Facility: HOSPITAL | Age: 87
LOS: 4 days | Discharge: NURSING, REHAB OR LONG-TERM CARE | DRG: 871 | End: 2022-01-27
Attending: EMERGENCY MEDICINE | Admitting: INTERNAL MEDICINE
Payer: COMMERCIAL

## 2022-01-01 ENCOUNTER — APPOINTMENT (INPATIENT)
Dept: RADIOLOGY | Facility: HOSPITAL | Age: 87
DRG: 871 | End: 2022-01-01
Payer: COMMERCIAL

## 2022-01-01 ENCOUNTER — APPOINTMENT (EMERGENCY)
Dept: CT IMAGING | Facility: HOSPITAL | Age: 87
DRG: 871 | End: 2022-01-01
Payer: COMMERCIAL

## 2022-01-01 ENCOUNTER — APPOINTMENT (EMERGENCY)
Dept: RADIOLOGY | Facility: HOSPITAL | Age: 87
DRG: 871 | End: 2022-01-01
Payer: COMMERCIAL

## 2022-01-01 ENCOUNTER — APPOINTMENT (INPATIENT)
Dept: NON INVASIVE DIAGNOSTICS | Facility: HOSPITAL | Age: 87
DRG: 871 | End: 2022-01-01
Payer: COMMERCIAL

## 2022-01-01 ENCOUNTER — HOSPITAL ENCOUNTER (INPATIENT)
Facility: HOSPITAL | Age: 87
LOS: 3 days | DRG: 871 | End: 2022-05-03
Attending: STUDENT IN AN ORGANIZED HEALTH CARE EDUCATION/TRAINING PROGRAM | Admitting: STUDENT IN AN ORGANIZED HEALTH CARE EDUCATION/TRAINING PROGRAM
Payer: COMMERCIAL

## 2022-01-01 VITALS
HEART RATE: 80 BPM | WEIGHT: 223.2 LBS | RESPIRATION RATE: 19 BRPM | OXYGEN SATURATION: 96 % | DIASTOLIC BLOOD PRESSURE: 89 MMHG | HEIGHT: 63 IN | TEMPERATURE: 98.9 F | BODY MASS INDEX: 39.55 KG/M2 | SYSTOLIC BLOOD PRESSURE: 151 MMHG

## 2022-01-01 VITALS
DIASTOLIC BLOOD PRESSURE: 61 MMHG | SYSTOLIC BLOOD PRESSURE: 136 MMHG | HEIGHT: 62 IN | TEMPERATURE: 99.1 F | BODY MASS INDEX: 39.19 KG/M2 | OXYGEN SATURATION: 99 % | HEART RATE: 82 BPM | RESPIRATION RATE: 18 BRPM | WEIGHT: 212.96 LBS

## 2022-01-01 DIAGNOSIS — N17.9 AKI (ACUTE KIDNEY INJURY) (HCC): ICD-10-CM

## 2022-01-01 DIAGNOSIS — I50.9 CHF (CONGESTIVE HEART FAILURE) (HCC): ICD-10-CM

## 2022-01-01 DIAGNOSIS — I50.9 ACUTE DECOMPENSATED HEART FAILURE (HCC): ICD-10-CM

## 2022-01-01 DIAGNOSIS — J96.21 ACUTE ON CHRONIC RESPIRATORY FAILURE WITH HYPOXIA AND HYPERCAPNIA (HCC): Primary | ICD-10-CM

## 2022-01-01 DIAGNOSIS — J18.9 PNEUMONIA: Primary | ICD-10-CM

## 2022-01-01 DIAGNOSIS — R06.02 SOB (SHORTNESS OF BREATH): ICD-10-CM

## 2022-01-01 DIAGNOSIS — J96.22 ACUTE ON CHRONIC RESPIRATORY FAILURE WITH HYPOXIA AND HYPERCAPNIA (HCC): Primary | ICD-10-CM

## 2022-01-01 DIAGNOSIS — R91.8 HILAR MASS: ICD-10-CM

## 2022-01-01 DIAGNOSIS — A41.9 SEPTIC SHOCK (HCC): ICD-10-CM

## 2022-01-01 DIAGNOSIS — R65.21 SEPTIC SHOCK (HCC): ICD-10-CM

## 2022-01-01 DIAGNOSIS — J18.9 BILATERAL PNEUMONIA: ICD-10-CM

## 2022-01-01 DIAGNOSIS — N39.0 UTI (URINARY TRACT INFECTION): ICD-10-CM

## 2022-01-01 LAB
2HR DELTA HS TROPONIN: -2 NG/L
2HR DELTA HS TROPONIN: 20 NG/L
4HR DELTA HS TROPONIN: 8 NG/L
ALBUMIN SERPL BCP-MCNC: 2.3 G/DL (ref 3.5–5)
ALBUMIN SERPL BCP-MCNC: 2.9 G/DL (ref 3.5–5)
ALBUMIN SERPL BCP-MCNC: 3.2 G/DL (ref 3.5–5)
ALP SERPL-CCNC: 76 U/L (ref 46–116)
ALP SERPL-CCNC: 83 U/L (ref 46–116)
ALP SERPL-CCNC: 89 U/L (ref 46–116)
ALT SERPL W P-5'-P-CCNC: 23 U/L (ref 12–78)
ALT SERPL W P-5'-P-CCNC: 27 U/L (ref 12–78)
ALT SERPL W P-5'-P-CCNC: 27 U/L (ref 12–78)
ANION GAP SERPL CALCULATED.3IONS-SCNC: 4 MMOL/L (ref 4–13)
ANION GAP SERPL CALCULATED.3IONS-SCNC: 5 MMOL/L (ref 4–13)
ANION GAP SERPL CALCULATED.3IONS-SCNC: 5 MMOL/L (ref 4–13)
ANION GAP SERPL CALCULATED.3IONS-SCNC: 6 MMOL/L (ref 4–13)
ANION GAP SERPL CALCULATED.3IONS-SCNC: 7 MMOL/L (ref 4–13)
ANION GAP SERPL CALCULATED.3IONS-SCNC: 8 MMOL/L (ref 4–13)
ANION GAP SERPL CALCULATED.3IONS-SCNC: 9 MMOL/L (ref 4–13)
AORTIC ROOT: 3 CM
AORTIC ROOT: 3 CM
AORTIC VALVE MEAN VELOCITY: 35.1 M/S
AORTIC VALVE MEAN VELOCITY: 44.1 M/S
APICAL FOUR CHAMBER EJECTION FRACTION: 60 %
APICAL FOUR CHAMBER EJECTION FRACTION: 74 %
APTT PPP: 38 SECONDS (ref 23–37)
APTT PPP: 48 SECONDS (ref 23–37)
APTT PPP: 60 SECONDS (ref 23–37)
APTT PPP: 61 SECONDS (ref 23–37)
APTT PPP: 64 SECONDS (ref 23–37)
APTT PPP: 79 SECONDS (ref 23–37)
AST SERPL W P-5'-P-CCNC: 24 U/L (ref 5–45)
AST SERPL W P-5'-P-CCNC: 29 U/L (ref 5–45)
AST SERPL W P-5'-P-CCNC: 33 U/L (ref 5–45)
ATRIAL RATE: 87 BPM
AV AREA BY CONTINUOUS VTI: 0.5 CM2
AV AREA BY CONTINUOUS VTI: 0.5 CM2
AV AREA PEAK VELOCITY: 0.4 CM2
AV AREA PEAK VELOCITY: 0.6 CM2
AV LVOT MEAN GRADIENT: 2 MMHG
AV LVOT MEAN GRADIENT: 3 MMHG
AV LVOT PEAK GRADIENT: 4 MMHG
AV LVOT PEAK GRADIENT: 6 MMHG
AV MEAN GRADIENT: 56 MMHG
AV MEAN GRADIENT: 87 MMHG
AV PEAK GRADIENT: 140 MMHG
AV PEAK GRADIENT: 95 MMHG
AV VALVE AREA: 0.46 CM2
AV VALVE AREA: 0.51 CM2
AV VELOCITY RATIO: 0.2
AV VELOCITY RATIO: 0.2
BACTERIA BLD CULT: NORMAL
BACTERIA BLD CULT: NORMAL
BACTERIA SPT RESP CULT: ABNORMAL
BACTERIA SPT RESP CULT: ABNORMAL
BACTERIA SPT RESP CULT: NORMAL
BACTERIA UR CULT: NORMAL
BACTERIA UR QL AUTO: ABNORMAL /HPF
BACTERIA UR QL AUTO: NORMAL /HPF
BASE EX.OXY STD BLDV CALC-SCNC: 69.2 % (ref 60–80)
BASE EX.OXY STD BLDV CALC-SCNC: 82.3 % (ref 60–80)
BASE EX.OXY STD BLDV CALC-SCNC: 82.6 % (ref 60–80)
BASE EXCESS BLDA CALC-SCNC: 7.5 MMOL/L
BASE EXCESS BLDA CALC-SCNC: 8.2 MMOL/L
BASE EXCESS BLDA CALC-SCNC: 9.1 MMOL/L
BASE EXCESS BLDA CALC-SCNC: 9.7 MMOL/L
BASE EXCESS BLDA CALC-SCNC: 9.9 MMOL/L
BASE EXCESS BLDV CALC-SCNC: 4.1 MMOL/L
BASE EXCESS BLDV CALC-SCNC: 7.3 MMOL/L
BASE EXCESS BLDV CALC-SCNC: 8.2 MMOL/L
BASOPHILS # BLD AUTO: 0.01 THOUSANDS/ΜL (ref 0–0.1)
BASOPHILS # BLD AUTO: 0.01 THOUSANDS/ΜL (ref 0–0.1)
BASOPHILS # BLD AUTO: 0.02 THOUSANDS/ΜL (ref 0–0.1)
BASOPHILS # BLD AUTO: 0.03 THOUSANDS/ΜL (ref 0–0.1)
BASOPHILS # BLD AUTO: 0.05 THOUSANDS/ΜL (ref 0–0.1)
BASOPHILS NFR BLD AUTO: 0 % (ref 0–1)
BILIRUB SERPL-MCNC: 0.64 MG/DL (ref 0.2–1)
BILIRUB SERPL-MCNC: 0.77 MG/DL (ref 0.2–1)
BILIRUB SERPL-MCNC: 0.79 MG/DL (ref 0.2–1)
BILIRUB UR QL STRIP: NEGATIVE
BILIRUB UR QL STRIP: NEGATIVE
BODY TEMPERATURE: 99 DEGREES FEHRENHEIT
BUN SERPL-MCNC: 22 MG/DL (ref 5–25)
BUN SERPL-MCNC: 24 MG/DL (ref 5–25)
BUN SERPL-MCNC: 31 MG/DL (ref 5–25)
BUN SERPL-MCNC: 32 MG/DL (ref 5–25)
BUN SERPL-MCNC: 34 MG/DL (ref 5–25)
BUN SERPL-MCNC: 35 MG/DL (ref 5–25)
BUN SERPL-MCNC: 36 MG/DL (ref 5–25)
BUN SERPL-MCNC: 42 MG/DL (ref 5–25)
BUN SERPL-MCNC: 43 MG/DL (ref 5–25)
BUN SERPL-MCNC: 44 MG/DL (ref 5–25)
BUN SERPL-MCNC: 46 MG/DL (ref 5–25)
CALCIUM ALBUM COR SERPL-MCNC: 10.4 MG/DL (ref 8.3–10.1)
CALCIUM ALBUM COR SERPL-MCNC: 9.7 MG/DL (ref 8.3–10.1)
CALCIUM ALBUM COR SERPL-MCNC: 9.8 MG/DL (ref 8.3–10.1)
CALCIUM SERPL-MCNC: 8.3 MG/DL (ref 8.3–10.1)
CALCIUM SERPL-MCNC: 8.4 MG/DL (ref 8.3–10.1)
CALCIUM SERPL-MCNC: 8.5 MG/DL (ref 8.3–10.1)
CALCIUM SERPL-MCNC: 8.5 MG/DL (ref 8.3–10.1)
CALCIUM SERPL-MCNC: 8.9 MG/DL (ref 8.3–10.1)
CALCIUM SERPL-MCNC: 9.2 MG/DL (ref 8.3–10.1)
CALCIUM SERPL-MCNC: 9.4 MG/DL (ref 8.3–10.1)
CALCIUM SERPL-MCNC: 9.5 MG/DL (ref 8.3–10.1)
CALCIUM SERPL-MCNC: 9.7 MG/DL (ref 8.3–10.1)
CARDIAC TROPONIN I PNL SERPL HS: 131 NG/L
CARDIAC TROPONIN I PNL SERPL HS: 139 NG/L
CARDIAC TROPONIN I PNL SERPL HS: 151 NG/L
CARDIAC TROPONIN I PNL SERPL HS: 43 NG/L
CARDIAC TROPONIN I PNL SERPL HS: 45 NG/L
CHLORIDE SERPL-SCNC: 100 MMOL/L (ref 100–108)
CHLORIDE SERPL-SCNC: 100 MMOL/L (ref 100–108)
CHLORIDE SERPL-SCNC: 101 MMOL/L (ref 100–108)
CHLORIDE SERPL-SCNC: 101 MMOL/L (ref 100–108)
CHLORIDE SERPL-SCNC: 102 MMOL/L (ref 100–108)
CHLORIDE SERPL-SCNC: 102 MMOL/L (ref 100–108)
CHLORIDE SERPL-SCNC: 103 MMOL/L (ref 100–108)
CHLORIDE SERPL-SCNC: 103 MMOL/L (ref 100–108)
CHLORIDE SERPL-SCNC: 98 MMOL/L (ref 100–108)
CHLORIDE SERPL-SCNC: 99 MMOL/L (ref 100–108)
CHLORIDE SERPL-SCNC: 99 MMOL/L (ref 100–108)
CHLORIDE UR-SCNC: 92 MMOL/L
CLARITY UR: ABNORMAL
CLARITY UR: CLEAR
CO2 SERPL-SCNC: 32 MMOL/L (ref 21–32)
CO2 SERPL-SCNC: 33 MMOL/L (ref 21–32)
CO2 SERPL-SCNC: 34 MMOL/L (ref 21–32)
CO2 SERPL-SCNC: 35 MMOL/L (ref 21–32)
CO2 SERPL-SCNC: 35 MMOL/L (ref 21–32)
CO2 SERPL-SCNC: 36 MMOL/L (ref 21–32)
CO2 SERPL-SCNC: 36 MMOL/L (ref 21–32)
CO2 SERPL-SCNC: 37 MMOL/L (ref 21–32)
CO2 SERPL-SCNC: 38 MMOL/L (ref 21–32)
COLOR UR: ABNORMAL
COLOR UR: YELLOW
CREAT SERPL-MCNC: 0.76 MG/DL (ref 0.6–1.3)
CREAT SERPL-MCNC: 0.76 MG/DL (ref 0.6–1.3)
CREAT SERPL-MCNC: 0.77 MG/DL (ref 0.6–1.3)
CREAT SERPL-MCNC: 0.82 MG/DL (ref 0.6–1.3)
CREAT SERPL-MCNC: 0.83 MG/DL (ref 0.6–1.3)
CREAT SERPL-MCNC: 1 MG/DL (ref 0.6–1.3)
CREAT SERPL-MCNC: 1.13 MG/DL (ref 0.6–1.3)
CREAT SERPL-MCNC: 1.24 MG/DL (ref 0.6–1.3)
CREAT SERPL-MCNC: 1.33 MG/DL (ref 0.6–1.3)
CREAT SERPL-MCNC: 1.42 MG/DL (ref 0.6–1.3)
CREAT SERPL-MCNC: 1.43 MG/DL (ref 0.6–1.3)
CREAT UR-MCNC: 84.9 MG/DL
CRP SERPL QL: 247.5 MG/L
DOP CALC AO PEAK VEL: 4.84 M/S
DOP CALC AO PEAK VEL: 5.92 M/S
DOP CALC AO VTI: 101.39 CM
DOP CALC AO VTI: 136.61 CM
DOP CALC LVOT AREA: 2.27 CM2
DOP CALC LVOT AREA: 2.83 CM2
DOP CALC LVOT DIAMETER: 1.7 CM
DOP CALC LVOT DIAMETER: 1.9 CM
DOP CALC LVOT PEAK VEL VTI: 22.36 CM
DOP CALC LVOT PEAK VEL VTI: 22.58 CM
DOP CALC LVOT PEAK VEL: 0.95 M/S
DOP CALC LVOT PEAK VEL: 1.2 M/S
DOP CALC LVOT STROKE INDEX: 23.4 ML/M2
DOP CALC LVOT STROKE INDEX: 31.2 ML/M2
DOP CALC LVOT STROKE VOLUME: 51.23 CM3
DOP CALC LVOT STROKE VOLUME: 63.36 CM3
EOSINOPHIL # BLD AUTO: 0 THOUSAND/ΜL (ref 0–0.61)
EOSINOPHIL # BLD AUTO: 0 THOUSAND/ΜL (ref 0–0.61)
EOSINOPHIL # BLD AUTO: 0.03 THOUSAND/ΜL (ref 0–0.61)
EOSINOPHIL # BLD AUTO: 0.07 THOUSAND/ΜL (ref 0–0.61)
EOSINOPHIL # BLD AUTO: 0.38 THOUSAND/ΜL (ref 0–0.61)
EOSINOPHIL NFR BLD AUTO: 0 % (ref 0–6)
EOSINOPHIL NFR BLD AUTO: 1 % (ref 0–6)
EOSINOPHIL NFR BLD AUTO: 5 % (ref 0–6)
ERYTHROCYTE [DISTWIDTH] IN BLOOD BY AUTOMATED COUNT: 14.5 % (ref 11.6–15.1)
ERYTHROCYTE [DISTWIDTH] IN BLOOD BY AUTOMATED COUNT: 14.6 % (ref 11.6–15.1)
ERYTHROCYTE [DISTWIDTH] IN BLOOD BY AUTOMATED COUNT: 14.7 % (ref 11.6–15.1)
ERYTHROCYTE [DISTWIDTH] IN BLOOD BY AUTOMATED COUNT: 14.8 % (ref 11.6–15.1)
ERYTHROCYTE [DISTWIDTH] IN BLOOD BY AUTOMATED COUNT: 15 % (ref 11.6–15.1)
ERYTHROCYTE [DISTWIDTH] IN BLOOD BY AUTOMATED COUNT: 15.6 % (ref 11.6–15.1)
EST. AVERAGE GLUCOSE BLD GHB EST-MCNC: 88 MG/DL
FERRITIN SERPL-MCNC: 472 NG/ML (ref 8–388)
FINE GRAN CASTS URNS QL MICRO: ABNORMAL /LPF
FLUAV RNA RESP QL NAA+PROBE: NEGATIVE
FLUBV RNA RESP QL NAA+PROBE: NEGATIVE
FRACTIONAL SHORTENING: 31 % (ref 28–44)
FRACTIONAL SHORTENING: 47 % (ref 28–44)
GFR SERPL CREATININE-BSD FRML MDRD: 32 ML/MIN/1.73SQ M
GFR SERPL CREATININE-BSD FRML MDRD: 32 ML/MIN/1.73SQ M
GFR SERPL CREATININE-BSD FRML MDRD: 35 ML/MIN/1.73SQ M
GFR SERPL CREATININE-BSD FRML MDRD: 38 ML/MIN/1.73SQ M
GFR SERPL CREATININE-BSD FRML MDRD: 43 ML/MIN/1.73SQ M
GFR SERPL CREATININE-BSD FRML MDRD: 50 ML/MIN/1.73SQ M
GFR SERPL CREATININE-BSD FRML MDRD: 63 ML/MIN/1.73SQ M
GFR SERPL CREATININE-BSD FRML MDRD: 64 ML/MIN/1.73SQ M
GFR SERPL CREATININE-BSD FRML MDRD: 69 ML/MIN/1.73SQ M
GFR SERPL CREATININE-BSD FRML MDRD: 70 ML/MIN/1.73SQ M
GFR SERPL CREATININE-BSD FRML MDRD: 70 ML/MIN/1.73SQ M
GLUCOSE SERPL-MCNC: 102 MG/DL (ref 65–140)
GLUCOSE SERPL-MCNC: 106 MG/DL (ref 65–140)
GLUCOSE SERPL-MCNC: 109 MG/DL (ref 65–140)
GLUCOSE SERPL-MCNC: 109 MG/DL (ref 65–140)
GLUCOSE SERPL-MCNC: 110 MG/DL (ref 65–140)
GLUCOSE SERPL-MCNC: 110 MG/DL (ref 65–140)
GLUCOSE SERPL-MCNC: 116 MG/DL (ref 65–140)
GLUCOSE SERPL-MCNC: 117 MG/DL (ref 65–140)
GLUCOSE SERPL-MCNC: 127 MG/DL (ref 65–140)
GLUCOSE SERPL-MCNC: 130 MG/DL (ref 65–140)
GLUCOSE SERPL-MCNC: 132 MG/DL (ref 65–140)
GLUCOSE SERPL-MCNC: 133 MG/DL (ref 65–140)
GLUCOSE SERPL-MCNC: 133 MG/DL (ref 65–140)
GLUCOSE SERPL-MCNC: 134 MG/DL (ref 65–140)
GLUCOSE SERPL-MCNC: 134 MG/DL (ref 65–140)
GLUCOSE SERPL-MCNC: 137 MG/DL (ref 65–140)
GLUCOSE SERPL-MCNC: 137 MG/DL (ref 65–140)
GLUCOSE SERPL-MCNC: 138 MG/DL (ref 65–140)
GLUCOSE SERPL-MCNC: 138 MG/DL (ref 65–140)
GLUCOSE SERPL-MCNC: 140 MG/DL (ref 65–140)
GLUCOSE SERPL-MCNC: 148 MG/DL (ref 65–140)
GLUCOSE SERPL-MCNC: 160 MG/DL (ref 65–140)
GLUCOSE SERPL-MCNC: 195 MG/DL (ref 65–140)
GLUCOSE SERPL-MCNC: 199 MG/DL (ref 65–140)
GLUCOSE UR STRIP-MCNC: NEGATIVE MG/DL
GLUCOSE UR STRIP-MCNC: NEGATIVE MG/DL
GRAM STN SPEC: ABNORMAL
GRAM STN SPEC: NORMAL
GRAM STN SPEC: NORMAL
HBA1C MFR BLD: 4.7 %
HCO3 BLDA-SCNC: 30.7 MMOL/L (ref 22–28)
HCO3 BLDA-SCNC: 31.9 MMOL/L (ref 22–28)
HCO3 BLDA-SCNC: 32.7 MMOL/L (ref 22–28)
HCO3 BLDA-SCNC: 33 MMOL/L (ref 22–28)
HCO3 BLDA-SCNC: 33.9 MMOL/L (ref 22–28)
HCO3 BLDV-SCNC: 30.8 MMOL/L (ref 24–30)
HCO3 BLDV-SCNC: 32.3 MMOL/L (ref 24–30)
HCO3 BLDV-SCNC: 35 MMOL/L (ref 24–30)
HCT VFR BLD AUTO: 30.2 % (ref 34.8–46.1)
HCT VFR BLD AUTO: 30.6 % (ref 34.8–46.1)
HCT VFR BLD AUTO: 33 % (ref 34.8–46.1)
HCT VFR BLD AUTO: 33.8 % (ref 34.8–46.1)
HCT VFR BLD AUTO: 34.7 % (ref 34.8–46.1)
HCT VFR BLD AUTO: 38.6 % (ref 34.8–46.1)
HCT VFR BLD AUTO: 39.3 % (ref 34.8–46.1)
HCT VFR BLD AUTO: 39.7 % (ref 34.8–46.1)
HGB BLD-MCNC: 10.5 G/DL (ref 11.5–15.4)
HGB BLD-MCNC: 10.6 G/DL (ref 11.5–15.4)
HGB BLD-MCNC: 11.2 G/DL (ref 11.5–15.4)
HGB BLD-MCNC: 12 G/DL (ref 11.5–15.4)
HGB BLD-MCNC: 12.2 G/DL (ref 11.5–15.4)
HGB BLD-MCNC: 12.6 G/DL (ref 11.5–15.4)
HGB BLD-MCNC: 9.6 G/DL (ref 11.5–15.4)
HGB BLD-MCNC: 9.6 G/DL (ref 11.5–15.4)
HGB UR QL STRIP.AUTO: ABNORMAL
HGB UR QL STRIP.AUTO: ABNORMAL
HOROWITZ INDEX BLDA+IHG-RTO: 50 MM[HG]
HYALINE CASTS #/AREA URNS LPF: ABNORMAL /LPF
IMM GRANULOCYTES # BLD AUTO: 0.02 THOUSAND/UL (ref 0–0.2)
IMM GRANULOCYTES # BLD AUTO: 0.05 THOUSAND/UL (ref 0–0.2)
IMM GRANULOCYTES # BLD AUTO: 0.06 THOUSAND/UL (ref 0–0.2)
IMM GRANULOCYTES # BLD AUTO: 0.09 THOUSAND/UL (ref 0–0.2)
IMM GRANULOCYTES # BLD AUTO: 0.22 THOUSAND/UL (ref 0–0.2)
IMM GRANULOCYTES NFR BLD AUTO: 0 % (ref 0–2)
IMM GRANULOCYTES NFR BLD AUTO: 1 % (ref 0–2)
INR PPP: 1.51 (ref 0.84–1.19)
INR PPP: 1.93 (ref 0.84–1.19)
INR PPP: 2.61 (ref 0.84–1.19)
INTERVENTRICULAR SEPTUM IN DIASTOLE (PARASTERNAL SHORT AXIS VIEW): 1 CM
INTERVENTRICULAR SEPTUM IN DIASTOLE (PARASTERNAL SHORT AXIS VIEW): 1.2 CM
INTERVENTRICULAR SEPTUM: 1.2 CM (ref 0.55–1.03)
IRON SATN MFR SERPL: 11 % (ref 15–50)
IRON SERPL-MCNC: 21 UG/DL (ref 50–170)
KETONES UR STRIP-MCNC: NEGATIVE MG/DL
KETONES UR STRIP-MCNC: NEGATIVE MG/DL
L PNEUMO1 AG UR QL IA.RAPID: NEGATIVE
L PNEUMO1 AG UR QL IA.RAPID: NEGATIVE
LAAS-AP4: 17.7 CM2
LACTATE SERPL-SCNC: 1.2 MMOL/L (ref 0.5–2)
LACTATE SERPL-SCNC: 1.8 MMOL/L (ref 0.5–2)
LEFT ATRIUM SIZE: 3.2 CM
LEFT ATRIUM SIZE: 3.9 CM
LEFT INTERNAL DIMENSION IN SYSTOLE: 2.3 CM (ref 2.1–4)
LEFT INTERNAL DIMENSION IN SYSTOLE: 3.1 CM (ref 3.41–5.16)
LEFT VENTRICULAR INTERNAL DIMENSION IN DIASTOLE: 4.3 CM (ref 7.73–11.53)
LEFT VENTRICULAR INTERNAL DIMENSION IN DIASTOLE: 4.5 CM (ref 5.64–8.4)
LEFT VENTRICULAR POSTERIOR WALL IN END DIASTOLE: 1 CM
LEFT VENTRICULAR POSTERIOR WALL IN END DIASTOLE: 1.1 CM (ref 0.53–1.01)
LEFT VENTRICULAR STROKE VOLUME: 55 ML
LEFT VENTRICULAR STROKE VOLUME: 67 ML
LEUKOCYTE ESTERASE UR QL STRIP: ABNORMAL
LEUKOCYTE ESTERASE UR QL STRIP: NEGATIVE
LVSV (TEICH): 55 ML
LYMPHOCYTES # BLD AUTO: 0.31 THOUSANDS/ΜL (ref 0.6–4.47)
LYMPHOCYTES # BLD AUTO: 0.41 THOUSANDS/ΜL (ref 0.6–4.47)
LYMPHOCYTES # BLD AUTO: 0.55 THOUSANDS/ΜL (ref 0.6–4.47)
LYMPHOCYTES # BLD AUTO: 0.84 THOUSANDS/ΜL (ref 0.6–4.47)
LYMPHOCYTES # BLD AUTO: 1.12 THOUSANDS/ΜL (ref 0.6–4.47)
LYMPHOCYTES NFR BLD AUTO: 11 % (ref 14–44)
LYMPHOCYTES NFR BLD AUTO: 2 % (ref 14–44)
LYMPHOCYTES NFR BLD AUTO: 4 % (ref 14–44)
LYMPHOCYTES NFR BLD AUTO: 5 % (ref 14–44)
LYMPHOCYTES NFR BLD AUTO: 9 % (ref 14–44)
MAGNESIUM SERPL-MCNC: 1.7 MG/DL (ref 1.6–2.6)
MAGNESIUM SERPL-MCNC: 1.7 MG/DL (ref 1.6–2.6)
MAGNESIUM SERPL-MCNC: 1.8 MG/DL (ref 1.6–2.6)
MAGNESIUM SERPL-MCNC: 1.8 MG/DL (ref 1.6–2.6)
MAGNESIUM SERPL-MCNC: 1.9 MG/DL (ref 1.6–2.6)
MAGNESIUM SERPL-MCNC: 1.9 MG/DL (ref 1.6–2.6)
MAGNESIUM SERPL-MCNC: 2 MG/DL (ref 1.6–2.6)
MAGNESIUM SERPL-MCNC: 2.3 MG/DL (ref 1.6–2.6)
MCH RBC QN AUTO: 28.1 PG (ref 26.8–34.3)
MCH RBC QN AUTO: 28.3 PG (ref 26.8–34.3)
MCH RBC QN AUTO: 28.5 PG (ref 26.8–34.3)
MCH RBC QN AUTO: 28.7 PG (ref 26.8–34.3)
MCH RBC QN AUTO: 28.8 PG (ref 26.8–34.3)
MCH RBC QN AUTO: 28.8 PG (ref 26.8–34.3)
MCH RBC QN AUTO: 29.3 PG (ref 26.8–34.3)
MCH RBC QN AUTO: 29.5 PG (ref 26.8–34.3)
MCHC RBC AUTO-ENTMCNC: 30.7 G/DL (ref 31.4–37.4)
MCHC RBC AUTO-ENTMCNC: 31.1 G/DL (ref 31.4–37.4)
MCHC RBC AUTO-ENTMCNC: 31.1 G/DL (ref 31.4–37.4)
MCHC RBC AUTO-ENTMCNC: 31.4 G/DL (ref 31.4–37.4)
MCHC RBC AUTO-ENTMCNC: 31.8 G/DL (ref 31.4–37.4)
MCHC RBC AUTO-ENTMCNC: 32.1 G/DL (ref 31.4–37.4)
MCHC RBC AUTO-ENTMCNC: 32.1 G/DL (ref 31.4–37.4)
MCHC RBC AUTO-ENTMCNC: 32.3 G/DL (ref 31.4–37.4)
MCV RBC AUTO: 88 FL (ref 82–98)
MCV RBC AUTO: 88 FL (ref 82–98)
MCV RBC AUTO: 90 FL (ref 82–98)
MCV RBC AUTO: 91 FL (ref 82–98)
MCV RBC AUTO: 91 FL (ref 82–98)
MCV RBC AUTO: 92 FL (ref 82–98)
MCV RBC AUTO: 93 FL (ref 82–98)
MCV RBC AUTO: 95 FL (ref 82–98)
MONOCYTES # BLD AUTO: 0.29 THOUSAND/ΜL (ref 0.17–1.22)
MONOCYTES # BLD AUTO: 0.85 THOUSAND/ΜL (ref 0.17–1.22)
MONOCYTES # BLD AUTO: 1.01 THOUSAND/ΜL (ref 0.17–1.22)
MONOCYTES # BLD AUTO: 1.22 THOUSAND/ΜL (ref 0.17–1.22)
MONOCYTES # BLD AUTO: 1.74 THOUSAND/ΜL (ref 0.17–1.22)
MONOCYTES NFR BLD AUTO: 10 % (ref 4–12)
MONOCYTES NFR BLD AUTO: 11 % (ref 4–12)
MONOCYTES NFR BLD AUTO: 4 % (ref 4–12)
MONOCYTES NFR BLD AUTO: 7 % (ref 4–12)
MONOCYTES NFR BLD AUTO: 9 % (ref 4–12)
MRSA NOSE QL CULT: NORMAL
NEUTROPHILS # BLD AUTO: 12.61 THOUSANDS/ΜL (ref 1.85–7.62)
NEUTROPHILS # BLD AUTO: 18.04 THOUSANDS/ΜL (ref 1.85–7.62)
NEUTROPHILS # BLD AUTO: 5.42 THOUSANDS/ΜL (ref 1.85–7.62)
NEUTROPHILS # BLD AUTO: 6.19 THOUSANDS/ΜL (ref 1.85–7.62)
NEUTROPHILS # BLD AUTO: 9.4 THOUSANDS/ΜL (ref 1.85–7.62)
NEUTS SEG NFR BLD AUTO: 72 % (ref 43–75)
NEUTS SEG NFR BLD AUTO: 79 % (ref 43–75)
NEUTS SEG NFR BLD AUTO: 88 % (ref 43–75)
NEUTS SEG NFR BLD AUTO: 88 % (ref 43–75)
NEUTS SEG NFR BLD AUTO: 91 % (ref 43–75)
NITRITE UR QL STRIP: POSITIVE
NITRITE UR QL STRIP: POSITIVE
NON-SQ EPI CELLS URNS QL MICRO: ABNORMAL /HPF
NON-SQ EPI CELLS URNS QL MICRO: NORMAL /HPF
NRBC BLD AUTO-RTO: 0 /100 WBCS
NT-PROBNP SERPL-MCNC: 3230 PG/ML
NT-PROBNP SERPL-MCNC: 6272 PG/ML
NT-PROBNP SERPL-MCNC: 735 PG/ML
O2 CT BLDA-SCNC: 15.8 ML/DL (ref 16–23)
O2 CT BLDA-SCNC: 16.1 ML/DL (ref 16–23)
O2 CT BLDA-SCNC: 17.2 ML/DL (ref 16–23)
O2 CT BLDA-SCNC: 18 ML/DL (ref 16–23)
O2 CT BLDA-SCNC: 22.7 ML/DL (ref 16–23)
O2 CT BLDV-SCNC: 12.7 ML/DL
O2 CT BLDV-SCNC: 13.2 ML/DL
O2 CT BLDV-SCNC: 14.2 ML/DL
OXYHGB MFR BLDA: 90.9 % (ref 94–97)
OXYHGB MFR BLDA: 97.6 % (ref 94–97)
OXYHGB MFR BLDA: 97.8 % (ref 94–97)
OXYHGB MFR BLDA: 97.9 % (ref 94–97)
OXYHGB MFR BLDA: 98 % (ref 94–97)
PCO2 BLDA: 37.5 MM HG (ref 36–44)
PCO2 BLDA: 38.4 MM HG (ref 36–44)
PCO2 BLDA: 39.9 MM HG (ref 36–44)
PCO2 BLDA: 41 MM HG (ref 36–44)
PCO2 BLDA: 46.2 MM HG (ref 36–44)
PCO2 BLDV: 39.6 MM HG (ref 42–50)
PCO2 BLDV: 59.5 MM HG (ref 42–50)
PCO2 BLDV: 66.2 MM HG (ref 42–50)
PCO2 TEMP ADJ BLDA: 41.4 MM HG (ref 36–44)
PEEP RESPIRATORY: 10 CM[H2O]
PEEP RESPIRATORY: 8 CM[H2O]
PH BLD: 7.51 [PH] (ref 7.35–7.45)
PH BLDA: 7.48 [PH] (ref 7.35–7.45)
PH BLDA: 7.51 [PH] (ref 7.35–7.45)
PH BLDA: 7.52 [PH] (ref 7.35–7.45)
PH BLDA: 7.54 [PH] (ref 7.35–7.45)
PH BLDA: 7.56 [PH] (ref 7.35–7.45)
PH BLDV: 7.31 [PH] (ref 7.3–7.4)
PH BLDV: 7.39 [PH] (ref 7.3–7.4)
PH BLDV: 7.51 [PH] (ref 7.3–7.4)
PH UR STRIP.AUTO: 8 [PH]
PH UR STRIP.AUTO: 8.5 [PH]
PHOSPHATE SERPL-MCNC: 1.7 MG/DL (ref 2.3–4.1)
PHOSPHATE SERPL-MCNC: 2.4 MG/DL (ref 2.3–4.1)
PHOSPHATE SERPL-MCNC: 3.7 MG/DL (ref 2.3–4.1)
PHOSPHATE SERPL-MCNC: 4.3 MG/DL (ref 2.3–4.1)
PLATELET # BLD AUTO: 222 THOUSANDS/UL (ref 149–390)
PLATELET # BLD AUTO: 225 THOUSANDS/UL (ref 149–390)
PLATELET # BLD AUTO: 239 THOUSANDS/UL (ref 149–390)
PLATELET # BLD AUTO: 245 THOUSANDS/UL (ref 149–390)
PLATELET # BLD AUTO: 275 THOUSANDS/UL (ref 149–390)
PLATELET # BLD AUTO: 279 THOUSANDS/UL (ref 149–390)
PLATELET # BLD AUTO: 307 THOUSANDS/UL (ref 149–390)
PLATELET # BLD AUTO: 327 THOUSANDS/UL (ref 149–390)
PMV BLD AUTO: 8.6 FL (ref 8.9–12.7)
PMV BLD AUTO: 9.1 FL (ref 8.9–12.7)
PMV BLD AUTO: 9.2 FL (ref 8.9–12.7)
PMV BLD AUTO: 9.2 FL (ref 8.9–12.7)
PMV BLD AUTO: 9.6 FL (ref 8.9–12.7)
PMV BLD AUTO: 9.6 FL (ref 8.9–12.7)
PMV BLD AUTO: 9.8 FL (ref 8.9–12.7)
PMV BLD AUTO: 9.9 FL (ref 8.9–12.7)
PO2 BLD: 129.4 MM HG (ref 75–129)
PO2 BLDA: 108.2 MM HG (ref 75–129)
PO2 BLDA: 109 MM HG (ref 75–129)
PO2 BLDA: 127.5 MM HG (ref 75–129)
PO2 BLDA: 128.2 MM HG (ref 75–129)
PO2 BLDA: 58.5 MM HG (ref 75–129)
PO2 BLDV: 40.8 MM HG (ref 35–45)
PO2 BLDV: 42.6 MM HG (ref 35–45)
PO2 BLDV: 49.3 MM HG (ref 35–45)
POTASSIUM SERPL-SCNC: 2.6 MMOL/L (ref 3.5–5.3)
POTASSIUM SERPL-SCNC: 2.8 MMOL/L (ref 3.5–5.3)
POTASSIUM SERPL-SCNC: 3 MMOL/L (ref 3.5–5.3)
POTASSIUM SERPL-SCNC: 3.2 MMOL/L (ref 3.5–5.3)
POTASSIUM SERPL-SCNC: 3.3 MMOL/L (ref 3.5–5.3)
POTASSIUM SERPL-SCNC: 3.4 MMOL/L (ref 3.5–5.3)
POTASSIUM SERPL-SCNC: 3.6 MMOL/L (ref 3.5–5.3)
POTASSIUM SERPL-SCNC: 3.7 MMOL/L (ref 3.5–5.3)
POTASSIUM SERPL-SCNC: 4.5 MMOL/L (ref 3.5–5.3)
PROCALCITONIN SERPL-MCNC: 0.28 NG/ML
PROCALCITONIN SERPL-MCNC: 0.28 NG/ML
PROCALCITONIN SERPL-MCNC: 2.49 NG/ML
PROCALCITONIN SERPL-MCNC: 3.86 NG/ML
PROCALCITONIN SERPL-MCNC: 3.89 NG/ML
PROCALCITONIN SERPL-MCNC: 5.95 NG/ML
PROT SERPL-MCNC: 6.5 G/DL (ref 6.4–8.2)
PROT SERPL-MCNC: 7.6 G/DL (ref 6.4–8.2)
PROT SERPL-MCNC: 7.8 G/DL (ref 6.4–8.2)
PROT UR STRIP-MCNC: ABNORMAL MG/DL
PROT UR STRIP-MCNC: ABNORMAL MG/DL
PROTHROMBIN TIME: 17.9 SECONDS (ref 11.6–14.5)
PROTHROMBIN TIME: 21.7 SECONDS (ref 11.6–14.5)
PROTHROMBIN TIME: 27.2 SECONDS (ref 11.6–14.5)
QRS AXIS: 3 DEGREES
QRSD INTERVAL: 78 MS
QT INTERVAL: 346 MS
QTC INTERVAL: 432 MS
RBC # BLD AUTO: 3.33 MILLION/UL (ref 3.81–5.12)
RBC # BLD AUTO: 3.35 MILLION/UL (ref 3.81–5.12)
RBC # BLD AUTO: 3.59 MILLION/UL (ref 3.81–5.12)
RBC # BLD AUTO: 3.74 MILLION/UL (ref 3.81–5.12)
RBC # BLD AUTO: 3.93 MILLION/UL (ref 3.81–5.12)
RBC # BLD AUTO: 4.16 MILLION/UL (ref 3.81–5.12)
RBC # BLD AUTO: 4.17 MILLION/UL (ref 3.81–5.12)
RBC # BLD AUTO: 4.46 MILLION/UL (ref 3.81–5.12)
RBC #/AREA URNS AUTO: ABNORMAL /HPF
RBC #/AREA URNS AUTO: NORMAL /HPF
RIGHT ATRIAL 2D VOLUME: 17 ML
RIGHT ATRIUM AREA SYSTOLE A4C: 9.6 CM2
RIGHT VENTRICLE ID DIMENSION: 3 CM
RSV RNA RESP QL NAA+PROBE: NEGATIVE
S PNEUM AG UR QL: NEGATIVE
S PNEUM AG UR QL: NEGATIVE
SARS-COV-2 RNA RESP QL NAA+PROBE: NEGATIVE
SIMV VENT INSPIRED AIR FIO2: 40
SIMV VENT PEEP: 8
SIMV VENT TIDAL VOLUME: 400
SIMV VENT: ABNORMAL
SL CV PED ECHO LEFT VENTRICLE DIASTOLIC VOLUME (MOD BIPLANE) 2D: 85 ML
SL CV PED ECHO LEFT VENTRICLE DIASTOLIC VOLUME (MOD BIPLANE) 2D: 94 ML
SL CV PED ECHO LEFT VENTRICLE SYSTOLIC VOLUME (MOD BIPLANE) 2D: 18 ML
SL CV PED ECHO LEFT VENTRICLE SYSTOLIC VOLUME (MOD BIPLANE) 2D: 39 ML
SODIUM SERPL-SCNC: 140 MMOL/L (ref 136–145)
SODIUM SERPL-SCNC: 141 MMOL/L (ref 136–145)
SODIUM SERPL-SCNC: 142 MMOL/L (ref 136–145)
SODIUM SERPL-SCNC: 142 MMOL/L (ref 136–145)
SODIUM SERPL-SCNC: 143 MMOL/L (ref 136–145)
SODIUM SERPL-SCNC: 144 MMOL/L (ref 136–145)
SODIUM SERPL-SCNC: 144 MMOL/L (ref 136–145)
SODIUM SERPL-SCNC: 145 MMOL/L (ref 136–145)
SP GR UR STRIP.AUTO: 1.01 (ref 1–1.03)
SP GR UR STRIP.AUTO: 1.02 (ref 1–1.03)
SPECIMEN SOURCE: ABNORMAL
T WAVE AXIS: 4 DEGREES
TIBC SERPL-MCNC: 187 UG/DL (ref 250–450)
TR MAX PG: 37 MMHG
TR MAX PG: 56 MMHG
TR PEAK VELOCITY: 3.1 M/S
TRICUSPID VALVE PEAK REGURGITATION VELOCITY: 3.06 M/S
TRICUSPID VALVE PEAK REGURGITATION VELOCITY: 3.73 M/S
TSH SERPL DL<=0.05 MIU/L-ACNC: 0.91 UIU/ML (ref 0.45–4.5)
UROBILINOGEN UR QL STRIP.AUTO: 0.2 E.U./DL
UROBILINOGEN UR QL STRIP.AUTO: 0.2 E.U./DL
UUN 24H UR-MCNC: 265 MG/DL
VENT AC: 16
VENT AC: 20
VENT AC: 20
VENT SIMV: 13
VENT- AC: AC
VENTRICULAR RATE: 94 BPM
VT SETTING VENT: 400 ML
WBC # BLD AUTO: 10.33 THOUSAND/UL (ref 4.31–10.16)
WBC # BLD AUTO: 11.88 THOUSAND/UL (ref 4.31–10.16)
WBC # BLD AUTO: 12.24 THOUSAND/UL (ref 4.31–10.16)
WBC # BLD AUTO: 13.5 THOUSAND/UL (ref 4.31–10.16)
WBC # BLD AUTO: 14.32 THOUSAND/UL (ref 4.31–10.16)
WBC # BLD AUTO: 20.46 THOUSAND/UL (ref 4.31–10.16)
WBC # BLD AUTO: 6.82 THOUSAND/UL (ref 4.31–10.16)
WBC # BLD AUTO: 7.56 THOUSAND/UL (ref 4.31–10.16)
WBC #/AREA URNS AUTO: ABNORMAL /HPF
WBC #/AREA URNS AUTO: NORMAL /HPF
WBC CLUMPS # UR AUTO: PRESENT /UL
Z-SCORE OF INTERVENTRICULAR SEPTUM IN END DIASTOLE: 3.39
Z-SCORE OF LEFT VENTRICULAR DIMENSION IN END DIASTOLE: -4.25
Z-SCORE OF LEFT VENTRICULAR DIMENSION IN END SYSTOLE: -2.39
Z-SCORE OF LEFT VENTRICULAR DIMENSION IN END SYSTOLE: -7.86
Z-SCORE OF LEFT VENTRICULAR POSTERIOR WALL IN END DIASTOLE: 2.68

## 2022-01-01 PROCEDURE — 94760 N-INVAS EAR/PLS OXIMETRY 1: CPT

## 2022-01-01 PROCEDURE — 4A133B1 MONITORING OF ARTERIAL PRESSURE, PERIPHERAL, PERCUTANEOUS APPROACH: ICD-10-PCS | Performed by: STUDENT IN AN ORGANIZED HEALTH CARE EDUCATION/TRAINING PROGRAM

## 2022-01-01 PROCEDURE — 83735 ASSAY OF MAGNESIUM: CPT | Performed by: STUDENT IN AN ORGANIZED HEALTH CARE EDUCATION/TRAINING PROGRAM

## 2022-01-01 PROCEDURE — 83735 ASSAY OF MAGNESIUM: CPT | Performed by: PHYSICIAN ASSISTANT

## 2022-01-01 PROCEDURE — 36620 INSERTION CATHETER ARTERY: CPT | Performed by: NURSE PRACTITIONER

## 2022-01-01 PROCEDURE — 99232 SBSQ HOSP IP/OBS MODERATE 35: CPT | Performed by: FAMILY MEDICINE

## 2022-01-01 PROCEDURE — 99285 EMERGENCY DEPT VISIT HI MDM: CPT

## 2022-01-01 PROCEDURE — 71045 X-RAY EXAM CHEST 1 VIEW: CPT

## 2022-01-01 PROCEDURE — 36556 INSERT NON-TUNNEL CV CATH: CPT | Performed by: PHYSICIAN ASSISTANT

## 2022-01-01 PROCEDURE — 84145 PROCALCITONIN (PCT): CPT | Performed by: NURSE PRACTITIONER

## 2022-01-01 PROCEDURE — 84145 PROCALCITONIN (PCT): CPT | Performed by: PHYSICIAN ASSISTANT

## 2022-01-01 PROCEDURE — 82948 REAGENT STRIP/BLOOD GLUCOSE: CPT

## 2022-01-01 PROCEDURE — 85025 COMPLETE CBC W/AUTO DIFF WBC: CPT | Performed by: STUDENT IN AN ORGANIZED HEALTH CARE EDUCATION/TRAINING PROGRAM

## 2022-01-01 PROCEDURE — 0241U HB NFCT DS VIR RESP RNA 4 TRGT: CPT | Performed by: FAMILY MEDICINE

## 2022-01-01 PROCEDURE — 85025 COMPLETE CBC W/AUTO DIFF WBC: CPT | Performed by: PHYSICIAN ASSISTANT

## 2022-01-01 PROCEDURE — 87086 URINE CULTURE/COLONY COUNT: CPT | Performed by: STUDENT IN AN ORGANIZED HEALTH CARE EDUCATION/TRAINING PROGRAM

## 2022-01-01 PROCEDURE — 83605 ASSAY OF LACTIC ACID: CPT | Performed by: STUDENT IN AN ORGANIZED HEALTH CARE EDUCATION/TRAINING PROGRAM

## 2022-01-01 PROCEDURE — 94640 AIRWAY INHALATION TREATMENT: CPT

## 2022-01-01 PROCEDURE — 84100 ASSAY OF PHOSPHORUS: CPT | Performed by: PHYSICIAN ASSISTANT

## 2022-01-01 PROCEDURE — 96375 TX/PRO/DX INJ NEW DRUG ADDON: CPT

## 2022-01-01 PROCEDURE — 87070 CULTURE OTHR SPECIMN AEROBIC: CPT | Performed by: NURSE PRACTITIONER

## 2022-01-01 PROCEDURE — 96374 THER/PROPH/DIAG INJ IV PUSH: CPT

## 2022-01-01 PROCEDURE — 36415 COLL VENOUS BLD VENIPUNCTURE: CPT | Performed by: PHYSICIAN ASSISTANT

## 2022-01-01 PROCEDURE — 94002 VENT MGMT INPAT INIT DAY: CPT

## 2022-01-01 PROCEDURE — 84484 ASSAY OF TROPONIN QUANT: CPT | Performed by: NURSE PRACTITIONER

## 2022-01-01 PROCEDURE — 85730 THROMBOPLASTIN TIME PARTIAL: CPT | Performed by: PHYSICIAN ASSISTANT

## 2022-01-01 PROCEDURE — 82805 BLOOD GASES W/O2 SATURATION: CPT | Performed by: PHYSICIAN ASSISTANT

## 2022-01-01 PROCEDURE — 93308 TTE F-UP OR LMTD: CPT

## 2022-01-01 PROCEDURE — 93325 DOPPLER ECHO COLOR FLOW MAPG: CPT

## 2022-01-01 PROCEDURE — 36620 INSERTION CATHETER ARTERY: CPT | Performed by: PHYSICIAN ASSISTANT

## 2022-01-01 PROCEDURE — 5A1945Z RESPIRATORY VENTILATION, 24-96 CONSECUTIVE HOURS: ICD-10-PCS | Performed by: STUDENT IN AN ORGANIZED HEALTH CARE EDUCATION/TRAINING PROGRAM

## 2022-01-01 PROCEDURE — 83880 ASSAY OF NATRIURETIC PEPTIDE: CPT | Performed by: STUDENT IN AN ORGANIZED HEALTH CARE EDUCATION/TRAINING PROGRAM

## 2022-01-01 PROCEDURE — 96365 THER/PROPH/DIAG IV INF INIT: CPT

## 2022-01-01 PROCEDURE — 4A133J1 MONITORING OF ARTERIAL PULSE, PERIPHERAL, PERCUTANEOUS APPROACH: ICD-10-PCS | Performed by: STUDENT IN AN ORGANIZED HEALTH CARE EDUCATION/TRAINING PROGRAM

## 2022-01-01 PROCEDURE — 84484 ASSAY OF TROPONIN QUANT: CPT | Performed by: PHYSICIAN ASSISTANT

## 2022-01-01 PROCEDURE — NC001 PR NO CHARGE: Performed by: PHYSICIAN ASSISTANT

## 2022-01-01 PROCEDURE — 81001 URINALYSIS AUTO W/SCOPE: CPT | Performed by: STUDENT IN AN ORGANIZED HEALTH CARE EDUCATION/TRAINING PROGRAM

## 2022-01-01 PROCEDURE — 99291 CRITICAL CARE FIRST HOUR: CPT | Performed by: STUDENT IN AN ORGANIZED HEALTH CARE EDUCATION/TRAINING PROGRAM

## 2022-01-01 PROCEDURE — 87449 NOS EACH ORGANISM AG IA: CPT | Performed by: PHYSICIAN ASSISTANT

## 2022-01-01 PROCEDURE — C9113 INJ PANTOPRAZOLE SODIUM, VIA: HCPCS | Performed by: NURSE PRACTITIONER

## 2022-01-01 PROCEDURE — 80053 COMPREHEN METABOLIC PANEL: CPT | Performed by: PHYSICIAN ASSISTANT

## 2022-01-01 PROCEDURE — 0241U HB NFCT DS VIR RESP RNA 4 TRGT: CPT | Performed by: PHYSICIAN ASSISTANT

## 2022-01-01 PROCEDURE — 84484 ASSAY OF TROPONIN QUANT: CPT | Performed by: STUDENT IN AN ORGANIZED HEALTH CARE EDUCATION/TRAINING PROGRAM

## 2022-01-01 PROCEDURE — NC001 PR NO CHARGE: Performed by: STUDENT IN AN ORGANIZED HEALTH CARE EDUCATION/TRAINING PROGRAM

## 2022-01-01 PROCEDURE — 80048 BASIC METABOLIC PNL TOTAL CA: CPT | Performed by: NURSE PRACTITIONER

## 2022-01-01 PROCEDURE — 87040 BLOOD CULTURE FOR BACTERIA: CPT | Performed by: STUDENT IN AN ORGANIZED HEALTH CARE EDUCATION/TRAINING PROGRAM

## 2022-01-01 PROCEDURE — 82436 ASSAY OF URINE CHLORIDE: CPT | Performed by: NURSE PRACTITIONER

## 2022-01-01 PROCEDURE — 99239 HOSP IP/OBS DSCHRG MGMT >30: CPT | Performed by: FAMILY MEDICINE

## 2022-01-01 PROCEDURE — 0BH18EZ INSERTION OF ENDOTRACHEAL AIRWAY INTO TRACHEA, VIA NATURAL OR ARTIFICIAL OPENING ENDOSCOPIC: ICD-10-PCS | Performed by: STUDENT IN AN ORGANIZED HEALTH CARE EDUCATION/TRAINING PROGRAM

## 2022-01-01 PROCEDURE — 36600 WITHDRAWAL OF ARTERIAL BLOOD: CPT

## 2022-01-01 PROCEDURE — 85610 PROTHROMBIN TIME: CPT | Performed by: PHYSICIAN ASSISTANT

## 2022-01-01 PROCEDURE — 99223 1ST HOSP IP/OBS HIGH 75: CPT | Performed by: INTERNAL MEDICINE

## 2022-01-01 PROCEDURE — 36415 COLL VENOUS BLD VENIPUNCTURE: CPT | Performed by: STUDENT IN AN ORGANIZED HEALTH CARE EDUCATION/TRAINING PROGRAM

## 2022-01-01 PROCEDURE — G1004 CDSM NDSC: HCPCS

## 2022-01-01 PROCEDURE — 99292 CRITICAL CARE ADDL 30 MIN: CPT | Performed by: NURSE PRACTITIONER

## 2022-01-01 PROCEDURE — 81001 URINALYSIS AUTO W/SCOPE: CPT | Performed by: PHYSICIAN ASSISTANT

## 2022-01-01 PROCEDURE — 99223 1ST HOSP IP/OBS HIGH 75: CPT | Performed by: NURSE PRACTITIONER

## 2022-01-01 PROCEDURE — 85027 COMPLETE CBC AUTOMATED: CPT | Performed by: NURSE PRACTITIONER

## 2022-01-01 PROCEDURE — 97167 OT EVAL HIGH COMPLEX 60 MIN: CPT

## 2022-01-01 PROCEDURE — 74176 CT ABD & PELVIS W/O CONTRAST: CPT

## 2022-01-01 PROCEDURE — 85027 COMPLETE CBC AUTOMATED: CPT | Performed by: PHYSICIAN ASSISTANT

## 2022-01-01 PROCEDURE — 99233 SBSQ HOSP IP/OBS HIGH 50: CPT | Performed by: INTERNAL MEDICINE

## 2022-01-01 PROCEDURE — 86140 C-REACTIVE PROTEIN: CPT | Performed by: PHYSICIAN ASSISTANT

## 2022-01-01 PROCEDURE — 84540 ASSAY OF URINE/UREA-N: CPT | Performed by: NURSE PRACTITIONER

## 2022-01-01 PROCEDURE — 80048 BASIC METABOLIC PNL TOTAL CA: CPT | Performed by: PHYSICIAN ASSISTANT

## 2022-01-01 PROCEDURE — 94003 VENT MGMT INPAT SUBQ DAY: CPT

## 2022-01-01 PROCEDURE — 87449 NOS EACH ORGANISM AG IA: CPT | Performed by: NURSE PRACTITIONER

## 2022-01-01 PROCEDURE — 92610 EVALUATE SWALLOWING FUNCTION: CPT

## 2022-01-01 PROCEDURE — 80053 COMPREHEN METABOLIC PANEL: CPT | Performed by: STUDENT IN AN ORGANIZED HEALTH CARE EDUCATION/TRAINING PROGRAM

## 2022-01-01 PROCEDURE — 85025 COMPLETE CBC W/AUTO DIFF WBC: CPT | Performed by: FAMILY MEDICINE

## 2022-01-01 PROCEDURE — 93321 DOPPLER ECHO F-UP/LMTD STD: CPT

## 2022-01-01 PROCEDURE — 85730 THROMBOPLASTIN TIME PARTIAL: CPT | Performed by: STUDENT IN AN ORGANIZED HEALTH CARE EDUCATION/TRAINING PROGRAM

## 2022-01-01 PROCEDURE — 80048 BASIC METABOLIC PNL TOTAL CA: CPT | Performed by: FAMILY MEDICINE

## 2022-01-01 PROCEDURE — 03HY32Z INSERTION OF MONITORING DEVICE INTO UPPER ARTERY, PERCUTANEOUS APPROACH: ICD-10-PCS | Performed by: STUDENT IN AN ORGANIZED HEALTH CARE EDUCATION/TRAINING PROGRAM

## 2022-01-01 PROCEDURE — 83880 ASSAY OF NATRIURETIC PEPTIDE: CPT | Performed by: PHYSICIAN ASSISTANT

## 2022-01-01 PROCEDURE — 83880 ASSAY OF NATRIURETIC PEPTIDE: CPT | Performed by: FAMILY MEDICINE

## 2022-01-01 PROCEDURE — 02HV33Z INSERTION OF INFUSION DEVICE INTO SUPERIOR VENA CAVA, PERCUTANEOUS APPROACH: ICD-10-PCS | Performed by: STUDENT IN AN ORGANIZED HEALTH CARE EDUCATION/TRAINING PROGRAM

## 2022-01-01 PROCEDURE — 83735 ASSAY OF MAGNESIUM: CPT | Performed by: NURSE PRACTITIONER

## 2022-01-01 PROCEDURE — 87205 SMEAR GRAM STAIN: CPT | Performed by: PHYSICIAN ASSISTANT

## 2022-01-01 PROCEDURE — 82805 BLOOD GASES W/O2 SATURATION: CPT | Performed by: NURSE PRACTITIONER

## 2022-01-01 PROCEDURE — 82570 ASSAY OF URINE CREATININE: CPT | Performed by: NURSE PRACTITIONER

## 2022-01-01 PROCEDURE — 87040 BLOOD CULTURE FOR BACTERIA: CPT | Performed by: PHYSICIAN ASSISTANT

## 2022-01-01 PROCEDURE — 93306 TTE W/DOPPLER COMPLETE: CPT

## 2022-01-01 PROCEDURE — 82728 ASSAY OF FERRITIN: CPT | Performed by: NURSE PRACTITIONER

## 2022-01-01 PROCEDURE — 85610 PROTHROMBIN TIME: CPT | Performed by: STUDENT IN AN ORGANIZED HEALTH CARE EDUCATION/TRAINING PROGRAM

## 2022-01-01 PROCEDURE — 71250 CT THORAX DX C-: CPT

## 2022-01-01 PROCEDURE — 99285 EMERGENCY DEPT VISIT HI MDM: CPT | Performed by: PHYSICIAN ASSISTANT

## 2022-01-01 PROCEDURE — 87070 CULTURE OTHR SPECIMN AEROBIC: CPT | Performed by: PHYSICIAN ASSISTANT

## 2022-01-01 PROCEDURE — 97163 PT EVAL HIGH COMPLEX 45 MIN: CPT

## 2022-01-01 PROCEDURE — 83605 ASSAY OF LACTIC ACID: CPT | Performed by: PHYSICIAN ASSISTANT

## 2022-01-01 PROCEDURE — 99291 CRITICAL CARE FIRST HOUR: CPT

## 2022-01-01 PROCEDURE — 99291 CRITICAL CARE FIRST HOUR: CPT | Performed by: NURSE PRACTITIONER

## 2022-01-01 PROCEDURE — 83036 HEMOGLOBIN GLYCOSYLATED A1C: CPT | Performed by: PHYSICIAN ASSISTANT

## 2022-01-01 PROCEDURE — 87081 CULTURE SCREEN ONLY: CPT | Performed by: NURSE PRACTITIONER

## 2022-01-01 PROCEDURE — 87205 SMEAR GRAM STAIN: CPT | Performed by: NURSE PRACTITIONER

## 2022-01-01 PROCEDURE — 83540 ASSAY OF IRON: CPT | Performed by: NURSE PRACTITIONER

## 2022-01-01 PROCEDURE — 84145 PROCALCITONIN (PCT): CPT | Performed by: STUDENT IN AN ORGANIZED HEALTH CARE EDUCATION/TRAINING PROGRAM

## 2022-01-01 PROCEDURE — 76937 US GUIDE VASCULAR ACCESS: CPT | Performed by: PHYSICIAN ASSISTANT

## 2022-01-01 PROCEDURE — 31500 INSERT EMERGENCY AIRWAY: CPT | Performed by: STUDENT IN AN ORGANIZED HEALTH CARE EDUCATION/TRAINING PROGRAM

## 2022-01-01 PROCEDURE — 83550 IRON BINDING TEST: CPT | Performed by: NURSE PRACTITIONER

## 2022-01-01 PROCEDURE — 83735 ASSAY OF MAGNESIUM: CPT | Performed by: FAMILY MEDICINE

## 2022-01-01 PROCEDURE — 84443 ASSAY THYROID STIM HORMONE: CPT | Performed by: STUDENT IN AN ORGANIZED HEALTH CARE EDUCATION/TRAINING PROGRAM

## 2022-01-01 PROCEDURE — 93005 ELECTROCARDIOGRAM TRACING: CPT

## 2022-01-01 PROCEDURE — 87106 FUNGI IDENTIFICATION YEAST: CPT | Performed by: PHYSICIAN ASSISTANT

## 2022-01-01 PROCEDURE — NC001 PR NO CHARGE: Performed by: NURSE PRACTITIONER

## 2022-01-01 PROCEDURE — 99291 CRITICAL CARE FIRST HOUR: CPT | Performed by: PHYSICIAN ASSISTANT

## 2022-01-01 PROCEDURE — 0241U HB NFCT DS VIR RESP RNA 4 TRGT: CPT | Performed by: STUDENT IN AN ORGANIZED HEALTH CARE EDUCATION/TRAINING PROGRAM

## 2022-01-01 PROCEDURE — 82805 BLOOD GASES W/O2 SATURATION: CPT | Performed by: STUDENT IN AN ORGANIZED HEALTH CARE EDUCATION/TRAINING PROGRAM

## 2022-01-01 RX ORDER — FENTANYL 75 UG/H
75 PATCH TRANSDERMAL
Status: DISCONTINUED | OUTPATIENT
Start: 2022-01-01 | End: 2022-01-01 | Stop reason: HOSPADM

## 2022-01-01 RX ORDER — CLONIDINE HYDROCHLORIDE 0.1 MG/1
0.1 TABLET ORAL DAILY
Status: DISCONTINUED | OUTPATIENT
Start: 2022-01-01 | End: 2022-01-01 | Stop reason: HOSPADM

## 2022-01-01 RX ORDER — MIDAZOLAM HYDROCHLORIDE 2 MG/2ML
4 INJECTION, SOLUTION INTRAMUSCULAR; INTRAVENOUS ONCE
Status: COMPLETED | OUTPATIENT
Start: 2022-01-01 | End: 2022-01-01

## 2022-01-01 RX ORDER — LORAZEPAM 2 MG/ML
1 INJECTION INTRAMUSCULAR
Status: DISCONTINUED | OUTPATIENT
Start: 2022-01-01 | End: 2022-01-01

## 2022-01-01 RX ORDER — POTASSIUM CHLORIDE 14.9 MG/ML
20 INJECTION INTRAVENOUS
Status: COMPLETED | OUTPATIENT
Start: 2022-01-01 | End: 2022-01-01

## 2022-01-01 RX ORDER — CALCIUM CARBONATE 200(500)MG
500 TABLET,CHEWABLE ORAL 2 TIMES DAILY PRN
Status: DISCONTINUED | OUTPATIENT
Start: 2022-01-01 | End: 2022-01-01

## 2022-01-01 RX ORDER — HYDROMORPHONE HCL 110MG/55ML
2 PATIENT CONTROLLED ANALGESIA SYRINGE INTRAVENOUS
Status: DISCONTINUED | OUTPATIENT
Start: 2022-01-01 | End: 2022-01-01 | Stop reason: HOSPADM

## 2022-01-01 RX ORDER — FUROSEMIDE 10 MG/ML
80 INJECTION INTRAMUSCULAR; INTRAVENOUS
Status: DISCONTINUED | OUTPATIENT
Start: 2022-01-01 | End: 2022-01-01

## 2022-01-01 RX ORDER — POTASSIUM CHLORIDE 14.9 MG/ML
20 INJECTION INTRAVENOUS ONCE
Status: COMPLETED | OUTPATIENT
Start: 2022-01-01 | End: 2022-01-01

## 2022-01-01 RX ORDER — HYDROMORPHONE HCL/PF 1 MG/ML
1 SYRINGE (ML) INJECTION ONCE
Status: COMPLETED | OUTPATIENT
Start: 2022-01-01 | End: 2022-01-01

## 2022-01-01 RX ORDER — FUROSEMIDE 10 MG/ML
40 INJECTION INTRAMUSCULAR; INTRAVENOUS
Status: DISCONTINUED | OUTPATIENT
Start: 2022-01-01 | End: 2022-01-01

## 2022-01-01 RX ORDER — LIDOCAINE HYDROCHLORIDE 10 MG/ML
5 INJECTION, SOLUTION EPIDURAL; INFILTRATION; INTRACAUDAL; PERINEURAL ONCE
Status: COMPLETED | OUTPATIENT
Start: 2022-01-01 | End: 2022-01-01

## 2022-01-01 RX ORDER — POTASSIUM CHLORIDE 20MEQ/15ML
40 LIQUID (ML) ORAL ONCE
Status: COMPLETED | OUTPATIENT
Start: 2022-01-01 | End: 2022-01-01

## 2022-01-01 RX ORDER — ACETAMINOPHEN 325 MG/1
650 TABLET ORAL EVERY 6 HOURS PRN
Status: DISCONTINUED | OUTPATIENT
Start: 2022-01-01 | End: 2022-01-01 | Stop reason: HOSPADM

## 2022-01-01 RX ORDER — FENTANYL CITRATE 50 UG/ML
50 INJECTION, SOLUTION INTRAMUSCULAR; INTRAVENOUS
Status: DISCONTINUED | OUTPATIENT
Start: 2022-01-01 | End: 2022-01-01

## 2022-01-01 RX ORDER — IPRATROPIUM BROMIDE AND ALBUTEROL SULFATE 2.5; .5 MG/3ML; MG/3ML
3 SOLUTION RESPIRATORY (INHALATION) 4 TIMES DAILY
Status: ON HOLD | COMMUNITY
End: 2022-01-01 | Stop reason: SDUPTHER

## 2022-01-01 RX ORDER — MIDAZOLAM HYDROCHLORIDE 2 MG/2ML
4 INJECTION, SOLUTION INTRAMUSCULAR; INTRAVENOUS
Status: DISCONTINUED | OUTPATIENT
Start: 2022-01-01 | End: 2022-01-01 | Stop reason: HOSPADM

## 2022-01-01 RX ORDER — GUAIFENESIN 600 MG
600 TABLET, EXTENDED RELEASE 12 HR ORAL 2 TIMES DAILY
Status: DISCONTINUED | OUTPATIENT
Start: 2022-01-01 | End: 2022-01-01 | Stop reason: HOSPADM

## 2022-01-01 RX ORDER — CLONIDINE HYDROCHLORIDE 0.1 MG/1
0.1 TABLET ORAL DAILY
Refills: 0
Start: 2022-01-01

## 2022-01-01 RX ORDER — FENTANYL CITRATE-0.9 % NACL/PF 10 MCG/ML
75 PLASTIC BAG, INJECTION (ML) INTRAVENOUS CONTINUOUS
Status: DISCONTINUED | OUTPATIENT
Start: 2022-01-01 | End: 2022-01-01

## 2022-01-01 RX ORDER — FENTANYL CITRATE-0.9 % NACL/PF 10 MCG/ML
150 PLASTIC BAG, INJECTION (ML) INTRAVENOUS CONTINUOUS
Status: DISCONTINUED | OUTPATIENT
Start: 2022-01-01 | End: 2022-01-01

## 2022-01-01 RX ORDER — FUROSEMIDE 10 MG/ML
60 INJECTION INTRAMUSCULAR; INTRAVENOUS
Status: DISCONTINUED | OUTPATIENT
Start: 2022-01-01 | End: 2022-01-01

## 2022-01-01 RX ORDER — LANOLIN ALCOHOL/MO/W.PET/CERES
3 CREAM (GRAM) TOPICAL
Status: DISCONTINUED | OUTPATIENT
Start: 2022-01-01 | End: 2022-01-01 | Stop reason: HOSPADM

## 2022-01-01 RX ORDER — FUROSEMIDE 20 MG/1
60 TABLET ORAL DAILY
Start: 2022-01-01

## 2022-01-01 RX ORDER — LEVALBUTEROL 1.25 MG/.5ML
1.25 SOLUTION, CONCENTRATE RESPIRATORY (INHALATION)
Status: DISCONTINUED | OUTPATIENT
Start: 2022-01-01 | End: 2022-01-01

## 2022-01-01 RX ORDER — BUSPIRONE HYDROCHLORIDE 5 MG/1
5 TABLET ORAL 3 TIMES DAILY
Status: DISCONTINUED | OUTPATIENT
Start: 2022-01-01 | End: 2022-01-01 | Stop reason: HOSPADM

## 2022-01-01 RX ORDER — ALBUTEROL SULFATE 90 UG/1
2 AEROSOL, METERED RESPIRATORY (INHALATION) ONCE
Status: COMPLETED | OUTPATIENT
Start: 2022-01-01 | End: 2022-01-01

## 2022-01-01 RX ORDER — IPRATROPIUM BROMIDE AND ALBUTEROL SULFATE 2.5; .5 MG/3ML; MG/3ML
3 SOLUTION RESPIRATORY (INHALATION) 4 TIMES DAILY PRN
Status: DISCONTINUED | OUTPATIENT
Start: 2022-01-01 | End: 2022-01-01 | Stop reason: HOSPADM

## 2022-01-01 RX ORDER — POTASSIUM CHLORIDE 20 MEQ/1
40 TABLET, EXTENDED RELEASE ORAL ONCE
Status: DISCONTINUED | OUTPATIENT
Start: 2022-01-01 | End: 2022-01-01

## 2022-01-01 RX ORDER — SUCCINYLCHOLINE/SOD CL,ISO/PF 100 MG/5ML
100 SYRINGE (ML) INTRAVENOUS ONCE
Status: COMPLETED | OUTPATIENT
Start: 2022-01-01 | End: 2022-01-01

## 2022-01-01 RX ORDER — CEFDINIR 300 MG/1
300 CAPSULE ORAL EVERY 12 HOURS SCHEDULED
Refills: 0
Start: 2022-01-01 | End: 2022-01-01

## 2022-01-01 RX ORDER — BUMETANIDE 0.25 MG/ML
2 INJECTION, SOLUTION INTRAMUSCULAR; INTRAVENOUS ONCE
Status: COMPLETED | OUTPATIENT
Start: 2022-01-01 | End: 2022-01-01

## 2022-01-01 RX ORDER — AMLODIPINE BESYLATE 5 MG/1
5 TABLET ORAL DAILY
Status: DISCONTINUED | OUTPATIENT
Start: 2022-01-01 | End: 2022-01-01 | Stop reason: HOSPADM

## 2022-01-01 RX ORDER — CEFEPIME HYDROCHLORIDE 2 G/50ML
2000 INJECTION, SOLUTION INTRAVENOUS EVERY 12 HOURS
Status: DISCONTINUED | OUTPATIENT
Start: 2022-01-01 | End: 2022-01-01

## 2022-01-01 RX ORDER — LORAZEPAM 2 MG/ML
2 INJECTION INTRAMUSCULAR
Status: DISCONTINUED | OUTPATIENT
Start: 2022-01-01 | End: 2022-01-01

## 2022-01-01 RX ORDER — ONDANSETRON 2 MG/ML
4 INJECTION INTRAMUSCULAR; INTRAVENOUS EVERY 6 HOURS PRN
Status: DISCONTINUED | OUTPATIENT
Start: 2022-01-01 | End: 2022-01-01

## 2022-01-01 RX ORDER — MAGNESIUM SULFATE HEPTAHYDRATE 40 MG/ML
2 INJECTION, SOLUTION INTRAVENOUS ONCE
Status: COMPLETED | OUTPATIENT
Start: 2022-01-01 | End: 2022-01-01

## 2022-01-01 RX ORDER — FUROSEMIDE 10 MG/ML
80 INJECTION INTRAMUSCULAR; INTRAVENOUS ONCE
Status: COMPLETED | OUTPATIENT
Start: 2022-01-01 | End: 2022-01-01

## 2022-01-01 RX ORDER — ACETAMINOPHEN 160 MG/5ML
650 SUSPENSION, ORAL (FINAL DOSE FORM) ORAL EVERY 6 HOURS PRN
Status: DISCONTINUED | OUTPATIENT
Start: 2022-01-01 | End: 2022-01-01

## 2022-01-01 RX ORDER — LEVALBUTEROL 1.25 MG/.5ML
1.25 SOLUTION, CONCENTRATE RESPIRATORY (INHALATION) 3 TIMES DAILY
Status: DISCONTINUED | OUTPATIENT
Start: 2022-01-01 | End: 2022-01-01

## 2022-01-01 RX ORDER — HEPARIN SODIUM 1000 [USP'U]/ML
2000 INJECTION, SOLUTION INTRAVENOUS; SUBCUTANEOUS
Status: DISCONTINUED | OUTPATIENT
Start: 2022-01-01 | End: 2022-01-01

## 2022-01-01 RX ORDER — POTASSIUM CHLORIDE 1500 MG/1
1 TABLET, FILM COATED, EXTENDED RELEASE ORAL
COMMUNITY
Start: 2022-01-01 | End: 2022-01-01

## 2022-01-01 RX ORDER — POTASSIUM CHLORIDE 20 MEQ/1
20 TABLET, EXTENDED RELEASE ORAL DAILY
Refills: 0
Start: 2022-01-01

## 2022-01-01 RX ORDER — POTASSIUM CHLORIDE 29.8 MG/ML
40 INJECTION INTRAVENOUS ONCE
Status: COMPLETED | OUTPATIENT
Start: 2022-01-01 | End: 2022-01-01

## 2022-01-01 RX ORDER — FENTANYL 75 UG/H
1 PATCH TRANSDERMAL
Status: DISCONTINUED | OUTPATIENT
Start: 2022-01-01 | End: 2022-01-01

## 2022-01-01 RX ORDER — POTASSIUM CHLORIDE 20 MEQ/1
20 TABLET, EXTENDED RELEASE ORAL ONCE
Status: COMPLETED | OUTPATIENT
Start: 2022-01-01 | End: 2022-01-01

## 2022-01-01 RX ORDER — HEPARIN SODIUM 5000 [USP'U]/ML
5000 INJECTION, SOLUTION INTRAVENOUS; SUBCUTANEOUS EVERY 8 HOURS SCHEDULED
Status: DISCONTINUED | OUTPATIENT
Start: 2022-01-01 | End: 2022-01-01

## 2022-01-01 RX ORDER — BACLOFEN 10 MG/1
10 TABLET ORAL 3 TIMES DAILY
COMMUNITY

## 2022-01-01 RX ORDER — CEFTRIAXONE 1 G/50ML
1000 INJECTION, SOLUTION INTRAVENOUS EVERY 24 HOURS
Status: DISCONTINUED | OUTPATIENT
Start: 2022-01-01 | End: 2022-01-01 | Stop reason: HOSPADM

## 2022-01-01 RX ORDER — FUROSEMIDE 10 MG/ML
40 INJECTION INTRAMUSCULAR; INTRAVENOUS ONCE
Status: COMPLETED | OUTPATIENT
Start: 2022-01-01 | End: 2022-01-01

## 2022-01-01 RX ORDER — GUAIFENESIN 100 MG/5ML
200 SOLUTION ORAL EVERY 6 HOURS SCHEDULED
Status: DISCONTINUED | OUTPATIENT
Start: 2022-01-01 | End: 2022-01-01

## 2022-01-01 RX ORDER — GLYCOPYRROLATE 0.2 MG/ML
0.1 INJECTION INTRAMUSCULAR; INTRAVENOUS EVERY 4 HOURS PRN
Status: DISCONTINUED | OUTPATIENT
Start: 2022-01-01 | End: 2022-01-01 | Stop reason: HOSPADM

## 2022-01-01 RX ORDER — INSULIN LISPRO 100 [IU]/ML
1-6 INJECTION, SOLUTION INTRAVENOUS; SUBCUTANEOUS EVERY 6 HOURS SCHEDULED
Status: DISCONTINUED | OUTPATIENT
Start: 2022-01-01 | End: 2022-01-01

## 2022-01-01 RX ORDER — CHLORHEXIDINE GLUCONATE 0.12 MG/ML
15 RINSE ORAL EVERY 12 HOURS SCHEDULED
Status: DISCONTINUED | OUTPATIENT
Start: 2022-01-01 | End: 2022-01-01

## 2022-01-01 RX ORDER — FUROSEMIDE 10 MG/ML
20 INJECTION INTRAMUSCULAR; INTRAVENOUS ONCE
Status: COMPLETED | OUTPATIENT
Start: 2022-01-01 | End: 2022-01-01

## 2022-01-01 RX ORDER — VANCOMYCIN HYDROCHLORIDE 1 G/200ML
15 INJECTION, SOLUTION INTRAVENOUS EVERY 24 HOURS
Status: DISCONTINUED | OUTPATIENT
Start: 2022-01-01 | End: 2022-01-01

## 2022-01-01 RX ORDER — LORAZEPAM 2 MG/ML
1 INJECTION INTRAMUSCULAR
Status: COMPLETED | OUTPATIENT
Start: 2022-01-01 | End: 2022-01-01

## 2022-01-01 RX ORDER — GUAIFENESIN 600 MG
600 TABLET, EXTENDED RELEASE 12 HR ORAL 2 TIMES DAILY
Refills: 0 | Status: ON HOLD
Start: 2022-01-01 | End: 2022-01-01

## 2022-01-01 RX ORDER — HEPARIN SODIUM 1000 [USP'U]/ML
4000 INJECTION, SOLUTION INTRAVENOUS; SUBCUTANEOUS
Status: DISCONTINUED | OUTPATIENT
Start: 2022-01-01 | End: 2022-01-01

## 2022-01-01 RX ORDER — POTASSIUM CHLORIDE 20 MEQ/1
20 TABLET, EXTENDED RELEASE ORAL DAILY
Status: DISCONTINUED | OUTPATIENT
Start: 2022-01-01 | End: 2022-01-01 | Stop reason: HOSPADM

## 2022-01-01 RX ORDER — ECHINACEA PURPUREA EXTRACT 125 MG
2 TABLET ORAL EVERY 6 HOURS PRN
COMMUNITY

## 2022-01-01 RX ORDER — FLUTICASONE PROPIONATE 50 MCG
1 SPRAY, SUSPENSION (ML) NASAL DAILY
Status: DISCONTINUED | OUTPATIENT
Start: 2022-01-01 | End: 2022-01-01 | Stop reason: HOSPADM

## 2022-01-01 RX ORDER — METOPROLOL TARTRATE 5 MG/5ML
5 INJECTION INTRAVENOUS EVERY 6 HOURS PRN
Status: DISCONTINUED | OUTPATIENT
Start: 2022-01-01 | End: 2022-01-01

## 2022-01-01 RX ORDER — POTASSIUM CHLORIDE 20 MEQ/1
40 TABLET, EXTENDED RELEASE ORAL 2 TIMES DAILY
Status: COMPLETED | OUTPATIENT
Start: 2022-01-01 | End: 2022-01-01

## 2022-01-01 RX ORDER — SENNOSIDES 8.6 MG
1 TABLET ORAL 2 TIMES DAILY PRN
Status: DISCONTINUED | OUTPATIENT
Start: 2022-01-01 | End: 2022-01-01 | Stop reason: HOSPADM

## 2022-01-01 RX ORDER — BISACODYL 10 MG
10 SUPPOSITORY, RECTAL RECTAL DAILY
Status: DISCONTINUED | OUTPATIENT
Start: 2022-01-01 | End: 2022-01-01

## 2022-01-01 RX ORDER — METHYLPREDNISOLONE SODIUM SUCCINATE 125 MG/2ML
125 INJECTION, POWDER, LYOPHILIZED, FOR SOLUTION INTRAMUSCULAR; INTRAVENOUS ONCE
Status: COMPLETED | OUTPATIENT
Start: 2022-01-01 | End: 2022-01-01

## 2022-01-01 RX ORDER — POLYETHYLENE GLYCOL 3350 17 G/17G
17 POWDER, FOR SOLUTION ORAL DAILY
Status: DISCONTINUED | OUTPATIENT
Start: 2022-01-01 | End: 2022-01-01

## 2022-01-01 RX ORDER — ETOMIDATE 2 MG/ML
20 INJECTION INTRAVENOUS ONCE
Status: COMPLETED | OUTPATIENT
Start: 2022-01-01 | End: 2022-01-01

## 2022-01-01 RX ORDER — LEVALBUTEROL 1.25 MG/.5ML
1.25 SOLUTION, CONCENTRATE RESPIRATORY (INHALATION) 3 TIMES DAILY
Status: DISCONTINUED | OUTPATIENT
Start: 2022-01-01 | End: 2022-01-01 | Stop reason: HOSPADM

## 2022-01-01 RX ORDER — ALBUMIN, HUMAN INJ 5% 5 %
12.5 SOLUTION INTRAVENOUS ONCE
Status: DISCONTINUED | OUTPATIENT
Start: 2022-01-01 | End: 2022-01-01

## 2022-01-01 RX ORDER — SENNOSIDES 8.8 MG/5ML
17.6 LIQUID ORAL
Status: DISCONTINUED | OUTPATIENT
Start: 2022-01-01 | End: 2022-01-01

## 2022-01-01 RX ORDER — LORAZEPAM 2 MG/ML
2 INJECTION INTRAMUSCULAR ONCE
Status: COMPLETED | OUTPATIENT
Start: 2022-01-01 | End: 2022-01-01

## 2022-01-01 RX ORDER — CEFTRIAXONE 1 G/50ML
1000 INJECTION, SOLUTION INTRAVENOUS ONCE
Status: COMPLETED | OUTPATIENT
Start: 2022-01-01 | End: 2022-01-01

## 2022-01-01 RX ORDER — HEPARIN SODIUM 10000 [USP'U]/100ML
3-20 INJECTION, SOLUTION INTRAVENOUS
Status: DISCONTINUED | OUTPATIENT
Start: 2022-01-01 | End: 2022-01-01

## 2022-01-01 RX ORDER — POLYETHYLENE GLYCOL 3350 17 G/17G
17 POWDER, FOR SOLUTION ORAL 2 TIMES DAILY
Status: DISCONTINUED | OUTPATIENT
Start: 2022-01-01 | End: 2022-01-01

## 2022-01-01 RX ORDER — IPRATROPIUM BROMIDE AND ALBUTEROL SULFATE 2.5; .5 MG/3ML; MG/3ML
3 SOLUTION RESPIRATORY (INHALATION) 4 TIMES DAILY PRN
Refills: 0 | Status: ON HOLD
Start: 2022-01-01 | End: 2022-01-01

## 2022-01-01 RX ORDER — ACETAMINOPHEN 325 MG/1
650 TABLET ORAL EVERY 4 HOURS PRN
COMMUNITY

## 2022-01-01 RX ORDER — BACLOFEN 10 MG/1
10 TABLET ORAL 2 TIMES DAILY
Status: DISCONTINUED | OUTPATIENT
Start: 2022-01-01 | End: 2022-01-01 | Stop reason: HOSPADM

## 2022-01-01 RX ORDER — BUSPIRONE HYDROCHLORIDE 10 MG/1
5 TABLET ORAL 3 TIMES DAILY
Status: DISCONTINUED | OUTPATIENT
Start: 2022-01-01 | End: 2022-01-01

## 2022-01-01 RX ORDER — FENTANYL CITRATE-0.9 % NACL/PF 10 MCG/ML
PLASTIC BAG, INJECTION (ML) INTRAVENOUS
Status: DISPENSED
Start: 2022-01-01 | End: 2022-01-01

## 2022-01-01 RX ORDER — SENNOSIDES 8.8 MG/5ML
8.8 LIQUID ORAL
Status: DISCONTINUED | OUTPATIENT
Start: 2022-01-01 | End: 2022-01-01

## 2022-01-01 RX ORDER — GUAIFENESIN 200 MG/1
200 TABLET ORAL 4 TIMES DAILY
COMMUNITY

## 2022-01-01 RX ORDER — PROPOFOL 10 MG/ML
5-50 INJECTION, EMULSION INTRAVENOUS
Status: DISCONTINUED | OUTPATIENT
Start: 2022-01-01 | End: 2022-01-01

## 2022-01-01 RX ORDER — OXYCODONE HYDROCHLORIDE 5 MG/1
2.5 TABLET ORAL EVERY 4 HOURS PRN
Status: DISCONTINUED | OUTPATIENT
Start: 2022-01-01 | End: 2022-01-01 | Stop reason: HOSPADM

## 2022-01-01 RX ORDER — FENTANYL 75 UG/H
1 PATCH TRANSDERMAL
COMMUNITY

## 2022-01-01 RX ORDER — PANTOPRAZOLE SODIUM 40 MG/1
40 INJECTION, POWDER, FOR SOLUTION INTRAVENOUS DAILY
Status: DISCONTINUED | OUTPATIENT
Start: 2022-01-01 | End: 2022-01-01

## 2022-01-01 RX ORDER — MORPHINE SULFATE 4 MG/ML
4 INJECTION, SOLUTION INTRAMUSCULAR; INTRAVENOUS
Status: DISCONTINUED | OUTPATIENT
Start: 2022-01-01 | End: 2022-01-01

## 2022-01-01 RX ADMIN — GLYCOPYRROLATE 0.1 MG: 0.2 INJECTION, SOLUTION INTRAMUSCULAR; INTRAVENOUS at 16:03

## 2022-01-01 RX ADMIN — IPRATROPIUM BROMIDE 0.5 MG: 0.5 SOLUTION RESPIRATORY (INHALATION) at 19:33

## 2022-01-01 RX ADMIN — IPRATROPIUM BROMIDE 0.5 MG: 0.5 SOLUTION RESPIRATORY (INHALATION) at 09:57

## 2022-01-01 RX ADMIN — GUAIFENESIN 600 MG: 600 TABLET, EXTENDED RELEASE ORAL at 08:52

## 2022-01-01 RX ADMIN — IPRATROPIUM BROMIDE 0.5 MG: 0.5 SOLUTION RESPIRATORY (INHALATION) at 13:21

## 2022-01-01 RX ADMIN — POTASSIUM CHLORIDE 20 MEQ: 14.9 INJECTION, SOLUTION INTRAVENOUS at 07:26

## 2022-01-01 RX ADMIN — HYDROMORPHONE HYDROCHLORIDE 1 MG/HR: 10 INJECTION INTRAMUSCULAR; INTRAVENOUS; SUBCUTANEOUS at 15:36

## 2022-01-01 RX ADMIN — POTASSIUM CHLORIDE 40 MEQ: 1500 TABLET, EXTENDED RELEASE ORAL at 08:54

## 2022-01-01 RX ADMIN — CEFEPIME HYDROCHLORIDE 2000 MG: 2 INJECTION, SOLUTION INTRAVENOUS at 16:27

## 2022-01-01 RX ADMIN — GUAIFENESIN 600 MG: 600 TABLET, EXTENDED RELEASE ORAL at 08:07

## 2022-01-01 RX ADMIN — GUAIFENESIN 200 MG: 100 SOLUTION ORAL at 12:58

## 2022-01-01 RX ADMIN — MELATONIN 3 MG: 3 TAB ORAL at 20:32

## 2022-01-01 RX ADMIN — LEVALBUTEROL HYDROCHLORIDE 1.25 MG: 1.25 SOLUTION, CONCENTRATE RESPIRATORY (INHALATION) at 02:19

## 2022-01-01 RX ADMIN — IPRATROPIUM BROMIDE 0.5 MG: 0.5 SOLUTION RESPIRATORY (INHALATION) at 08:24

## 2022-01-01 RX ADMIN — BUSPIRONE HYDROCHLORIDE 5 MG: 10 TABLET ORAL at 10:38

## 2022-01-01 RX ADMIN — CLONIDINE HYDROCHLORIDE 0.1 MG: 0.1 TABLET ORAL at 08:51

## 2022-01-01 RX ADMIN — NOREPINEPHRINE BITARTRATE 5 MCG/MIN: 1 INJECTION INTRAVENOUS at 02:35

## 2022-01-01 RX ADMIN — POTASSIUM CHLORIDE 20 MEQ: 1500 TABLET, EXTENDED RELEASE ORAL at 08:51

## 2022-01-01 RX ADMIN — CEFTRIAXONE 1000 MG: 1 INJECTION, SOLUTION INTRAVENOUS at 15:07

## 2022-01-01 RX ADMIN — LEVALBUTEROL HYDROCHLORIDE 1.25 MG: 1.25 SOLUTION, CONCENTRATE RESPIRATORY (INHALATION) at 17:54

## 2022-01-01 RX ADMIN — FENTANYL CITRATE 150 MCG/HR: 50 INJECTION, SOLUTION INTRAMUSCULAR; INTRAVENOUS at 14:10

## 2022-01-01 RX ADMIN — BUSPIRONE HYDROCHLORIDE 5 MG: 10 TABLET ORAL at 09:35

## 2022-01-01 RX ADMIN — MORPHINE SULFATE 4 MG: 4 INJECTION INTRAVENOUS at 16:03

## 2022-01-01 RX ADMIN — GUAIFENESIN 600 MG: 600 TABLET, EXTENDED RELEASE ORAL at 08:44

## 2022-01-01 RX ADMIN — GUAIFENESIN 200 MG: 100 SOLUTION ORAL at 23:43

## 2022-01-01 RX ADMIN — PROPOFOL 25 MCG/KG/MIN: 10 INJECTION, EMULSION INTRAVENOUS at 16:25

## 2022-01-01 RX ADMIN — FLUTICASONE PROPIONATE 1 SPRAY: 50 SPRAY, METERED NASAL at 08:57

## 2022-01-01 RX ADMIN — GUAIFENESIN 600 MG: 600 TABLET, EXTENDED RELEASE ORAL at 18:15

## 2022-01-01 RX ADMIN — BUSPIRONE HYDROCHLORIDE 5 MG: 5 TABLET ORAL at 16:02

## 2022-01-01 RX ADMIN — LEVALBUTEROL HYDROCHLORIDE 1.25 MG: 1.25 SOLUTION, CONCENTRATE RESPIRATORY (INHALATION) at 21:01

## 2022-01-01 RX ADMIN — BUSPIRONE HYDROCHLORIDE 5 MG: 5 TABLET ORAL at 08:56

## 2022-01-01 RX ADMIN — Medication 15 ML: at 08:50

## 2022-01-01 RX ADMIN — APIXABAN 5 MG: 5 TABLET, FILM COATED ORAL at 08:44

## 2022-01-01 RX ADMIN — SODIUM PHOSPHATE, MONOBASIC, MONOHYDRATE 30 MMOL: 276; 142 INJECTION, SOLUTION INTRAVENOUS at 20:53

## 2022-01-01 RX ADMIN — APIXABAN 5 MG: 5 TABLET, FILM COATED ORAL at 08:55

## 2022-01-01 RX ADMIN — GUAIFENESIN 200 MG: 100 SOLUTION ORAL at 12:17

## 2022-01-01 RX ADMIN — POLYETHYLENE GLYCOL 3350 17 G: 17 POWDER, FOR SOLUTION ORAL at 20:37

## 2022-01-01 RX ADMIN — FUROSEMIDE 40 MG: 10 INJECTION, SOLUTION INTRAMUSCULAR; INTRAVENOUS at 08:50

## 2022-01-01 RX ADMIN — POTASSIUM CHLORIDE 40 MEQ: 20 SOLUTION ORAL at 04:58

## 2022-01-01 RX ADMIN — METHYLPREDNISOLONE SODIUM SUCCINATE 125 MG: 125 INJECTION, POWDER, FOR SOLUTION INTRAMUSCULAR; INTRAVENOUS at 13:48

## 2022-01-01 RX ADMIN — BUSPIRONE HYDROCHLORIDE 5 MG: 5 TABLET ORAL at 15:07

## 2022-01-01 RX ADMIN — PROPOFOL 25 MCG/KG/MIN: 10 INJECTION, EMULSION INTRAVENOUS at 05:52

## 2022-01-01 RX ADMIN — PROPOFOL 25 MCG/KG/MIN: 10 INJECTION, EMULSION INTRAVENOUS at 23:03

## 2022-01-01 RX ADMIN — IPRATROPIUM BROMIDE 0.5 MG: 0.5 SOLUTION RESPIRATORY (INHALATION) at 07:30

## 2022-01-01 RX ADMIN — HYDROMORPHONE HYDROCHLORIDE 1 MG: 1 INJECTION, SOLUTION INTRAMUSCULAR; INTRAVENOUS; SUBCUTANEOUS at 15:17

## 2022-01-01 RX ADMIN — LEVALBUTEROL HYDROCHLORIDE 1.25 MG: 1.25 SOLUTION, CONCENTRATE RESPIRATORY (INHALATION) at 07:21

## 2022-01-01 RX ADMIN — PROPOFOL 25 MCG/KG/MIN: 10 INJECTION, EMULSION INTRAVENOUS at 23:30

## 2022-01-01 RX ADMIN — BACLOFEN 10 MG: 10 TABLET ORAL at 16:02

## 2022-01-01 RX ADMIN — BUSPIRONE HYDROCHLORIDE 5 MG: 5 TABLET ORAL at 14:24

## 2022-01-01 RX ADMIN — MELATONIN 3 MG: 3 TAB ORAL at 21:17

## 2022-01-01 RX ADMIN — HEPARIN SODIUM 11.1 UNITS/KG/HR: 10000 INJECTION, SOLUTION INTRAVENOUS at 17:30

## 2022-01-01 RX ADMIN — GUAIFENESIN 200 MG: 100 SOLUTION ORAL at 05:05

## 2022-01-01 RX ADMIN — ACETAMINOPHEN 650 MG: 650 SUSPENSION ORAL at 12:22

## 2022-01-01 RX ADMIN — MORPHINE SULFATE 2 MG: 2 INJECTION, SOLUTION INTRAMUSCULAR; INTRAVENOUS at 12:10

## 2022-01-01 RX ADMIN — IPRATROPIUM BROMIDE 0.5 MG: 0.5 SOLUTION RESPIRATORY (INHALATION) at 07:46

## 2022-01-01 RX ADMIN — IPRATROPIUM BROMIDE 0.5 MG: 0.5 SOLUTION RESPIRATORY (INHALATION) at 08:43

## 2022-01-01 RX ADMIN — CEFTRIAXONE 1000 MG: 1 INJECTION, SOLUTION INTRAVENOUS at 13:03

## 2022-01-01 RX ADMIN — Medication 400 MG: at 13:03

## 2022-01-01 RX ADMIN — PROPOFOL 25 MCG/KG/MIN: 10 INJECTION, EMULSION INTRAVENOUS at 17:02

## 2022-01-01 RX ADMIN — NOREPINEPHRINE BITARTRATE 4000 MCG: 1 INJECTION INTRAVENOUS at 09:26

## 2022-01-01 RX ADMIN — IPRATROPIUM BROMIDE 0.5 MG: 0.5 SOLUTION RESPIRATORY (INHALATION) at 08:58

## 2022-01-01 RX ADMIN — IPRATROPIUM BROMIDE 0.5 MG: 0.5 SOLUTION RESPIRATORY (INHALATION) at 13:33

## 2022-01-01 RX ADMIN — SENNOSIDES 17.6 MG: 8.8 SYRUP ORAL at 20:37

## 2022-01-01 RX ADMIN — FUROSEMIDE 40 MG: 10 INJECTION, SOLUTION INTRAMUSCULAR; INTRAVENOUS at 09:38

## 2022-01-01 RX ADMIN — IPRATROPIUM BROMIDE 0.5 MG: 0.5 SOLUTION RESPIRATORY (INHALATION) at 14:00

## 2022-01-01 RX ADMIN — IPRATROPIUM BROMIDE 0.5 MG: 0.5 SOLUTION RESPIRATORY (INHALATION) at 20:49

## 2022-01-01 RX ADMIN — OXYCODONE HYDROCHLORIDE 2.5 MG: 5 TABLET ORAL at 11:47

## 2022-01-01 RX ADMIN — Medication 15 ML: at 20:37

## 2022-01-01 RX ADMIN — APIXABAN 5 MG: 5 TABLET, FILM COATED ORAL at 08:52

## 2022-01-01 RX ADMIN — GUAIFENESIN 200 MG: 100 SOLUTION ORAL at 23:04

## 2022-01-01 RX ADMIN — FENTANYL CITRATE 75 MCG/HR: 50 INJECTION, SOLUTION INTRAMUSCULAR; INTRAVENOUS at 02:10

## 2022-01-01 RX ADMIN — LEVALBUTEROL HYDROCHLORIDE 1.25 MG: 1.25 SOLUTION, CONCENTRATE RESPIRATORY (INHALATION) at 19:33

## 2022-01-01 RX ADMIN — POLYETHYLENE GLYCOL 3350 17 G: 17 POWDER, FOR SOLUTION ORAL at 09:36

## 2022-01-01 RX ADMIN — BACLOFEN 10 MG: 10 TABLET ORAL at 08:51

## 2022-01-01 RX ADMIN — FUROSEMIDE 80 MG: 10 INJECTION, SOLUTION INTRAVENOUS at 17:54

## 2022-01-01 RX ADMIN — POTASSIUM CHLORIDE 20 MEQ: 200 INJECTION, SOLUTION INTRAVENOUS at 05:31

## 2022-01-01 RX ADMIN — GUAIFENESIN 200 MG: 100 SOLUTION ORAL at 17:48

## 2022-01-01 RX ADMIN — FUROSEMIDE 60 MG: 10 INJECTION, SOLUTION INTRAVENOUS at 08:44

## 2022-01-01 RX ADMIN — CEFEPIME HYDROCHLORIDE 2000 MG: 2 INJECTION, SOLUTION INTRAVENOUS at 16:23

## 2022-01-01 RX ADMIN — MORPHINE SULFATE 2 MG: 2 INJECTION, SOLUTION INTRAMUSCULAR; INTRAVENOUS at 13:11

## 2022-01-01 RX ADMIN — AMLODIPINE BESYLATE 5 MG: 5 TABLET ORAL at 08:56

## 2022-01-01 RX ADMIN — POTASSIUM CHLORIDE 20 MEQ: 14.9 INJECTION, SOLUTION INTRAVENOUS at 06:36

## 2022-01-01 RX ADMIN — BACLOFEN 10 MG: 10 TABLET ORAL at 08:07

## 2022-01-01 RX ADMIN — FUROSEMIDE 80 MG: 10 INJECTION, SOLUTION INTRAMUSCULAR; INTRAVENOUS at 08:48

## 2022-01-01 RX ADMIN — POTASSIUM CHLORIDE 40 MEQ: 1500 TABLET, EXTENDED RELEASE ORAL at 16:20

## 2022-01-01 RX ADMIN — IPRATROPIUM BROMIDE 0.5 MG: 0.5 SOLUTION RESPIRATORY (INHALATION) at 19:43

## 2022-01-01 RX ADMIN — VANCOMYCIN HYDROCHLORIDE 1000 MG: 1 INJECTION, SOLUTION INTRAVENOUS at 04:21

## 2022-01-01 RX ADMIN — GUAIFENESIN 600 MG: 600 TABLET, EXTENDED RELEASE ORAL at 16:20

## 2022-01-01 RX ADMIN — BACLOFEN 10 MG: 10 TABLET ORAL at 17:54

## 2022-01-01 RX ADMIN — FUROSEMIDE 40 MG: 10 INJECTION, SOLUTION INTRAMUSCULAR; INTRAVENOUS at 12:12

## 2022-01-01 RX ADMIN — LEVALBUTEROL HYDROCHLORIDE 1.25 MG: 1.25 SOLUTION, CONCENTRATE RESPIRATORY (INHALATION) at 13:21

## 2022-01-01 RX ADMIN — NOREPINEPHRINE BITARTRATE 8 MCG/MIN: 1 INJECTION INTRAVENOUS at 09:27

## 2022-01-01 RX ADMIN — POTASSIUM CHLORIDE 20 MEQ: 1500 TABLET, EXTENDED RELEASE ORAL at 13:03

## 2022-01-01 RX ADMIN — CEFTRIAXONE 1000 MG: 1 INJECTION, SOLUTION INTRAVENOUS at 14:00

## 2022-01-01 RX ADMIN — POLYETHYLENE GLYCOL 3350 17 G: 17 POWDER, FOR SOLUTION ORAL at 08:00

## 2022-01-01 RX ADMIN — LEVALBUTEROL HYDROCHLORIDE 1.25 MG: 1.25 SOLUTION, CONCENTRATE RESPIRATORY (INHALATION) at 13:01

## 2022-01-01 RX ADMIN — IPRATROPIUM BROMIDE 0.5 MG: 0.5 SOLUTION RESPIRATORY (INHALATION) at 02:20

## 2022-01-01 RX ADMIN — BUSPIRONE HYDROCHLORIDE 5 MG: 5 TABLET ORAL at 08:51

## 2022-01-01 RX ADMIN — Medication 15 ML: at 20:03

## 2022-01-01 RX ADMIN — MORPHINE SULFATE 2 MG: 2 INJECTION, SOLUTION INTRAMUSCULAR; INTRAVENOUS at 14:43

## 2022-01-01 RX ADMIN — CEFEPIME HYDROCHLORIDE 2000 MG: 2 INJECTION, SOLUTION INTRAVENOUS at 02:56

## 2022-01-01 RX ADMIN — POTASSIUM CHLORIDE 20 MEQ: 1500 TABLET, EXTENDED RELEASE ORAL at 08:44

## 2022-01-01 RX ADMIN — LEVALBUTEROL HYDROCHLORIDE 1.25 MG: 1.25 SOLUTION, CONCENTRATE RESPIRATORY (INHALATION) at 19:43

## 2022-01-01 RX ADMIN — CLONIDINE HYDROCHLORIDE 0.1 MG: 0.1 TABLET ORAL at 08:55

## 2022-01-01 RX ADMIN — IPRATROPIUM BROMIDE 0.5 MG: 0.5 SOLUTION RESPIRATORY (INHALATION) at 19:27

## 2022-01-01 RX ADMIN — FLUTICASONE PROPIONATE 1 SPRAY: 50 SPRAY, METERED NASAL at 08:50

## 2022-01-01 RX ADMIN — FENTANYL CITRATE 75 MCG/HR: 50 INJECTION, SOLUTION INTRAMUSCULAR; INTRAVENOUS at 07:42

## 2022-01-01 RX ADMIN — POTASSIUM CHLORIDE 20 MEQ: 1500 TABLET, EXTENDED RELEASE ORAL at 12:02

## 2022-01-01 RX ADMIN — CEFEPIME HYDROCHLORIDE 2000 MG: 2 INJECTION, SOLUTION INTRAVENOUS at 03:50

## 2022-01-01 RX ADMIN — SENNOSIDES 17.6 MG: 8.8 SYRUP ORAL at 21:31

## 2022-01-01 RX ADMIN — OXYCODONE HYDROCHLORIDE 2.5 MG: 5 TABLET ORAL at 05:25

## 2022-01-01 RX ADMIN — PROPOFOL 25 MCG/KG/MIN: 10 INJECTION, EMULSION INTRAVENOUS at 23:32

## 2022-01-01 RX ADMIN — POTASSIUM CHLORIDE 20 MEQ: 14.9 INJECTION, SOLUTION INTRAVENOUS at 02:06

## 2022-01-01 RX ADMIN — FUROSEMIDE 40 MG: 10 INJECTION, SOLUTION INTRAMUSCULAR; INTRAVENOUS at 17:39

## 2022-01-01 RX ADMIN — POTASSIUM CHLORIDE 40 MEQ: 29.8 INJECTION, SOLUTION INTRAVENOUS at 04:57

## 2022-01-01 RX ADMIN — SENNOSIDES 8.8 MG: 8.8 SYRUP ORAL at 21:05

## 2022-01-01 RX ADMIN — AMLODIPINE BESYLATE 5 MG: 5 TABLET ORAL at 08:52

## 2022-01-01 RX ADMIN — IPRATROPIUM BROMIDE 0.5 MG: 0.5 SOLUTION RESPIRATORY (INHALATION) at 21:00

## 2022-01-01 RX ADMIN — LORAZEPAM 2 MG: 2 INJECTION INTRAMUSCULAR; INTRAVENOUS at 16:03

## 2022-01-01 RX ADMIN — GUAIFENESIN 200 MG: 100 SOLUTION ORAL at 06:18

## 2022-01-01 RX ADMIN — ACETAMINOPHEN 650 MG: 650 SUSPENSION ORAL at 23:04

## 2022-01-01 RX ADMIN — BUSPIRONE HYDROCHLORIDE 5 MG: 5 TABLET ORAL at 15:24

## 2022-01-01 RX ADMIN — IPRATROPIUM BROMIDE 0.5 MG: 0.5 SOLUTION RESPIRATORY (INHALATION) at 20:22

## 2022-01-01 RX ADMIN — BUSPIRONE HYDROCHLORIDE 5 MG: 10 TABLET ORAL at 08:00

## 2022-01-01 RX ADMIN — CEFEPIME HYDROCHLORIDE 2000 MG: 2 INJECTION, SOLUTION INTRAVENOUS at 03:12

## 2022-01-01 RX ADMIN — GUAIFENESIN 200 MG: 100 SOLUTION ORAL at 05:09

## 2022-01-01 RX ADMIN — FUROSEMIDE 60 MG: 10 INJECTION, SOLUTION INTRAVENOUS at 16:02

## 2022-01-01 RX ADMIN — FUROSEMIDE 60 MG: 10 INJECTION, SOLUTION INTRAVENOUS at 08:07

## 2022-01-01 RX ADMIN — MAGNESIUM SULFATE HEPTAHYDRATE 2 G: 40 INJECTION, SOLUTION INTRAVENOUS at 07:29

## 2022-01-01 RX ADMIN — MIDAZOLAM 4 MG: 1 INJECTION INTRAMUSCULAR; INTRAVENOUS at 16:36

## 2022-01-01 RX ADMIN — Medication 15 ML: at 08:00

## 2022-01-01 RX ADMIN — POTASSIUM CHLORIDE 40 MEQ: 20 SOLUTION ORAL at 07:31

## 2022-01-01 RX ADMIN — ALTEPLASE 2 MG: 2.2 INJECTION, POWDER, LYOPHILIZED, FOR SOLUTION INTRAVENOUS at 21:39

## 2022-01-01 RX ADMIN — FENTANYL CITRATE 75 MCG/HR: 50 INJECTION, SOLUTION INTRAMUSCULAR; INTRAVENOUS at 01:17

## 2022-01-01 RX ADMIN — PROPOFOL 25 MCG/KG/MIN: 10 INJECTION, EMULSION INTRAVENOUS at 05:08

## 2022-01-01 RX ADMIN — BACLOFEN 10 MG: 10 TABLET ORAL at 08:56

## 2022-01-01 RX ADMIN — AMLODIPINE BESYLATE 5 MG: 5 TABLET ORAL at 08:43

## 2022-01-01 RX ADMIN — FUROSEMIDE 80 MG: 10 INJECTION, SOLUTION INTRAMUSCULAR; INTRAVENOUS at 08:57

## 2022-01-01 RX ADMIN — APIXABAN 5 MG: 5 TABLET, FILM COATED ORAL at 16:21

## 2022-01-01 RX ADMIN — GUAIFENESIN 600 MG: 600 TABLET, EXTENDED RELEASE ORAL at 16:02

## 2022-01-01 RX ADMIN — ETOMIDATE 20 MG: 20 INJECTION, SOLUTION INTRAVENOUS at 00:51

## 2022-01-01 RX ADMIN — POTASSIUM CHLORIDE 40 MEQ: 20 SOLUTION ORAL at 08:00

## 2022-01-01 RX ADMIN — HEPARIN SODIUM 11.1 UNITS/KG/HR: 10000 INJECTION, SOLUTION INTRAVENOUS at 17:13

## 2022-01-01 RX ADMIN — LEVALBUTEROL HYDROCHLORIDE 1.25 MG: 1.25 SOLUTION, CONCENTRATE RESPIRATORY (INHALATION) at 08:58

## 2022-01-01 RX ADMIN — ACETAMINOPHEN 650 MG: 650 SUSPENSION ORAL at 04:21

## 2022-01-01 RX ADMIN — GUAIFENESIN 200 MG: 100 SOLUTION ORAL at 05:52

## 2022-01-01 RX ADMIN — IPRATROPIUM BROMIDE 0.5 MG: 0.5 SOLUTION RESPIRATORY (INHALATION) at 19:47

## 2022-01-01 RX ADMIN — BUSPIRONE HYDROCHLORIDE 5 MG: 5 TABLET ORAL at 18:00

## 2022-01-01 RX ADMIN — Medication 15 ML: at 09:12

## 2022-01-01 RX ADMIN — BUSPIRONE HYDROCHLORIDE 5 MG: 10 TABLET ORAL at 17:02

## 2022-01-01 RX ADMIN — FUROSEMIDE 80 MG: 10 INJECTION, SOLUTION INTRAVENOUS at 01:38

## 2022-01-01 RX ADMIN — MORPHINE SULFATE 4 MG: 4 INJECTION INTRAVENOUS at 15:35

## 2022-01-01 RX ADMIN — LEVALBUTEROL HYDROCHLORIDE 1.25 MG: 1.25 SOLUTION, CONCENTRATE RESPIRATORY (INHALATION) at 07:30

## 2022-01-01 RX ADMIN — FENTANYL 75 MCG: 75 PATCH TRANSDERMAL at 08:49

## 2022-01-01 RX ADMIN — FENTANYL CITRATE 50 MCG/HR: 50 INJECTION, SOLUTION INTRAMUSCULAR; INTRAVENOUS at 16:04

## 2022-01-01 RX ADMIN — LEVALBUTEROL HYDROCHLORIDE 1.25 MG: 1.25 SOLUTION, CONCENTRATE RESPIRATORY (INHALATION) at 07:47

## 2022-01-01 RX ADMIN — LORAZEPAM 1 MG: 2 INJECTION INTRAMUSCULAR; INTRAVENOUS at 11:32

## 2022-01-01 RX ADMIN — BUSPIRONE HYDROCHLORIDE 5 MG: 10 TABLET ORAL at 08:50

## 2022-01-01 RX ADMIN — GUAIFENESIN 200 MG: 100 SOLUTION ORAL at 17:02

## 2022-01-01 RX ADMIN — LORAZEPAM 1 MG: 2 INJECTION INTRAMUSCULAR; INTRAVENOUS at 12:24

## 2022-01-01 RX ADMIN — GUAIFENESIN 200 MG: 100 SOLUTION ORAL at 00:24

## 2022-01-01 RX ADMIN — ACETAMINOPHEN 650 MG: 650 SUSPENSION ORAL at 17:02

## 2022-01-01 RX ADMIN — BUSPIRONE HYDROCHLORIDE 5 MG: 5 TABLET ORAL at 21:17

## 2022-01-01 RX ADMIN — MORPHINE SULFATE 2 MG: 2 INJECTION, SOLUTION INTRAMUSCULAR; INTRAVENOUS at 12:40

## 2022-01-01 RX ADMIN — POLYETHYLENE GLYCOL 3350 17 G: 17 POWDER, FOR SOLUTION ORAL at 10:38

## 2022-01-01 RX ADMIN — HYDROMORPHONE HYDROCHLORIDE 2 MG: 2 INJECTION INTRAMUSCULAR; INTRAVENOUS; SUBCUTANEOUS at 16:36

## 2022-01-01 RX ADMIN — MORPHINE SULFATE 2 MG: 2 INJECTION, SOLUTION INTRAMUSCULAR; INTRAVENOUS at 11:32

## 2022-01-01 RX ADMIN — BUSPIRONE HYDROCHLORIDE 5 MG: 5 TABLET ORAL at 08:07

## 2022-01-01 RX ADMIN — AMPICILLIN SODIUM AND SULBACTAM SODIUM 3 G: 2; 1 INJECTION, POWDER, FOR SOLUTION INTRAMUSCULAR; INTRAVENOUS at 13:30

## 2022-01-01 RX ADMIN — BUSPIRONE HYDROCHLORIDE 5 MG: 5 TABLET ORAL at 08:44

## 2022-01-01 RX ADMIN — Medication 15 ML: at 10:38

## 2022-01-01 RX ADMIN — ALBUTEROL SULFATE 2 PUFF: 90 AEROSOL, METERED RESPIRATORY (INHALATION) at 13:48

## 2022-01-01 RX ADMIN — CLONIDINE HYDROCHLORIDE 0.1 MG: 0.1 TABLET ORAL at 08:45

## 2022-01-01 RX ADMIN — POLYETHYLENE GLYCOL 3350 17 G: 17 POWDER, FOR SOLUTION ORAL at 20:03

## 2022-01-01 RX ADMIN — IPRATROPIUM BROMIDE 0.5 MG: 0.5 SOLUTION RESPIRATORY (INHALATION) at 13:01

## 2022-01-01 RX ADMIN — LEVALBUTEROL HYDROCHLORIDE 1.25 MG: 1.25 SOLUTION, CONCENTRATE RESPIRATORY (INHALATION) at 21:03

## 2022-01-01 RX ADMIN — POTASSIUM CHLORIDE 40 MEQ: 20 SOLUTION ORAL at 12:22

## 2022-01-01 RX ADMIN — ACETAMINOPHEN 650 MG: 650 SUSPENSION ORAL at 00:01

## 2022-01-01 RX ADMIN — LEVALBUTEROL HYDROCHLORIDE 1.25 MG: 1.25 SOLUTION, CONCENTRATE RESPIRATORY (INHALATION) at 02:20

## 2022-01-01 RX ADMIN — ACETAMINOPHEN 650 MG: 325 TABLET ORAL at 08:49

## 2022-01-01 RX ADMIN — PANTOPRAZOLE SODIUM 40 MG: 40 INJECTION, POWDER, FOR SOLUTION INTRAVENOUS at 09:16

## 2022-01-01 RX ADMIN — BUSPIRONE HYDROCHLORIDE 5 MG: 10 TABLET ORAL at 20:37

## 2022-01-01 RX ADMIN — FUROSEMIDE 80 MG: 10 INJECTION, SOLUTION INTRAMUSCULAR; INTRAVENOUS at 14:24

## 2022-01-01 RX ADMIN — APIXABAN 5 MG: 5 TABLET, FILM COATED ORAL at 18:15

## 2022-01-01 RX ADMIN — LIDOCAINE HYDROCHLORIDE 2 ML: 10 INJECTION, SOLUTION EPIDURAL; INFILTRATION; INTRACAUDAL; PERINEURAL at 03:41

## 2022-01-01 RX ADMIN — FUROSEMIDE 40 MG: 10 INJECTION, SOLUTION INTRAMUSCULAR; INTRAVENOUS at 16:23

## 2022-01-01 RX ADMIN — BUSPIRONE HYDROCHLORIDE 5 MG: 10 TABLET ORAL at 16:22

## 2022-01-01 RX ADMIN — LEVALBUTEROL HYDROCHLORIDE 1.25 MG: 1.25 SOLUTION, CONCENTRATE RESPIRATORY (INHALATION) at 08:24

## 2022-01-01 RX ADMIN — APIXABAN 5 MG: 5 TABLET, FILM COATED ORAL at 08:07

## 2022-01-01 RX ADMIN — BACLOFEN 10 MG: 10 TABLET ORAL at 16:20

## 2022-01-01 RX ADMIN — PROPOFOL 25 MCG/KG/MIN: 10 INJECTION, EMULSION INTRAVENOUS at 16:04

## 2022-01-01 RX ADMIN — GUAIFENESIN 600 MG: 600 TABLET, EXTENDED RELEASE ORAL at 17:54

## 2022-01-01 RX ADMIN — CLONIDINE HYDROCHLORIDE 0.1 MG: 0.1 TABLET ORAL at 08:07

## 2022-01-01 RX ADMIN — LEVALBUTEROL HYDROCHLORIDE 1.25 MG: 1.25 SOLUTION, CONCENTRATE RESPIRATORY (INHALATION) at 02:09

## 2022-01-01 RX ADMIN — BUSPIRONE HYDROCHLORIDE 5 MG: 10 TABLET ORAL at 20:03

## 2022-01-01 RX ADMIN — PROPOFOL 15 MCG/KG/MIN: 10 INJECTION, EMULSION INTRAVENOUS at 09:27

## 2022-01-01 RX ADMIN — ACETAMINOPHEN 650 MG: 650 SUSPENSION ORAL at 17:48

## 2022-01-01 RX ADMIN — MAGNESIUM SULFATE HEPTAHYDRATE 2 G: 40 INJECTION, SOLUTION INTRAVENOUS at 12:12

## 2022-01-01 RX ADMIN — PROPOFOL 5 MCG/KG/MIN: 10 INJECTION, EMULSION INTRAVENOUS at 01:03

## 2022-01-01 RX ADMIN — FUROSEMIDE 80 MG: 10 INJECTION, SOLUTION INTRAMUSCULAR; INTRAVENOUS at 15:24

## 2022-01-01 RX ADMIN — GLYCOPYRROLATE 0.1 MG: 0.2 INJECTION, SOLUTION INTRAMUSCULAR; INTRAVENOUS at 12:07

## 2022-01-01 RX ADMIN — PANTOPRAZOLE SODIUM 40 MG: 40 INJECTION, POWDER, FOR SOLUTION INTRAVENOUS at 08:50

## 2022-01-01 RX ADMIN — FUROSEMIDE 20 MG: 10 INJECTION, SOLUTION INTRAVENOUS at 14:49

## 2022-01-01 RX ADMIN — ACETAMINOPHEN 649.6 MG: 650 SUSPENSION ORAL at 20:03

## 2022-01-01 RX ADMIN — FENTANYL CITRATE 75 MCG/HR: 50 INJECTION, SOLUTION INTRAMUSCULAR; INTRAVENOUS at 11:15

## 2022-01-01 RX ADMIN — Medication 15 ML: at 20:01

## 2022-01-01 RX ADMIN — LEVALBUTEROL HYDROCHLORIDE 1.25 MG: 1.25 SOLUTION, CONCENTRATE RESPIRATORY (INHALATION) at 20:49

## 2022-01-01 RX ADMIN — CEFEPIME HYDROCHLORIDE 2000 MG: 2 INJECTION, SOLUTION INTRAVENOUS at 16:04

## 2022-01-01 RX ADMIN — LEVALBUTEROL HYDROCHLORIDE 1.25 MG: 1.25 SOLUTION, CONCENTRATE RESPIRATORY (INHALATION) at 13:33

## 2022-01-01 RX ADMIN — FENTANYL CITRATE 75 MCG/HR: 50 INJECTION, SOLUTION INTRAMUSCULAR; INTRAVENOUS at 21:31

## 2022-01-01 RX ADMIN — BUSPIRONE HYDROCHLORIDE 5 MG: 10 TABLET ORAL at 20:48

## 2022-01-01 RX ADMIN — GUAIFENESIN 600 MG: 600 TABLET, EXTENDED RELEASE ORAL at 08:56

## 2022-01-01 RX ADMIN — AMLODIPINE BESYLATE 5 MG: 5 TABLET ORAL at 08:07

## 2022-01-01 RX ADMIN — PROPOFOL 25 MCG/KG/MIN: 10 INJECTION, EMULSION INTRAVENOUS at 03:34

## 2022-01-01 RX ADMIN — MELATONIN 3 MG: 3 TAB ORAL at 21:03

## 2022-01-01 RX ADMIN — OXYCODONE HYDROCHLORIDE 2.5 MG: 5 TABLET ORAL at 21:31

## 2022-01-01 RX ADMIN — CEFTRIAXONE 1000 MG: 1 INJECTION, SOLUTION INTRAVENOUS at 14:23

## 2022-01-01 RX ADMIN — VANCOMYCIN HYDROCHLORIDE 1000 MG: 1 INJECTION, SOLUTION INTRAVENOUS at 03:36

## 2022-01-01 RX ADMIN — PROPOFOL 25 MCG/KG/MIN: 10 INJECTION, EMULSION INTRAVENOUS at 12:09

## 2022-01-01 RX ADMIN — MELATONIN 3 MG: 3 TAB ORAL at 21:02

## 2022-01-01 RX ADMIN — LEVALBUTEROL HYDROCHLORIDE 1.25 MG: 1.25 SOLUTION, CONCENTRATE RESPIRATORY (INHALATION) at 08:43

## 2022-01-01 RX ADMIN — CEFEPIME HYDROCHLORIDE 2000 MG: 2 INJECTION, SOLUTION INTRAVENOUS at 03:09

## 2022-01-01 RX ADMIN — POTASSIUM CHLORIDE 20 MEQ: 14.9 INJECTION, SOLUTION INTRAVENOUS at 08:59

## 2022-01-01 RX ADMIN — PANTOPRAZOLE SODIUM 40 MG: 40 INJECTION, POWDER, FOR SOLUTION INTRAVENOUS at 08:00

## 2022-01-01 RX ADMIN — BUMETANIDE 2 MG: 0.25 INJECTION, SOLUTION INTRAMUSCULAR; INTRAVENOUS at 04:52

## 2022-01-01 RX ADMIN — BACLOFEN 10 MG: 10 TABLET ORAL at 18:15

## 2022-01-01 RX ADMIN — POTASSIUM CHLORIDE 20 MEQ: 200 INJECTION, SOLUTION INTRAVENOUS at 09:26

## 2022-01-01 RX ADMIN — APIXABAN 5 MG: 5 TABLET, FILM COATED ORAL at 17:55

## 2022-01-01 RX ADMIN — Medication 100 MG: at 00:52

## 2022-01-01 RX ADMIN — POTASSIUM CHLORIDE 40 MEQ: 20 SOLUTION ORAL at 05:32

## 2022-01-01 RX ADMIN — BUSPIRONE HYDROCHLORIDE 5 MG: 5 TABLET ORAL at 20:32

## 2022-01-01 RX ADMIN — IPRATROPIUM BROMIDE 0.5 MG: 0.5 SOLUTION RESPIRATORY (INHALATION) at 02:19

## 2022-01-01 RX ADMIN — BUSPIRONE HYDROCHLORIDE 5 MG: 5 TABLET ORAL at 21:03

## 2022-01-01 RX ADMIN — GUAIFENESIN 200 MG: 100 SOLUTION ORAL at 12:22

## 2022-01-01 RX ADMIN — IPRATROPIUM BROMIDE 0.5 MG: 0.5 SOLUTION RESPIRATORY (INHALATION) at 02:09

## 2022-01-01 RX ADMIN — BACLOFEN 10 MG: 10 TABLET ORAL at 08:44

## 2022-01-01 RX ADMIN — IPRATROPIUM BROMIDE 0.5 MG: 0.5 SOLUTION RESPIRATORY (INHALATION) at 07:21

## 2022-01-01 RX ADMIN — FLUTICASONE PROPIONATE 1 SPRAY: 50 SPRAY, METERED NASAL at 11:14

## 2022-01-01 RX ADMIN — LEVALBUTEROL HYDROCHLORIDE 1.25 MG: 1.25 SOLUTION, CONCENTRATE RESPIRATORY (INHALATION) at 09:57

## 2022-01-01 RX ADMIN — LORAZEPAM 2 MG: 2 INJECTION INTRAMUSCULAR; INTRAVENOUS at 15:17

## 2022-01-01 RX ADMIN — LEVALBUTEROL HYDROCHLORIDE 1.25 MG: 1.25 SOLUTION, CONCENTRATE RESPIRATORY (INHALATION) at 15:25

## 2022-01-01 RX ADMIN — INSULIN LISPRO 2 UNITS: 100 INJECTION, SOLUTION INTRAVENOUS; SUBCUTANEOUS at 09:37

## 2022-01-01 RX ADMIN — BISACODYL 10 MG: 10 SUPPOSITORY RECTAL at 09:26

## 2022-01-01 RX ADMIN — LEVALBUTEROL HYDROCHLORIDE 1.25 MG: 1.25 SOLUTION, CONCENTRATE RESPIRATORY (INHALATION) at 19:47

## 2022-01-01 RX ADMIN — LEVALBUTEROL HYDROCHLORIDE 1.25 MG: 1.25 SOLUTION, CONCENTRATE RESPIRATORY (INHALATION) at 19:27

## 2022-01-01 RX ADMIN — POTASSIUM CHLORIDE 20 MEQ: 200 INJECTION, SOLUTION INTRAVENOUS at 12:17

## 2022-01-01 RX ADMIN — PROPOFOL 25 MCG/KG/MIN: 10 INJECTION, EMULSION INTRAVENOUS at 11:15

## 2022-01-01 RX ADMIN — LORAZEPAM 1 MG: 2 INJECTION INTRAMUSCULAR; INTRAVENOUS at 13:39

## 2022-01-01 RX ADMIN — APIXABAN 5 MG: 5 TABLET, FILM COATED ORAL at 16:02

## 2022-01-01 RX ADMIN — FENTANYL 75 MCG: 75 PATCH TRANSDERMAL at 10:15

## 2022-01-01 RX ADMIN — CEFTRIAXONE 1000 MG: 1 INJECTION, SOLUTION INTRAVENOUS at 01:50

## 2022-01-23 PROBLEM — J18.9 CAP (COMMUNITY ACQUIRED PNEUMONIA): Status: ACTIVE | Noted: 2018-08-09

## 2022-01-23 PROBLEM — J96.20 ACUTE ON CHRONIC RESPIRATORY FAILURE (HCC): Status: ACTIVE | Noted: 2018-08-11

## 2022-01-23 PROBLEM — I50.33 ACUTE ON CHRONIC DIASTOLIC HEART FAILURE (HCC): Status: ACTIVE | Noted: 2018-09-27

## 2022-01-23 NOTE — ASSESSMENT & PLAN NOTE
· Covid/RSV/flu negative  · CXR reviewed  · Procalcitonin elevated, trend with CBC w diff daily  · Non-severe DRIP 4: Rocephin  · Urine antigens, sputum GS&C  · Supportive care

## 2022-01-23 NOTE — ASSESSMENT & PLAN NOTE
· Chronic use of 4L baseline  Continue regularly scheduled nebulizer tid as well as prn Duo-neb from PTA    · Now on 6L to maintain acceptable Spo2 goal 90% resting  · Likely from CHF & PNA  · Treat the above & titrate to goal  · Respiratory protocol, incentive spirometer

## 2022-01-23 NOTE — ASSESSMENT & PLAN NOTE
Wt Readings from Last 3 Encounters:   07/22/21 116 kg (254 lb 13 6 oz)   06/28/21 115 kg (253 lb 15 5 oz)   10/09/20 114 kg (251 lb 12 3 oz)     · Likely exacerbated from CAP  · OP diuretic: Lasix 80 mg T/Thr/Sat/Sun, will start IV lasix 80 bid & monitor output    · Update Echo  · Daily weights, I/O

## 2022-01-23 NOTE — ED ATTENDING ATTESTATION
1/23/2022  IWilson MD, saw and evaluated the patient  I have discussed the patient with the resident/non-physician practitioner and agree with the resident's/non-physician practitioner's findings, Plan of Care, and MDM as documented in the resident's/non-physician practitioner's note, except where noted  All available labs and Radiology studies were reviewed  I was present for key portions of any procedure(s) performed by the resident/non-physician practitioner and I was immediately available to provide assistance  At this point I agree with the current assessment done in the Emergency Department  I have conducted an independent evaluation of this patient a history and physical is as follows:    ED Course     Complains of increasing shortness of breath  Started yesterday  Cough with occasional bloody phlegm  On exam the patient is awake alert  Mild respiratory distress  Heart is a regular rate and rhythm  Wheezes bilaterally  Abdomen soft nontender good bowel sounds      Critical Care Time  Procedures

## 2022-01-23 NOTE — ASSESSMENT & PLAN NOTE
· Tachypnea & leukocytosis   Source: CAP  · No ivf in acute CHF  · Rocephin  · Afebrile, trend CBC & procalcitonin  · Follow BC from admission

## 2022-01-23 NOTE — ED PROVIDER NOTES
History  Chief Complaint   Patient presents with    Shortness of Breath     patient dx with pneumonia, been short of breath since yesterday  Patient is 51-year-old female with a past medical history of AFib on Eliquis, hypertension, chronic oxygen therapy at 4 L nasal cannula, who presents emergency department today with the chief complaint of increased shortness of breath via EMS from [facility]  Patient began having increased shortness of breath yesterday  Coughing up white phlegm  She denies any chest pain nausea vomiting abdominal pain dizziness  Patient on her 4 L oxygen was found to be 88%  Patient did have chest x-ray performed at nursing home which illustrated possible pneumonia  Patient is COVID vaccinated and boosted  Shortness of Breath  Severity:  Moderate  Onset quality:  Gradual  Duration:  2 days  Timing:  Constant  Progression:  Worsening  Chronicity:  New  Relieved by:  Nothing  Worsened by: Activity  Ineffective treatments:  Oxygen and rest  Associated symptoms: cough and sputum production    Associated symptoms: no chest pain    Cough:     Cough characteristics:  Productive    Sputum characteristics:  White and frothy    Severity:  Moderate    Onset quality:  Gradual    Timing:  Constant    Progression:  Worsening  Risk factors: no tobacco use        Prior to Admission Medications   Prescriptions Last Dose Informant Patient Reported? Taking?    Calcium Carbonate Antacid (TUMS ULTRA PO)   Yes No   Sig: Take 2 tablets by mouth 2 (two) times a day   Diclofenac Sodium (VOLTAREN) 1 %   Yes No   Sig: Apply 2 g topically 2 (two) times a day   Emollient (Minerin) LOTN   Yes No   Sig: Apply topically 2 (two) times a day   Polyethyl Glycol-Propyl Glycol (SYSTANE) 0 4-0 3 % GEL  Self Yes No   Sig: Apply to eye   Wound Dressings (Triad Hydrophilic Wound Dressi) PSTE   Yes No   Sig: Apply topically as needed   amLODIPine (NORVASC) 5 mg tablet   Yes No   Sig: Take 5 mg by mouth daily apixaban (ELIQUIS) 5 mg  Self Yes No   Sig: Take 5 mg by mouth 2 (two) times a day   bisacodyl (FLEET) 10 MG/30ML ENEM   Yes No   Sig: Insert 10 mg into the rectum once   busPIRone (BUSPAR) 5 mg tablet   Yes No   Sig: Take 5 mg by mouth 3 (three) times a day   cholecalciferol (VITAMIN D3) 1,000 units tablet   Yes No   Sig: Take 1,000 Units by mouth daily   cloNIDine (CATAPRES) 0 2 mg tablet  Self Yes No   Sig: Take 0 1 mg by mouth 3 (three) times a day     diphenhydrAMINE (BENADRYL) 25 mg tablet   Yes No   Sig: Take 12 5 mg by mouth daily at bedtime   doxycycline hyclate (VIBRAMYCIN) 100 mg capsule   Yes No   Sig: Take 100 mg by mouth every 12 (twelve) hours   furosemide (LASIX) 80 mg tablet   Yes No   Sig: Take 80 mg by mouth 4 (four) times a week   ipratropium (ATROVENT) 0 02 % nebulizer solution  Self Yes No   Sig: Take 0 5 mg by nebulization 4 (four) times a day   levalbuterol (XOPENEX) 1 25 mg/3 mL nebulizer solution  Self Yes No   Sig: Take 1 25 mg by nebulization 3 (three) times a day   melatonin 3 mg  Self Yes No   Sig: Take 3 mg by mouth daily at bedtime   menthol-zinc oxide (Calmoseptine) 0 44-20 6 % OINT   Yes No   Sig: Apply topically as needed   menthol-zinc oxide (Calmoseptine) 0 44-20 6 % OINT   Yes No   Sig: Apply topically 2 (two) times a day   nystatin (MYCOSTATIN) powder  Self Yes No   Sig: Apply topically as needed    ondansetron (ZOFRAN-ODT) 4 mg disintegrating tablet   No No   Sig: Take 1 tablet (4 mg total) by mouth every 6 (six) hours as needed for nausea or vomiting for up to 3 days   polyethylene glycol (GLYCOLAX) powder   No No   Sig: Take 17 g by mouth 4 (four) times a day Take 4 times per day until a satisfying bowel movement and then switch to 1 time per day     senna (SENOKOT) 8 6 MG tablet   Yes No   Sig: Take 1 tablet by mouth 2 (two) times a day as needed for constipation   white petrolatum-mineral oil (EUCERIN,HYDROCERIN) cream  Self Yes No   Sig: Apply topically as needed Facility-Administered Medications: None       Past Medical History:   Diagnosis Date    Arthritis     Atrial fibrillation (HCC)     CHF (congestive heart failure) (HCC)     Hyperlipidemia     Hypertension     PONV (postoperative nausea and vomiting)     Renal disorder        Past Surgical History:   Procedure Laterality Date    ANKLE FRACTURE SURGERY Left     CHOLECYSTECTOMY      JOINT REPLACEMENT Bilateral     TKR    REPLACEMENT TOTAL KNEE BILATERAL Bilateral        Family History   Problem Relation Age of Onset    Alcohol abuse Neg Hx     Arthritis Neg Hx     Asthma Neg Hx     Birth defects Neg Hx     Cancer Neg Hx     COPD Neg Hx     Depression Neg Hx     Diabetes Neg Hx     Drug abuse Neg Hx     Early death Neg Hx     Hearing loss Neg Hx     Heart disease Neg Hx     Hyperlipidemia Neg Hx     Hypertension Neg Hx     Kidney disease Neg Hx     Learning disabilities Neg Hx     Mental illness Neg Hx     Mental retardation Neg Hx     Miscarriages / Stillbirths Neg Hx     Stroke Neg Hx     Vision loss Neg Hx      I have reviewed and agree with the history as documented  E-Cigarette/Vaping    E-Cigarette Use Never User      E-Cigarette/Vaping Substances     Social History     Tobacco Use    Smoking status: Never Smoker    Smokeless tobacco: Never Used   Vaping Use    Vaping Use: Never used   Substance Use Topics    Alcohol use: Never    Drug use: Never       Review of Systems   Respiratory: Positive for cough, sputum production and shortness of breath  Cardiovascular: Negative for chest pain  All other systems reviewed and are negative  Physical Exam  Physical Exam  Vitals and nursing note reviewed  Constitutional:       General: She is not in acute distress  Appearance: She is well-developed  HENT:      Head: Normocephalic and atraumatic  Eyes:      Extraocular Movements: Extraocular movements intact        Conjunctiva/sclera: Conjunctivae normal  Pupils: Pupils are equal, round, and reactive to light  Cardiovascular:      Rate and Rhythm: Normal rate  Rhythm irregularly irregular  Pulses: Normal pulses  Heart sounds: Murmur heard  Systolic murmur is present  Pulmonary:      Effort: Pulmonary effort is normal  No respiratory distress  Breath sounds: Examination of the right-lower field reveals rales  Examination of the left-lower field reveals rales  Decreased breath sounds, wheezing and rales present  Chest:      Chest wall: No tenderness  Abdominal:      Palpations: Abdomen is soft  Tenderness: There is no abdominal tenderness  Musculoskeletal:      Cervical back: Neck supple  Right lower leg: Edema present  Left lower leg: Edema present  Skin:     General: Skin is warm and dry  Neurological:      Mental Status: She is alert and oriented to person, place, and time           Vital Signs  ED Triage Vitals [01/23/22 1303]   Temperature Pulse Respirations Blood Pressure SpO2   99 °F (37 2 °C) 90 20 (!) 174/92 (!) 88 %      Temp Source Heart Rate Source Patient Position - Orthostatic VS BP Location FiO2 (%)   Temporal Monitor Lying Left arm --      Pain Score       --           Vitals:    01/23/22 1345 01/23/22 1400 01/23/22 1430 01/23/22 1445   BP: 168/78 (!) 204/95 (!) 177/79    Pulse: 88 94 88 87   Patient Position - Orthostatic VS:             Visual Acuity      ED Medications  Medications   ampicillin-sulbactam (UNASYN) 3 g in sodium chloride 0 9 % 100 mL IVPB (0 g Intravenous Stopped 1/23/22 1351)   methylPREDNISolone sodium succinate (Solu-MEDROL) injection 125 mg (125 mg Intravenous Given 1/23/22 1348)   albuterol (PROVENTIL HFA,VENTOLIN HFA) inhaler 2 puff (2 puffs Inhalation Given 1/23/22 1348)   furosemide (LASIX) injection 20 mg (20 mg Intravenous Given 1/23/22 1449)       Diagnostic Studies  Results Reviewed     Procedure Component Value Units Date/Time    C-reactive protein [[id number]]  (Abnormal) Collected: 01/23/22 1326    Lab Status: Final result Specimen: Blood from Arm, Right Updated: 01/23/22 1452      5 mg/L     COVID/FLU/RSV - 2 hour TAT [[id number]]  (Normal) Collected: 01/23/22 1326    Lab Status: Final result Specimen: Nares from Nose Updated: 01/23/22 1420     SARS-CoV-2 Negative     INFLUENZA A PCR Negative     INFLUENZA B PCR Negative     RSV PCR Negative    Narrative:      FOR PEDIATRIC PATIENTS - copy/paste COVID Guidelines URL to browser: [email or web address]  [email or web address]    SARS-CoV-2 assay is a Nucleic Acid Amplification assay intended for the  qualitative detection of nucleic acid from SARS-CoV-2 in nasopharyngeal  swabs  Results are for the presumptive identification of SARS-CoV-2 RNA  Positive results are indicative of infection with SARS-CoV-2, the virus  causing COVID-19, but do not rule out bacterial infection or co-infection  with other viruses  Laboratories within the [place] and its  territories are required to report all positive results to the appropriate  public health authorities  Negative results do not preclude SARS-CoV-2  infection and should not be used as the sole basis for treatment or other  patient management decisions  Negative results must be combined with  clinical observations, patient history, and epidemiological information  This test has not been FDA cleared or approved  This test has been authorized by FDA under an Emergency Use Authorization  (EUA)  This test is only authorized for the duration of time the  declaration that circumstances exist justifying the authorization of the  emergency use of an in vitro diagnostic tests for detection of SARS-CoV-2  virus and/or diagnosis of COVID-19 infection under section 564(b)(1) of  the Act, 21 U  S C  260XRU-0(O)(6), unless the authorization is terminated  or revoked sooner   The test has been validated but independent review by FDA  and CLIA is pending  Test performed using [employer]: This RT-PCR assay targets N2,  a region unique to SARS-CoV-2  A conserved region in the E-gene was chosen  for pan-Sarbecovirus detection which includes SARS-CoV-2  Procalcitonin with AM Reflex [[id number]]  (Abnormal) Collected: 01/23/22 1326    Lab Status: Final result Specimen: Blood from Arm, Right Updated: 01/23/22 1408     Procalcitonin 0 28 ng/ml     Procalcitonin Reflex [[id number]]     Lab Status: No result Specimen: Blood     HS Troponin 0hr (reflex protocol) [[id number]] Collected: 01/23/22 1326    Lab Status: Final result Specimen: Blood from Arm, Right Updated: 01/23/22 1407     hs TnI 0hr 45 ng/L     HS Troponin I 2hr [[id number]]     Lab Status: No result Specimen: Blood     Lactic acid [[id number]]  (Normal) Collected: 01/23/22 1326    Lab Status: Final result Specimen: Blood from Arm, Right Updated: 01/23/22 1405     LACTIC ACID 1 2 mmol/L     Narrative:      Result may be elevated if tourniquet was used during collection      Comprehensive metabolic panel [[id number]]  (Abnormal) Collected: 01/23/22 1326    Lab Status: Final result Specimen: Blood from Arm, Right Updated: 01/23/22 1404     Sodium 144 mmol/L      Potassium 3 3 mmol/L      Chloride 102 mmol/L      CO2 35 mmol/L      ANION GAP 7 mmol/L      BUN 22 mg/dL      Creatinine 0 76 mg/dL      Glucose 127 mg/dL      Calcium 9 2 mg/dL      Corrected Calcium 9 8 mg/dL      AST 29 U/L      ALT 27 U/L      Alkaline Phosphatase 89 U/L      Total Protein 7 8 g/dL      Albumin 3 2 g/dL      Total Bilirubin 0 77 mg/dL      eGFR 70 ml/min/1 73sq m     Narrative:      Tiffany guidelines for Chronic Kidney Disease (CKD):     Stage 1 with normal or high GFR (GFR > 90 mL/min/1 73 square meters)    Stage 2 Mild CKD (GFR = 60-89 mL/min/1 73 square meters)    Stage 3A Moderate CKD (GFR = 45-59 mL/min/1 73 square meters)    Stage 3B Moderate CKD (GFR = 30-44 mL/min/1 73 square meters)    Stage 4 Severe CKD (GFR = 15-29 mL/min/1 73 square meters)    Stage 5 End Stage CKD (GFR <15 mL/min/1 73 square meters)  Note: GFR calculation is accurate only with a steady state creatinine    NT-BNP PRO [[id number]]  (Abnormal) Collected: 01/23/22 1326    Lab Status: Final result Specimen: Blood from Arm, Right Updated: 01/23/22 1404     NT-proBNP 3,230 pg/mL     Magnesium [[id number]]  (Normal) Collected: 01/23/22 1326    Lab Status: Final result Specimen: Blood from Arm, Right Updated: 01/23/22 1404     Magnesium 1 9 mg/dL     Protime-INR [[id number]]  (Abnormal) Collected: 01/23/22 1326    Lab Status: Final result Specimen: Blood from Arm, Right Updated: 01/23/22 1354     Protime 17 9 seconds      INR 1 51    APTT [[id number]]  (Abnormal) Collected: 01/23/22 1326    Lab Status: Final result Specimen: Blood from Arm, Right Updated: 01/23/22 1354     PTT 38 seconds     CBC and differential [[id number]]  (Abnormal) Collected: 01/23/22 1326    Lab Status: Final result Specimen: Blood from Arm, Right Updated: 01/23/22 1338     WBC 14 32 Thousand/uL      RBC 4 16 Million/uL      Hemoglobin 12 0 g/dL      Hematocrit 38 6 %      MCV 93 fL      MCH 28 8 pg      MCHC 31 1 g/dL      RDW 14 8 %      MPV 9 2 fL      Platelets 118 Thousands/uL      nRBC 0 /100 WBCs      Neutrophils Relative 88 %      Immat GRANS % 1 %      Lymphocytes Relative 4 %      Monocytes Relative 7 %      Eosinophils Relative 0 %      Basophils Relative 0 %      Neutrophils Absolute 12 61 Thousands/µL      Immature Grans Absolute 0 09 Thousand/uL      Lymphocytes Absolute 0 55 Thousands/µL      Monocytes Absolute 1 01 Thousand/µL      Eosinophils Absolute 0 03 Thousand/µL      Basophils Absolute 0 03 Thousands/µL     Blood gas, Venous [[id number]]  (Abnormal) Collected: 01/23/22 1326    Lab Status: Final result Specimen: Blood from Arm, Right Updated: 01/23/22 1338     pH, Stalin 7 388     pCO2, Stalin 59 5 mm Hg      pO2, Stalin 49 3 mm Hg      HCO3, Stalin 35 0 mmol/L      Base Excess, Stalin 8 2 mmol/L      O2 Content, Stalin 14 2 ml/dL      O2 HGB, VENOUS 82 3 %     Blood culture #2 [[id number]] Collected: 01/23/22 1312    Lab Status: In process Specimen: Blood from Arm, Right Updated: 01/23/22 1335    Blood culture #1 [[id number]] Collected: 01/23/22 1326    Lab Status: No result Specimen: Blood from Arm, Right     UA w Reflex to Microscopic w Reflex to Culture [[id number]]     Lab Status: No result Specimen: Urine                  XR chest portable    (Results Pending)              Procedures  ECG 12 Lead Documentation Only    Date/Time: 1/23/2022 2:57 PM  Performed by: Mahendra Johns PA-C  Authorized by: Mahendra Johns PA-C     Indications / Diagnosis:  Afib  ECG reviewed by me, the ED Provider: yes    Patient location:  ED  Previous ECG:     Previous ECG:  Unavailable  Interpretation:     Interpretation: abnormal    Rate:     ECG rate:  81  Rhythm:     Rhythm: atrial fibrillation               ED Course  ED Course as of 01/23/22 1457   Sun Jan 23, 2022   1423 SARS-COV-2: Negative   1424 NT-proBNP(!): 3,230   1428 LACTIC ACID: 1 2                            Initial Sepsis Screening     Row Name 01/23/22 1441                Is the patient's history suggestive of a new or worsening infection? Yes (Proceed)  -SB        Suspected source of infection pneumonia  -SB        Are two or more of the following signs & symptoms of infection both present and new to the patient? Yes (Proceed)  -SB        Indicate SIRS criteria Leukocytosis (WBC > 90323 IJL); Tachypnea > 20 resp per min  -SB        If the answer is yes to both questions, suspicion of sepsis is present --        If severe sepsis is present AND tissue hypoperfusion perists in the hour after fluid resuscitation or lactate > 4, the patient meets criteria for SEPTIC SHOCK --        Are any of the following organ dysfunction criteria present within 6 hours of suspected infection and SIRS criteria that are NOT considered to be chronic conditions? No  -SB        Organ dysfunction --        Date of presentation of severe sepsis --        Time of presentation of severe sepsis --        Tissue hypoperfusion persists in the hour after crystalloid fluid administration, evidenced, by either: --        Was hypotension present within one hour of the conclusion of crystalloid fluid administration? --        Date of presentation of septic shock --        Time of presentation of septic shock --              User Key  (r) = Recorded By, (t) = Taken By, (c) = Cosigned By    [address] Name Provider Type    UMESH Downs PA-C Physician Assistant                SBIRT 20yo+      Most Recent Value   SBIRT (25 yo +)    In order to provide better care to our patients, we are screening all of our patients for alcohol and drug use  Would it be okay to ask you these screening questions?  No Filed at: 01/23/2022 134                    [facility]  Number of Diagnoses or Management Options     Amount and/or Complexity of Data Reviewed  Clinical lab tests: ordered and reviewed  Tests in the radiology section of CPT®: reviewed and ordered  Decide to obtain previous medical records or to obtain history from someone other than the patient: yes  Obtain history from someone other than the patient: yes  Review and summarize past medical records: yes  Discuss the patient with other providers: yes  Independent visualization of images, tracings, or specimens: yes    Risk of Complications, Morbidity, and/or Mortality  Presenting problems: moderate  Diagnostic procedures: moderate  Management options: moderate    Patient Progress  Patient progress: stable      Disposition  Final diagnoses:   Pneumonia   SOB (shortness of breath)   CHF (congestive heart failure) ([facility] [address] )     Time reflects when diagnosis was documented in both MDM as applicable and the Disposition within this note     Time User Action Codes Description Comment    1/23/2022  2:28 PM Vivien Correa Add [J18 9] Pneumonia     1/23/2022  2:28 PM Justice Marroquin Add [R06 02] SOB (shortness of breath)     1/23/2022  2:28 PM Justice Marroquin Add [I50 9] CHF (congestive heart failure) [facility])       ED Disposition     ED Disposition Condition Date/Time Comment    Admit Stable Sun Jan 23, 2022  2:41 PM Case was discussed with fadi  and the patient's admission status was agreed to be Admission Status: inpatient status to the service of Dr Autumn Coffey    Follow-up Information    None         Patient's Medications   Discharge Prescriptions    No medications on file       No discharge procedures on file      PDMP Review     None          ED Provider  Electronically Signed by           Rasheed Rios PA-C  01/23/22 6480

## 2022-01-23 NOTE — H&P
29 [facility] 1933, 80 y o  female MRN: [id number]  Unit/Bed#: ED 08 Encounter: [id number]  Primary Care Provider: No primary care provider on file  Date and time admitted to hospital: 1/23/2022 12:56 PM    CAP (community acquired pneumonia)  Assessment & Plan  · Covid/RSV/flu negative  · CXR reviewed  · Procalcitonin elevated, trend with CBC w diff daily  · Non-severe DRIP 4: Rocephin  · Urine antigens, sputum GS&C  · Supportive care    Acute on chronic diastolic heart failure (HCC)  Assessment & Plan  Wt Readings from Last 3 Encounters:   07/22/21 116 kg (254 lb 13 6 oz)   06/28/21 115 kg (253 lb 15 5 oz)   10/09/20 114 kg (251 lb 12 3 oz)     · Likely exacerbated from CAP  · OP diuretic: Lasix 80 mg T/Thr/Sat/Sun, will start IV lasix 80 bid & monitor output  · Update Echo  · Daily weights, I/O    Acute on chronic respiratory failure (HCC)  Assessment & Plan  · Chronic use of 4L baseline  Continue regularly scheduled nebulizer tid as well as prn Duo-neb from PTA  · Now on 6L to maintain acceptable Spo2 goal 90% resting  · Likely from CHF & PNA  · Treat the above & titrate to goal  · Respiratory protocol, incentive spirometer    Paroxysmal atrial fibrillation (HCC)  Assessment & Plan  · Rate controlled  · AC with eliquis from home, continue    Sepsis [facility])  Assessment & Plan  · Tachypnea & leukocytosis  Source: CAP  · No ivf in acute CHF  · Rocephin  · Afebrile, trend CBC & procalcitonin  · Follow BC from admission    Hypertension  Assessment & Plan  · Accelerated in Acute chf  · Continue home antihypertensives  · IV metoprolol prn sbp > 170 mmhg      VTE Pharmacologic Prophylaxis: VTE Score: 10 High Risk (Score >/= 5) - Pharmacological DVT Prophylaxis Ordered: apixaban (Eliquis)  Sequential Compression Devices Ordered  Code Status: Level 1- Full code  Discussion with family: Updated  (daughter) via phone      Anticipated Length of Stay: Patient will be admitted on an inpatient basis with an anticipated length of stay of greater than 2 midnights secondary to iv lasix, iv abx, monitoring labs, o2 requirement  Total Time for Visit, including Counseling / Coordination of Care: 30 minutes Greater than 50% of this total time spent on direct patient counseling and coordination of care  Chief Complaint: sob x 2 days    History of Present Illness:  Jaydon Ervin is a 80 y o  female with a PMH of chronic respiratory failure requiring 4L O2 at baseline, ambulatory dysfunction, sacral wound, OA, HTN, Afib AC with Eliquis, CHF, obesity who presents with sob x 2 days  Sudden onset, worsening severity so much that she cannot catch her breath even at rest  Associated with increased sputum production with her cough  No fever, chills, sweats  No nausea, vomiting, diarrhea  No abd pain  Decreased po intake over the last 2 days  Patient at the time of my visit is complaining mostly of burning pain on the buttocks area which is improved with repositioning  Patient lives at [facility] long term  She is dependent for ADLs & IADLs  Her Daughter is the point of contact  She ambulates at baseline with a RW  Review of Systems:  Review of Systems   Constitutional: Positive for appetite change  HENT: Negative  Eyes: Negative  Respiratory: Positive for cough  Cardiovascular: Negative for chest pain  Gastrointestinal: Negative  Endocrine: Negative  Genitourinary: Negative  Musculoskeletal: Negative  Skin: Negative  Allergic/Immunologic: Negative  Neurological: Negative  Hematological: Bruises/bleeds easily  On chronic AC   Psychiatric/Behavioral: Negative          Past Medical and Surgical History:   Past Medical History:   Diagnosis Date    Arthritis     Atrial fibrillation ([facility] [address] )     CHF (congestive heart failure) (HCC)     Hyperlipidemia     Hypertension     PONV (postoperative nausea and vomiting)     Renal disorder        Past Surgical History:   Procedure Laterality Date    ANKLE FRACTURE SURGERY Left     CHOLECYSTECTOMY      JOINT REPLACEMENT Bilateral     TKR    REPLACEMENT TOTAL KNEE BILATERAL Bilateral        Meds/Allergies:  Prior to Admission medications    Medication Sig Start Date End Date Taking?  Authorizing Provider   amLODIPine (NORVASC) 5 mg tablet Take 5 mg by mouth daily   Yes Historical Provider, MD   apixaban (ELIQUIS) 5 mg Take 5 mg by mouth 2 (two) times a day   Yes Historical Provider, MD   baclofen 10 mg tablet Take 10 mg by mouth 3 (three) times a day   Yes Historical Provider, MD   bisacodyl (FLEET) 10 MG/30ML ENEM Insert 10 mg into the rectum once   Yes Historical Provider, MD   busPIRone (BUSPAR) 5 mg tablet Take 5 mg by mouth 3 (three) times a day   Yes Historical Provider, MD   Calcium Carbonate Antacid (TUMS ULTRA PO) Take 2 tablets by mouth 2 (two) times a day   Yes Historical Provider, MD   cholecalciferol (VITAMIN D3) 1,000 units tablet Take 1,000 Units by mouth daily   Yes Historical Provider, MD   cloNIDine (CATAPRES) 0 2 mg tablet Take 0 1 mg by mouth 3 (three) times a day     Yes Historical Provider, MD   Diclofenac Sodium (VOLTAREN) 1 % Apply 2 g topically 2 (two) times a day   Yes Historical Provider, MD   diphenhydrAMINE (BENADRYL) 25 mg tablet Take 12 5 mg by mouth daily at bedtime   Yes Historical Provider, MD   fentaNYL (DURAGESIC) 75 mcg/hr Place 1 patch on the skin every third day   Yes Historical Provider, MD   FLUTICASONE PROPIONATE NA into each nostril   Yes Historical Provider, MD   furosemide (LASIX) 80 mg tablet Take 80 mg by mouth 4 (four) times a week   Yes Historical Provider, MD   ipratropium (ATROVENT) 0 02 % nebulizer solution Take 0 5 mg by nebulization 4 (four) times a day   Yes Historical Provider, MD   ipratropium-albuterol (DUO-NEB) 0 5-2 5 mg/3 mL nebulizer solution Take 3 mL by nebulization 4 (four) times a day   Yes Historical Provider, MD levalbuterol (XOPENEX) 1 25 mg/3 mL nebulizer solution Take 1 25 mg by nebulization 3 (three) times a day   Yes Historical Provider, MD   magnesium hydroxide (MILK OF MAGNESIA) 400 mg/5 mL oral suspension Take by mouth daily as needed for constipation   Yes Historical Provider, MD   melatonin 3 mg Take 3 mg by mouth daily at bedtime   Yes Historical Provider, MD   menthol-cetylpyridinium (CEPACOL) 3 MG lozenge Take 1 lozenge by mouth as needed for sore throat   Yes Historical Provider, MD   polyethylene glycol (GLYCOLAX) powder Take 17 g by mouth 4 (four) times a day Take 4 times per day until a satisfying bowel movement and then switch to 1 time per day   7/1/19  Yes Noni Stanford MD   senna (SENOKOT) 8 6 MG tablet Take 1 tablet by mouth 2 (two) times a day as needed for constipation   Yes Historical Provider, MD   doxycycline hyclate (VIBRAMYCIN) 100 mg capsule Take 100 mg by mouth every 12 (twelve) hours  1/23/22  Historical Provider, MD   Emollient (Minerin) LOTN Apply topically 2 (two) times a day  1/23/22  Historical Provider, MD   menthol-zinc oxide (Calmoseptine) 0 44-20 6 % OINT Apply topically as needed  1/23/22  Historical Provider, MD   menthol-zinc oxide (Calmoseptine) 0 44-20 6 % OINT Apply topically 2 (two) times a day  1/23/22  Historical Provider, MD   nystatin (MYCOSTATIN) powder Apply topically as needed   1/23/22  Historical Provider, MD   ondansetron (ZOFRAN-ODT) 4 mg disintegrating tablet Take 1 tablet (4 mg total) by mouth every 6 (six) hours as needed for nausea or vomiting for up to 3 days 7/22/21 1/23/22  Alistair Petersen PA-C   Polyethyl Glycol-Propyl Glycol (SYSTANE) 0 4-0 3 % GEL Apply to eye  1/23/22  Historical Provider, MD   white petrolatum-mineral oil (EUCERIN,HYDROCERIN) cream Apply topically as needed  1/23/22  Historical Provider, MD   Wound Dressings (Triad Hydrophilic Wound Dressi) PSTE Apply topically as needed  1/23/22  Historical Provider, MD     I have reveiwed home medications using records provided by SNF  Allergies: Allergies   Allergen Reactions    Azithromycin Other (See Comments)     unknown    Cephalexin Other (See Comments)     unknown    Ciprofloxacin Other (See Comments)     unknown    Sulfa Antibiotics Other (See Comments)     unknown    Vancomycin Rash       Social History:  Marital Status:    Occupation: none  Patient Pre-hospital Living Situation: Amanuel Baird: [address] [place] [address]  Patient Pre-hospital Level of Mobility: walks with walker  Patient Pre-hospital Diet Restrictions: Sodium 2G, regular consistency/thin liquids  Substance Use History:   Social History     Substance and Sexual Activity   Alcohol Use Never     Social History     Tobacco Use   Smoking Status Never Smoker   Smokeless Tobacco Never Used     Social History     Substance and Sexual Activity   Drug Use Never       Family History:  Family History   Problem Relation Age of Onset    Alcohol abuse Neg Hx     Arthritis Neg Hx     Asthma Neg Hx     Birth defects Neg Hx     Cancer Neg Hx     COPD Neg Hx     Depression Neg Hx     Diabetes Neg Hx     Drug abuse Neg Hx     Early death Neg Hx     Hearing loss Neg Hx     Heart disease Neg Hx     Hyperlipidemia Neg Hx     Hypertension Neg Hx     Kidney disease Neg Hx     Learning disabilities Neg Hx     Mental illness Neg Hx     Mental retardation Neg Hx     Miscarriages / Stillbirths Neg Hx     Stroke Neg Hx     Vision loss Neg Hx        Physical Exam:     Vitals:   Blood Pressure: (!) 175/81 (01/23/22 1600)  Pulse: 90 (01/23/22 1600)  Temperature: 99 °F (37 2 °C) (01/23/22 1303)  Temp Source: Temporal (01/23/22 1303)  Respirations: (!) 47 (01/23/22 1600)  SpO2: 92 % (01/23/22 1600)    Physical Exam  Vitals and nursing note reviewed  Constitutional:       General: She is in acute distress  Appearance: She is ill-appearing and toxic-appearing  She is not diaphoretic        Comments: Awake & alert, unable to maneuver herself in bed on her own during my physical exam   HENT:      Head: Normocephalic and atraumatic  Nose: Nose normal       Mouth/Throat:      Mouth: Mucous membranes are dry  Eyes:      Extraocular Movements: Extraocular movements intact  Conjunctiva/sclera: Conjunctivae normal       Pupils: Pupils are equal, round, and reactive to light  Neck:      Comments: No jvd, Hypertrophied accessory respiratory muscles  Cardiovascular:      Rate and Rhythm: Normal rate  Rhythm irregular  Heart sounds: Murmur heard  Gallop present  Comments: Very significant murmur  Pulmonary:      Effort: Tachypnea, accessory muscle usage and respiratory distress present  Comments: Inspiratory wheeze, diffuse rales  Chest:      Chest wall: No tenderness  Abdominal:      General: Bowel sounds are normal  There is no distension  Palpations: Abdomen is soft  Tenderness: There is no abdominal tenderness  There is no guarding  Musculoskeletal:         General: No tenderness  Comments: Dependent edema 1+ in bl peripheral lower extremities, scars from prior knee replacement   Skin:     General: Skin is warm and dry  Findings: No bruising  Neurological:      General: No focal deficit present  Mental Status: She is alert and oriented to person, place, and time  Mental status is at baseline     Psychiatric:         Mood and Affect: Mood normal          Behavior: Behavior normal       Comments: anxiety          Additional Data:     Lab Results:  Results from last 7 days   Lab Units 01/23/22  1326   WBC Thousand/uL 14 32*   HEMOGLOBIN g/dL 12 0   HEMATOCRIT % 38 6   PLATELETS Thousands/uL 279   NEUTROS PCT % 88*   LYMPHS PCT % 4*   MONOS PCT % 7   EOS PCT % 0     Results from last 7 days   Lab Units 01/23/22  1326   SODIUM mmol/L 144   POTASSIUM mmol/L 3 3*   CHLORIDE mmol/L 102   CO2 mmol/L 35*   BUN mg/dL 22   CREATININE mg/dL 0 76   ANION GAP mmol/L 7   CALCIUM mg/dL 9 2 ALBUMIN g/dL 3 2*   TOTAL BILIRUBIN mg/dL 0 77   ALK PHOS U/L 89   ALT U/L 27   AST U/L 29   GLUCOSE RANDOM mg/dL 127     Results from last 7 days   Lab Units 01/23/22  1326   INR  1 51*             Results from last 7 days   Lab Units 01/23/22  1326   LACTIC ACID mmol/L 1 2   PROCALCITONIN ng/ml 0 28*       Imaging: Personally reviewed the following imaging: chest xray  XR chest portable    (Results Pending)       EKG and Other Studies Reviewed on Admission:   · EKG: Viewed the ED provider's interpretation of the EKG, did not personally view the EKG itself  ** Please Note: This note has been constructed using a voice recognition system   **

## 2022-01-24 NOTE — ED NOTES
Fentanyl patch removed from L back prior to new patch application   Waste of old patch with [facility]       Ronny Jones, GEOVANNY  01/24/22 7257

## 2022-01-24 NOTE — ASSESSMENT & PLAN NOTE
· Chronic use of 4L baseline  Continue regularly scheduled nebulizer tid as well as prn Duo-neb from PTA    · Now on 5L to maintain acceptable Spo2 goal 90% resting  · Likely from CHF & PNA  · Treat the above & titrate to goal  · Respiratory protocol, incentive spirometer

## 2022-01-24 NOTE — ASSESSMENT & PLAN NOTE
· Covid/RSV/flu negative  · CXR reviewed  · Procalcitonin elevated, trend with CBC w diff daily  · Non-severe DRIP 4: Rocephin  · Urine antigens in process, sputum GS&C  · Supportive care

## 2022-01-24 NOTE — ASSESSMENT & PLAN NOTE
Wt Readings from Last 3 Encounters:   01/24/22 115 kg (254 lb)   07/22/21 116 kg (254 lb 13 6 oz)   06/28/21 115 kg (253 lb 15 5 oz)     · Likely exacerbated from CAP  · OP diuretic: Lasix 80 mg T/Thr/Sat/Sun, while inpatient IV lasix 80 bid  · Echo 1/24 results reviewed  · Daily weights, I/O

## 2022-01-24 NOTE — PROGRESS NOTES
[address]  Progress Note - Kirsty Thompson 1933, 80 y o  female MRN: [id number]  Unit/Bed#: ED 08 Encounter: [id number]  Primary Care Provider: No primary care provider on file  Date and time admitted to hospital: 1/23/2022 12:56 PM    CAP (community acquired pneumonia)  Assessment & Plan  · Covid/RSV/flu negative  · CXR reviewed  · Procalcitonin elevated, trend with CBC w diff daily  · Non-severe DRIP 4: Rocephin  · Urine antigens in process, sputum GS&C  · Supportive care    Acute on chronic diastolic heart failure (HCC)  Assessment & Plan  Wt Readings from Last 3 Encounters:   01/24/22 115 kg (254 lb)   07/22/21 116 kg (254 lb 13 6 oz)   06/28/21 115 kg (253 lb 15 5 oz)     · Likely exacerbated from CAP  · OP diuretic: Lasix 80 mg T/Thr/Sat/Sun, while inpatient IV lasix 80 bid  · Echo 1/24 results reviewed  · Daily weights, I/O    Acute on chronic respiratory failure ([address] )  Assessment & Plan  · Chronic use of 4L baseline  Continue regularly scheduled nebulizer tid as well as prn Duo-neb from PTA  · Now on 5L to maintain acceptable Spo2 goal 90% resting  · Likely from CHF & PNA  · Treat the above & titrate to goal  · Respiratory protocol, incentive spirometer    Paroxysmal atrial fibrillation (HCC)  Assessment & Plan  · Rate controlled  · AC with eliquis from home, continue    Sepsis [facility])  Assessment & Plan  · Tachypnea & leukocytosis  Source: CAP  · No ivf in acute CHF  · Rocephin  · Afebrile, leukocytosis down trended, procalcitonin 0 28 - 0 28  · Blood culture from admission x2 negative to date  Hypertension  Assessment & Plan  · Accelerated in Acute chf  · Continue home antihypertensives  · IV metoprolol prn sbp > 170 mmhg      VTE Pharmacologic Prophylaxis: VTE Score: 10 High Risk (Score >/= 5) - Pharmacological DVT Prophylaxis Ordered: apixaban (Eliquis)  Sequential Compression Devices Ordered      Patient Centered Rounds: I performed bedside rounds with nursing staff today   Discussions with Specialists or Other Care Team Provider: ryan    Education and Discussions with Family / Patient: Updated  (daughter) via phone  Time Spent for Care: 30 minutes  More than 50% of total time spent on counseling and coordination of care as described above  Current Length of Stay: 1 day(s)  Current Patient Status: Inpatient   Certification Statement: The patient will continue to require additional inpatient hospital stay due to iv diuretic, lab monitoring, iv abx, improved respiratory status  Discharge Plan: Anticipate discharge in 48 hrs to prior assisted or independent living facility  Code Status: Level 1 - Full Code    Subjective:   Mild hemoptysis mixed with mucous with cough  SOB is much improved    She is comfortable in bed her bottom isn't sore  Atea corn flakes & banana for breakfast this am which is improved from the last 3 days when she hadn't eaten much prior to her admission  Objective:     Vitals:   Temp (24hrs), Av 2 °F (36 8 °C), Min:97 6 °F (36 4 °C), Max:99 °F (37 2 °C)    Temp:  [97 6 °F (36 4 °C)-99 °F (37 2 °C)] 97 8 °F (36 6 °C)  HR:  [59-94] 83  Resp:  [17-55] 18  BP: (150-204)/() 174/79  SpO2:  [88 %-99 %] 94 %  Body mass index is 44 99 kg/m²  Input and Output Summary (last 24 hours): Intake/Output Summary (Last 24 hours) at 2022 1233  Last data filed at 2022 1045  Gross per 24 hour   Intake 100 ml   Output 1700 ml   Net -1600 ml       Physical Exam:   Physical Exam  Vitals and nursing note reviewed  Constitutional:       General: She is not in acute distress  Appearance: She is obese  She is ill-appearing  She is not toxic-appearing or diaphoretic  HENT:      Head: Normocephalic and atraumatic  Nose: Nose normal       Mouth/Throat:      Mouth: Mucous membranes are dry  Eyes:      Extraocular Movements: Extraocular movements intact        Conjunctiva/sclera: Conjunctivae normal    Neck: Comments: No jvd  Cardiovascular:      Rate and Rhythm: Normal rate and regular rhythm  Heart sounds: Murmur heard  Pulmonary:      Breath sounds: Rales present  No wheezing  Comments: Much improved work of breathing compared to yesterday  She is having some pursed lip breathing but not using the accessory respiratory muscles today    No wheezes, rales are present bl lung fields but quieter compared to yesterday  Abdominal:      General: Bowel sounds are normal  There is no distension  Palpations: Abdomen is soft  Tenderness: There is no abdominal tenderness  There is no guarding  Genitourinary:     Comments: Pure wick  Musculoskeletal:         General: Swelling present  No tenderness  Cervical back: Neck supple  Comments: Trace pitting edema to the ankles bl no other dependent edema   Skin:     General: Skin is warm and dry  Neurological:      General: No focal deficit present  Mental Status: She is alert and oriented to person, place, and time  Psychiatric:         Mood and Affect: Mood normal          Behavior: Behavior normal           Additional Data:     Labs:  Results from last 7 days   Lab Units 01/24/22  0428   WBC Thousand/uL 6 82   HEMOGLOBIN g/dL 10 5*   HEMATOCRIT % 33 8*   PLATELETS Thousands/uL 239   NEUTROS PCT % 91*   LYMPHS PCT % 5*   MONOS PCT % 4   EOS PCT % 0     Results from last 7 days   Lab Units 01/24/22  0428 01/23/22  1326 01/23/22  1326   SODIUM mmol/L 145   < > 144   POTASSIUM mmol/L 3 2*   < > 3 3*   CHLORIDE mmol/L 102   < > 102   CO2 mmol/L 36*   < > 35*   BUN mg/dL 24   < > 22   CREATININE mg/dL 0 76   < > 0 76   ANION GAP mmol/L 7   < > 7   CALCIUM mg/dL 8 9   < > 9 2   ALBUMIN g/dL  --   --  3 2*   TOTAL BILIRUBIN mg/dL  --   --  0 77   ALK PHOS U/L  --   --  89   ALT U/L  --   --  27   AST U/L  --   --  29   GLUCOSE RANDOM mg/dL 140   < > 127    < > = values in this interval not displayed       Results from last 7 days   Lab Units 01/23/22  1326   INR  1 51*             Results from last 7 days   Lab Units 01/24/22  0428 01/23/22  1326   LACTIC ACID mmol/L  --  1 2   PROCALCITONIN ng/ml 0 28* 0 28*       Lines/Drains:  Invasive Devices  Report    Peripheral Intravenous Line            Peripheral IV 01/23/22 Right Antecubital <1 day                      Imaging: No pertinent imaging reviewed  Recent Cultures (last 7 days):   Results from last 7 days   Lab Units 01/23/22  1838 01/23/22  1326 01/23/22  1312   BLOOD CULTURE   --  Received in Microbiology Lab  Culture in Progress  Received in Microbiology Lab  Culture in Progress     LEGIONELLA URINARY ANTIGEN  Negative  --   --        Last 24 Hours Medication List:   Current Facility-Administered Medications   Medication Dose Route Frequency Provider Last Rate    acetaminophen  650 mg Oral Q6H PRN Lissett Demo, PA-C      amLODIPine  5 mg Oral Daily Lissett Demo, PA-C      apixaban  5 mg Oral BID Lissett Demo, PA-C      baclofen  10 mg Oral BID Lissett Demo, PA-C      busPIRone  5 mg Oral TID Lissett Demo, PA-C      calcium carbonate  500 mg Oral BID PRN Lissett Demo, PA-C      cefTRIAXone  1,000 mg Intravenous Q24H Lissett Demo, PA-C      cloNIDine  0 1 mg Oral Daily Lissett Demo, PA-C      Diclofenac Sodium  2 g Topical HS PRN Lissett Demo, PA-C      fentaNYL  75 mcg Transdermal Q72H Lissett Demo, PA-C      fluticasone  1 spray Each Nare Daily [place], [place]      furosemide  80 mg Intravenous BID (diuretic) Lissett Demo, PA-SERAFIN      guaiFENesin  600 mg Oral BID Lissett Demo, PA-C      ipratropium  0 5 mg Nebulization TID Lissett Demo, PA-C      ipratropium-albuterol  3 mL Nebulization 4x Daily PRN Lissett Demo, PA-C      levalbuterol  1 25 mg Nebulization TID Lissett Demo, PA-C      melatonin  3 mg Oral HS Lissett Demo, PA-C      metoprolol  5 mg Intravenous Q6H PRN Lissett Demo, PA-C      ondansetron  4 mg Intravenous Q6H PRN Lissett Demo, PA-C      oxyCODONE  2 5 mg Oral Q4H PRN Lissett Demo, PA-C      senna  1 tablet Oral BID CATHRYN Nieves PA-C          Today, Patient Was Seen By: Luisa Nieves PA-C    **Please Note: This note may have been constructed using a voice recognition system  **

## 2022-01-24 NOTE — ED NOTES
Pt stating "I am coughing up blood", this RN asked how much and pt had small amount in napkin, pt requesting to have "stronger fentanyl patch", this RN explained 75mcg patch was ordered and assessed pts pain, pt denies any pain at current moment  This RN then asked pt if she would like to hold fentanyl patch until pain is present pt states "no I would like it now"  Pt ate 100% of breakfast, fresh water provided, call bell in reach   Will continue to monitor      Maikel Das RN  01/24/22 5161

## 2022-01-24 NOTE — UTILIZATION REVIEW
Initial Clinical Review    Admission: Date/Time/Statement:   Admission Orders (From admission, onward)     Ordered        01/23/22 1441  Inpatient Admission  Once                      Orders Placed This Encounter   Procedures    Inpatient Admission     Standing Status:   Standing     Number of Occurrences:   1     Order Specific Question:   Level of Care     Answer:   Med Surg [16]     Order Specific Question:   Estimated length of stay     Answer:   More than 2 Midnights     Order Specific Question:   Certification     Answer:   I certify that inpatient services are medically necessary for this patient for a duration of greater than two midnights  See H&P and MD Progress Notes for additional information about the patient's course of treatment  ED Arrival Information     Expected Arrival Acuity    1/23/2022 12:56 1/23/2022 12:56 Emergent         Means of arrival Escorted by Service Admission type    Ambulance [facility] Emergency         Arrival complaint    pneumonia        Chief Complaint   Patient presents with    Shortness of Breath     patient dx with pneumonia, been short of breath since yesterday  Initial Presentation: 81 yo female with pmh chr respiratoy failure on O2 @ 4 l baseline, sacral wound, oa, htn, a fib on eliquis, chf, obesity presents to ED from SNF with worsening SOB x 2 days, cough, increased sputum, sat on 4 liters 88%  Decreased bs's with wheezing, rales, + le edema  Elevated WBC's, CRP, procalcitonin, NT-proBNP, hypokalemia  CXR -Diffuse patchy airspace consolidation bilaterally   This is nonspecific and may be related to multifocal pneumonia or pulmonary edema    Admitted to Inpatient with Acute on chronic respiratory failure/hypoxia, sepsis d/t Right sided pneumonia, acute on chronic CHF  Plan is for supplemental O2, IV ceftriaxone, IV Lasix, nebs txs, d wt, speech consult      Date: 1/24 Day 2: Mild hemoptysis mixed with mucous with cough; SOB improved - some pursed lip breathing but not using accessory respiratory muscles today; Weaned to 5 L from 6 L this am   + rales bilat   Trace pitting edema to the ankles  Continue IV lasix  leukocytosis down trended, procalcitonin 0 28 - 0 28 - continue IV rocephin  BC's neg to date    Patient will continue to require additional inpatient hospital stay due to iv diuretic, lab monitoring, iv abx    ED Triage Vitals   Temperature Pulse Respirations Blood Pressure SpO2   01/23/22 1303 01/23/22 1303 01/23/22 1303 01/23/22 1303 01/23/22 1303   99 °F (37 2 °C) 90 20 (!) 174/92 (!) 88 %      Temp Source Heart Rate Source Patient Position - Orthostatic VS BP Location FiO2 (%)   01/23/22 1303 01/23/22 1303 01/23/22 1303 01/23/22 1303 --   Temporal Monitor Lying Left arm       Pain Score       01/24/22 0300       No Pain          Wt Readings from Last 1 Encounters:   01/24/22 115 kg (254 lb)     Additional Vital Signs:   01/24/22 1228 97 8 °F (36 6 °C) 70 18 142/73 101 97 % 40 5 L/min Nasal cannula Lying   01/24/22 1041 97 8 °F (36 6 °C) 83 18 174/79 Abnormal  114 94 % 40 5 L/min Nasal cannula Lying   01/24/22 0810 97 6 °F (36 4 °C) 85 17 174/79 Abnormal  114 94 % 40 5 L/min Nasal cannula Lying   01/24/22 0730 -- 71 -- 174/79 Abnormal  -- -- -- -- -- --   01/24/22 0700 -- 66 -- -- -- 93 % -- -- -- --   01/24/22 0430 98 °F (36 7 °C) 76 24 Abnormal  -- -- 95 % 44 6 L/min Nasal cannula --   01/24/22 0300 98 6 °F (37 °C) 59 20 150/81 -- 99 % -- -- Nasal cannula Lying   01/23/22 2300 -- 79 22 157/87 -- 98 % 44 6 L/min Nasal cannula Sitting   01/23/22 2000 -- 78 22 153/70 97 98 % 44 6 L/min Nasal cannula --   01/23/22 1800 -- 82 55 Abnormal  182/81 Abnormal  116 96 % 44 6 L/min Nasal cannula --   01/23/22 1600 -- 90 47 Abnormal  175/81 Abnormal  116 92 % 44 6 L/min Nasal cannula --   01/23/22 1530 -- 87 44 Abnormal  165/91 116 92 % 44 6 L/min Nasal cannula --   01/23/22 1500 -- 86 30 Abnormal  164/119 Abnormal  138 93 % 44 6 L/min Nasal cannula --   01/23/22 1445 -- 87 47 Abnormal  -- -- 93 % -- -- -- --   01/23/22 1430 -- 88 50 Abnormal  177/79 Abnormal  114 92 % -- -- -- --   01/23/22 1400 -- 94 30 Abnormal  204/95 Abnormal  136 90 % -- -- -- --   01/23/22 1348 -- -- -- -- -- -- -- -- Nasal cannula --   01/23/22 1345 -- 88 18 168/78 112 93 % -- -- -- --   01/23/22 1315 -- 89 18 174/92 Abnormal  124 96 % -- -- --        Pertinent Labs/Diagnostic Test Results:   Results from last 7 days   Lab Units 01/23/22  1326   SARS-COV-2  Negative      Results from last 7 days   Lab Units 01/24/22  0428 01/23/22  1326   WBC Thousand/uL 6 82 14 32*   HEMOGLOBIN g/dL 10 5* 12 0   HEMATOCRIT % 33 8* 38 6   PLATELETS Thousands/uL 239 279   NEUTROS ABS Thousands/µL 6 19 12 61*     Results from last 7 days   Lab Units 01/24/22  0428 01/23/22  1326   SODIUM mmol/L 145 144   POTASSIUM mmol/L 3 2* 3 3*   CHLORIDE mmol/L 102 102   CO2 mmol/L 36* 35*   ANION GAP mmol/L 7 7   BUN mg/dL 24 22   CREATININE mg/dL 0 76 0 76   EGFR ml/min/1 73sq m 70 70   CALCIUM mg/dL 8 9 9 2   MAGNESIUM mg/dL 1 8 1 9   PHOSPHORUS mg/dL 3 7  --      Results from last 7 days   Lab Units 01/23/22  1326   AST U/L 29   ALT U/L 27   ALK PHOS U/L 89   TOTAL PROTEIN g/dL 7 8   ALBUMIN g/dL 3 2*   TOTAL BILIRUBIN mg/dL 0 77     Results from last 7 days   Lab Units 01/24/22  0428 01/23/22  1326   GLUCOSE RANDOM mg/dL 140 127     Results from last 7 days   Lab Units 01/23/22  1326   PH IRENE  7 388   PCO2 IRENE mm Hg 59 5*   PO2 IRENE mm Hg 49 3*   HCO3 IRENE mmol/L 35 0*   BASE EXC IRENE mmol/L 8 2   O2 CONTENT IRENE ml/dL 14 2   O2 HGB, VENOUS % 82 3*     Results from last 7 days   Lab Units 01/23/22  1603 01/23/22  1326   HS TNI 0HR ng/L  --  45   HS TNI 2HR ng/L 43  --    HSTNI D2 ng/L -2  --      Results from last 7 days   Lab Units 01/23/22  1326   PROTIME seconds 17 9*   INR  1 51*   PTT seconds 38*     Results from last 7 days   Lab Units 01/24/22  0428 01/23/22  1326   PROCALCITONIN ng/ml 0 28* 0 28* Results from last 7 days   Lab Units 01/23/22  1326   LACTIC ACID mmol/L 1 2     Results from last 7 days   Lab Units 01/23/22  1326   NT-PRO BNP pg/mL 3,230*     Results from last 7 days   Lab Units 01/23/22  1326   CRP mg/L 247 5*     Results from last 7 days   Lab Units 01/23/22  1838   CLARITY UA  Clear   COLOR UA  Straw   SPEC GRAV UA  1 015   PH UA  8 0   GLUCOSE UA mg/dl Negative   KETONES UA mg/dl Negative   BLOOD UA  Trace-lysed*   PROTEIN UA mg/dl 30 (1+)*   NITRITE UA  Positive*   BILIRUBIN UA  Negative   UROBILINOGEN UA E U /dl 0 2   LEUKOCYTES UA  Negative   WBC UA /hpf None Seen   RBC UA /hpf 1-2   BACTERIA UA /hpf Occasional   EPITHELIAL CELLS WET PREP /hpf None Seen     Results from last 7 days   Lab Units 01/23/22  1326   INFLUENZA A PCR  Negative   INFLUENZA B PCR  Negative   RSV PCR  Negative     Results from last 7 days   Lab Units 01/23/22  1326 01/23/22  1312   BLOOD CULTURE  Received in Microbiology Lab  Culture in Progress  Received in Microbiology Lab  Culture in Progress  1/23 EKG: Interpretation: abnormal      ECG rate:  81    Rhythm: atrial fibrillation       1/23 CXR: Diffuse patchy airspace consolidation bilaterally   This is nonspecific and may be related to multifocal pneumonia or pulmonary edema  Clinical service was aware of the findings at the time of dictation  1/24 ECHO:    Left Ventricle: Mildly increased left ventricular wall thickness with normal LV cavity size and systolic function  There is [id number] variability in contractility due to rhythm  Estimated ejection fraction 65%  No regional wall motion abnormality identified  Diastolic characterization was not assessed    Right Ventricle: The right ventricle appears top-normal in size with low-normal function by visual estimation    Aortic Valve: Aortic valve is not well seen  Aortic calcification is noted  The measured trans aortic velocity suggests severe aortic stenosis    Trace insufficiency seen     Mitral Valve: Mitral annular sclerosis, particularly involving the posterior aspect of the annulus with preserved leaflet excursion and mild insufficiency    Tricuspid Valve: Grossly normal-appearing tricuspid leaflets with mild to moderate insufficiency  Measured trans tricuspid regurgitant velocity of 3 7 m/sec corresponds to a gradient of 56 mmHg  With addition of 10 mmHg presumed atrial pressure, estimated pulmonary pressure is increased at 66 mmHg      Compared to study performed August 2018, that was a limited study for assessment of aortic valve stenosis  Aortic valve was described as being moderately stenotic with mild insufficiency    Right ventricle was described as having normal size and function          ED Treatment:   Medication Administration from 01/23/2022 1256 to 01/24/2022 0900       Date/Time Order Dose Route Action     01/23/2022 1330 ampicillin-sulbactam (UNASYN) 3 g in sodium chloride 0 9 % 100 mL IVPB 3 g Intravenous New Bag     01/23/2022 1348 methylPREDNISolone sodium succinate (Solu-MEDROL) injection 125 mg 125 mg Intravenous Given     01/23/2022 1348 albuterol (PROVENTIL HFA,VENTOLIN HFA) inhaler 2 puff 2 puff Inhalation Given     01/23/2022 1449 furosemide (LASIX) injection 20 mg 20 mg Intravenous Given     01/24/2022 0852 amLODIPine (NORVASC) tablet 5 mg 5 mg Oral Given     01/24/2022 0852 apixaban (ELIQUIS) tablet 5 mg 5 mg Oral Given     01/23/2022 1755 apixaban (ELIQUIS) tablet 5 mg 5 mg Oral Given     01/24/2022 0851 baclofen tablet 10 mg 10 mg Oral Given     01/23/2022 1754 baclofen tablet 10 mg 10 mg Oral Given     01/24/2022 0851 busPIRone (BUSPAR) tablet 5 mg 5 mg Oral Given     01/23/2022 1800 busPIRone (BUSPAR) tablet 5 mg 5 mg Oral Given     01/24/2022 0851 cloNIDine (CATAPRES) tablet 0 1 mg 0 1 mg Oral Given     01/24/2022 0850 fluticasone (FLONASE) 50 mcg/act nasal spray 1 spray 1 spray Each Nare Given     01/23/2022 2100 ipratropium (ATROVENT) 0 02 % inhalation solution 0 5 mg 0 5 mg Nebulization Given     01/23/2022 2101 levalbuterol (Jeananne Richard) inhalation solution 1 25 mg 1 25 mg Nebulization Given     01/23/2022 1754 levalbuterol (Jeananne Richard) inhalation solution 1 25 mg 1 25 mg Nebulization Given     01/23/2022 2102 melatonin tablet 3 mg 3 mg Oral Given     01/24/2022 0852 guaiFENesin (MUCINEX) 12 hr tablet 600 mg 600 mg Oral Given     01/23/2022 1754 guaiFENesin (MUCINEX) 12 hr tablet 600 mg 600 mg Oral Given     01/23/2022 1754 furosemide (LASIX) injection 80 mg 80 mg Intravenous Given     01/24/2022 0848 furosemide (LASIX) injection 80 mg 80 mg Intravenous Given     01/24/2022 0851 potassium chloride (K-DUR,KLOR-CON) CR tablet 20 mEq 20 mEq Oral Given        Past Medical History:   Diagnosis Date    Arthritis     Atrial fibrillation ([facility] )     [facility]congest[facility] failure) [facility])     Hyperlipidemia     Hypertension     PONV (postoperative nausea and vomiting)     Renal disorder      Present on Admission:   Sepsis ([facility] )   Paroxysmal atrial fibrillation ([facility] )   Hypertension   Acute on chronic diastolic heart failure ([facility])   Acute on chronic respiratory failure ([facility])   CAP (community acquired pneumonia)      Admitting Diagnosis: SOB (shortness of breath) [R06 02]  Age/Sex: 80 y o  female  Admission Orders:  Scheduled Medications:  amLODIPine, 5 mg, Oral, Daily  apixaban, 5 mg, Oral, BID  baclofen, 10 mg, Oral, BID  busPIRone, 5 mg, Oral, TID  cefTRIAXone, 1,000 mg, Intravenous, Q24H  cloNIDine, 0 1 mg, Oral, Daily  fentaNYL, 1 patch, Transdermal, Q72H  fluticasone, 1 spray, Each Nare, Daily  furosemide, 80 mg, Intravenous, BID (diuretic)  guaiFENesin, 600 mg, Oral, BID  ipratropium, 0 5 mg, Nebulization, TID  levalbuterol, 1 25 mg, Nebulization, TID  melatonin, 3 mg, Oral, HS    Continuous IV Infusions:     PRN Meds:  acetaminophen, 650 mg, Oral, Q6H PRN  calcium carbonate, 500 mg, Oral, BID PRN  Diclofenac Sodium, 2 g, Topical, HS PRN  ipratropium-albuterol, 3 mL, Nebulization, 4x Daily PRN  metoprolol, 5 mg, Intravenous, Q6H PRN  ondansetron, 4 mg, Intravenous, Q6H PRN  oxyCODONE, 2 5 mg, Oral, Q4H PRN  senna, 1 tablet, Oral, BID PRN    2 gm na diet, fld restrict 1800 ccs  IS q1h  W/a, daily wt,  I&O  SCDs  Wound care daily  PT/OT/Speech  IP CONSULT TO CASE MANAGEMENT    Network Utilization Review Department  ATTENTION: Please call with any questions or concerns to [phone] and carefully listen to the prompts so that you are directed to the right person  All voicemails are confidential   Mariah Bee all requests for admission clinical reviews, approved or denied determinations and any other requests to dedicated fax number below belonging to the campus where the patient is receiving treatment   List of dedicated fax numbers for the Facilities:  [address] DENIALS (Administrative/Medical Necessity) [phone]   [address] (Maternity/NICU/Pediatrics) [phone]   [address]  [address] [place] [phone] [place] [address] [address] [facility] [place] [address] [phone]

## 2022-01-24 NOTE — ED NOTES
Pt transferred to inpatient hospital bed for comfort and all linens changed  Pt offers no complaints at this time and has call bell within reach        Kalin GEOVANNY Monroy  01/23/22 4448

## 2022-01-24 NOTE — SPEECH THERAPY NOTE
Speech-Language Pathology Bedside Swallow Evaluation      Patient Name: Selene Barrios    LLAWN'N Date: 1/24/2022     Problem List  Active Problems:    Hypertension    Sepsis ([facility] )    CAP (community acquired pneumonia)    Paroxysmal atrial fibrillation (HCC)    Acute on chronic respiratory failure (HCC)    Acute on chronic diastolic heart failure (HCC)      Past Medical History  Past Medical History:   Diagnosis Date    Arthritis     Atrial fibrillation ([facility] )     CHF (congestive heart failure) (HCC)     Hyperlipidemia     Hypertension     PONV (postoperative nausea and vomiting)     Renal disorder        Past Surgical History  Past Surgical History:   Procedure Laterality Date    ANKLE FRACTURE SURGERY Left     CHOLECYSTECTOMY      JOINT REPLACEMENT Bilateral     TKR    REPLACEMENT TOTAL KNEE BILATERAL Bilateral        Summary   Dysphagia order requested to r/o swallowing relation due to extensive right sided pneumonia  Pt presented with no s/s or episodes of difficulty that would suggest oral or pharygneal dysphagia  Completed varying consistency trials and liquid presentations that all yielded same functional tolerance  Pt did exhibit slight fluctuation of SpO2 levels due to rate of intake and pt education provided on oxygen conservation strategies due to current need of supplemental  Cognition informally appeared intact and pt recalled strategies throughout and at end of evaluation with exhibited use  Recommend continuation of regular diet and thin liquids  Dysphagia evaluation only  Risk/s for Aspiration: low     Recommended Diet: regular diet and thin liquids   Recommended Form of Meds: whole with liquid   Aspiration precautions and swallowing strategies: upright posture, slow rate of feeding, small bites/sips and alternating bites and sips  Other Recommendations: Continue frequent oral care      Current Medical Status  Selene Barrios is a 80 y o  female with a PMH of chronic respiratory failure requiring 4L O2 at baseline, ambulatory dysfunction, sacral wound, OA, HTN, Afib AC with Eliquis, CHF, obesity who presents with sob x 2 days  Sudden onset, worsening severity so much that she cannot catch her breath even at rest  Associated with increased sputum production with her cough  No fever, chills, sweats  No nausea, vomiting, diarrhea  No abd pain  Decreased po intake over the last 2 days      Patient at the time of my visit is complaining mostly of burning pain on the buttocks area which is improved with repositioning      Patient lives at [facility] long term  She is dependent for ADLs & IADLs  Her Daughter is the point of contact  She ambulates at baseline with a RW    Current Precautions:  Fall      Allergies:  No known food allergies    Past medical history:  Please see H&P for details    Special Studies:  Reviewed CXR from 1/23/22  Social/Education/Vocational Hx:  Pt lives @ [facility]  Ordered a regular diet with thin liquids  Pt denied dysphagia  Swallow Information   Current Risks for Dysphagia & Aspiration: n/a  Current Symptoms/Concerns: degree of right sided lung infiltrates  Current Diet: regular diet and thin liquids   Baseline Diet: regular diet and thin liquids      Baseline Assessment   Behavior/Cognition: alert  Speech/Language Status: able to participate in conversation  Patient Positioning: upright in bed      Swallow Mechanism Exam  Facial: symmetrical  Labial: WFL  Lingual: WFL  Velum: symmetrical  Mandible: adequate ROM  Dentition: upper denture and lower partial  Vocal quality:clear/adequate   Volitional Cough: strong/productive   Respiratory Status: nasal cannula      Consistencies Assessed and Performance   Consistencies Administered: thin liquids, puree, mechanical soft solids, soft solids and hard solids    Oral Stage: WFL  Mastication was adequate with the materials administered today    Bolus formation and transfer were functional with no significant oral residue noted  No overt s/s reduced oral control  Pharyngeal Stage: WFL  Swallow Mechanics:  Swallowing initiation appeared prompt  Laryngeal rise was palpated and judged to be within functional limits  No coughing, throat clearing, change in vocal quality or respiratory status noted today       Esophageal Concerns: none reported    Strategies and Efficacy: slow rate, small bites/sips, cyclic ingestion    Summary and Recommendations (see above)    Results Reviewed with: patient     Treatment Recommended: dysphagia evaluation only     Tenisha Coffey CCC-SLP

## 2022-01-24 NOTE — ASSESSMENT & PLAN NOTE
· Tachypnea & leukocytosis  Source: CAP  · No ivf in acute CHF  · Rocephin  · Afebrile, leukocytosis down trended, procalcitonin 0 28 - 0 28  · Blood culture from admission x2 negative to date

## 2022-01-24 NOTE — DISCHARGE INSTR - OTHER ORDERS
Skin care Plan:  1-Cleanse POA  stage 3 on Sacrum w/ NSS apply Maxorb AG to wound  bed, cover w/Allevyn foam, malik with T, change daily and PRN  2-Turn/reposition q2h or when medically stable for pressure re-distribution on skin   3-Elevate heels to offload pressure]  4-Moisturize skin daily with skin nourishing cream  5-Ehob cushion in chair when out of bed  6-Hydraguard to bilateral heels BID and PRN    7-Low Air loss Mattress

## 2022-01-24 NOTE — WOUND OSTOMY CARE
Consult Note - Wound   Polina Heart 80 y o  female MRN: [id number]  Unit/Bed#: ED 08 Encounter: [id number]      History and Present Illness:Presented to Ed from [facility]  PMHchronic sacral wound  morbid obesity,CAP  Seen today for initial wound assessment  Pt supine in bed, on low air loss mattress  Pur wik for bladder management  Max assist to turn and reposition  Assessment Findings:   1)Bilateral heels intact  2)POA stage 3 wound of sacrum  Pt has had chronic MASD with partial thickness skin loss to sacrum as noted in medical records 07/21  Today wound presents with 50% pale yellow slough and 50% pink, no tunneling or undermining noted  Edges appear slightly rolled  Areas cleansed and Maxorb AG  Then allevyn foam applied  Pt positioned on side with green foam wedge  Pt is on low air loss mattress  Skin care Plan:  1-Cleanse POA  stage 3 on Sacrum w/ NSS apply Maxorb AG to wound  bed, cover w/Allevyn foam, malik with T, change daily and PRN  2-Turn/reposition q2h or when medically stable for pressure re-distribution on skin   3-Elevate heels to offload pressure]  4-Moisturize skin daily with skin nourishing cream  5-Ehob cushion in chair when out of bed  6-Hydraguard to bilateral heels BID and PRN  7-Low Air loss Mattress      Wound 06/28/21 Pretibial Right;Distal;Lateral (Active)       Wound 06/30/21 MASD Sacrum (Active)   Wound Image   01/24/22 1024   Wound Description Beefy red;Fragile;Slough; Yellow 01/24/22 1024   Pressure Injury Stage 3 01/24/22 1024   Mleanie-wound Assessment Intact;Fragile 01/24/22 1024   Wound Length (cm) 2 cm 01/24/22 1024   Wound Width (cm) 1 cm 01/24/22 1024   Wound Depth (cm) 0 5 cm 01/24/22 1024   Wound Surface Area (cm^2) 2 cm^2 01/24/22 1024   Wound Volume (cm^3) 1 cm^3 01/24/22 1024   Calculated Wound Volume (cm^3) 1 cm^3 01/24/22 1024   Change in Wound Size % -257 14 01/24/22 1024   Drainage Amount Scant 01/24/22 1024   Drainage Description Clear 01/24/22 1024   Treatments Cleansed;Site care 01/24/22 1024   Dressing Calcium Alginate with Silver; Foam, Silicon (eg  Allevyn, etc) 01/24/22 1024   Dressing Changed New 01/24/22 1024   Patient Tolerance Tolerated well 01/24/22 1024   Dressing Status Clean;Dry; Intact 01/24/22 1024               Call or tigertext with any questions  Wound Care will continue to follow    Amaya Chiu Rn Bsn

## 2022-01-24 NOTE — PROGRESS NOTES
Pt reports decreased appetite x 2 days PTA and currently  Has a good appetite at baseline  Pt had corn flakes, banana, coffee at breakfast this AM  Reports only eating applesauce and coffee for lunch today because the meals were too close together  Pt reports swallowing difficulties "when I'm lying down" though no problems otherwise  Noting stage 3 chronic sacral wound, ordered Duran BID which pt is agreeable to  Continue 2 gm Na diet for acute on chronic CHF

## 2022-01-25 NOTE — ED NOTES
Pt requesting "a narcotic for pain in sacral wound area", pt readjusted onto L side, oxycodone provided, water provided, and mouth swab provided with mouth wash on for dry mouth complaint     Shasta Courtney RN  01/25/22 5329

## 2022-01-25 NOTE — OCCUPATIONAL THERAPY NOTE
Occupational Therapy Evaluation     Patient Name: Aki Olivares  IKHQX'N Date: 1/25/2022  Problem List  Active Problems:    Hypertension    Sepsis ([facility] )    CAP (community acquired pneumonia)    Paroxysmal atrial fibrillation (HCC)    Acute on chronic respiratory failure (HCC)    Acute on chronic diastolic heart failure (HCC)    Past Medical History  Past Medical History:   Diagnosis Date    Arthritis     Atrial fibrillation ([facility] [facility] )     CHF (congestive heart failure) (HCC)     Hyperlipidemia     Hypertension     PONV (postoperative nausea and vomiting)     Renal disorder      Past Surgical History  Past Surgical History:   Procedure Laterality Date    ANKLE FRACTURE SURGERY Left     CHOLECYSTECTOMY      JOINT REPLACEMENT Bilateral     TKR    REPLACEMENT TOTAL KNEE BILATERAL Bilateral            01/25/22 1023   Note Type   Note type Evaluation   Restrictions/Precautions   Other Precautions Telemetry;O2;Multiple lines; Fall Risk;Pain   Pain Assessment   Pain Assessment Tool FLACC   Pain Location/Orientation Orientation: Bilateral;Location: Leg   Pain Rating: FLACC (Rest) - Face 0   Pain Rating: FLACC (Rest) - Legs 0   Pain Rating: FLACC (Rest) - Activity 0   Pain Rating: FLACC (Rest) - Cry 0   Pain Rating: FLACC (Rest) - Consolability 0   Score: FLACC (Rest) 0   Pain Rating: FLACC (Activity) - Face 1   Pain Rating: FLACC (Activity) - Legs 0   Pain Rating: FLACC (Activity) - Activity 0   Pain Rating: FLACC (Activity) - Cry 1   Pain Rating: FLACC (Activity) - Consolability 0   Score: FLACC (Activity) 2   Home Living   Type of Home SNF   Home Layout One level   Home Equipment Wheelchair-manual;Hospital bed   Additional Comments Spoke with RN at SNF where Pt resides  Pt requires a mechanical STS lift for all functional transfers   Pt with increased tone in BLE    Prior Function   Level of [place] Needs assistance with ADLs and functional mobility   Lives With Facility staff   ADL Assistance Needs assistance   IADLs Needs assistance   Comments Pt has assistance for all ADLs and IADL tasks  Pt is able to self-feed after set-up   ADL   Grooming Assistance 3  Moderate Assistance   Grooming Deficit Verbal cueing;Supervision/safety; Increased time to complete;Wash/dry hands; Wash/dry face;Brushing hair   UB Dressing Assistance 2  Maximal Assistance   UB Dressing Deficit Verbal cueing;Supervision/safety; Increased time to complete; Thread RUE; Thread LUE;Pull over head;Pull around back   LB Dressing Assistance 1  Total Assistance   LB Dressing Deficit Steadying; Requires assistive device for steadying;Verbal cueing;Supervision/safety; Increased time to complete; Don/doff R sock; Don/doff L sock; Thread RLE into pants; Thread LLE into pants;Pull up over hips   Additional Comments ADLs completed while supine in bed d/t Pt's inability to maintain unsupported sitting at EOB  Max A for UB Dressing, Total A for LB Dressing and Mod A for grooming tasks  Bed Mobility   Rolling R 2  Maximal assistance   Additional items Bedrails; Increased time required;Verbal cues;LE management;Assist x 1  (trunk managemetn)   Rolling L 2  Maximal assistance   Additional items Assist x 1;Bedrails; Increased time required;Verbal cues;LE management  (trunk management)   Supine to Sit 2  Maximal assistance   Additional items Assist x 2; Increased time required;Verbal cues;LE management  (trunk management)   Sit to Supine 1  Dependent   Additional items Assist x 2; Increased time required;Verbal cues;LE management  (trunk managemetn)   Additional Comments Pt requiring extensive assist for all bed moiblity  Pt with increased tone and stiffness  Pt participating as able with side rolling and supine>sit although continues to require Max for rolling an dMax x's 2 for supine>sit  Pt requiring Max A for unsupported sitting d/t posterior lean   Transfers   Sit to Stand Unable to assess   Additional Comments Pt is a mechanical STS lift at baseline   unable to safely attempt STS from EOB at thist danielle  Balance   Static Sitting Poor -   Activity Tolerance   Activity Tolerance Patient limited by fatigue;Patient limited by pain   Medical Staff Made Aware Spoke with PT, Lori   Nurse Made Aware Spoke with RN, Douglas Berger Assessment   RUE Assessment WFL   LUE Assessment   LUE Assessment WFL   Hand Function   Gross Motor Coordination Functional   Fine Motor Coordination Functional   Sensation   Light Touch No apparent deficits   Cognition   Overall Cognitive Status WFL   Arousal/Participation Alert; Responsive; Cooperative   Attention Attends with cues to redirect   Orientation Level Oriented X4   Memory Within functional limits   Following Commands Follows one step commands without difficulty   Assessment   Limitation Decreased ADL status; Decreased UE strength;Decreased Safe judgement during ADL;Decreased endurance;Decreased self-care trans;Decreased high-level ADLs   Prognosis Fair   Assessment Pt is a 80 y o  female, admitted to [address] [place] [address] 1/23/2022 d/t experiencing increased SOB  Dx: "no active problem"  Pt with PMHx impacting their performance during ADL tasks, including: arthritis, a-fib, CHF, hyperlipidemia, HTN  Prior to admission to the hospital Pt was performing ADLs with physical assistance  IADLs with physical assistance  Functional transfers/ambulation with physical assistance  Pt is a mechanical STS lift at baseline  DOES NOT AMBULATE OR STAND WITHOUT MECHANICAL MEANS  Cognitive status was PTA was intact  OT order placed to assess Pt's ADLs, cognitive status, and performance during functional tasks in order to maximize safety and independence while making most appropriate d/c recommendations   Pt's clinical presentation is currently unstable/unpredictable given new onset deficits that effect Pt's occupational performance and ability to safely return to PLOF including decrease activity tolerance, decrease standing balance, decrease performance during ADL tasks, decrease safety awareness , decrease UB MS, increased pain, decrease generalized strength, decrease activity engagement, decrease performance during functional transfers and decrease GM control combined with medical complications of hypertension , pain impacting overall mobility status, abnormal H&H, abnormal CBC, abnormal potassium values, low SpO2 values, new onset O2 use, elevated BNP, increased RR and need for input for mobility technique/safety  Personal factors affecting Pt at time of initial evaluation include: inability to perform IADLs, inability to perform ADLs, inability to ambulate household distances, inability to navigate community distances, limited insight into impairments and decreased initiation and engagement  Pt at this time appears to be completing ADLs and functional tasks at [facility] and does not require any further acute OT services at this time  Pt is recommended to return to SNF with rehab services as appropriate upon d/c from [address] [place] [address]  D/c OT effective 1/25/2022  Plan   OT Frequency Eval only   Recommendation   OT Discharge Recommendation Return to facility with rehabilitation services   AM-PAC Daily Activity Inpatient   Lower Body Dressing 1   Bathing 1   Toileting 1   Upper Body Dressing 2   Grooming 3   Eating 3   Daily Activity Raw Score 11   Daily Activity Standardized Score (Calc for Raw Score >=11) 29 04   AM-PAC Applied Cognition Inpatient   Following a Speech/Presentation 3   Understanding Ordinary Conversation 4   Taking Medications 2   Remembering Where Things Are Placed or Put Away 3   Remembering List of 4-5 Errands 3   Taking Care of Complicated Tasks 2   Applied Cognition Raw Score 17   Applied Cognition Standardized Score 36 52     The patient's raw score on the AM-PAC Daily Activity inpatient short form is 11, standardized score is 29 04, less than 39 4  Patients at this level are likely to benefit from DC to post-acute rehabilitation services   Please refer to the recommendation of the Occupational Therapist for safe DC planning        Stewart Enriquez OTR/L

## 2022-01-25 NOTE — ASSESSMENT & PLAN NOTE
Wt Readings from Last 3 Encounters:   01/24/22 115 kg (254 lb)   07/22/21 116 kg (254 lb 13 6 oz)   06/28/21 115 kg (253 lb 15 5 oz)     · Likely exacerbated from CAP  · OP diuretic: Lasix 80 mg T/Thr/Sat/Sun, while inpatient IV lasix 80 bid good urine output patient still has real regular chest x-ray tomorrow  Potassium has been supplemented  Still trace edema  Unfortunately nobody measured what her weight is decreased Lasix 60 mg IV b i d  Secondary to contraction alkalosis and beyond slightly    · Echo 1/24 results reviewed  · Daily weights, I/O

## 2022-01-25 NOTE — ED NOTES
Pt given PRN Oxycodone for buttocks pain  Pt repositioned multiple times throughout the shift without relief        Emperatriz Soriano RN  01/25/22 0080

## 2022-01-25 NOTE — PROGRESS NOTES
[address]  Progress Note - Dinah Mendoza 1933, 80 y o  female MRN: [id number]  Unit/Bed#: -01 Encounter: [id number]  Primary Care Provider: No primary care provider on file  Date and time admitted to hospital: 1/23/2022 12:56 PM    Acute on chronic diastolic heart failure (HCC)  Assessment & Plan  Wt Readings from Last 3 Encounters:   01/24/22 115 kg (254 lb)   07/22/21 116 kg (254 lb 13 6 oz)   06/28/21 115 kg (253 lb 15 5 oz)     · Likely exacerbated from CAP  · OP diuretic: Lasix 80 mg T/Thr/Sat/Sun, while inpatient IV lasix 80 bid good urine output patient still has real regular chest x-ray tomorrow  Potassium has been supplemented  Still trace edema  Unfortunately nobody measured what her weight is decreased Lasix 60 mg IV b i d  Secondary to contraction alkalosis and beyond slightly  · Echo 1/24 results reviewed  · Daily weights, I/O    Acute on chronic respiratory failure ([facility] )  Assessment & Plan  · Chronic use of 4L baseline  Continue regularly scheduled nebulizer tid as well as prn Duo-neb from PTA  · Now on 5L to maintain acceptable Spo2 goal 90% resting  · Likely from CHF & PNA  · Treat the above & titrate to goal  · Respiratory protocol, incentive spirometer    Paroxysmal atrial fibrillation (HCC)  Assessment & Plan  · Rate controlled  · AC with eliquis from home, continue    CAP (community acquired pneumonia)  Assessment & Plan  · Covid/RSV/flu negative  · CXR reviewed  · Procalcitonin elevated, trend with CBC w diff daily  · Non-severe DRIP 4: Rocephin  · Urine antigens in process, sputum GS&C  · Supportive care    Sepsis ([facility] )  Assessment & Plan  · Tachypnea & leukocytosis  Source: CAP  · No ivf in acute CHF  · Rocephin  · Afebrile, leukocytosis down trended, procalcitonin 0 28 - 0 28  · Blood culture from admission x2 negative to date    Resolved    Hypertension  Assessment & Plan  · Accelerated in Acute chf controlled now  · Continue home antihypertensives  · IV metoprolol prn sbp > 170 mmhg        VTE Pharmacologic Prophylaxis: VTE Score: 10 High Risk (Score >/= 5) - Pharmacological DVT Prophylaxis Ordered: apixaban (Eliquis)  Sequential Compression Devices Ordered  Patient Centered Rounds: I performed bedside rounds with nursing staff today  Discussions with Specialists or Other Care Team Provider: cm     Education and Discussions with Family / Patient: patient will update famiy    Time Spent for Care: 30 minutes  More than 50% of total time spent on counseling and coordination of care as described above  Current Length of Stay: 2 day(s)  Current Patient Status: Inpatient   Certification Statement: The patient will continue to require additional inpatient hospital stay due to chf   Discharge Plan: Anticipate discharge in 48-72 hrs to discharge location to be determined pending rehab evaluations  Code Status: Level 1 - Full Code    Subjective:   Seen and examined sob improved no cp    Objective:     Vitals:   Temp (24hrs), Av 3 °F (36 8 °C), Min:98 2 °F (36 8 °C), Max:98 3 °F (36 8 °C)    Temp:  [98 2 °F (36 8 °C)-98 3 °F (36 8 °C)] 98 2 °F (36 8 °C)  HR:  [69-90] 90  Resp:  [14-19] 18  BP: (136-167)/(70-91) 136/80  SpO2:  [92 %-98 %] 96 %  Body mass index is 44 99 kg/m²  Input and Output Summary (last 24 hours): Intake/Output Summary (Last 24 hours) at 2022 1600  Last data filed at 2022 1112  Gross per 24 hour   Intake 780 ml   Output 3050 ml   Net -2270 ml       Physical Exam:   Physical Exam  Vitals and nursing note reviewed  Constitutional:       General: She is not in acute distress  Appearance: She is well-developed  HENT:      Head: Normocephalic and atraumatic  Eyes:      Conjunctiva/sclera: Conjunctivae normal    Cardiovascular:      Rate and Rhythm: Normal rate and regular rhythm  Heart sounds: No murmur heard  Pulmonary:      Effort: Pulmonary effort is normal  No respiratory distress  Breath sounds: Rales present  Abdominal:      Palpations: Abdomen is soft  Tenderness: There is no abdominal tenderness  Musculoskeletal:         General: Swelling (trace) present  Cervical back: Neck supple  Skin:     General: Skin is warm and dry  Neurological:      Mental Status: She is alert  Mental status is at baseline  Additional Data:     Labs:  Results from last 7 days   Lab Units 01/25/22  0510   WBC Thousand/uL 11 88*   HEMOGLOBIN g/dL 11 2*   HEMATOCRIT % 34 7*   PLATELETS Thousands/uL 307   NEUTROS PCT % 79*   LYMPHS PCT % 9*   MONOS PCT % 10   EOS PCT % 1     Results from last 7 days   Lab Units 01/25/22  0510 01/24/22  0428 01/23/22  1326   SODIUM mmol/L 141   < > 144   POTASSIUM mmol/L 2 6*   < > 3 3*   CHLORIDE mmol/L 98*   < > 102   CO2 mmol/L 38*   < > 35*   BUN mg/dL 32*   < > 22   CREATININE mg/dL 0 77   < > 0 76   ANION GAP mmol/L 5   < > 7   CALCIUM mg/dL 8 9   < > 9 2   ALBUMIN g/dL  --   --  3 2*   TOTAL BILIRUBIN mg/dL  --   --  0 77   ALK PHOS U/L  --   --  89   ALT U/L  --   --  27   AST U/L  --   --  29   GLUCOSE RANDOM mg/dL 110   < > 127    < > = values in this interval not displayed  Results from last 7 days   Lab Units 01/23/22  1326   INR  1 51*             Results from last 7 days   Lab Units 01/24/22  0428 01/23/22  1326   LACTIC ACID mmol/L  --  1 2   PROCALCITONIN ng/ml 0 28* 0 28*       Lines/Drains:  Invasive Devices  Report    Peripheral Intravenous Line            Peripheral IV 01/23/22 Right Antecubital 2 days                      Imaging: Reviewed radiology reports from this admission including: chest xray    Recent Cultures (last 7 days):   Results from last 7 days   Lab Units 01/24/22  1251 01/23/22  1838 01/23/22  1326 01/23/22  1312   BLOOD CULTURE   --   --  No Growth at 24 hrs  No Growth at 24 hrs     SPUTUM CULTURE  Culture too young- will reincubate  --   --   --    GRAM STAIN RESULT  1+ Epithelial cells per low power field* Rare Polys*  1+ Gram positive cocci in pairs, chains and clusters*  Rare Gram positive rods*  Rare Budding Yeast with Pseudomycelia*  --   --   --    LEGIONELLA URINARY ANTIGEN   --  Negative  --   --        Last 24 Hours Medication List:   Current Facility-Administered Medications   Medication Dose Route Frequency Provider Last Rate    acetaminophen  650 mg Oral Q6H PRN Lissett Demo, PA-C      amLODIPine  5 mg Oral Daily Lissett Demo, PA-C      apixaban  5 mg Oral BID Lissett Demo, PA-C      baclofen  10 mg Oral BID Lissett Demo, PA-C      busPIRone  5 mg Oral TID Lissett Demo, PA-C      calcium carbonate  500 mg Oral BID PRN Lissett Demo, PA-C      cefTRIAXone  1,000 mg Intravenous Q24H Lissett Demo, PA-C 1,000 mg (01/25/22 1423)    cloNIDine  0 1 mg Oral Daily Lissett Demo, PA-C      Diclofenac Sodium  2 g Topical HS PRN Lissett Demo, PA-C      fentaNYL  75 mcg Transdermal Q72H Lissett Demo, PA-C      fluticasone  1 spray Each Nare Daily Lissett Demo, PA-C      furosemide  60 mg Intravenous BID (diuretic) Palmer Sanford MD      guaiFENesin  600 mg Oral BID Lissett Demo, PA-C      ipratropium  0 5 mg Nebulization TID Lissett Demo, PA-C      ipratropium-albuterol  3 mL Nebulization 4x Daily PRN Lissett Demo, PA-C      levalbuterol  1 25 mg Nebulization TID Palmer Sanford MD      melatonin  3 mg Oral HS Lissett Demo, PA-C      metoprolol  5 mg Intravenous Q6H PRN Lissett Demo, PA-C      ondansetron  4 mg Intravenous Q6H PRN Lissett Demo, PA-C      oxyCODONE  2 5 mg Oral Q4H PRN Lissett Demo, PA-C      potassium chloride  40 mEq Oral BID JONO Barajas      senna  1 tablet Oral BID PRN Driss Boswell PA-C          Today, Patient Was Seen By: Palmer Sanford MD    **Please Note: This note may have been constructed using a voice recognition system  **

## 2022-01-25 NOTE — ED NOTES
Pt laying in bed, pt c/o pain at nasal cannula  Vitals stable  K+ infusing   Will continue to monitor      Brent Tam RN  01/25/22 8866

## 2022-01-25 NOTE — PLAN OF CARE
Problem: MOBILITY - ADULT  Goal: Maintain or return to baseline ADL function  Description: INTERVENTIONS:  -  Assess patient's ability to carry out ADLs; assess patient's baseline for ADL function and identify physical deficits which impact ability to perform ADLs (bathing, care of mouth/teeth, toileting, grooming, dressing, etc )  - Assess/evaluate cause of self-care deficits   - Assess range of motion  - Assess patient's mobility; develop plan if impaired  - Assess patient's need for assistive devices and provide as appropriate  - Encourage maximum independence but intervene and supervise when necessary  - Involve family in performance of ADLs  - Assess for home care needs following discharge   - Consider OT consult to assist with ADL evaluation and planning for discharge  - Provide patient education as appropriate  Outcome: Progressing  Goal: Maintains/Returns to pre admission functional level  Description: INTERVENTIONS:  - Perform BMAT or MOVE assessment daily    - Set and communicate daily mobility goal to care team and patient/family/caregiver  - Collaborate with rehabilitation services on mobility goals if consulted  - Perform Range of Motion  times a day  - Reposition patient every  hours    - Dangle patient  times a day  - Stand patien times a day  - Ambulate patient  times a day  - Out of bed to chair  times a day   - Out of bed for meals  times a day  - Out of bed for toileting  - Record patient progress and toleration of activity level   Outcome: Progressing

## 2022-01-25 NOTE — CASE MANAGEMENT
Case Management Assessment & Discharge Planning Note    Patient name Ana Valdez  Location ED 08/ED 08 MRN [id number]  : 1933 Date 2022       Current Admission Date: 2022  Current Admission Diagnosis:Sepsis [facility])   Patient Active Problem List    Diagnosis Date Noted    Positive blood culture 10/11/2020    Sacral wound 10/11/2020    Numbness 10/10/2020    Nonrheumatic aortic valve stenosis 2018    Acute on chronic diastolic heart failure ([address] ) 2018    Paroxysmal atrial fibrillation ([facility] ) 2018    Acute on chronic respiratory failure ([facility] ) 2018    CAP (community acquired pneumonia) 2018    Osteoarthritis of both hips 2018    Acute retention of urine 2018    ANSELMO (acute kidney injury) ([facility] ) 2018    Hyperlipidemia 2018    Morbid (severe) obesity due to excess calories ([facility] [facility] ) 2018    Sepsis ([facility] ) 2018    Hypertension 2018      LOS (days): 2  Geometric Mean LOS (GMLOS) (days): 4 80  Days to GMLOS:2 9     OBJECTIVE:    Risk of Unplanned Readmission Score: 22     Current admission status: Inpatient  Referral Reason: Other    Preferred Pharmacy:   [facility] [address]   [place] [address]  Phone: [phone] Fax: [phone] [facility] [address] [place] [address]  Phone: [phone] Fax: [phone]    Primary Care Provider: No primary care provider on file  Primary Insurance: José Miguel Duvall [address] REP  Secondary Insurance:     ASSESSMENT:  Active Health Care Agents    There are no active Health Care Agents on file         Advance Directives  Does patient have a [address] [place] [address] Avenue?:  (Dgt is POA)    Readmission Root Cause  30 Day Readmission: No    Patient Information  Admitted from[de-identified] Facility  Mental Status: Alert  During Assessment patient was accompanied by: Not accompanied during assessment  Assessment information provided by[de-identified] Patient  Primary Caregiver: Other (Comment) (Bladimir Catherine)  Support Systems: Daughter  Type of Current Residence: Facility  Homeless/housing insecurity resource given?: N/A  Living Arrangements: Other (Comment)    Activities of Daily Living Prior to Admission  Functional Status: Assistance  Completes ADLs independently?: No  Level of ADL dependence: Assistance  Ambulates independently?: No  Level of ambulatory dependence: Total Dependent  Does patient use assisted devices?: Yes  Assisted Devices (DME) used: Wheelchair  Does patient currently own DME?: Yes  What DME does the patient currently own?: Wheelchair    Patient Information Continued  Income Source: Pension/half-way  Does patient have prescription coverage?: Yes  Food insecurity resource given?: N/A  Does patient receive dialysis treatments?: No  Does patient have a history of substance abuse?: No  Does patient have a history of Mental Health Diagnosis?: No      Means of Transportation  Means of Transport to Appts[de-identified] Other (Comment) (Facility)  Was application for public transport provided?: N/A        DISCHARGE DETAILS:    Discharge planning discussed with[de-identified] Pt  Freedom of Choice: Yes   Pt from [facility], has been there since August 2021  Pt reports she uses a w/c, can pivot but not ambulate  Pt reports having assistance w/ ADLs  Pt has a 15d bed hold  CM will continue to follow

## 2022-01-25 NOTE — PHYSICAL THERAPY NOTE
PHYSICAL THERAPY EVALUATION  NAME:  Aditya Steinberg  DATE: 01/25/22    AGE:   80 y o  Mrn:   [id number]  ADMIT DX:  CHF (congestive heart failure) (HCC) [I50 9]  Pneumonia [J18 9]  SOB (shortness of breath) [R06 02]    Past Medical History:   Diagnosis Date    Arthritis     Atrial fibrillation (HCC)     CHF (congestive heart failure) (HCC)     Hyperlipidemia     Hypertension     PONV (postoperative nausea and vomiting)     Renal disorder      Length Of Stay: 2  Performed at least 2 patient identifiers during session: Name and Birthday  PHYSICAL THERAPY EVALUATION :        01/25/22 1022   Note Type   Note type Evaluation   Pain Assessment   Pain Assessment Tool FLACC   Pain Location/Orientation Orientation: Bilateral;Orientation: Left   Pain Rating: FLACC (Rest) - Face 0   Pain Rating: FLACC (Rest) - Legs 0   Pain Rating: FLACC (Rest) - Activity 0   Pain Rating: FLACC (Rest) - Cry 0   Pain Rating: FLACC (Rest) - Consolability 0   Score: FLACC (Rest) 0   Pain Rating: FLACC (Activity) - Face 1   Pain Rating: FLACC (Activity) - Legs 0   Pain Rating: FLACC (Activity) - Activity 0   Pain Rating: FLACC (Activity) - Cry 1   Pain Rating: FLACC (Activity) - Consolability 0   Score: FLACC (Activity) 2   Restrictions/Precautions   Other Precautions Telemetry;O2;Multiple lines; Fall Risk   Home Living   Type of [address] [place] [address]   Additional Comments Therapist Spoke with RN at SNF where Pt resides  Pt requires a mechanical STS lift for all functional transfers  Pt with increased stiffness/contractures to BLE   Prior Function   Level of [place] Needs assistance with ADLs and functional mobility   Lives With Facility staff   ADL Assistance Needs assistance   IADLs Needs assistance   Comments Pt has assistance for all ADLs and IADL tasks   Pt is able to self-feed after set-up   Cognition   Overall Cognitive Status [facility]   Attention Attends with cues to redirect   Orientation Level Oriented X4   Memory Within functional limits   Following Commands Follows one step commands without difficulty   RLE Assessment   RLE Assessment X   RLE Overall AROM   R Knee Flexion 50-75% limited   R Knee Extension 25% limited   R Ankle Dorsiflexion 50% limited   Strength RLE   RLE Overall Strength 3-/5   LLE Overall AROM   L Knee Flexion 50-75% limited   L Knee Extension 25% limited   L Ankle Dorsiflexion 50% limited   Strength LLE   LLE Overall Strength 3-/5   Bed Mobility   Rolling R 2  Maximal assistance   Additional items Increased time required;Verbal cues;LE management; Bedrails;Assist x 1  (trunk management)   Rolling L 2  Maximal assistance   Additional items Assist x 1;Bedrails; Increased time required;Verbal cues;LE management  (trunk management)   Supine to Sit 2  Maximal assistance   Additional items Assist x 2; Increased time required;Verbal cues;LE management  (trunk managment)   Sit to Supine 1  Dependent   Additional items Assist x 2; Increased time required;Verbal cues;LE management  (trunk management)   Additional Comments Pt required significant assistance for all mobility  Pt decreased flexibility of LEs grossly assessed and significantly limited  Pt able to sit at EOB x 3-4 min with Max A x 1 due to posterior lean with inability to self correct with VC  Did not assess transfers as pt uses mechanical assistance at baseline   Balance   Static Sitting Poor -   Activity Tolerance   Activity Tolerance Patient limited by fatigue;Patient limited by pain   Medical Staff Made Aware [place]   Nurse Made Aware [place] SMALL   Assessment   Prognosis Fair   Problem List Decreased strength;Decreased range of motion;Decreased endurance; Impaired balance;Decreased mobility; Impaired judgement;Decreased safety awareness; Impaired tone;Obesity;Pain   Barriers to Discharge None   Goals   Patient Goals "Get better"   Plan   Treatment/Interventions Spoke to nursing;Spoke to case management;OT   Recommendation   PT Discharge Recommendation Return to facility with rehabilitation services   Equipment Recommended   (none anticipated)   AM-PAC Basic Mobility Inpatient   Turning in Bed Without Bedrails 2   Lying on Back to Sitting on Edge of Flat Bed 1   Moving Bed to Chair 1   Standing Up From Chair 1   Walk in Room 1   Climb 3-5 Stairs 1   Basic Mobility Inpatient Raw Score 7   Turning Head Towards Sound 4   Follow Simple Instructions 3   Low Function Basic Mobility Raw Score 14   Low Function Basic Mobility Standardized Score 22 01   Highest Level Of Mobility   -[facility] Goal 2: Bed activities/Dependent transfer   -[facility] Highest Level of Mobility 3: Sit at edge of bed   -[facility] Goal Achieved Yes   End of Consult   Patient Position at End of Consult Supine; All needs within reach     The patient's AM-PAC Basic Mobility Inpatient Short Form Raw Score is 7    A Raw score of less than or equal to 16 suggests the patient may benefit from discharge to post-acute rehabilitation services however pt appears to be functioning at baseline, recommend pt return to SNF with skilled PT as deemed appropriate  Please also refer to the recommendation of the Physical Therapist for safe discharge planning  (Please find full objective findings from PT assessment regarding body systems outlined above)  Pt is 80 y o  female seen for PT evaluation s/p admit to [address] [place] [address] on 1/23/2022 w/ <principal problem not specified>  PT consulted to assess pt's functional mobility and d/c needs  Order placed for PT eval and tx  Comorbidities affecting pt's physical performance at time of assessment include: A fib, HTN, CHF, and renal disorder  PTA, pt was dependent for all mobility tasks and long term resident of SNF   Personal factors affecting pt at time of IE include: inability to ambulate household distances, inability to navigate community distances, inability to navigate level surfaces w/o external assistance, decreased initiation and engagement, limited insight into impairments, inability to perform IADLs, inability to perform ADLs and inability to live alone  Please find objective findings from PT assessment regarding body systems outlined above  The following objective measures performed on IE: AM-PAC 6-Clicks: 2/78  Pt's clinical presentation is currently unstable/unpredictable  From PT/mobility standpoint, pt appears to be functioning close to or at mobility baseline, therefore no further immediate skilled PT needs are warranted at this time  Pt currently performing all phases of functional mobility at dependent level  Recommend pt return to previous living environment once medically cleared  Will sign off, D/C PT  Please reconsult if further immediate skilled PT needs are warranted           Martha Russell, PT,DPT

## 2022-01-25 NOTE — ASSESSMENT & PLAN NOTE
· Tachypnea & leukocytosis  Source: CAP  · No ivf in acute CHF  · Rocephin  · Afebrile, leukocytosis down trended, procalcitonin 0 28 - 0 28  · Blood culture from admission x2 negative to date    Resolved

## 2022-01-26 NOTE — ASSESSMENT & PLAN NOTE
· BP elevated 170 but she has some back pain could be attributed today because yesterday is was 130s continue her current antihypertensives

## 2022-01-26 NOTE — ASSESSMENT & PLAN NOTE
· Chronic use of 4L baseline  Most likely secondary to pulmonary edema from heart failure    Improved in down to her baseline 4 L  Now on 5L to maintain acceptable Spo2 goal 90% resting  · Likely from CHF & PNA  · Will continue diuresis

## 2022-01-26 NOTE — PLAN OF CARE
Problem: MOBILITY - ADULT  Goal: Maintain or return to baseline ADL function  Description: INTERVENTIONS:  -  Assess patient's ability to carry out ADLs; assess patient's baseline for ADL function and identify physical deficits which impact ability to perform ADLs (bathing, care of mouth/teeth, toileting, grooming, dressing, etc )  - Assess/evaluate cause of self-care deficits   - Assess range of motion  - Assess patient's mobility; develop plan if impaired  - Assess patient's need for assistive devices and provide as appropriate  - Encourage maximum independence but intervene and supervise when necessary  - Involve family in performance of ADLs  - Assess for home care needs following discharge   - Consider OT consult to assist with ADL evaluation and planning for discharge  - Provide patient education as appropriate  Outcome: Progressing  Goal: Maintains/Returns to pre admission functional level  Description: INTERVENTIONS:  - Perform BMAT or MOVE assessment daily    - Set and communicate daily mobility goal to care team and patient/family/caregiver  - Collaborate with rehabilitation services on mobility goals if consulted  - Perform Range of Motion   times a day  - Reposition patient every   hours  - Dangle patient   times a day  - Stand patient   times a day  - Ambulate patient   times a day  - Out of bed to chair     times a day   - Out of bed for meals    times a day  - Out of bed for toileting  - Record patient progress and toleration of activity level   Outcome: Progressing     Problem: Prexisting or High Potential for Compromised Skin Integrity  Goal: Skin integrity is maintained or improved  Description: INTERVENTIONS:  - Identify patients at risk for skin breakdown  - Assess and monitor skin integrity  - Assess and monitor nutrition and hydration status  - Monitor labs   - Assess for incontinence   - Turn and reposition patient  - Assist with mobility/ambulation  - Relieve pressure over bony prominences  - Avoid friction and shearing  - Provide appropriate hygiene as needed including keeping skin clean and dry  - Evaluate need for skin moisturizer/barrier cream  - Collaborate with interdisciplinary team   - Patient/family teaching  - Consider wound care consult   Outcome: Progressing     Problem: Nutrition/Hydration-ADULT  Goal: Nutrient/Hydration intake appropriate for improving, restoring or maintaining nutritional needs  Description: Monitor and assess patient's nutrition/hydration status for malnutrition  Collaborate with interdisciplinary team and initiate plan and interventions as ordered  Monitor patient's weight and dietary intake as ordered or per policy  Utilize nutrition screening tool and intervene as necessary  Determine patient's food preferences and provide high-protein, high-caloric foods as appropriate       INTERVENTIONS:  - Monitor oral intake, urinary output, labs, and treatment plans  - Assess nutrition and hydration status and recommend course of action  - Evaluate amount of meals eaten  - Assist patient with eating if necessary   - Allow adequate time for meals  - Recommend/ encourage appropriate diets, oral nutritional supplements, and vitamin/mineral supplements  - Order, calculate, and assess calorie counts as needed  - Recommend, monitor, and adjust tube feedings and TPN/PPN based on assessed needs  - Assess need for intravenous fluids  - Provide specific nutrition/hydration education as appropriate  - Include patient/family/caregiver in decisions related to nutrition  Outcome: Progressing     Problem: PAIN - ADULT  Goal: Verbalizes/displays adequate comfort level or baseline comfort level  Description: Interventions:  - Encourage patient to monitor pain and request assistance  - Assess pain using appropriate pain scale  - Administer analgesics based on type and severity of pain and evaluate response  - Implement non-pharmacological measures as appropriate and evaluate response  - Consider cultural and social influences on pain and pain management  - Notify physician/advanced practitioner if interventions unsuccessful or patient reports new pain  Outcome: Progressing     Problem: INFECTION - ADULT  Goal: Absence or prevention of progression during hospitalization  Description: INTERVENTIONS:  - Assess and monitor for signs and symptoms of infection  - Monitor lab/diagnostic results  - Monitor all insertion sites, i e  indwelling lines, tubes, and drains  - Monitor endotracheal if appropriate and nasal secretions for changes in amount and color  - [place] appropriate cooling/warming therapies per order  - Administer medications as ordered  - Instruct and encourage patient and family to use good hand hygiene technique  - Identify and instruct in appropriate isolation precautions for identified infection/condition  Outcome: Progressing     Problem: SAFETY ADULT  Goal: Maintain or return to baseline ADL function  Description: INTERVENTIONS:  -  Assess patient's ability to carry out ADLs; assess patient's baseline for ADL function and identify physical deficits which impact ability to perform ADLs (bathing, care of mouth/teeth, toileting, grooming, dressing, etc )  - Assess/evaluate cause of self-care deficits   - Assess range of motion  - Assess patient's mobility; develop plan if impaired  - Assess patient's need for assistive devices and provide as appropriate  - Encourage maximum independence but intervene and supervise when necessary  - Involve family in performance of ADLs  - Assess for home care needs following discharge   - Consider OT consult to assist with ADL evaluation and planning for discharge  - Provide patient education as appropriate  Outcome: Progressing  Goal: Maintains/Returns to pre admission functional level  Description: INTERVENTIONS:  - Perform BMAT or MOVE assessment daily    - Set and communicate daily mobility goal to care team and patient/family/caregiver  - Collaborate with rehabilitation services on mobility goals if consulted  - Perform Range of Motion   times a day  - Reposition patient every   hours  - Dangle patient   times a day  - Stand patient   times a day  - Ambulate patient   times a day  - Out of bed to chair   times a day   - Out of bed for meals   times a day  - Out of bed for toileting  - Record patient progress and toleration of activity level   Outcome: Progressing  Goal: Patient will remain free of falls  Description: INTERVENTIONS:  - Educate patient/family on patient safety including physical limitations  - Instruct patient to call for assistance with a ctivity   - Consult OT/PT to assist with strengthening/mobility   - Keep Call bell within reach  - Keep bed low and locked with side rails adjusted as appropriate  - Keep care items and personal belongings within reach  - Initiate and maintain comfort rounds  - Make Fall Risk Sign visible to staff  - Offer Toileting every   Hours, in advance of need  - Initiate/Maintain   alarm  - Obtain necessary fall risk management equipment:     - Apply yellow socks and bracelet for high fall risk patients  - Consider moving patient to room near nurses station  Outcome: Progressing     Problem: DISCHARGE PLANNING  Goal: Discharge to home or other facility with appropriate resources  Description: INTERVENTIONS:  - Identify barriers to discharge w/patient and caregiver  - Arrange for needed discharge resources and transportation as appropriate  - Identify discharge learning needs (meds, wound care, etc )  - Arrange for interpretive services to assist at discharge as needed  - Refer to Case Management Department for coordinating discharge planning if the patient needs post-hospital services based on physician/advanced practitioner order or complex needs related to functional status, cognitive ability, or social support system  Outcome: Progressing     Problem: Knowledge Deficit  Goal: Patient/family/caregiver demonstrates understanding of disease process, treatment plan, medications, and discharge instructions  Description: Complete learning assessment and assess knowledge base    Interventions:  - Provide teaching at level of understanding  - Provide teaching via preferred learning methods  Outcome: Progressing

## 2022-01-26 NOTE — ASSESSMENT & PLAN NOTE
Wt Readings from Last 3 Encounters:   01/26/22 101 kg (223 lb 4 8 oz)   07/22/21 116 kg (254 lb 13 6 oz)   06/28/21 115 kg (253 lb 15 5 oz)     · Improved significantly leg edema is down chest x-ray significantly improved but there are still some evidence of edema she is down to her 4 L  Shortness of breath has improved as well  Potassium and creatinine are stable  · OP diuretic: Lasix 80 mg T/Thr/Sat/Sun,  · Contraction alkalosis improved continue Lasix 60 mg IV b i d  For another day will switch to p o  Tomorrow  · Place on KCl 20 mEq p o  Daily will give a dose of magnesium to keep above 2  · Daily weights and strict I's and O's repeat a BMP tomorrow and proBNP    2D echo EF 65 and there is tricuspid with mild to moderate and deficiency done on January 24th

## 2022-01-26 NOTE — PROGRESS NOTES
[address]  Progress Note - Ava Diss 1933, 80 y o  female MRN: [id number]  Unit/Bed#: -01 Encounter: [id number]  Primary Care Provider: No primary care provider on file  Date and time admitted to hospital: 1/23/2022 12:56 PM    Acute on chronic diastolic heart failure (HCC)  Assessment & Plan  Wt Readings from Last 3 Encounters:   01/26/22 101 kg (223 lb 4 8 oz)   07/22/21 116 kg (254 lb 13 6 oz)   06/28/21 115 kg (253 lb 15 5 oz)     · Improved significantly leg edema is down chest x-ray significantly improved but there are still some evidence of edema she is down to her 4 L  Shortness of breath has improved as well  Potassium and creatinine are stable  · OP diuretic: Lasix 80 mg T/Thr/Sat/Sun,  · Contraction alkalosis improved continue Lasix 60 mg IV b i d  For another day will switch to p o  Tomorrow  · Place on KCl 20 mEq p o  Daily will give a dose of magnesium to keep above 2  · Daily weights and strict I's and O's repeat a BMP tomorrow and proBNP  2D echo EF 65 and there is tricuspid with mild to moderate and deficiency done on January 24th    Acute on chronic respiratory failure ([facility] [address] )  Assessment & Plan  · Chronic use of 4L baseline  Most likely secondary to pulmonary edema from heart failure  Improved in down to her baseline 4 L  Now on 5L to maintain acceptable Spo2 goal 90% resting  · Likely from CHF & PNA  · Will continue diuresis    Paroxysmal atrial fibrillation (HCC)  Assessment & Plan  · Rate controlled  · AC with eliquis from home, continue    CAP (community acquired pneumonia)  Assessment & Plan  · Covid/RSV/flu negative  · CXR reviewed pro calcitonin was elevated  No WBCs will treat for 5 days  · Procalcitonin elevated, trend with CBC w diff daily  · Non-severe DRIP 4: Rocephin  · Urine strep Legionella negative  · Supportive care    Sepsis [facility])  Assessment & Plan  · Tachypnea & leukocytosis   Source: CAP  · Resolved    Hypertension  Assessment & Plan  · BP elevated 170 but she has some back pain could be attributed today because yesterday is was 130s continue her current antihypertensives        VTE Pharmacologic Prophylaxis: VTE Score: 10 High Risk (Score >/= 5) - Pharmacological DVT Prophylaxis Ordered: apixaban (Eliquis)  Sequential Compression Devices Ordered  Patient Centered Rounds: I performed bedside rounds with nursing staff today  Discussions with Specialists or Other Care Team Provider: cm    Education and Discussions with Family / Sourav Georgesobeida will update family    Time Spent for Care: 30 minutes  More than 50% of total time spent on counseling and coordination of care as described above  Current Length of Stay: 3 day(s)  Current Patient Status: Inpatient   Certification Statement: The patient will continue to require additional inpatient hospital stay due to chf /cap  Discharge Plan: Anticipate discharge in 24-48 hrs to rehab facility  Code Status: Level 1 - Full Code    Subjective:   Seen and examined, sob improved    Objective:     Vitals:   Temp (24hrs), Av 9 °F (36 6 °C), Min:97 4 °F (36 3 °C), Max:98 2 °F (36 8 °C)    Temp:  [97 4 °F (36 3 °C)-98 2 °F (36 8 °C)] 98 2 °F (36 8 °C)  HR:  [75-90] 81  Resp:  [17-18] 17  BP: (136-176)/(80-92) 176/92  SpO2:  [96 %-99 %] 99 %  Body mass index is 39 56 kg/m²  Input and Output Summary (last 24 hours): Intake/Output Summary (Last 24 hours) at 2022 1150  Last data filed at 2022 0947  Gross per 24 hour   Intake 120 ml   Output 2100 ml   Net -1980 ml       Physical Exam:   Physical Exam  Vitals and nursing note reviewed  Constitutional:       General: She is not in acute distress  Appearance: She is well-developed  HENT:      Head: Normocephalic and atraumatic  Eyes:      Conjunctiva/sclera: Conjunctivae normal    Cardiovascular:      Rate and Rhythm: Normal rate and regular rhythm  Heart sounds: No murmur heard        Pulmonary:      Effort: Pulmonary effort is normal  No respiratory distress  Breath sounds: Rales (bibasilar) present  No wheezing  Abdominal:      Palpations: Abdomen is soft  Tenderness: There is no abdominal tenderness  Musculoskeletal:         General: No swelling  Cervical back: Neck supple  Skin:     General: Skin is warm and dry  Neurological:      Mental Status: She is alert  Mental status is at baseline  Additional Data:     Labs:  Results from last 7 days   Lab Units 01/26/22  0947   WBC Thousand/uL 7 56   HEMOGLOBIN g/dL 12 6   HEMATOCRIT % 39 3   PLATELETS Thousands/uL 327   NEUTROS PCT % 72   LYMPHS PCT % 11*   MONOS PCT % 11   EOS PCT % 5     Results from last 7 days   Lab Units 01/26/22  0947 01/24/22  0428 01/23/22  1326   SODIUM mmol/L 142   < > 144   POTASSIUM mmol/L 3 7   < > 3 3*   CHLORIDE mmol/L 100   < > 102   CO2 mmol/L 35*   < > 35*   BUN mg/dL 31*   < > 22   CREATININE mg/dL 0 82   < > 0 76   ANION GAP mmol/L 7   < > 7   CALCIUM mg/dL 9 4   < > 9 2   ALBUMIN g/dL  --   --  3 2*   TOTAL BILIRUBIN mg/dL  --   --  0 77   ALK PHOS U/L  --   --  89   ALT U/L  --   --  27   AST U/L  --   --  29   GLUCOSE RANDOM mg/dL 148*   < > 127    < > = values in this interval not displayed  Results from last 7 days   Lab Units 01/23/22  1326   INR  1 51*             Results from last 7 days   Lab Units 01/24/22  0428 01/23/22  1326   LACTIC ACID mmol/L  --  1 2   PROCALCITONIN ng/ml 0 28* 0 28*       Lines/Drains:  Invasive Devices  Report    Peripheral Intravenous Line            Peripheral IV 01/23/22 Right Antecubital 2 days          Drain            External Urinary Catheter <1 day                      Imaging: Reviewed radiology reports from this admission including: chest xray    Recent Cultures (last 7 days):   Results from last 7 days   Lab Units 01/24/22  1251 01/23/22  1838 01/23/22  1326 01/23/22  1312   BLOOD CULTURE   --   --  No Growth at 48 hrs  No Growth at 48 hrs     SPUTUM CULTURE  Culture results to follow  --   --   --    GRAM STAIN RESULT  1+ Epithelial cells per low power field*  Rare Polys*  1+ Gram positive cocci in pairs, chains and clusters*  Rare Gram positive rods*  Rare Budding Yeast with Pseudomycelia*  --   --   --    LEGIONELLA URINARY ANTIGEN   --  Negative  --   --        Last 24 Hours Medication List:   Current Facility-Administered Medications   Medication Dose Route Frequency Provider Last Rate    acetaminophen  650 mg Oral Q6H PRN Lissett Demo, PA-C      amLODIPine  5 mg Oral Daily Lissett Demo, PA-C      apixaban  5 mg Oral BID Lissett Demo, PA-C      baclofen  10 mg Oral BID Lissett Demo, PA-C      busPIRone  5 mg Oral TID Lissett Demo, PA-C      cefTRIAXone  1,000 mg Intravenous Q24H Lissett Demo, PA-C 1,000 mg (01/25/22 1423)    cloNIDine  0 1 mg Oral Daily Lissett Demo, PA-C      Diclofenac Sodium  2 g Topical HS PRN Lissett Demo, PA-C      fentaNYL  75 mcg Transdermal Q72H Lissett Demo, PA-C      fluticasone  1 spray Each Nare Daily Lissett Demo, PA-C      furosemide  60 mg Intravenous BID (diuretic) Gareth Arreola MD      guaiFENesin  600 mg Oral BID Lissett Demo, PA-C      ipratropium  0 5 mg Nebulization TID Lissett Demo, PA-C      ipratropium-albuterol  3 mL Nebulization 4x Daily PRN Lissett Demo, PA-C      levalbuterol  1 25 mg Nebulization TID Gareth Arreola MD      magnesium oxide  400 mg Oral Once Gareth Arreola MD      melatonin  3 mg Oral HS Lissett Demo, PA-C      oxyCODONE  2 5 mg Oral Q4H PRN Lissett Demo, PA-C      potassium chloride  20 mEq Oral Daily Gareth Arreola MD      senna  1 tablet Oral BID PRN Le Story PA-C          Today, Patient Was Seen By: Gareth Arreola MD    **Please Note: This note may have been constructed using a voice recognition system  **

## 2022-01-26 NOTE — PLAN OF CARE
Problem: MOBILITY - ADULT  Goal: Maintain or return to baseline ADL function  Description: INTERVENTIONS:  -  Assess patient's ability to carry out ADLs; assess patient's baseline for ADL function and identify physical deficits which impact ability to perform ADLs (bathing, care of mouth/teeth, toileting, grooming, dressing, etc )  - Assess/evaluate cause of self-care deficits   - Assess range of motion  - Assess patient's mobility; develop plan if impaired  - Assess patient's need for assistive devices and provide as appropriate  - Encourage maximum independence but intervene and supervise when necessary  - Involve family in performance of ADLs  - Assess for home care needs following discharge   - Consider OT consult to assist with ADL evaluation and planning for discharge  - Provide patient education as appropriate  Outcome: Progressing  Goal: Maintains/Returns to pre admission functional level  Description: INTERVENTIONS:  - Perform BMAT or MOVE assessment daily    - Set and communicate daily mobility goal to care team and patient/family/caregiver     - Collaborate with rehabilitation services on mobility goals if consulted  - Out of bed for toileting  - Record patient progress and toleration of activity level   Outcome: Progressing     Problem: Prexisting or High Potential for Compromised Skin Integrity  Goal: Skin integrity is maintained or improved  Description: INTERVENTIONS:  - Identify patients at risk for skin breakdown  - Assess and monitor skin integrity  - Assess and monitor nutrition and hydration status  - Monitor labs   - Assess for incontinence   - Turn and reposition patient  - Assist with mobility/ambulation  - Relieve pressure over bony prominences  - Avoid friction and shearing  - Provide appropriate hygiene as needed including keeping skin clean and dry  - Evaluate need for skin moisturizer/barrier cream  - Collaborate with interdisciplinary team   - Patient/family teaching  - Consider wound care consult   Outcome: Progressing     Problem: Nutrition/Hydration-ADULT  Goal: Nutrient/Hydration intake appropriate for improving, restoring or maintaining nutritional needs  Description: Monitor and assess patient's nutrition/hydration status for malnutrition  Collaborate with interdisciplinary team and initiate plan and interventions as ordered  Monitor patient's weight and dietary intake as ordered or per policy  Utilize nutrition screening tool and intervene as necessary  Determine patient's food preferences and provide high-protein, high-caloric foods as appropriate       INTERVENTIONS:  - Monitor oral intake, urinary output, labs, and treatment plans  - Assess nutrition and hydration status and recommend course of action  - Evaluate amount of meals eaten  - Assist patient with eating if necessary   - Allow adequate time for meals  - Recommend/ encourage appropriate diets, oral nutritional supplements, and vitamin/mineral supplements  - Order, calculate, and assess calorie counts as needed  - Recommend, monitor, and adjust tube feedings and TPN/PPN based on assessed needs  - Assess need for intravenous fluids  - Provide specific nutrition/hydration education as appropriate  - Include patient/family/caregiver in decisions related to nutrition  Outcome: Progressing     Problem: MOBILITY - ADULT  Goal: Maintain or return to baseline ADL function  Description: INTERVENTIONS:  -  Assess patient's ability to carry out ADLs; assess patient's baseline for ADL function and identify physical deficits which impact ability to perform ADLs (bathing, care of mouth/teeth, toileting, grooming, dressing, etc )  - Assess/evaluate cause of self-care deficits   - Assess range of motion  - Assess patient's mobility; develop plan if impaired  - Assess patient's need for assistive devices and provide as appropriate  - Encourage maximum independence but intervene and supervise when necessary  - Involve family in performance of ADLs  - Assess for home care needs following discharge   - Consider OT consult to assist with ADL evaluation and planning for discharge  - Provide patient education as appropriate  1/25/2022 2328 by Richardo Leventhal  Outcome: Progressing  1/25/2022 2327 by Richardo Leventhal  Outcome: Progressing  Goal: Maintains/Returns to pre admission functional level  Description: INTERVENTIONS:  - Perform BMAT or MOVE assessment daily    - Set and communicate daily mobility goal to care team and patient/family/caregiver  - Collaborate with rehabilitation services on mobility goals if consulted  - Out of bed for toileting  - Record patient progress and toleration of activity level   1/25/2022 2328 by Richardo Leventhal  Outcome: Progressing  1/25/2022 2327 by Richardo Leventhal  Outcome: Progressing     Problem: Prexisting or High Potential for Compromised Skin Integrity  Goal: Skin integrity is maintained or improved  Description: INTERVENTIONS:  - Identify patients at risk for skin breakdown  - Assess and monitor skin integrity  - Assess and monitor nutrition and hydration status  - Monitor labs   - Assess for incontinence   - Turn and reposition patient  - Assist with mobility/ambulation  - Relieve pressure over bony prominences  - Avoid friction and shearing  - Provide appropriate hygiene as needed including keeping skin clean and dry  - Evaluate need for skin moisturizer/barrier cream  - Collaborate with interdisciplinary team   - Patient/family teaching  - Consider wound care consult   1/25/2022 2328 by Richardo Leventhal  Outcome: Progressing  1/25/2022 2327 by Richardo Leventhal  Outcome: Progressing     Problem: Nutrition/Hydration-ADULT  Goal: Nutrient/Hydration intake appropriate for improving, restoring or maintaining nutritional needs  Description: Monitor and assess patient's nutrition/hydration status for malnutrition  Collaborate with interdisciplinary team and initiate plan and interventions as ordered    Monitor patient's weight and dietary intake as ordered or per policy  Utilize nutrition screening tool and intervene as necessary  Determine patient's food preferences and provide high-protein, high-caloric foods as appropriate       INTERVENTIONS:  - Monitor oral intake, urinary output, labs, and treatment plans  - Assess nutrition and hydration status and recommend course of action  - Evaluate amount of meals eaten  - Assist patient with eating if necessary   - Allow adequate time for meals  - Recommend/ encourage appropriate diets, oral nutritional supplements, and vitamin/mineral supplements  - Order, calculate, and assess calorie counts as needed  - Recommend, monitor, and adjust tube feedings and TPN/PPN based on assessed needs  - Assess need for intravenous fluids  - Provide specific nutrition/hydration education as appropriate  - Include patient/family/caregiver in decisions related to nutrition  1/25/2022 2328 by Pebbles Gomez  Outcome: Progressing  1/25/2022 2327 by Pebbles Gomez  Outcome: Progressing     Problem: PAIN - ADULT  Goal: Verbalizes/displays adequate comfort level or baseline comfort level  Description: Interventions:  - Encourage patient to monitor pain and request assistance  - Assess pain using appropriate pain scale  - Administer analgesics based on type and severity of pain and evaluate response  - Implement non-pharmacological measures as appropriate and evaluate response  - Consider cultural and social influences on pain and pain management  - Notify physician/advanced practitioner if interventions unsuccessful or patient reports new pain  Outcome: Progressing     Problem: INFECTION - ADULT  Goal: Absence or prevention of progression during hospitalization  Description: INTERVENTIONS:  - Assess and monitor for signs and symptoms of infection  - Monitor lab/diagnostic results  - Monitor all insertion sites, i e  indwelling lines, tubes, and drains  - Monitor endotracheal if appropriate and nasal secretions for changes in amount and color  - [place] appropriate cooling/warming therapies per order  - Administer medications as ordered  - Instruct and encourage patient and family to use good hand hygiene technique  - Identify and instruct in appropriate isolation precautions for identified infection/condition  Outcome: Progressing     Problem: SAFETY ADULT  Goal: Maintain or return to baseline ADL function  Description: INTERVENTIONS:  -  Assess patient's ability to carry out ADLs; assess patient's baseline for ADL function and identify physical deficits which impact ability to perform ADLs (bathing, care of mouth/teeth, toileting, grooming, dressing, etc )  - Assess/evaluate cause of self-care deficits   - Assess range of motion  - Assess patient's mobility; develop plan if impaired  - Assess patient's need for assistive devices and provide as appropriate  - Encourage maximum independence but intervene and supervise when necessary  - Involve family in performance of ADLs  - Assess for home care needs following discharge   - Consider OT consult to assist with ADL evaluation and planning for discharge  - Provide patient education as appropriate  1/25/2022 2328 by Morris Ramos  Outcome: Progressing  1/25/2022 2327 by Morris Ramos  Outcome: Progressing  Goal: Maintains/Returns to pre admission functional level  Description: INTERVENTIONS:  - Perform BMAT or MOVE assessment daily    - Set and communicate daily mobility goal to care team and patient/family/caregiver     - Collaborate with rehabilitation services on mobility goals if consulted  - Out of bed for toileting  - Record patient progress and toleration of activity level   1/25/2022 2328 by Morris Ramos  Outcome: Progressing  1/25/2022 2327 by Morris Ramos  Outcome: Progressing  Goal: Patient will remain free of falls  Description: INTERVENTIONS:  - Educate patient/family on patient safety including physical limitations  - Instruct patient to call for assistance with activity   - Consult OT/PT to assist with strengthening/mobility   - Keep Call bell within reach  - Keep bed low and locked with side rails adjusted as appropriate  - Keep care items and personal belongings within reach  - Initiate and maintain comfort rounds  - Make Fall Risk Sign visible to staff  - Offer Toileting every 2 Hours, in advance of need  - Initiate/Maintain bed alarm  - Apply yellow socks and bracelet for high fall risk patients  - Consider moving patient to room near nurses station  Outcome: Progressing     Problem: DISCHARGE PLANNING  Goal: Discharge to home or other facility with appropriate resources  Description: INTERVENTIONS:  - Identify barriers to discharge w/patient and caregiver  - Arrange for needed discharge resources and transportation as appropriate  - Identify discharge learning needs (meds, wound care, etc )  - Arrange for interpretive services to assist at discharge as needed  - Refer to Case Management Department for coordinating discharge planning if the patient needs post-hospital services based on physician/advanced practitioner order or complex needs related to functional status, cognitive ability, or social support system  Outcome: Progressing     Problem: Knowledge Deficit  Goal: Patient/family/caregiver demonstrates understanding of disease process, treatment plan, medications, and discharge instructions  Description: Complete learning assessment and assess knowledge base    Interventions:  - Provide teaching at level of understanding  - Provide teaching via preferred learning methods  Outcome: Progressing

## 2022-01-26 NOTE — ASSESSMENT & PLAN NOTE
· Covid/RSV/flu negative  · CXR reviewed pro calcitonin was elevated    No WBCs will treat for 5 days  · Procalcitonin elevated, trend with CBC w diff daily  · Non-severe DRIP 4: Rocephin  · Urine strep Legionella negative  · Supportive care

## 2022-01-27 PROBLEM — L89.153 PRESSURE ULCER OF SACRAL REGION, STAGE 3 (HCC): Status: ACTIVE | Noted: 2022-01-01

## 2022-01-27 NOTE — DISCHARGE SUMMARY
[address]  Discharge- Cass Cameron 1933, 80 y o  female MRN: [id number]  Unit/Bed#: -01 Encounter: [id number]  Primary Care Provider: No primary care provider on file  Date and time admitted to hospital: 1/23/2022 12:56 PM    Pressure ulcer of sacral region, stage 3 (HCC)  Assessment & Plan  Pressure ulcer of sacral region, stage 3, POA, as documented by Wound RN, requiring frequent turning and repositioning, Allevyn dressing, and low air loss mattress  Acute on chronic diastolic heart failure (HCC)  Assessment & Plan  Wt Readings from Last 3 Encounters:   01/27/22 101 kg (223 lb 3 2 oz)   07/22/21 116 kg (254 lb 13 6 oz)   06/28/21 115 kg (253 lb 15 5 oz)     · Improved significantly leg edema is down chest x-ray significantly improved but there are still some evidence of edema she is down to her 4 L  Shortness of breath has improved as well  Potassium and creatinine are stable  · OP diuretic: Lasix 80 mg T/Thr/Sat/Sun,  · She diuresed quite well she is negative 6 L the weight on not sure if it is actually accurate is a bed scale but she does not have any lower extremity edema lungs improved she is down to 4 L I will place her on Lasix 60 mg daily at the facility with potassium 20 mEq p o  Daily and recheck labs on Monday to ensure her kidney function is stable  · ProBNP is down to 725   2D echo EF 65 and there is tricuspid with mild to moderate and deficiency done on January 24th  · She is medically clear to be discharged back to [facility]    Acute on chronic respiratory failure ([facility] [address] )  Assessment & Plan  · Chronic use of 4L baseline  Most likely secondary to pulmonary edema from heart failure    Secondary to CHF and cap resolved down to 4 L    Paroxysmal atrial fibrillation (HCC)  Assessment & Plan  · Rate controlled  · AC with eliquis from home, continue    CAP (community acquired pneumonia)  Assessment & Plan  · Covid/RSV/flu negative  · CXR reviewed pro calcitonin was elevated  No WBCs will treat for 5 days switched to cefdinir 2 more days  · Procalcitonin elevated, trend with CBC w diff daily  · Non-severe DRIP 4: Urine strep Legionella negative  · Supportive care    Sepsis [facility])  Assessment & Plan  · Tachypnea & leukocytosis  Source: CAP  · Resolved    Hypertension  Assessment & Plan  · Blood pressure controlled continue antihypertensives      Medical Problems             Resolved Problems  Date Reviewed: 1/27/2022    None              Discharging Physician / Practitioner: Nahid Martinez MD  PCP: No primary care provider on file  Admission Date:   Admission Orders (From admission, onward)     Ordered        01/23/22 1441  Inpatient Admission  Once                      Discharge Date: 01/27/22    Consultations During Hospital Stay:  · Speech therapy    Procedures Performed:   · None    Significant Findings / Test Results:   · 1/23 cxr-Diffuse patchy airspace consolidation bilaterally  This is nonspecific and may be related to multifocal pneumonia or pulmonary edema  Clinical service was aware of the findings at the time of dictation  · 1/2653-hek-Hhgjdfaj diffuse airspace disease likely representing pulmonary edema  · 2D echo-     Left Ventricle: Mildly increased left ventricular wall thickness with normal LV cavity size and systolic function  There is hthi-zu-ssrz variability in contractility due to rhythm  Estimated ejection fraction 65%  No regional wall motion abnormality identified  Diastolic characterization was not assessed    Right Ventricle: The right ventricle appears top-normal in size with low-normal function by visual estimation    Aortic Valve: Aortic valve is not well seen  Aortic calcification is noted  The measured trans aortic velocity suggests severe aortic stenosis  Trace insufficiency seen      Mitral Valve: Mitral annular sclerosis, particularly involving the posterior aspect of the annulus with preserved leaflet excursion and mild insufficiency    Tricuspid Valve: Grossly normal-appearing tricuspid leaflets with mild to moderate insufficiency  Measured trans tricuspid regurgitant velocity of 3 7 m/sec corresponds          Incidental Findings:   · None    Test Results Pending at Discharge (will require follow up):   · none     Outpatient Tests Requested:  · Bmp next week     Complications:  none    Reason for Admission:  Shortness of breath for 2 days    Hospital Course:   Anusha Schuster is a 80 y o  female patient who originally presented to the hospital on 1/23/2022 due to dyspnea for 2 days she is chronically on 4 L of oxygen and she was found to be on 5 secondary to CHF exacerbation and community-acquired pneumonia patient was started on aggressive diuresis and she produced -6 L the weight that was placed was in accurate her chest x-ray on repeat was improved she will complete a total of 7 days of antibiotics with cefdinir on discharge for 2 more days her leg edema is gone her lungs improved and shortness of breath is gone  I will be changing her Lasix to 60 mg daily with KCl 20 mEq p o  Daily repeat a BMP next week to ensure her potassium and creatinine are stable  Otherwise she is medically clear to be discharged back to the skilled nursing facility 2D echo results as above      Please see above list of diagnoses and related plan for additional information  Condition at Discharge: stable    Discharge Day Visit / Exam:   Subjective:  Seen and examined no complaints  Vitals: Blood Pressure: 151/89 (01/27/22 0719)  Pulse: 80 (01/27/22 0719)  Temperature: 98 9 °F (37 2 °C) (01/27/22 0719)  Temp Source: Oral (01/25/22 0759)  Respirations: 19 (01/27/22 0719)  Height: 5' 3" (160 cm) (01/24/22 0730)  Weight - Scale: 101 kg (223 lb 3 2 oz) (01/27/22 0600)  SpO2: 96 % (01/27/22 0719)  Exam:   Physical Exam  Vitals and nursing note reviewed  Constitutional:       General: She is not in acute distress       Appearance: She is well-developed  HENT:      Head: Normocephalic and atraumatic  Eyes:      Conjunctiva/sclera: Conjunctivae normal    Cardiovascular:      Rate and Rhythm: Normal rate and regular rhythm  Heart sounds: No murmur heard  Pulmonary:      Effort: Pulmonary effort is normal  No respiratory distress  Breath sounds: Rales (left base mild) present  Abdominal:      Palpations: Abdomen is soft  Tenderness: There is no abdominal tenderness  Musculoskeletal:         General: No swelling  Cervical back: Neck supple  Skin:     General: Skin is warm and dry  Neurological:      Mental Status: She is alert  Mental status is at baseline  Discussion with Family: Attempted to update  (daughter) via phone  Left voicemail  Discharge instructions/Information to patient and family:   See after visit summary for information provided to patient and family  Provisions for Follow-Up Care:  See after visit summary for information related to follow-up care and any pertinent home health orders  Disposition:   [facility] Readmission: no     Discharge Statement:  I spent >35 minutes discharging the patient  This time was spent on the day of discharge  I had direct contact with the patient on the day of discharge  Greater than 50% of the total time was spent examining patient, answering all patient questions, arranging and discussing plan of care with patient as well as directly providing post-discharge instructions  Additional time then spent on discharge activities  Discharge Medications:  See after visit summary for reconciled discharge medications provided to patient and/or family        **Please Note: This note may have been constructed using a voice recognition system**

## 2022-01-27 NOTE — CASE MANAGEMENT
Case Management Progress Note    Patient name Yocasta Sharma  Location /-79 MRN [id number]  : 1933 Date 2022       LOS (days): 4  Geometric Mean LOS (GMLOS) (days): 4 80  Days to GMLOS:0 8        OBJECTIVE:        Current admission status: Inpatient  Preferred Pharmacy:   [facility] [address]   [place] [address]  Phone: [phone] Fax: [phone] [facility] [address] ,  [facility] [address] [place] [address] [address] [facility]  Phone: [phone] Fax: [phone]    Primary Care Provider: No primary care provider on file  Primary Insurance: Tena Brian [facility] RE[facility]  Secondary Insurance:     PROGRESS NOTE:  Pt for d/c, return to [facility] completed  CM placed referral for SLETs for BLS, received confirmation of 1730  CM updated nsg, and facility  Addendum:  Late entry, time changed to 1615, Nsg updated

## 2022-01-27 NOTE — ASSESSMENT & PLAN NOTE
· Chronic use of 4L baseline  Most likely secondary to pulmonary edema from heart failure    Secondary to CHF and cap resolved down to 4 L

## 2022-01-27 NOTE — PLAN OF CARE
Problem: MOBILITY - ADULT  Goal: Maintain or return to baseline ADL function  Description: INTERVENTIONS:  -  Assess patient's ability to carry out ADLs; assess patient's baseline for ADL function and identify physical deficits which impact ability to perform ADLs (bathing, care of mouth/teeth, toileting, grooming, dressing, etc )  - Assess/evaluate cause of self-care deficits   - Assess range of motion  - Assess patient's mobility; develop plan if impaired  - Assess patient's need for assistive devices and provide as appropriate  - Encourage maximum independence but intervene and supervise when necessary  - Involve family in performance of ADLs  - Assess for home care needs following discharge   - Consider OT consult to assist with ADL evaluation and planning for discharge  - Provide patient education as appropriate  Outcome: Progressing  Goal: Maintains/Returns to pre admission functional level  Description: INTERVENTIONS:  - Perform BMAT or MOVE assessment daily    - Set and communicate daily mobility goal to care team and patient/family/caregiver     - Collaborate with rehabilitation services on mobility goals if consulted  - Out of bed for toileting  - Record patient progress and toleration of activity level   Outcome: Progressing     Problem: Prexisting or High Potential for Compromised Skin Integrity  Goal: Skin integrity is maintained or improved  Description: INTERVENTIONS:  - Identify patients at risk for skin breakdown  - Assess and monitor skin integrity  - Assess and monitor nutrition and hydration status  - Monitor labs   - Assess for incontinence   - Turn and reposition patient  - Assist with mobility/ambulation  - Relieve pressure over bony prominences  - Avoid friction and shearing  - Provide appropriate hygiene as needed including keeping skin clean and dry  - Evaluate need for skin moisturizer/barrier cream  - Collaborate with interdisciplinary team   - Patient/family teaching  - Consider wound care consult   Outcome: Progressing     Problem: Nutrition/Hydration-ADULT  Goal: Nutrient/Hydration intake appropriate for improving, restoring or maintaining nutritional needs  Description: Monitor and assess patient's nutrition/hydration status for malnutrition  Collaborate with interdisciplinary team and initiate plan and interventions as ordered  Monitor patient's weight and dietary intake as ordered or per policy  Utilize nutrition screening tool and intervene as necessary  Determine patient's food preferences and provide high-protein, high-caloric foods as appropriate       INTERVENTIONS:  - Monitor oral intake, urinary output, labs, and treatment plans  - Assess nutrition and hydration status and recommend course of action  - Evaluate amount of meals eaten  - Assist patient with eating if necessary   - Allow adequate time for meals  - Recommend/ encourage appropriate diets, oral nutritional supplements, and vitamin/mineral supplements  - Order, calculate, and assess calorie counts as needed  - Recommend, monitor, and adjust tube feedings and TPN/PPN based on assessed needs  - Assess need for intravenous fluids  - Provide specific nutrition/hydration education as appropriate  - Include patient/family/caregiver in decisions related to nutrition  Outcome: Progressing     Problem: PAIN - ADULT  Goal: Verbalizes/displays adequate comfort level or baseline comfort level  Description: Interventions:  - Encourage patient to monitor pain and request assistance  - Assess pain using appropriate pain scale  - Administer analgesics based on type and severity of pain and evaluate response  - Implement non-pharmacological measures as appropriate and evaluate response  - Consider cultural and social influences on pain and pain management  - Notify physician/advanced practitioner if interventions unsuccessful or patient reports new pain  Outcome: Progressing     Problem: INFECTION - ADULT  Goal: Absence or prevention of progression during hospitalization  Description: INTERVENTIONS:  - Assess and monitor for signs and symptoms of infection  - Monitor lab/diagnostic results  - Monitor all insertion sites, i e  indwelling lines, tubes, and drains  - Monitor endotracheal if appropriate and nasal secretions for changes in amount and color  - Kemmerer appropriate cooling/warming therapies per order  - Administer medications as ordered  - Instruct and encourage patient and family to use good hand hygiene technique  - Identify and instruct in appropriate isolation precautions for identified infection/condition  Outcome: Progressing     Problem: SAFETY ADULT  Goal: Maintain or return to baseline ADL function  Description: INTERVENTIONS:  -  Assess patient's ability to carry out ADLs; assess patient's baseline for ADL function and identify physical deficits which impact ability to perform ADLs (bathing, care of mouth/teeth, toileting, grooming, dressing, etc )  - Assess/evaluate cause of self-care deficits   - Assess range of motion  - Assess patient's mobility; develop plan if impaired  - Assess patient's need for assistive devices and provide as appropriate  - Encourage maximum independence but intervene and supervise when necessary  - Involve family in performance of ADLs  - Assess for home care needs following discharge   - Consider OT consult to assist with ADL evaluation and planning for discharge  - Provide patient education as appropriate  Outcome: Progressing  Goal: Maintains/Returns to pre admission functional level  Description: INTERVENTIONS:  - Perform BMAT or MOVE assessment daily    - Set and communicate daily mobility goal to care team and patient/family/caregiver     - Collaborate with rehabilitation services on mobility goals if consulted  - Out of bed for toileting  - Record patient progress and toleration of activity level   Outcome: Progressing  Goal: Patient will remain free of falls  Description: INTERVENTIONS:  - Educate patient/family on patient safety including physical limitations  - Instruct patient to call for assistance with activity   - Consult OT/PT to assist with strengthening/mobility   - Keep Call bell within reach  - Keep bed low and locked with side rails adjusted as appropriate  - Keep care items and personal belongings within reach  - Initiate and maintain comfort rounds  - Make Fall Risk Sign visible to staff  - Offer Toileting every 2 Hours, in advance of need  - Initiate/Maintain bed alarm  - Apply yellow socks and bracelet for high fall risk patients  - Consider moving patient to room near nurses station  Outcome: Progressing     Problem: DISCHARGE PLANNING  Goal: Discharge to home or other facility with appropriate resources  Description: INTERVENTIONS:  - Identify barriers to discharge w/patient and caregiver  - Arrange for needed discharge resources and transportation as appropriate  - Identify discharge learning needs (meds, wound care, etc )  - Arrange for interpretive services to assist at discharge as needed  - Refer to Case Management Department for coordinating discharge planning if the patient needs post-hospital services based on physician/advanced practitioner order or complex needs related to functional status, cognitive ability, or social support system  Outcome: Progressing     Problem: Knowledge Deficit  Goal: Patient/family/caregiver demonstrates understanding of disease process, treatment plan, medications, and discharge instructions  Description: Complete learning assessment and assess knowledge base    Interventions:  - Provide teaching at level of understanding  - Provide teaching via preferred learning methods  Outcome: Progressing

## 2022-01-27 NOTE — PLAN OF CARE
Problem: MOBILITY - ADULT  Goal: Maintain or return to baseline ADL function  Description: INTERVENTIONS:  -  Assess patient's ability to carry out ADLs; assess patient's baseline for ADL function and identify physical deficits which impact ability to perform ADLs (bathing, care of mouth/teeth, toileting, grooming, dressing, etc )  - Assess/evaluate cause of self-care deficits   - Assess range of motion  - Assess patient's mobility; develop plan if impaired  - Assess patient's need for assistive devices and provide as appropriate  - Encourage maximum independence but intervene and supervise when necessary  - Involve family in performance of ADLs  - Assess for home care needs following discharge   - Consider OT consult to assist with ADL evaluation and planning for discharge  - Provide patient education as appropriate  Outcome: Progressing  Goal: Maintains/Returns to pre admission functional level  Description: INTERVENTIONS:  - Perform BMAT or MOVE assessment daily    - Set and communicate daily mobility goal to care team and patient/family/caregiver  - Collaborate with rehabilitation services on mobility goals if consulted  - Perform Range of Motion   times a day  - Reposition patient every   hours  - Dangle patient   times a day  - Stand patient   times a day  - Ambulate patient   times a day  - Out of bed to chair   times a day   - Out of bed for meals    times a day  - Out of bed for toileting  - Record patient progress and toleration of activity level   Outcome: Progressing     Problem: Prexisting or High Potential for Compromised Skin Integrity  Goal: Skin integrity is maintained or improved  Description: INTERVENTIONS:  - Identify patients at risk for skin breakdown  - Assess and monitor skin integrity  - Assess and monitor nutrition and hydration status  - Monitor labs   - Assess for incontinence   - Turn and reposition patient  - Assist with mobility/ambulation  - Relieve pressure over bony prominences  - Avoid friction and shearing  - Provide appropriate hygiene as needed including keeping skin clean and dry  - Evaluate need for skin moisturizer/barrier cream  - Collaborate with interdisciplinary team   - Patient/family teaching  - Consider wound care consult   Outcome: Progressing     Problem: Nutrition/Hydration-ADULT  Goal: Nutrient/Hydration intake appropriate for improving, restoring or maintaining nutritional needs  Description: Monitor and assess patient's nutrition/hydration status for malnutrition  Collaborate with interdisciplinary team and initiate plan and interventions as ordered  Monitor patient's weight and dietary intake as ordered or per policy  Utilize nutrition screening tool and intervene as necessary  Determine patient's food preferences and provide high-protein, high-caloric foods as appropriate       INTERVENTIONS:  - Monitor oral intake, urinary output, labs, and treatment plans  - Assess nutrition and hydration status and recommend course of action  - Evaluate amount of meals eaten  - Assist patient with eating if necessary   - Allow adequate time for meals  - Recommend/ encourage appropriate diets, oral nutritional supplements, and vitamin/mineral supplements  - Order, calculate, and assess calorie counts as needed  - Recommend, monitor, and adjust tube feedings and TPN/PPN based on assessed needs  - Assess need for intravenous fluids  - Provide specific nutrition/hydration education as appropriate  - Include patient/family/caregiver in decisions related to nutrition  Outcome: Progressing     Problem: PAIN - ADULT  Goal: Verbalizes/displays adequate comfort level or baseline comfort level  Description: Interventions:  - Encourage patient to monitor pain and request assistance  - Assess pain using appropriate pain scale  - Administer analgesics based on type and severity of pain and evaluate response  - Implement non-pharmacological measures as appropriate and evaluate response  - Consider cultural and social influences on pain and pain management  - Notify physician/advanced practitioner if interventions unsuccessful or patient reports new pain  Outcome: Progressing     Problem: INFECTION - ADULT  Goal: Absence or prevention of progression during hospitalization  Description: INTERVENTIONS:  - Assess and monitor for signs and symptoms of infection  - Monitor lab/diagnostic results  - Monitor all insertion sites, i e  indwelling lines, tubes, and drains  - Monitor endotracheal if appropriate and nasal secretions for changes in amount and color  - Alanson appropriate cooling/warming therapies per order  - Administer medications as ordered  - Instruct and encourage patient and family to use good hand hygiene technique  - Identify and instruct in appropriate isolation precautions for identified infection/condition  Outcome: Progressing     Problem: SAFETY ADULT  Goal: Maintain or return to baseline ADL function  Description: INTERVENTIONS:  -  Assess patient's ability to carry out ADLs; assess patient's baseline for ADL function and identify physical deficits which impact ability to perform ADLs (bathing, care of mouth/teeth, toileting, grooming, dressing, etc )  - Assess/evaluate cause of self-care deficits   - Assess range of motion  - Assess patient's mobility; develop plan if impaired  - Assess patient's need for assistive devices and provide as appropriate  - Encourage maximum independence but intervene and supervise when necessary  - Involve family in performance of ADLs  - Assess for home care needs following discharge   - Consider OT consult to assist with ADL evaluation and planning for discharge  - Provide patient education as appropriate  Outcome: Progressing  Goal: Maintains/Returns to pre admission functional level  Description: INTERVENTIONS:  - Perform BMAT or MOVE assessment daily    - Set and communicate daily mobility goal to care team and patient/family/caregiver  - Collaborate with rehabilitation services on mobility goals if consulted  - Perform Range of Motion   times a day  - Reposition patient every   hours  - Dangle patient   times a day  - Stand patient   times a day  - Ambulate patient   times a day  - Out of bed to chair   times a day   - Out of bed for meals   times a day  - Out of bed for toileting  - Record patient progress and toleration of activity level   Outcome: Progressing  Goal: Patient will remain free of falls  Description: INTERVENTIONS:  - Educate patient/family on patient safety including physical limitations  - Instruct patient to call for assistance with activity   - Consult OT/PT to assist with strengthening/mobility   - Keep Call bell within reach  - Keep bed low and locked with side rails adjusted as appropriate  - Keep care items and personal belongings within reach  - Initiate and maintain comfort rounds  - Make Fall Risk Sign visible to staff  - Offer Toileting every   Hours, in advance of need  - Initiate/Maintain   alarm  - Obtain necessary fall risk management equipment:     - Apply yellow socks and bracelet for high fall risk patients  - Consider moving patient to room near nurses station  Outcome: Progressing     Problem: DISCHARGE PLANNING  Goal: Discharge to home or other facility with appropriate resources  Description: INTERVENTIONS:  - Identify barriers to discharge w/patient and caregiver  - Arrange for needed discharge resources and transportation as appropriate  - Identify discharge learning needs (meds, wound care, etc )  - Arrange for interpretive services to assist at discharge as needed  - Refer to Case Management Department for coordinating discharge planning if the patient needs post-hospital services based on physician/advanced practitioner order or complex needs related to functional status, cognitive ability, or social support system  Outcome: Progressing     Problem: Knowledge Deficit  Goal: Patient/family/caregiver demonstrates understanding of disease process, treatment plan, medications, and discharge instructions  Description: Complete learning assessment and assess knowledge base    Interventions:  - Provide teaching at level of understanding  - Provide teaching via preferred learning methods  Outcome: Progressing

## 2022-01-27 NOTE — ASSESSMENT & PLAN NOTE
Pressure ulcer of sacral region, stage 3, POA, as documented by Wound RN, requiring frequent turning and repositioning, Allevyn dressing, and low air loss mattress

## 2022-01-27 NOTE — ASSESSMENT & PLAN NOTE
Wt Readings from Last 3 Encounters:   01/27/22 101 kg (223 lb 3 2 oz)   07/22/21 116 kg (254 lb 13 6 oz)   06/28/21 115 kg (253 lb 15 5 oz)     · Improved significantly leg edema is down chest x-ray significantly improved but there are still some evidence of edema she is down to her 4 L  Shortness of breath has improved as well  Potassium and creatinine are stable  · OP diuretic: Lasix 80 mg T/Thr/Sat/Sun,  · She diuresed quite well she is negative 6 L the weight on not sure if it is actually accurate is a bed scale but she does not have any lower extremity edema lungs improved she is down to 4 L I will place her on Lasix 60 mg daily at the facility with potassium 20 mEq p o   Daily and recheck labs on Monday to ensure her kidney function is stable  · ProBNP is down to 725   2D echo EF 65 and there is tricuspid with mild to moderate and deficiency done on January 24th  · She is medically clear to be discharged back to [facility]

## 2022-01-27 NOTE — ASSESSMENT & PLAN NOTE
· Covid/RSV/flu negative  · CXR reviewed pro calcitonin was elevated    No WBCs will treat for 5 days switched to cefdinir 2 more days  · Procalcitonin elevated, trend with CBC w diff daily  · Non-severe DRIP 4: Urine strep Legionella negative  · Supportive care

## 2022-01-28 NOTE — UTILIZATION REVIEW
Notification of Discharge   This is a Notification of Discharge from our facility [address]  Please be advised that this patient has been discharge from our facility  Below you will find the admission and discharge date and time including the patients disposition  UTILIZATION REVIEW CONTACT:  Priyank Sarabia  Utilization   Network Utilization Review Department  Phone: [phone] x carefully listen to the prompts  All voicemails are confidential   Email: [email or web address]     PHYSICIAN ADVISORY SERVICES:  FOR KWYH-[id number]-AXQL REVIEW - MEDICAL NECESSITY DENIAL  Phone: [phone]  Fax: [phone]  Email: [email or web address]     PRESENTATION DATE: 1/23/2022 12:56 PM  OBERVATION ADMISSION DATE:   INPATIENT ADMISSION DATE: 1/23/22  2:41 PM   DISCHARGE DATE: 1/27/2022  4:49 PM  DISPOSITION: Non [facility]/SNU Non [facility] SN[facility]/SNU      IMPORTANT INFORMATION:  Send all requests for admission clinical reviews, approved or denied determinations and any other requests to dedicated fax number below belonging to the campus where the patient is receiving treatment   List of dedicated fax numbers:  [address] DENIALS (Administrative/Medical Necessity) [phone]   [address]  (Maternity/NICU/Pediatrics) [phone]   Mike Figueroa [phone]   Kathie Bateman [phone]   [address] [place] [address]  [phone]   [address] [place] [address],[address] [phone]   [facility]  [phone]   [address] [facility]  [address] [facility] [address] [facility] [phone]

## 2022-04-30 PROBLEM — J18.9 HCAP (HEALTHCARE-ASSOCIATED PNEUMONIA): Status: ACTIVE | Noted: 2022-01-01

## 2022-04-30 PROBLEM — N39.0 UTI (URINARY TRACT INFECTION): Status: ACTIVE | Noted: 2022-01-01

## 2022-04-30 PROBLEM — J96.21 ACUTE ON CHRONIC RESPIRATORY FAILURE WITH HYPOXIA (HCC): Status: ACTIVE | Noted: 2018-08-11

## 2022-04-30 PROBLEM — N28.9 RENAL LESION: Status: ACTIVE | Noted: 2022-01-01

## 2022-04-30 PROBLEM — R65.21 SEPTIC SHOCK (HCC): Status: ACTIVE | Noted: 2018-07-20

## 2022-04-30 PROBLEM — J90 PLEURAL EFFUSION, BILATERAL: Status: ACTIVE | Noted: 2022-01-01

## 2022-04-30 PROBLEM — L89.94 PRESSURE ULCER, STAGE 4 (HCC): Status: ACTIVE | Noted: 2022-01-01

## 2022-04-30 PROBLEM — R77.8 ELEVATED TROPONIN LEVEL NOT DUE MYOCARDIAL INFARCTION: Status: ACTIVE | Noted: 2022-01-01

## 2022-04-30 NOTE — PROCEDURES
Central Line Insertion    Date/Time: 4/30/2022 8:24 AM  Performed by: Derrek Najera PA-C  Authorized by: Derrek Najera PA-C     Patient location:  Bedside  Consent:     Consent obtained:  Verbal    Consent given by:  Healthcare agent    Risks discussed:  Arterial puncture, bleeding, infection, incorrect placement, nerve damage and pneumothorax    Alternatives discussed:  Alternative treatment  Universal protocol:     Procedure explained and questions answered to patient or proxy's satisfaction: yes      Relevant documents present and verified: yes      Test results available and properly labeled: yes      Radiology Images displayed and confirmed  If images not available, report reviewed: yes      Required blood products, implants, devices, and special equipment available: yes      Site/side marked: yes      Immediately prior to procedure, a time out was called: yes      Patient identity confirmed:  Arm band, anonymous protocol, patient vented/unresponsive and hospital-assigned identification number  Pre-procedure details:     Hand hygiene: Hand hygiene performed prior to insertion      Sterile barrier technique: All elements of maximal sterile technique followed      Skin preparation:  2% chlorhexidine    Skin preparation agent: Skin preparation agent completely dried prior to procedure    Indications:     Central line indications: medications requiring central line and hemodynamic monitoring    Anesthesia (see MAR for exact dosages):      Anesthesia method:  Local infiltration    Local anesthetic:  Lidocaine 1% w/o epi  Procedure details:     Location:  Right internal jugular    Vessel type: vein      Laterality:  Right    Approach: percutaneous technique used      Patient position:  Trendelenburg    Catheter type:  Triple lumen 16cm    Catheter size:  7 Fr    Landmarks identified: yes      Ultrasound guidance: yes      Ultrasound image availability:  Images available in PACS    Sterile ultrasound techniques: Sterile gel and sterile probe covers were used      Number of attempts:  1    Successful placement: yes      Vessel of catheter tip end:  Distal SVC  Post-procedure details:     Post-procedure:  Dressing applied and line sutured    Assessment:  Blood return through all ports, free fluid flow, placement verified by x-ray and no pneumothorax on x-ray    Patient tolerance of procedure:   Tolerated well, no immediate complications

## 2022-04-30 NOTE — ASSESSMENT & PLAN NOTE
Wt Readings from Last 3 Encounters:   04/30/22 98 7 kg (217 lb 9 5 oz)   01/27/22 101 kg (223 lb 3 2 oz)   07/22/21 116 kg (254 lb 13 6 oz)     · Last echo completed 01/24:  · Left Ventricle: Mildly increased left ventricular wall thickness with normal LV cavity size and systolic function  There is [id number]-hc-[id number] variability in contractility due to rhythm  EF 65%  No RWMA  Diastolic characterization was not assessed  · Aortic Valve: Aortic valve not well seen  Aortic calcification is noted  The measured trans aortic velocity suggests severe aortic stenosis  Trace insufficiency seen  · Mitral Valve: Mitral annular sclerosis, particularly involving the posterior aspect of the annulus with preserved leaflet excursion and mild insufficiency  · Tricuspid Valve: Grossly normal-appearing tricuspid leaflets with mild to moderate insufficiency  Measured trans tricuspid regurgitant velocity of 3 7 m/sec corresponds to a gradient of 56 mmHg  With addition of 10 mmHg presumed atrial pressure, estimated pulmonary pressure is increased at 66 mmHg  · Formal TTE ordered  · Elevated BNP 6272 with elevated HS troponin of 131  · Duiretic plan: Home dose Lasix 60 mg p o  BID on hold  Little response to IV lasix and bumex IV    · Daily weight

## 2022-04-30 NOTE — ASSESSMENT & PLAN NOTE
· Likely multifactorial with bilateral pneumonia, bilateral pleural effusions and fluid overload  · At baseline patient wears 4 L nasal cannula  · Intubated in ED  · Mechanical vent bundle ordered  · Fentanyl and propofol for sedation  · SAT with SBT daily  · Continue treatment for healthcare associated pneumonia and fluid overload

## 2022-04-30 NOTE — PROGRESS NOTES
Vancomycin Assessment    Trip Javed is a 80 y o  female who is currently receiving vancomycin 1000mg q24h (15mg/kg ABW) for Pneumonia     Relevant clinical data and objective history reviewed:  Creatinine   Date Value Ref Range Status   04/30/2022 1 43 (H) 0 60 - 1 30 mg/dL Final     Comment:     Standardized to IDMS reference method   01/27/2022 0 83 0 60 - 1 30 mg/dL Final     Comment:     Standardized to IDMS reference method   01/26/2022 0 82 0 60 - 1 30 mg/dL Final     Comment:     Standardized to IDMS reference method     BP (!) 69/41   Pulse 82   Temp (!) 100 8 °F (38 2 °C)   Resp 21   Ht 5' 3" (1 6 m)   Wt 102 kg (223 lb 15 8 oz)   SpO2 97%   BMI 39 68 kg/m²   No intake/output data recorded  Lab Results   Component Value Date/Time    BUN 36 (H) 04/30/2022 12:41 AM    BUN 23 01/03/2019 11:56 AM    WBC 20 46 (H) 04/30/2022 12:41 AM    HGB 12 2 04/30/2022 12:41 AM    HCT 39 7 04/30/2022 12:41 AM    MCV 95 04/30/2022 12:41 AM     04/30/2022 12:41 AM     Temp Readings from Last 3 Encounters:   04/30/22 (!) 100 8 °F (38 2 °C)   01/27/22 98 9 °F (37 2 °C)   07/22/21 98 9 °F (37 2 °C) (Oral)     Vancomycin Days of Therapy: 1    Assessment/Plan  The patient is currently on vancomycin utilizing scheduled dosing based on adjusted body weight (due to obesity)  Baseline risks associated with therapy include: pre-existing renal impairment and advanced age  The patient is currently receiving 1000mg q24h (15mg/kg ABW) and is clinically appropriate and dose will be continued  Pharmacy will also follow closely for s/sx of nephrotoxicity, infusion reactions, and appropriateness of therapy  BMP and CBC will be ordered per protocol  Plan for trough as patient approaches steady state, prior to the 4th  dose at approximately 0300 on 5/3  Due to infection severity, will target a trough of 15-20 (appropriate for most indications)     Pharmacy will continue to follow the patients culture results and clinical progress daily      Toma Cruz, Pharmacist

## 2022-04-30 NOTE — ASSESSMENT & PLAN NOTE
Wt Readings from Last 3 Encounters:   04/30/22 98 7 kg (217 lb 9 5 oz)   01/27/22 101 kg (223 lb 3 2 oz)   07/22/21 116 kg (254 lb 13 6 oz)     · Last echo completed 01/24:  · Left Ventricle: Mildly increased left ventricular wall thickness with normal LV cavity size and systolic function  There is jpek-os-scci variability in contractility due to rhythm  EF 65%  No RWMA  Diastolic characterization was not assessed  · Aortic Valve: Aortic valve not well seen  Aortic calcification is noted  The measured trans aortic velocity suggests severe aortic stenosis  Trace insufficiency seen  · Mitral Valve: Mitral annular sclerosis, particularly involving the posterior aspect of the annulus with preserved leaflet excursion and mild insufficiency  · Tricuspid Valve: Grossly normal-appearing tricuspid leaflets with mild to moderate insufficiency  Measured trans tricuspid regurgitant velocity of 3 7 m/sec corresponds to a gradient of 56 mmHg  With addition of 10 mmHg presumed atrial pressure, estimated pulmonary pressure is increased at 66 mmHg  · Formal TTE ordered  · Elevated BNP 6272 with elevated HS troponin of 131  · Duiretic plan: Home dose Lasix 60 mg p o  BID on hold  Little response to IV lasix  Trial bumex IV now    · Daily weight

## 2022-04-30 NOTE — ASSESSMENT & PLAN NOTE
· Baseline creatinine 0 7-0 8  · Creatinine on presentation 1 43  · Holding home Lasix  · Avoid nephrotoxins  · Avoid hypotension  · Strict I&O, Barnett for accurate I&O  · Trend BUN and creatinine  · FEUrea 10 3%, suggests pre-renal etiology

## 2022-04-30 NOTE — CASE MANAGEMENT
Case Management Progress Note    Patient name Fabienne Osorio  Location /-01 MRN [id number]  : 1933 Date 2022       LOS (days): 0  Geometric Mean LOS (GMLOS) (days):   Days to [facility]:        OBJECTIVE:        Current admission status: Inpatient  Preferred Pharmacy:   [facility] [address]   [place] [address]  Phone: [phone] Fax: [phone] [facility] ,  [facility] [address] [place] [address] [address] [facility]  Phone: [phone] Fax: [phone]    Primary Care Provider: No primary care provider on file  Primary Insurance: Laverne MCDONNELL  Secondary Insurance:     PROGRESS NOTE:    Pt is from [facility], [facility] requesting a return call to obtain information as to how pt was functioning prior to admission     As per prior notes, pt is totally dependent     Pt has been at [facility] sine 2021

## 2022-04-30 NOTE — QUICK NOTE
Patient's daughter updated on critical condition of her mother Jose Alfredojf Mitesh  All questions answered

## 2022-04-30 NOTE — PROCEDURES
Arterial Line Insertion    Date/Time: 4/30/2022 3:45 AM  Performed by: JONO Sexton  Authorized by: JONO Sexton     Patient location:  ICU  Other Assisting Provider: No    Consent:     Consent obtained:  Emergent situation (Discussed with patient's Daughter [facility])    Consent given by:  Healthcare agent    Risks discussed:  Bleeding, infection, pain, ischemia and repeat procedure  Universal protocol:     Procedure explained and questions answered to patient or proxy's satisfaction: yes      Relevant documents present and verified: yes      Test results available and properly labeled: yes      Radiology Images displayed and confirmed  If images not available, report reviewed: yes      Required blood products, implants, devices, and special equipment available: yes      Site/side marked: yes      Immediately prior to procedure a time out was called: yes      Patient identity confirmed:  Arm band and anonymous protocol, patient vented/unresponsive  Indications:     Indications: hemodynamic monitoring, multiple ABGs and continuous blood pressure monitoring    Pre-procedure details:     Skin preparation:  Chlorhexidine    Preparation: Patient was prepped and draped in sterile fashion    Anesthesia (see MAR for exact dosages): Anesthesia method:  Local infiltration    Local anesthetic:  Lidocaine 1% w/o epi  Procedure details:     Location / Tip of Catheter:  Radial    Laterality:  Left    Brigido's test performed: yes      Needle gauge:  20 G    Placement technique:  Ultrasound guided and Seldinger    Ultrasound image availability:  Not obtained due to urgency    Number of attempts:  3    Successful placement: no (Unable to thread catheter)    Post-procedure details:     Post-procedure: pressure dressing applied  CMS:  Unchanged    Patient tolerance of procedure:   Tolerated well, no immediate complications

## 2022-04-30 NOTE — ASSESSMENT & PLAN NOTE
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  · Cultures pending: blood, urine, sputum, MRSA  · Urine strep and legionella negative  · PCT elevated at 3 86 - 5 95  · Started ceftriaxone at [facility] on 04/29  Initially placed on cefepime and doxycycline in ED  Changed to cefepime and vancomycin at time of admission  · Intubated ED for respiratory distress and worsening respiratory failure  · CT chest/abd/pelvis - Extensive bilateral pneumonia, left worse than right  There is mild mediastinal lymphadenopathy  Both brenna appear full, left more so than right, and there is suggestion of some extrinsic compression upon the bronchi as they pass through the brenna, left more so than right  Adenopathy and/or mass should be excluded  Short-term follow-up recommended  Pulmonology consultation and follow-up recommended  Small bilateral pleural effusions, right slightly larger than left  The right main pulmonary artery measures 3 7 cm diameter; this raises concern for pulmonary hypertension  There is an approximately 3 9 x 3 1 cm cystic structure containing internal septations arising from the lateral aspect lower pole right kidney  This appears similar in size to July 22, 2021  · UA suspicious UTI:  Nitrite positive, small amount of leukocytes, Innumerable wbc's, moderate bacteria  Culture pending  The 30ml/kg fluid bolus was not given to the patient despite hypotension and/or significantly elevated lactate of ? 4 and/or presence of septic shock due to: Concern for fluid/volume overload and severe aortic stenosis  The patient may receive additional colloid or crystalloid fluids thereafter based on clinical condition  Home lasix dose 60 mg PO BID  Given additional 80 mg IV in ED with 125 ml UOP  Bumex 2 mg IV ordered  Arterial line inserted with good pleth, reading 20 points SBP lower then cuff   Once MAP >65 on art line, patient began voiding  Norepinephrine started for hypotension  Currently off  Maintain goal MAP > 65

## 2022-04-30 NOTE — RESPIRATORY THERAPY NOTE
RT Ventilator Management Note  Romana Mingle 80 y o  female MRN: [id number]  Unit/Bed#: -01 Encounter: [id number]      Daily Screen    No data found in the last 10 encounters  Physical Exam:          Resp Comments: paged ot patient's room at 0029  Patient was placed on BiPAP per verbal order from ED physician  Patient  was tachypneic and was intubated based on her worsoning tachypnea, about 50 BPM   Patient was suctioned in line for a large amount of thickj red bloody sputum  Sputum clogged the flow sensor  Nurse then used BVM to oxygenat patient while genie sensor was changed  She was then transported to CT scan and then to her room in ICU  An ABG was drawn (R radial pos uri's)  Patient was suctioned twice more for a moderate amount of thick red sputum  A samole was collected in luken's tube for lab  PEEP increased from 6 to 10 based on ABG result and consult with critical care

## 2022-04-30 NOTE — H&P
[address] 1933, 80 y o  female MRN: [id number]  Unit/Bed#: -01 Encounter: [id number]  Primary Care Provider: No primary care provider on file  Date and time admitted to hospital: 4/30/2022 12:28 AM    * Septic shock (HCC)  Assessment & Plan  · A/E/B fever (101 5° C max), HR > 90, tachypnea (RR>20), SBP < 90  · Per patient's daughter, patient has been experiencing shortness of breath over the past 2 days with worsening respiratory distress today  Also reports that she was coughing up pink tinged sputum x1 day  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  · Cultures pending: blood, urine, sputum, MRSA, urine strep and legionella  · Elevated PCT: 3 86  · Started ce[facility]one at [facility] on 04[facility]  Initially placed on cefepime and doxycycline in ED  Changed to cefepime and vancomycin at time of admission  · Intubated ED for respiratory distress and worsening respiratory failure  · CT chest/abd/pelvis - Extensive bilateral pneumonia, left worse than right  There is mild mediastinal lymphadenopathy  Both brenna appear full, left more so than right, and there is suggestion of some extrinsic compression upon the bronchi as they pass through the brenna, left more so than right  Adenopathy and/or mass should be excluded  Short-term follow-up recommended  Pulmonology consultation and follow-up recommended  Small bilateral pleural effusions, right slightly larger than left  The right main pulmonary artery measures 3 7 cm diameter; this raises concern for pulmonary hypertension  There is an approximately 3 9 x 3 1 cm cystic structure containing internal septations arising from the lateral aspect lower pole right kidney  This appears similar in size to July 22, 2021  · UA suspicious UTI:  Nitrite positive, small amount of leukocytes, Innumerable wbc's, moderate bacteria    Culture pending  The 30ml/kg fluid bolus was not given to the patient despite hypotension and/or significantly elevated lactate of ? 4 and/or presence of septic shock due to: Concern for fluid/volume overload and severe aortic stenosis  The patient may receive additional colloid or crystalloid fluids thereafter based on clinical condition  Home lasix dose 60 mg PO BID  Given additional 80 mg IV in ED with 125 ml UOP  Bumex IV ordered  Monitor response  Norepinephrine started for hypotension  Currently requiring 0-3 mcg  Attempt to wean off  Maintain goal MAP > 65  HCAP (healthcare-associated pneumonia)  Assessment & Plan  · See plan under sepsis    UTI (urinary tract infection)  Assessment & Plan  · See plan under sepsis    Acute on chronic respiratory failure with hypoxia ([address] )  Assessment & Plan  · Likely multifactorial with bilateral pneumonia, bilateral pleural effusions and fluid overload  · At baseline patient wears 4 L nasal cannula  · Intubated in ED  · Mechanical vent bundle ordered  · Fentanyl and propofol for sedation  · SAT with SBT daily  · Continue treatment for healthcare associated pneumonia and fluid overload    Acute on chronic diastolic heart failure (HCC)  Assessment & Plan  Wt Readings from Last 3 Encounters:   04/30/22 98 7 kg (217 lb 9 5 oz)   01/27/22 101 kg (223 lb 3 2 oz)   07/22/21 116 kg (254 lb 13 6 oz)     · Last echo completed 01/24:  · Left Ventricle: Mildly increased left ventricular wall thickness with normal LV cavity size and systolic function  There is [id number] variability in contractility due to rhythm  EF 65%  No RWMA  Diastolic characterization was not assessed  · Aortic Valve: Aortic valve not well seen  Aortic calcification is noted  The measured trans aortic velocity suggests severe aortic stenosis  Trace insufficiency seen    · Mitral Valve: Mitral annular sclerosis, particularly involving the posterior aspect of the annulus with preserved leaflet excursion and mild insufficiency  · Tricuspid Valve: Grossly normal-appearing tricuspid leaflets with mild to moderate insufficiency  Measured trans tricuspid regurgitant velocity of 3 7 m/sec corresponds to a gradient of 56 mmHg  With addition of 10 mmHg presumed atrial pressure, estimated pulmonary pressure is increased at 66 mmHg  · Formal TTE ordered  · Elevated BNP 6272 with elevated HS troponin of 131  · Duiretic plan: Home dose Lasix 60 mg p o  BID on hold  Little response to IV lasix  Trial bumex IV now    · Daily weight    Pleural effusion, bilateral  Assessment & Plan  · Likely secondary to acute on chronic Heart Failure  · Seen on CT scan of chest  · Continue diuresis under acute on chronic heart failure plan    Pressure ulcer, stage 4 (HCC)  Assessment & Plan  · Stage IV sacral pressure ulcer present on admission  · Consider as possible source for sepsis however does not appear infected  · Wound care consult  · Consider General surgery consult    Paroxysmal atrial fibrillation (HCC)  Assessment & Plan  · History paroxysmal AFib on Eliquis  · Last dose of Eliquis was given 4/29  · Cont AC  · Currently AFib rate controlled in 70-80s    ANSELMO (acute kidney injury) ([facility] [address] )  Assessment & Plan  · Baseline creatinine 0 7-0 8  · Creatinine on presentation 1 43  · Holding home Lasix  · Avoid nephrotoxins  · Avoid hypotension  · Strict I&OAnibal for accurate I&O  · Trend BUN and creatinine    Hypertension  Assessment & Plan  · History of hypertension however currently hypotensive due to sepsis  · Hold home antihypertensives      -------------------------------------------------------------------------------------------------------------  Chief Complaint: respiratory distress from [facility]    History of Present Illness   HX and PE limited by: intubation/sedation  Amy Alexander is a 80 y o  female with PMHx of chronic respiratory failure on 4L NC at baseline, Afib on eliquis, HTN, HLD, sacral decubiti, who presents from [facility] with respiratory distress  Pt's daughter states she was having difficulty breathing the past 2 days and appeared to have worsening SOB yesterday  She was also coughing pink tinged sputum  [facility] started the pt on ceftriaxone on 04/29  EMS was called and pt was hypoxic with SPO2 84% on her baseline 4L NC  Pt was placed on CPAP and then transitioned to BIPAP once in ED  Pt was given solumedrol and lasix 80 mg IV  Severe Sepsis criteria met in ED  Pt was started on ceftriaxone and doxycycline  IV fluids were not administered due to fluid overload  Pt continue experience respiratory distress was intubated  Post intubation patient became hypotensive and patient then met septic shock criteria  Pt was initiated on norepinephrine and antibiotics were changed to cefepime and vancomycin  CT of the chest showed extensive bilateral pneumonia and bilateral pleural effusions  Further workup revealed urinalysis consistent with UTI, ANSELMO, elevated BNP and troponin  Pt admitted to [address] [place] [address] for continued work-up  History obtained from chart review and unobtainable from patient due to mental status and intubation   -------------------------------------------------------------------------------------------------------------  Dispo: Admit to Critical Care     Code Status: Level 1 - Full Code  Code status discussed with patient's daughter Abril Long  Per Abril Long, she is the POA and the pt has a living will - copy requested  --------------------------------------------------------------------------------------------------------------  Review of Systems    Review of systems was unable to be performed secondary to intubated and sedated    Physical Exam  Vitals reviewed  Constitutional:       General: She is not in acute distress  Appearance: She is ill-appearing  Interventions: She is sedated, intubated and restrained  HENT:      Head: Normocephalic and atraumatic        Nose: Nose normal  Mouth/Throat:      Mouth: Mucous membranes are moist       Pharynx: Oropharynx is clear  Eyes:      Conjunctiva/sclera: Conjunctivae normal       Pupils: Pupils are equal, round, and reactive to light  Cardiovascular:      Rate and Rhythm: Rhythm regularly irregular  Pulses: Normal pulses  Pulmonary:      Effort: No respiratory distress  She is intubated  Breath sounds: Rhonchi and rales present  Abdominal:      General: Bowel sounds are normal  There is no distension  Palpations: Abdomen is soft  Hernia: A hernia is present  Genitourinary:     Comments: Anibal  Musculoskeletal:      Cervical back: Normal range of motion  Right lower leg: Edema present  Left lower leg: Edema present  Skin:     General: Skin is warm and dry  Capillary Refill: Capillary refill takes less than 2 seconds  Neurological:      General: No focal deficit present  Mental Status: She is easily aroused  Comments: Following commands   Psychiatric:         Mood and Affect: Mood normal          Behavior: Behavior is cooperative        --------------------------------------------------------------------------------------------------------------  Vitals:   Vitals:    04/30/22 0332 04/30/22 0400 04/30/22 0415 04/30/22 0430   BP:  (!) 85/53 97/61 108/71   BP Location:  Right arm     Pulse:  87 86 78   Resp:  20 20 20   Temp:  (!) 101 5 °F (38 6 °C) (!) 101 1 °F (38 4 °C) (!) 100 8 °F (38 2 °C)   TempSrc:  Bladder     SpO2: 99% 97% 98% 99%   Weight:       Height:         Temp  Min: 97 5 °F (36 4 °C)  Max: 101 5 °F (38 6 °C)     Height: 5' 2" (157 5 cm)  Body mass index is 39 8 kg/m²      Laboratory and Diagnostics:  Results from last 7 days   Lab Units 04/30/22  0041   WBC Thousand/uL 20 46*   HEMOGLOBIN g/dL 12 2   HEMATOCRIT % 39 7   PLATELETS Thousands/uL 275   NEUTROS PCT % 88*   MONOS PCT % 9     Results from last 7 days   Lab Units 04/30/22  0041   SODIUM mmol/L 143   POTASSIUM mmol/L 3 4* CHLORIDE mmol/L 100   CO2 mmol/L 37*   ANION GAP mmol/L 6   BUN mg/dL 36*   CREATININE mg/dL 1 43*   CALCIUM mg/dL 9 5   GLUCOSE RANDOM mg/dL 134   ALT U/L 23   AST U/L 24   ALK PHOS U/L 83   ALBUMIN g/dL 2 9*   TOTAL BILIRUBIN mg/dL 0 79     Results from last 7 days   Lab Units 04/30/22  0041   MAGNESIUM mg/dL 1 7      Results from last 7 days   Lab Units 04/30/22  0041   INR  1 93*          Results from last 7 days   Lab Units 04/30/22  0041   LACTIC ACID mmol/L 1 8     ABG:  Results from last 7 days   Lab Units 04/30/22  0359   PH ART  7 483*   PCO2 ART mm Hg 46 2*   PO2 ART mm Hg 58 5*   HCO3 ART mmol/L 33 9*   BASE EXC ART mmol/L 9 1   ABG SOURCE  Radial, Right     VBG:  Results from last 7 days   Lab Units 04/30/22  0359 04/30/22 0041   PH IRENE   --  7 306   PCO2 IRENE mm Hg  --  66 2*   PO2 IRENE mm Hg  --  40 8   HCO3 IRENE mmol/L  --  32 3*   BASE EXC IRENE mmol/L  --  4 1   ABG SOURCE  Radial, Right  --      Results from last 7 days   Lab Units 04/30/22 0041   PROCALCITONIN ng/ml 3 86*       Micro:       Pending blood, urine, sputum, MRSA    EKG: Afib  Imaging: I have personally reviewed pertinent reports  and I have personally reviewed pertinent films in PACS  CT chest abdomen pelvis wo contrast   Final Result by Jan Li MD (04/30 0170)      Extensive bilateral pneumonia, left worse than right  There is mild mediastinal lymphadenopathy  Both brenna appear full, left more so than right, and there is suggestion of some extrinsic compression upon the bronchi as they pass through the brenna, left    more so than right  Adenopathy and/or mass should be excluded  Short-term follow-up recommended  Pulmonology consultation and follow-up recommended  Small bilateral pleural effusions, right slightly larger than left  The right main pulmonary artery measures 3 7 cm diameter; this raises concern for pulmonary hypertension        There is an approximately 3 9 x 3 1 cm cystic structure containing internal septations arising from the lateral aspect lower pole right kidney  This appears similar in size to July 22, 2021  Nonemergent outpatient follow-up is recommended  Other nonemergent findings, as described above  Please see discussion  This examination demonstrates findings for which imaging follow-up is recommended and was logged as such in Loc  The study was marked in [facility] for immediate notification                    Workstation performed: [id number]         XR chest 1 view portable   ED Interpretation by Joseph Zendejas DO (04/30 0128)   Diffuse bilateral patchy infiltrates            Historical Information   Past Medical History:   Diagnosis Date    Arthritis     Atrial fibrillation ([address] )     CHF (congestive heart failure) (HCC)     Hyperlipidemia     Hypertension     Pneumonia     PONV (postoperative nausea and vomiting)     Renal disorder      Past Surgical History:   Procedure Laterality Date    ANKLE FRACTURE SURGERY Left     CHOLECYSTECTOMY      JOINT REPLACEMENT Bilateral     TKR    REPLACEMENT TOTAL KNEE BILATERAL Bilateral      Social History   Social History     Substance and Sexual Activity   Alcohol Use Never     Social History     Substance and Sexual Activity   Drug Use Never     Social History     Tobacco Use   Smoking Status Never Smoker   Smokeless Tobacco Never Used     Exercise History: unable to assess due to intubation/sedation  Family History:   Family History   Problem Relation Age of Onset    Alcohol abuse Neg Hx     Arthritis Neg Hx     Asthma Neg Hx     Birth defects Neg Hx     Cancer Neg Hx     COPD Neg Hx     Depression Neg Hx     Diabetes Neg Hx     Drug abuse Neg Hx     Early death Neg Hx     Hearing loss Neg Hx     Heart disease Neg Hx     Hyperlipidemia Neg Hx     Hypertension Neg Hx     Kidney disease Neg Hx     Learning disabilities Neg Hx     Mental illness Neg Hx     Mental retardation Neg Hx     Miscarriages / Stillbirths Neg Hx     Stroke Neg Hx     Vision loss Neg Hx      I have reviewed this patient's family history and commented on sigificant items within the HPI      Medications:  Current Facility-Administered Medications   Medication Dose Route Frequency    acetaminophen (TYLENOL) oral suspension 650 mg  650 mg Per NG Tube Q6H PRN    apixaban (ELIQUIS) tablet 5 mg  5 mg Per NG Tube BID    busPIRone (BUSPAR) tablet 5 mg  5 mg Oral TID    cefepime (MAXIPIME) IVPB (premix in dextrose) 2,000 mg 50 mL  2,000 mg Intravenous Q12H    chlorhexidine (PERIDEX) 0 12 % oral rinse 15 mL  15 mL Mouth/Throat Q12H [facility]    fentaNYL 1000 mcg in sodium chloride 0 9% 100mL infusion  50 mcg/hr Intravenous Continuous    guaiFENesin (ROBITUSSIN) oral solution 200 mg  200 mg Per NG Tube Q6H [facility]    ipratropium (ATROVENT) 0 02 % inhalation solution 0 5 mg  0 5 mg Nebulization Q6H    levalbuterol (XOPENEX) inhalation solution 1 25 mg  1 25 mg Nebulization Q6H    norepinephrine (LEVOPHED) 4 mg (STANDARD CONCENTRATION) IV in sodium chloride 0 9% 250 mL  1-30 mcg/min Intravenous Titrated    polyethylene glycol (MIRALAX) packet 17 g  17 g Per NG Tube Daily    propofol (DIPRIVAN) 1000 mg in 100 mL infusion (premix)  5-50 mcg/kg/min Intravenous Titrated    senna oral syrup 8 8 mg  8 8 mg Per NG Tube HS    vancomycin (VANCOCIN) IVPB (premix in dextrose) 1,000 mg 200 mL  15 mg/kg (Adjusted) Intravenous Q24H     Home medications:  Prior to Admission Medications   Prescriptions Last Dose Informant Patient Reported? Taking?    Benzocaine 15 MG LOZG Unknown at Unknown time  Yes No   Sig: Apply 1 lozenge to the mouth or throat every 6 (six) hours as needed   Calcium Carbonate Antacid (TUMS ULTRA PO) 4/29/2022 at Unknown time  Yes Yes   Sig: Take 2 tablets by mouth 2 (two) times a day   Cholecalciferol 1 25 MG (23350 UT) capsule 4/6/2022 at Unknown time  Yes No   Sig: Take 50,000 Units by mouth every 30 (thirty) days   Diclofenac Sodium (VOLTAREN) 1 % 4/29/2022 at Unknown time  Yes Yes   Sig: Apply 2 g topically 2 (two) times a day   Potassium Chloride ER 20 MEQ TBCR 4/29/2022 at Unknown time  Yes Yes   Sig: Take 1 tablet by mouth daily at bedtime   acetaminophen (TYLENOL) 325 mg tablet Unknown at Unknown time  Yes No   Sig: Take 650 mg by mouth every 4 (four) hours as needed for mild pain or fever   amLODIPine (NORVASC) 5 mg tablet 4/29/2022 at Unknown time  Yes Yes   Sig: Take 5 mg by mouth daily   apixaban (ELIQUIS) 5 mg 4/29/2022 at Unknown time Self Yes Yes   Sig: Take 5 mg by mouth 2 (two) times a day   baclofen 10 mg tablet 4/29/2022 at Unknown time  Yes Yes   Sig: Take 10 mg by mouth 3 (three) times a day   bisacodyl (FLEET) 10 MG/30ML ENEM Unknown at Unknown time  Yes No   Sig: Insert 10 mg into the rectum if needed for constipation   busPIRone (BUSPAR) 5 mg tablet 4/29/2022 at Unknown time  Yes Yes   Sig: Take 5 mg by mouth 3 (three) times a day   cefTRIAXone 250 mg/mL in lidocaine (PF) IM syringe 4/29/2022 at Unknown time  Yes Yes   Sig: Inject 1 g into a muscle every 24 hours Started 4/29/22 for 4 days   cloNIDine (CATAPRES) 0 1 mg tablet 4/29/2022 at Unknown time  No Yes   Sig: Take 1 tablet (0 1 mg total) by mouth in the morning   fentaNYL (DURAGESIC) 75 mcg/hr 4/27/2022 at Unknown time  Yes Yes   Sig: Place 1 patch on the skin every third day   furosemide (LASIX) 20 mg tablet 4/29/2022 at Unknown time  No Yes   Sig: Take 3 tablets (60 mg total) by mouth daily   Patient taking differently: Take 60 mg by mouth 2 (two) times a day     guaiFENesin 200 MG tablet 4/29/2022 at Unknown time  Yes Yes   Sig: Take 200 mg by mouth 4 (four) times a day   ipratropium (ATROVENT) 0 02 % nebulizer solution 4/29/2022 at Unknown time Self Yes Yes   Sig: Take 0 5 mg by nebulization 4 (four) times a day   levalbuterol (XOPENEX) 1 25 mg/3 mL nebulizer solution 4/29/2022 at Unknown time Self Yes Yes   Sig: Take 1 25 mg by nebulization 3 (three) times a day magnesium hydroxide (MILK OF MAGNESIA) 400 mg/5 mL oral suspension 2022 at Unknown time  Yes Yes   Sig: Take by mouth daily as needed for constipation   melatonin 3 mg 2022 at Unknown time Self Yes Yes   Sig: Take 3 mg by mouth daily at bedtime   menthol-cetylpyridinium (CEPACOL) 3 MG lozenge Unknown at Unknown time  Yes No   Sig: Take 1 lozenge by mouth as needed for sore throat   polyethylene glycol (GLYCOLAX) powder 2022 at Unknown time  No Yes   Sig: Take 17 g by mouth 4 (four) times a day Take 4 times per day until a satisfying bowel movement and then switch to 1 time per day  Patient taking differently: Take 17 g by mouth every other day Take 4 times per day until a satisfying bowel movement and then switch to 1 time per day  potassium chloride (K-DUR,KLOR-CON) 20 mEq tablet 2022 at Unknown time  No Yes   Sig: Take 1 tablet (20 mEq total) by mouth daily   senna (SENOKOT) 8 6 MG tablet Unknown at Unknown time  Yes No   Sig: Take 1 tablet by mouth 2 (two) times a day as needed for constipation   sodium chloride (OCEAN) 0 65 % nasal spray Unknown at Unknown time  Yes No   Si sprays into each nostril every 6 (six) hours as needed for congestion      Facility-Administered Medications: None     Allergies:   Allergies   Allergen Reactions    Azithromycin Other (See Comments)     unknown    Cephalexin Other (See Comments)     unknown    Ciprofloxacin Other (See Comments)     unknown    Sulfa Antibiotics Other (See Comments)     unknown    Vancomycin Rash       ------------------------------------------------------------------------------------------------------------  Advance Directive and Living Will:      Power of :    POLST:    ------------------------------------------------------------------------------------------------------------  Anticipated Length of Stay is > 2 midnights    Care Time Delivered:   Upon my evaluation, this patient had a high probability of imminent or life-threatening deterioration due to Acute on chronic hypoxic respiratory failure and septic shock, which required my direct attention, intervention, and personal management  I have personally provided 60 minutes (0400 to 0500) of critical care time, exclusive of procedures, teaching, family meetings, and any prior time recorded by providers other than myself  JONO Lei        Portions of the record may have been created with voice recognition software  Occasional wrong word or "sound a like" substitutions may have occurred due to the inherent limitations of voice recognition software    Read the chart carefully and recognize, using context, where substitutions have occurred

## 2022-04-30 NOTE — ASSESSMENT & PLAN NOTE
· A/E/B fever (101 5° C max), HR > 90, tachypnea (RR>20), SBP < 90  · Per patient's daughter, patient has been experiencing shortness of breath over the past 2 days with worsening respiratory distress today  Also reports that she was coughing up pink tinged sputum x1 day  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  · Cultures pending: blood, urine, sputum, MRSA, urine strep and legionella  · Started ceftriaxone at [facility] on 04/29  Initially placed on cefepime and doxycycline in ED  Changed to cefepime and vancomycin at time of admission  · Intubated ED for respiratory distress and worsening respiratory failure  · CT chest/abd/pelvis - Extensive bilateral pneumonia, left worse than right  There is mild mediastinal lymphadenopathy  Both brenna appear full, left more so than right, and there is suggestion of some extrinsic compression upon the bronchi as they pass through the brenna, left more so than right  Adenopathy and/or mass should be excluded  Short-term follow-up recommended  Pulmonology consultation and follow-up recommended  Small bilateral pleural effusions, right slightly larger than left  The right main pulmonary artery measures 3 7 cm diameter; this raises concern for pulmonary hypertension  There is an approximately 3 9 x 3 1 cm cystic structure containing internal septations arising from the lateral aspect lower pole right kidney  This appears similar in size to July 22, 2021  · UA suspicious UTI:  Nitrite positive, small amount of leukocytes, Innumerable wbc's, moderate bacteria  Culture pending  The 30ml/kg fluid bolus was not given to the patient despite hypotension and/or significantly elevated lactate of ? 4 and/or presence of septic shock due to: Concern for fluid/volume overload and severe aortic stenosis   The patient may receive additional colloid or crystalloid fluids thereafter based on clinical condition  Home lasix dose 60 mg PO BID  Given additional 80 mg IV in ED with 125 ml UOP  Bumex IV ordered  Monitor response  Norepinephrine started for hypotension  Currently requiring 0-3 mcg  Attempt to wean off  Maintain goal MAP > 65

## 2022-04-30 NOTE — ASSESSMENT & PLAN NOTE
· History paroxysmal AFib on Eliquis  · Last dose of Eliquis was given 4/29  · Start heparin gtt  · Currently AFib rate controlled in 70-80s

## 2022-04-30 NOTE — PROCEDURES
Arterial Line Insertion    Date/Time: 4/30/2022 7:14 AM  Performed by: Verito Munson PA-C  Authorized by: Verito Munson PA-C     Patient location:  Bedside  Consent:     Consent obtained:  Emergent situation    Risks discussed:  Bleeding, pain, infection, ischemia and repeat procedure  Universal protocol:     Relevant documents present and verified: yes      Test results available and properly labeled: yes      Radiology Images displayed and confirmed  If images not available, report reviewed: yes      Required blood products, implants, devices, and special equipment available: yes      Site/side marked: yes      Immediately prior to procedure a time out was called: yes      Patient identity confirmed: Anonymous protocol, patient vented/unresponsive and arm band  Indications:     Indications: hemodynamic monitoring and multiple ABGs    Pre-procedure details:     Skin preparation:  Chlorhexidine    Preparation: Patient was prepped and draped in sterile fashion    Anesthesia (see MAR for exact dosages): Anesthesia method:  Local infiltration    Local anesthetic:  Lidocaine 1% w/o epi  Procedure details:     Location / Tip of Catheter:  Radial    Laterality:  Right    Brigido's test performed: yes      Brigido's test abnormal: no      Needle gauge:  18 G    Placement technique:  Ultrasound guided    Ultrasound image availability:  Not obtained due to urgency    Sterile ultrasound techniques: Sterile gel and sterile probe covers were used      Number of attempts:  2    Successful placement: yes      Transducer: waveform confirmed    Post-procedure details:     Post-procedure:  Biopatch applied, sterile dressing applied, sutured and wrist guard applied    CMS:  Normal, numbness, weakness, unchanged, decreased circulation and unable to assess    Patient tolerance of procedure:   Tolerated well, no immediate complications

## 2022-04-30 NOTE — ASSESSMENT & PLAN NOTE
· History of hypertension however currently hypotensive due to sepsis  · Hold home antihypertensives

## 2022-04-30 NOTE — ASSESSMENT & PLAN NOTE
· History paroxysmal AFib on Eliquis  · Last dose of Eliquis was given 4/29  · Cont AC  · Currently AFib rate controlled in 70-80s

## 2022-04-30 NOTE — ASSESSMENT & PLAN NOTE
· There is an approximately 3 9 x 3 1 cm cystic structure containing internal septations arising from the lateral aspect lower pole right kidney  This appears similar in size to July 22, 2021  · Outpatient follow-up recommended

## 2022-04-30 NOTE — PLAN OF CARE
Problem: Potential for Falls  Goal: Patient will remain free of falls  Description: INTERVENTIONS:  - Educate patient/family on patient safety including physical limitations  - Instruct patient to call for assistance with activity   - Consult OT/PT to assist with strengthening/mobility   - Keep Call bell within reach  - Keep bed low and locked with side rails adjusted as appropriate  - Keep care items and personal belongings within reach  - Initiate and maintain comfort rounds  - Make Fall Risk Sign visible to staff  - Offer Toileting every 2 Hours, in advance of need  - Initiate/Maintain bed/chair alarm  - Apply yellow socks and bracelet for high fall risk patients  - Consider moving patient to room near nurses station  Outcome: Progressing     Problem: PAIN - ADULT  Goal: Verbalizes/displays adequate comfort level or baseline comfort level  Description: Interventions:  - Encourage patient to monitor pain and request assistance  - Assess pain using appropriate pain scale  - Administer analgesics based on type and severity of pain and evaluate response  - Implement non-pharmacological measures as appropriate and evaluate response  - Consider cultural and social influences on pain and pain management  - Notify physician/advanced practitioner if interventions unsuccessful or patient reports new pain  Outcome: Progressing     Problem: INFECTION - ADULT  Goal: Absence or prevention of progression during hospitalization  Description: INTERVENTIONS:  - Assess and monitor for signs and symptoms of infection  - Monitor lab/diagnostic results  - Monitor all insertion sites, i e  indwelling lines, tubes, and drains  - Monitor endotracheal if appropriate and nasal secretions for changes in amount and color  - Se[place] appropriate cooling/warming therapies per order  - Administer medications as ordered  - Instruct and encourage patient and family to use good hand hygiene technique  - Identify and instruct in appropriate isolation precautions for identified infection/condition  Outcome: Progressing     Problem: SAFETY ADULT  Goal: Patient will remain free of falls  Description: INTERVENTIONS:  - Educate patient/family on patient safety including physical limitations  - Instruct patient to call for assistance with activity   - Consult OT/PT to assist with strengthening/mobility   - Keep Call bell within reach  - Keep bed low and locked with side rails adjusted as appropriate  - Keep care items and personal belongings within reach  - Initiate and maintain comfort rounds  - Make Fall Risk Sign visible to staff  - Offer Toileting every 2 Hours, in advance of need  - Initiate/Maintain bed/chair alarm  - Apply yellow socks and bracelet for high fall risk patients  - Consider moving patient to room near nurses station  Outcome: Progressing  Goal: Maintain or return to baseline ADL function  Description: INTERVENTIONS:  -  Assess patient's ability to carry out ADLs; assess patient's baseline for ADL function and identify physical deficits which impact ability to perform ADLs (bathing, care of mouth/teeth, toileting, grooming, dressing, etc )  - Assess/evaluate cause of self-care deficits   - Assess range of motion  - Assess patient's mobility; develop plan if impaired  - Assess patient's need for assistive devices and provide as appropriate  - Encourage maximum independence but intervene and supervise when necessary  - Involve family in performance of ADLs  - Assess for home care needs following discharge   - Consider OT consult to assist with ADL evaluation and planning for discharge  - Provide patient education as appropriate  Outcome: Progressing  Goal: Maintains/Returns to pre admission functional level  Description: INTERVENTIONS:  - Perform BMAT or MOVE assessment daily    - Set and communicate daily mobility goal to care team and patient/family/caregiver     - Collaborate with rehabilitation services on mobility goals if consulted  - Perform Range of Motion 3 times a day  - Reposition patient every 2 hours  - Out of bed for toileting  - Record patient progress and toleration of activity level   Outcome: Progressing     Problem: Knowledge Deficit  Goal: Patient/family/caregiver demonstrates understanding of disease process, treatment plan, medications, and discharge instructions  Description: Complete learning assessment and assess knowledge base    Interventions:  - Provide teaching at level of understanding  - Provide teaching via preferred learning methods  Outcome: Progressing     Problem: CARDIOVASCULAR - ADULT  Goal: Maintains optimal cardiac output and hemodynamic stability  Description: INTERVENTIONS:  - Monitor I/O, vital signs and rhythm  - Monitor for S/S and trends of decreased cardiac output  - Administer and titrate ordered vasoactive medications to optimize hemodynamic stability  - Assess quality of pulses, skin color and temperature  - Assess for signs of decreased coronary artery perfusion  - Instruct patient to report change in severity of symptoms  Outcome: Progressing  Goal: Absence of cardiac dysrhythmias or at baseline rhythm  Description: INTERVENTIONS:  - Continuous cardiac monitoring, vital signs, obtain 12 lead EKG if ordered  - Administer antiarrhythmic and heart rate control medications as ordered  - Monitor electrolytes and administer replacement therapy as ordered  Outcome: Progressing     Problem: RESPIRATORY - ADULT  Goal: Achieves optimal ventilation and oxygenation  Description: INTERVENTIONS:  - Assess for changes in respiratory status  - Assess for changes in mentation and behavior  - Position to facilitate oxygenation and minimize respiratory effort  - Oxygen administered by appropriate delivery if ordered  - Initiate smoking cessation education as indicated  - Encourage broncho-pulmonary hygiene including cough, deep breathe, Incentive Spirometry  - Assess the need for suctioning and aspirate as needed  - Assess and instruct to report SOB or any respiratory difficulty  - Respiratory Therapy support as indicated  Outcome: Progressing     Problem: METABOLIC, FLUID AND ELECTROLYTES - ADULT  Goal: Electrolytes maintained within normal limits  Description: INTERVENTIONS:  - Monitor labs and assess patient for signs and symptoms of electrolyte imbalances  - Administer electrolyte replacement as ordered  - Monitor response to electrolyte replacements, including repeat lab results as appropriate  - Instruct patient on fluid and nutrition as appropriate  Outcome: Progressing  Goal: Fluid balance maintained  Description: INTERVENTIONS:  - Monitor labs   - Monitor I/O and WT  - Instruct patient on fluid and nutrition as appropriate  - Assess for signs & symptoms of volume excess or deficit  Outcome: Progressing  Goal: Glucose maintained within target range  Description: INTERVENTIONS:  - Monitor Blood Glucose as ordered  - Assess for signs and symptoms of hyperglycemia and hypoglycemia  - Administer ordered medications to maintain glucose within target range  - Assess nutritional intake and initiate nutrition service referral as needed  Outcome: Progressing     Problem: SKIN/TISSUE INTEGRITY - ADULT  Goal: Pressure injury heals and does not worsen  Description: Interventions:  - Implement low air loss mattress or specialty surface (Criteria met)  - Apply silicone foam dressing  - Apply fecal or urinary incontinence containment device     - Utilize friction reducing device or surface for transfers     - Consider nutrition services referral as needed  Outcome: Progressing

## 2022-04-30 NOTE — ASSESSMENT & PLAN NOTE
· Likely multifactorial with bilateral pneumonia, bilateral pleural effusions and fluid overload  · At baseline patient wears 4 L nasal cannula  · Intubated in ED  · Mechanical vent bundle ordered  · Fentanyl and propofol for sedation  · SAT with SBT daily  · Continue treatment for healthcare associated pneumonia and fluid overload  · Consider pulmonary consult for CT results suggestive of extrinsic compression  upon the bronchi as they pass through the brenna, left more so than right  Adenopathy and/or mass should be excluded  Short-term follow-up recommended

## 2022-04-30 NOTE — ASSESSMENT & PLAN NOTE
· Stage IV sacral pressure ulcer present on admission  · Consider as possible source for sepsis however does not appear infected  · Wound care consult  · Consider General surgery consult

## 2022-04-30 NOTE — ASSESSMENT & PLAN NOTE
· Likely secondary to acute on chronic Heart Failure  · Seen on CT scan of chest  · Continue diuresis under acute on chronic heart failure plan

## 2022-04-30 NOTE — RESPIRATORY THERAPY NOTE
04/30/22 0843   Vent Information   Vent ID [id number]   Vent type Thurman G5   APVcmv Settings   Resp Rate (BPM) 20 BPM   VT (mL) 400 mL   %TI (%) 33 %   FIO2 (%) 50 %   PEEP (cmH2O) 10 cmH2O   Insp Resistance 14   P-ramp (ms) 50 (ms)   Flow Trigger (LPM) -3 LPM   Humidification Heater   Heater Temp 95 °F (35 °C)   APVcmv Actuals   Resp Rate (BPM) 20 BPM   VT (mL) 398 mL   MV (Obs) 7 9   MAP (cmH2O) 15 cmH2O   Peak Pressure (cmH2O) 26 cmH2O   I:E Ratio (Obs) 1:2   Static Compliance (mL/cmH20) 27 7 mL/cmH2O   Plateau Pressure (cm H2O) 28 cm H2O   Heater Temperature (Obs) 95 °F (35 °C)   ETT  7 5 mm   Placement Date/Time: 04/30/22 0052   Preoxygenated: Yes  Tube Size: 7 5 mm  Laryngoscope: Mac  Insertion: Atraumatic  Insertion attempts: 1  Placement Verification: Auscultation; Chest x-ray;Symmetrical chest wall movement  Secured at (cm): 21 at the l  Secured at (cm) 23   Measured from Lips   Secured Location Right   Secured by Commercial tube ochoa     Patient sedated and resting comfortably  BS Diminished/Coarse  SPO2 99%  Titrated PEEP to 8cmH2O   ABG @ 8:25 7 55/37 5/109/32 7

## 2022-04-30 NOTE — ASSESSMENT & PLAN NOTE
· Baseline creatinine 0 7-0 8  · Creatinine on presentation 1 43  · Holding home Lasix  · Avoid nephrotoxins  · Avoid hypotension  · Strict I&O, Barnett for accurate I&O  · Trend BUN and creatinine

## 2022-05-01 NOTE — ASSESSMENT & PLAN NOTE
Wt Readings from Last 3 Encounters:   05/01/22 98 2 kg (216 lb 7 9 oz)   01/27/22 101 kg (223 lb 3 2 oz)   07/22/21 116 kg (254 lb 13 6 oz)     · Last echo completed 01/24:  · Left Ventricle: Mildly increased left ventricular wall thickness with normal LV cavity size and systolic function  There is [id number] variability in contractility due to rhythm  EF 65%  No RWMA  Diastolic characterization was not assessed  · Aortic Valve: Aortic valve not well seen  Aortic calcification is noted  The measured trans aortic velocity suggests severe aortic stenosis  Trace insufficiency seen  · Mitral Valve: Mitral annular sclerosis, particularly involving the posterior aspect of the annulus with preserved leaflet excursion and mild insufficiency  · Tricuspid Valve: Grossly normal-appearing tricuspid leaflets with mild to moderate insufficiency  Measured trans tricuspid regurgitant velocity of 3 7 m/sec corresponds to a gradient of 56 mmHg  With addition of 10 mmHg presumed atrial pressure, estimated pulmonary pressure is increased at 66 mmHg  · Formal TTE ordered  · Elevated BNP 6272 with elevated HS troponin of 131  · Duiretic plan: Home dose Lasix 60 mg p o  BID on hold  Cont gentle diuresis with IV lasix PRN    · Daily weight

## 2022-05-01 NOTE — ASSESSMENT & PLAN NOTE
· History paroxysmal AFib on Eliquis  · Last dose of Eliquis was given 4/29, started on heparin gtt on admission  · Cont heparin gtt  · Currently AFib rate controlled in 70-80s

## 2022-05-01 NOTE — ASSESSMENT & PLAN NOTE
· Baseline creatinine 0 7-0 8  · Creatinine on presentation 1 43  · Avoid nephrotoxins  · Avoid hypotension  · Strict I&O, Barnett for accurate I&O  · Trend BUN and creatinine  · FEUrea 10 3%, suggests pre-renal etiology

## 2022-05-01 NOTE — UTILIZATION REVIEW
Initial Clinical Review    Admission: Date/Time/Statement:   Admission Orders (From admission, onward)     Ordered        04/30/22 0205  Inpatient Admission  Once                      Orders Placed This Encounter   Procedures    Inpatient Admission     Standing Status:   Standing     Number of Occurrences:   1     Order Specific Question:   Level of Care     Answer:   Critical Care [15]     Order Specific Question:   Estimated length of stay     Answer:   More than 2 Midnights     Order Specific Question:   Certification     Answer:   I certify that inpatient services are medically necessary for this patient for a duration of greater than two midnights  See H&P and MD Progress Notes for additional information about the patient's course of treatment  ED Arrival Information     Expected Arrival Acuity    - 4/30/2022 00:28 Emergent         Means of arrival Escorted by Service Admission type    Ambulance [facility] Emergency         Arrival complaint            Chief Complaint   Patient presents with    Shortness of Breath     All day SOB that got worse  Initial Presentation: 80 y o  female presents to ED via  EMS  From [facility] with respiratory distress  Daughter states patient having difficulty breathing for past 2 days, seemed worse the day prior to admission  Cough noted with  Pink tinged sputum  Started on CHRISTI  On  4/29 at [facility]  Found hypoxic  By  EMS with sats  84  % on baseline  4L  NC,  Placed on  CPAP and transitioned to Bipap in ED  Given IV  S/medrol and IV lasix  in ED  Met  Severe sepsis criteria  In ED with tachypnea, tachycardia,  Hypotension, temp  Had persistent respiratory distress in ED and intubated  Became  Hypotensive post intubation and met septic shock criteria  Started on norepinephrine and  CHRISTI  Ct scan showed extensive bilateral pneumonia and bilateral pleural effusions  U/A  Consistent with UTI/ANSELMO   Admit  Ip ICU LOC  With  Septic Shock, UTI, Pneumonia, Acute/chronic respiratory failure with hypoxia and  Acute/chronic diastolic heart failure and plan is vent support,  CHRISTI,  IV  Bumex, norepinephrine,  Monitor labs and vital signs, blood/urine/sputum cultures and fentanyl/propofol  Date:    5/1        Day 2:   Remains intubated  Currently on propofol and fentanyl, norepinephrine off  On IV heparin  Continue current meds/treatment plan      ED Triage Vitals   Temperature Pulse Respirations Blood Pressure SpO2   04/30/22 0032 04/30/22 0037 04/30/22 0032 04/30/22 0037 04/30/22 0032   98 °F (36 7 °C) 95 (!) 40 138/74 91 %      Temp Source Heart Rate Source Patient Position - Orthostatic VS BP Location FiO2 (%)   04/30/22 0032 04/30/22 0130 04/30/22 0032 04/30/22 0032 --   Temporal Monitor Lying Right arm       Pain Score       04/30/22 0032       No Pain          Wt Readings from Last 1 Encounters:   05/01/22 98 2 kg (216 lb 7 9 oz)     Additional Vital Signs:   05/01/22 0212 -- -- -- -- -- -- -- 99 % -- -- --   05/01/22 0210 -- -- -- -- -- -- -- 99 % -- -- --   05/01/22 0200 99 3 °F (37 4 °C) 90 18 135/67 92 110/62 80 mmHg 99 % -- -- --   05/01/22 0100 99 7 °F (37 6 °C) 94 19 115/60 81 103/59 76 mmHg 99 % -- -- --   05/01/22 0000 99 7 °F (37 6 °C) 94 18 148/76 105 126/66 88 mmHg 98 % -- -- --   04/30/22 2241 99 9 °F (37 7 °C) 97 16 143/76 100 -- -- 97 % -- -- Lying   04/30/22 2200 99 9 °F (37 7 °C) 95 16 140/76 99 107/61 80 mmHg 98 % -- -- --   04/30/22 2100 99 7 °F (37 6 °C) 96 17 136/65 93 106/61 78 mmHg 97 % -- -- --   04/30/22 2000 99 7 °F (37 6 °C) 95 19 131/79 99 123/66 88 mmHg 98 % -- -- --   04/30/22 1929 -- -- -- -- -- -- -- 98 % -- -- --   04/30/22 1928 -- -- -- -- -- -- -- 98 % -- -- --   04/30/22 1900 99 7 °F (37 6 °C) 92 17 -- -- 116/61 82 mmHg 98 % Ventilator -- --   04/30/22 1830 99 9 °F (37 7 °C) 94 17 -- -- 113/61 81 mmHg 98 % -- -- --   04/30/22 1815 99 9 °F (37 7 °C) 94 16 -- -- 114/62 82 mmHg 98 % -- -- --   04/30/22 1800 100 °F (37 8 °C) 93 17 121/64 86 111/60 79 mmHg 98 % -- -- --   04/30/22 1758 -- -- -- 121/64 -- -- -- -- -- -- --   04/30/22 1715 100 °F (37 8 °C) 92 17 -- -- 116/59 80 mmHg 98 % -- -- --   04/30/22 1615 100 2 °F (37 9 °C) 91 16 -- -- 113/59 79 mmHg 98 % -- -- --   04/30/22 1600 100 2 °F (37 9 °C) 85 16 -- -- 113/61 81 mmHg 97 % -- -- --   04/30/22 1516 -- 98 18 117/67 97 -- -- -- -- -- Lying   04/30/22 1445 100 °F (37 8 °C) 86 16 -- -- 122/63 84 mmHg 98 % -- -- --   04/30/22 1430 100 °F (37 8 °C) 84 18 -- -- 126/64 86 mmHg 98 % -- -- --   04/30/22 1415 100 °F (37 8 °C) 88 18 -- -- 120/62 83 mmHg 98 % -- -- --   04/30/22 1345 100 °F (37 8 °C) 84 22 -- -- 123/62 83 mmHg 99 % -- -- --   04/30/22 1330 100 °F (37 8 °C) 85 18 -- -- 124/63 84 mmHg 99 % -- -- --   04/30/22 1315 100 °F (37 8 °C) 84 16 -- -- 120/61 81 mmHg 98 % -- -- --   04/30/22 1300 100 °F (37 8 °C) 88 17 -- -- 126/64 86 mmHg 98 % -- -- --   04/30/22 1230 100 °F (37 8 °C) 84 20 -- -- 108/56 74 mmHg 99 % -- -- --   04/30/22 1215 99 9 °F (37 7 °C) 83 20 -- -- 100/54 71 mmHg 98 % --         Pertinent Labs/Diagnostic Test Results:   EKG  ( 4/30)  Atrial fibrillation    HR  94    Normal QRS  Normal T waves  XR chest portable ICU   Final Result by Salley Councilman, MD (04/30 1041)      Right jugular catheter in SVC with no pneumothorax  Severe bilateral pneumonia  Workstation performed: [id number]         CT chest abdomen pelvis wo contrast   Final Result by Andra Baca MD (04/30 0231)      Extensive bilateral pneumonia, left worse than right  There is mild mediastinal lymphadenopathy  Both brenna appear full, left more so than right, and there is suggestion of some extrinsic compression upon the bronchi as they pass through the brenna, left    more so than right  Adenopathy and/or mass should be excluded  Short-term follow-up recommended  Pulmonology consultation and follow-up recommended        Small bilateral pleural effusions, right slightly larger than left  The right main pulmonary artery measures 3 7 cm diameter; this raises concern for pulmonary hypertension  There is an approximately 3 9 x 3 1 cm cystic structure containing internal septations arising from the lateral aspect lower pole right kidney  This appears similar in size to July 22, 2021  Nonemergent outpatient follow-up is recommended  Other nonemergent findings, as described above  Please see discussion  This examination demonstrates findings for which imaging follow-up is recommended and was logged as such in Takes  The study was marked in [facility] for immediate notification  Workstation performed: [id number]         XR chest 1 view portable   ED Interpretation by Leah Cortez DO (04/30 0128)   Diffuse bilateral patchy infiltrates      Final Result by Lima Rico MD (04/30 1040)      Severe bilateral pneumonia, greater on the left  ET tube 3 cm above the kaur                    Workstation performed: [id number]           Results from last 7 days   Lab Units 04/30/22  0044   SARS-COV-2  Negative     Results from last 7 days   Lab Units 05/01/22  0404 04/30/22  0610 04/30/22  0041   WBC Thousand/uL 12 24* 13 50* 20 46*   HEMOGLOBIN g/dL 9 6* 10 6* 12 2   HEMATOCRIT % 30 2* 33 0* 39 7   PLATELETS Thousands/uL 222 225 275   NEUTROS ABS Thousands/µL  --   --  18 04*         Results from last 7 days   Lab Units 05/01/22  0404 04/30/22  1219 04/30/22  0610 04/30/22  0041   SODIUM mmol/L 140 141 141 143   POTASSIUM mmol/L 2 8* 3 6 3 0* 3 4*   CHLORIDE mmol/L 101 103 101 100   CO2 mmol/L 34* 34* 32 37*   ANION GAP mmol/L 5 4 8 6   BUN mg/dL 44* 46* 43* 36*   CREATININE mg/dL 1 24 1 33* 1 42* 1 43*   EGFR ml/min/1 73sq m 38 35 32 32   CALCIUM mg/dL 8 5 8 5 8 9 9 5   MAGNESIUM mg/dL 2 0  --  1 7 1 7     Results from last 7 days   Lab Units 04/30/22  0041   AST U/L 24   ALT U/L 23   ALK PHOS U/L 83   TOTAL PROTEIN g/dL 7 6   ALBUMIN g/dL 2 9* TOTAL BILIRUBIN mg/dL 0 79     Results from last 7 days   Lab Units 05/01/22  0513 04/30/22  2357 04/30/22  1739 04/30/22  0606 04/30/22  0049   POC GLUCOSE mg/dl 137 132 138 199* 160*     Results from last 7 days   Lab Units 05/01/22  0404 04/30/22  1219 04/30/22  0610 04/30/22  0041   GLUCOSE RANDOM mg/dL 137 130 195* 134         Results from last 7 days   Lab Units 04/30/22  0610   HEMOGLOBIN A1C % 4 7   EAG mg/dl 88      Results from last 7 days   Lab Units 05/01/22  0405 04/30/22  1609 04/30/22  0825   PH ART  7 521* 7 536* 7 559*   PCO2 ART mm Hg 38 4 39 9 37 5   PO2 ART mm Hg 127 5 108 2 109 0   HCO3 ART mmol/L 30 7* 33 0* 32 7*   BASE EXC ART mmol/L 7 5 9 7 9 9   O2 CONTENT ART mL/dL 22 7 15 8* 16 1   O2 HGB, ARTERIAL % 97 9* 97 6* 97 8*   ABG SOURCE  Line, Arterial Line, Arterial Line, Arterial     Results from last 7 days   Lab Units 04/30/22  0935 04/30/22  0041   PH IRENE  7 509* 7 306   PCO2 IRENE mm Hg 39 6* 66 2*   PO2 IRENE mm Hg 42 6 40 8   HCO3 IRENE mmol/L 30 8* 32 3*   BASE EXC IRENE mmol/L 7 3 4 1   O2 CONTENT IRENE ml/dL 13 2 12 7   O2 HGB, VENOUS % 82 6* 69 2             Results from last 7 days   Lab Units 04/30/22  0610 04/30/22  0407 04/30/22  0041   HS TNI 0HR ng/L  --   --  131*   HS TNI 2HR ng/L  --  151*  --    HSTNI D2 ng/L  --  20*  --    HS TNI 4HR ng/L 139*  --   --    HSTNI D4 ng/L 8  --   --          Results from last 7 days   Lab Units 05/01/22  0648 04/30/22  2320 04/30/22  1608 04/30/22  0825 04/30/22  0825 04/30/22  0041   PROTIME seconds  --   --   --   --  27 2* 21 7*   INR   --   --   --   --  2 61* 1 93*   PTT seconds 64* 61* 48*   < > 79*  --     < > = values in this interval not displayed       Results from last 7 days   Lab Units 04/30/22  0041   TSH 3RD GENERATON uIU/mL 0 913     Results from last 7 days   Lab Units 05/01/22  0648 04/30/22  0610 04/30/22  0041   PROCALCITONIN ng/ml 3 89* 5 95* 3 86*     Results from last 7 days   Lab Units 04/30/22  0041   LACTIC ACID mmol/L 1 8 Results from last 7 days   Lab Units 04/30/22  0041   NT-PRO BNP pg/mL 6,272*               Results from last 7 days   Lab Units 04/30/22  0404 04/30/22  0129   CLARITY UA   --  Slightly Cloudy   COLOR UA   --  Yellow   SPEC GRAV UA   --  1 020   PH UA   --  8 5*   GLUCOSE UA mg/dl  --  Negative   KETONES UA mg/dl  --  Negative   BLOOD UA   --  Small*   PROTEIN UA mg/dl  --  100 (2+)*   NITRITE UA   --  Positive*   BILIRUBIN UA   --  Negative   UROBILINOGEN UA E U /dl  --  0 2   LEUKOCYTES UA   --  Small*   WBC UA /hpf  --  Innumerable*   RBC UA /hpf  --  2-4   BACTERIA UA /hpf  --  Moderate*   EPITHELIAL CELLS WET PREP /hpf  --  Occasional   CREATININE UR mg/dL 84 9  --      Results from last 7 days   Lab Units 04/30/22  0404 04/30/22  0403 04/30/22  0044   STREP PNEUMONIAE ANTIGEN, URINE  Negative  --   --    LEGIONELLA URINARY ANTIGEN   --  Negative  --    INFLUENZA A PCR   --   --  Negative   INFLUENZA B PCR   --   --  Negative   RSV PCR   --   --  Negative                             Results from last 7 days   Lab Units 04/30/22  0044 04/30/22  0041   BLOOD CULTURE  Received in Microbiology Lab  Culture in Progress  Received in Microbiology Lab  Culture in Progress                 ED Treatment:   Medication Administration from 04/30/2022 0000 to 04/30/2022 0314       Date/Time Order Dose Route Action Comments     04/30/2022 0103 propofol (DIPRIVAN) 1000 mg in 100 mL infusion (premix) 5 mcg/kg/min Intravenous New Bag      04/30/2022 0117 fentaNYL 1000 mcg in sodium chloride 0 9% 100mL infusion 75 mcg/hr Intravenous New Bag      04/30/2022 0051 etomidate (AMIDATE) 2 mg/mL injection 20 mg 20 mg Intravenous Given      04/30/2022 0052 Succinylcholine Chloride 100 mg/5 mL syringe 100 mg 100 mg Intravenous Given      04/30/2022 0229 cefTRIAXone (ROCEPHIN) IVPB (premix in dextrose) 1,000 mg 50 mL 0 mg Intravenous Stopped      04/30/2022 0150 cefTRIAXone (ROCEPHIN) IVPB (premix in dextrose) 1,000 mg 50 mL 1,000 mg Intravenous New Bag      04/30/2022 0138 furosemide (LASIX) injection 80 mg 80 mg Intravenous Given      04/30/2022 0206 potassium chloride 20 mEq IVPB (premix) 20 mEq Intravenous New Bag      04/30/2022 0235 norepinephrine (LEVOPHED) 4 mg (STANDARD CONCENTRATION) IV in sodium chloride 0 9% 250 mL 5 mcg/min Intravenous New Bag         Present on Admission:   Hypertension   Septic shock (HCC)   ANSELMO [facility]acute kidney injury) ([facility] [address] )   Paroxysmal atrial fibrillation (HCC)   Acute on chronic respiratory failure with hypoxia (HCC)   Acute on chronic diastolic heart failure (HCC)   Pressure ulcer, stage 4 (HCC)   HCAP (healthcare-associated pneumonia)   UTI (urinary tract infection)   Elevated troponin level not due myocardial infarction   Renal lesion      Admitting Diagnosis: Shortness of breath [R06 02]  UTI (urinary tract infection) [N39 0]  Bilateral pneumonia [J18 9]  [facility]) [facility] ) [N17 9]  Septic shock (HCC) [A41 9, R65 21]  Acute decompensated heart failure (HCC) [I50 9]  Acute on chronic respiratory failure with hypoxia and hypercapnia (HCC) [J96 21, J96 22]  Age/Sex: 80 y o  female  Admission Orders:  Scheduled Medications:  busPIRone, 5 mg, Oral, TID  cefepime, 2,000 mg, Intravenous, Q12H  chlorhexidine, 15 mL, Mouth/Throat, Q12H ERICK  guaiFENesin, 200 mg, Per NG Tube, Q6H [facility]  insulin lispro, 1-6 Units, Subcutaneous, Q6H ERICK  ipratropium, 0 5 mg, Nebulization, Q6H  levalbuterol, 1 25 mg, Nebulization, Q6H  pantoprazole, 40 mg, Intravenous, Daily  polyethylene glycol, 17 g, Per NG Tube, BID  senna, 17 6 mg, Per NG Tube, HS  vancomycin, 15 mg/kg (Adjusted), Intravenous, Q24H      Continuous IV Infusions:  fentaNYL, 75 mcg/hr, Intravenous, Continuous  heparin (porcine), 3-20 Units/kg/hr (Order-Specific), Intravenous, Titrated  norepinephrine, 1-30 mcg/min, Intravenous, Titrated  propofol, 5-50 mcg/kg/min, Intravenous, Titrated      PRN Meds:  acetaminophen, 650 mg, Per NG Tube, Q6H PRN  heparin (porcine), 2,000 Units, Intravenous, Q1H PRN  heparin (porcine), 4,000 Units, Intravenous, Q1H PRN  pneumococcal 13-valent conjugate vaccine, 0 5 mL, Intramuscular, Prior to discharge    Neuro checks  q 4 hrs  Fall precautions  Aspiration precautions    IP CONSULT TO CASE MANAGEMENT  IP CONSULT TO PHARMACY  IP CONSULT TO CASE MANAGEMENT    Network Utilization Review Department  ATTENTION: Please call with any questions or concerns to [phone] and carefully listen to the prompts so that you are directed to the right person  All voicemails are confidential   Chanda Humphrey all requests for admission clinical reviews, approved or denied determinations and any other requests to dedicated fax number below belonging to the campus where the patient is receiving treatment   List of dedicated fax numbers for the Facilities:  [address] DENIALS (Administrative/Medical Necessity) [phone]   [address] (Maternity/NICU/Pediatrics) [phone]   [address]  [address] [place] [address] [id number] [address] [facility] [phone]

## 2022-05-01 NOTE — RESPIRATORY THERAPY NOTE
RT Ventilator Management Note  Bob Carey 80 y o  female MRN: [id number]  Unit/Bed#: -01 Encounter: [id number]      Daily Screen       4/30/2022  0843 5/1/2022  0731          Patient safety screen outcome[de-identified] Failed Failed      Not Ready for Weaning due to[de-identified] Underline problem not resolved Underline problem not resolved              Physical Exam:   Assessment Type: During-treatment  General Appearance: Sedated  Respiratory Pattern: Spontaneous,Assisted  Chest Assessment: Chest expansion symmetrical  Bilateral Breath Sounds: Clear  O2 Device: Vent 40%  Assumed care of pt at 0700  Pt was on APVcmv settings 13/400/+8/40%  Pt following commands o fCC PA trial on ASV however pt became very tachypnea with WOB and placed back to original APVcmv settings  No other changes made through out the day  Suctioned for moderate amount of thick tan/blood tinged mucus

## 2022-05-01 NOTE — PLAN OF CARE
Problem: Potential for Falls  Goal: Patient will remain free of falls  Description: INTERVENTIONS:  - Educate patient/family on patient safety including physical limitations  - Instruct patient to call for assistance with activity   - Consult OT/PT to assist with strengthening/mobility   - Keep Call bell within reach  - Keep bed low and locked with side rails adjusted as appropriate  - Keep care items and personal belongings within reach  - Initiate and maintain comfort rounds  - Make Fall Risk Sign visible to staff  - Offer Toileting every 2 Hours, in advance of need  - Initiate/Maintain bed/chair alarm  - Apply yellow socks and bracelet for high fall risk patients  - Consider moving patient to room near nurses station  Outcome: Progressing     Problem: PAIN - ADULT  Goal: Verbalizes/displays adequate comfort level or baseline comfort level  Description: Interventions:  - Encourage patient to monitor pain and request assistance  - Assess pain using appropriate pain scale  - Administer analgesics based on type and severity of pain and evaluate response  - Implement non-pharmacological measures as appropriate and evaluate response  - Consider cultural and social influences on pain and pain management  - Notify physician/advanced practitioner if interventions unsuccessful or patient reports new pain  Outcome: Progressing     Problem: INFECTION - ADULT  Goal: Absence or prevention of progression during hospitalization  Description: INTERVENTIONS:  - Assess and monitor for signs and symptoms of infection  - Monitor lab/diagnostic results  - Monitor all insertion sites, i e  indwelling lines, tubes, and drains  - Monitor endotracheal if appropriate and nasal secretions for changes in amount and color  - Ladd appropriate cooling/warming therapies per order  - Administer medications as ordered  - Instruct and encourage patient and family to use good hand hygiene technique  - Identify and instruct in appropriate isolation precautions for identified infection/condition  Outcome: Progressing     Problem: SAFETY ADULT  Goal: Patient will remain free of falls  Description: INTERVENTIONS:  - Educate patient/family on patient safety including physical limitations  - Instruct patient to call for assistance with activity   - Consult OT/PT to assist with strengthening/mobility   - Keep Call bell within reach  - Keep bed low and locked with side rails adjusted as appropriate  - Keep care items and personal belongings within reach  - Initiate and maintain comfort rounds  - Make Fall Risk Sign visible to staff  - Offer Toileting every 2 Hours, in advance of need  - Initiate/Maintain bed/chair alarm  - Apply yellow socks and bracelet for high fall risk patients  - Consider moving patient to room near nurses station  Outcome: Progressing  Goal: Maintain or return to baseline ADL function  Description: INTERVENTIONS:  -  Assess patient's ability to carry out ADLs; assess patient's baseline for ADL function and identify physical deficits which impact ability to perform ADLs (bathing, care of mouth/teeth, toileting, grooming, dressing, etc )  - Assess/evaluate cause of self-care deficits   - Assess range of motion  - Assess patient's mobility; develop plan if impaired  - Assess patient's need for assistive devices and provide as appropriate  - Encourage maximum independence but intervene and supervise when necessary  - Involve family in performance of ADLs  - Assess for home care needs following discharge   - Consider OT consult to assist with ADL evaluation and planning for discharge  - Provide patient education as appropriate  Outcome: Progressing  Goal: Maintains/Returns to pre admission functional level  Description: INTERVENTIONS:  - Perform BMAT or MOVE assessment daily    - Set and communicate daily mobility goal to care team and patient/family/caregiver     - Collaborate with rehabilitation services on mobility goals if consulted  - Perform Range of Motion 3 times a day  - Reposition patient every 2 hours  - Out of bed for toileting  - Record patient progress and toleration of activity level   Outcome: Progressing     Problem: DISCHARGE PLANNING  Goal: Discharge to home or other facility with appropriate resources  Description: INTERVENTIONS:  - Identify barriers to discharge w/patient and caregiver  - Arrange for needed discharge resources and transportation as appropriate  - Identify discharge learning needs (meds, wound care, etc )  - Arrange for interpretive services to assist at discharge as needed  - Refer to Case Management Department for coordinating discharge planning if the patient needs post-hospital services based on physician/advanced practitioner order or complex needs related to functional status, cognitive ability, or social support system  Outcome: Progressing     Problem: Knowledge Deficit  Goal: Patient/family/caregiver demonstrates understanding of disease process, treatment plan, medications, and discharge instructions  Description: Complete learning assessment and assess knowledge base    Interventions:  - Provide teaching at level of understanding  - Provide teaching via preferred learning methods  Outcome: Progressing     Problem: CARDIOVASCULAR - ADULT  Goal: Maintains optimal cardiac output and hemodynamic stability  Description: INTERVENTIONS:  - Monitor I/O, vital signs and rhythm  - Monitor for S/S and trends of decreased cardiac output  - Administer and titrate ordered vasoactive medications to optimize hemodynamic stability  - Assess quality of pulses, skin color and temperature  - Assess for signs of decreased coronary artery perfusion  - Instruct patient to report change in severity of symptoms  Outcome: Progressing  Goal: Absence of cardiac dysrhythmias or at baseline rhythm  Description: INTERVENTIONS:  - Continuous cardiac monitoring, vital signs, obtain 12 lead EKG if ordered  - Administer antiarrhythmic and heart rate control medications as ordered  - Monitor electrolytes and administer replacement therapy as ordered  Outcome: Progressing     Problem: RESPIRATORY - ADULT  Goal: Achieves optimal ventilation and oxygenation  Description: INTERVENTIONS:  - Assess for changes in respiratory status  - Assess for changes in mentation and behavior  - Position to facilitate oxygenation and minimize respiratory effort  - Oxygen administered by appropriate delivery if ordered  - Initiate smoking cessation education as indicated  - Encourage broncho-pulmonary hygiene including cough, deep breathe, Incentive Spirometry  - Assess the need for suctioning and aspirate as needed  - Assess and instruct to report SOB or any respiratory difficulty  - Respiratory Therapy support as indicated  Outcome: Progressing     Problem: GENITOURINARY - ADULT  Goal: Maintains or returns to baseline urinary function  Description: INTERVENTIONS:  - Assess urinary function  - Encourage oral fluids to ensure adequate hydration if ordered  - Administer IV fluids as ordered to ensure adequate hydration  - Administer ordered medications as needed  - Offer frequent toileting  - Follow urinary retention protocol if ordered  Outcome: Progressing  Goal: Absence of urinary retention  Description: INTERVENTIONS:  - Assess patients ability to void and empty bladder  - Monitor I/O  - Bladder scan as needed  - Discuss with physician/AP medications to alleviate retention as needed  - Discuss catheterization for long term situations as appropriate  Outcome: Progressing  Goal: Urinary catheter remains patent  Description: INTERVENTIONS:  - Assess patency of urinary catheter  - If patient has a chronic castelan, consider changing catheter if non-functioning  - Follow guidelines for intermittent irrigation of non-functioning urinary catheter  Outcome: Progressing     Problem: METABOLIC, FLUID AND ELECTROLYTES - ADULT  Goal: Electrolytes maintained within normal limits  Description: INTERVENTIONS:  - Monitor labs and assess patient for signs and symptoms of electrolyte imbalances  - Administer electrolyte replacement as ordered  - Monitor response to electrolyte replacements, including repeat lab results as appropriate  - Instruct patient on fluid and nutrition as appropriate  Outcome: Progressing  Goal: Fluid balance maintained  Description: INTERVENTIONS:  - Monitor labs   - Monitor I/O and WT  - Instruct patient on fluid and nutrition as appropriate  - Assess for signs & symptoms of volume excess or deficit  Outcome: Progressing  Goal: Glucose maintained within target range  Description: INTERVENTIONS:  - Monitor Blood Glucose as ordered  - Assess for signs and symptoms of hyperglycemia and hypoglycemia  - Administer ordered medications to maintain glucose within target range  - Assess nutritional intake and initiate nutrition service referral as needed  Outcome: Progressing     Problem: SKIN/TISSUE INTEGRITY - ADULT  Goal: Pressure injury heals and does not worsen  Description: Interventions:  - Implement low air loss mattress or specialty surface (Criteria met)  - Apply silicone foam dressing  - Apply fecal or urinary incontinence containment device     - Utilize friction reducing device or surface for transfers   - Consider nutrition services referral as needed  Outcome: Progressing

## 2022-05-01 NOTE — ASSESSMENT & PLAN NOTE
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  · Cultures pending: blood, urine, sputum, MRSA  · Urine strep and legionella negative  · PCT elevated: 3 86 - 5 95 - 3 89  · Started ceftriaxone at [facility] on 04/29  Initially placed on cefepime and doxycycline in ED  Changed to cefepime and vancomycin at time of admission  · Cont Vanco/Cefepime until blood and MRSA cultures finalize, then de escalate  MRSA negative - vancomycin d/c'd 5/1  · Intubated ED for respiratory distress and worsening respiratory failure  · CT chest/abd/pelvis - Extensive bilateral pneumonia, left worse than right  There is mild mediastinal lymphadenopathy  Both brenna appear full, left more so than right, and there is suggestion of some extrinsic compression upon the bronchi as they pass through the brenna, left more so than right  Adenopathy and/or mass should be excluded  Short-term follow-up recommended  Pulmonology consultation and follow-up recommended  Small bilateral pleural effusions, right slightly larger than left  The right main pulmonary artery measures 3 7 cm diameter; this raises concern for pulmonary hypertension  There is an approximately 3 9 x 3 1 cm cystic structure containing internal septations arising from the lateral aspect lower pole right kidney  This appears similar in size to July 22, 2021  · UA:  Nitrite positive, small amount of leukocytes, Innumerable wbc's, moderate bacteria  Culture negative  The 30ml/kg fluid bolus was not given to the patient despite hypotension and/or significantly elevated lactate of ? 4 and/or presence of septic shock due to: Concern for fluid/volume overload and severe aortic stenosis  Norepinephrine for hypotension  Currently off  Maintain goal MAP > 65

## 2022-05-01 NOTE — ASSESSMENT & PLAN NOTE
· Likely multifactorial with bilateral pneumonia, bilateral pleural effusions and fluid overload  · At baseline patient wears 4 L nasal cannula  · Intubated in ED  · Mechanical vent bundle ordered  · Fentanyl and propofol for sedation  · SAT with SBT daily  · Continue treatment for healthcare associated pneumonia and fluid overload  · Pulmonary consult for CT results suggestive of extrinsic compression upon the bronchi as they pass through the brenna, left more so than right  Adenopathy and/or mass should be excluded  Short-term follow-up recommended

## 2022-05-01 NOTE — PROGRESS NOTES
[address]  Progress Note - Myah Steven 1933, 80 y o  female MRN: [id number]  Unit/Bed#: -01 Encounter: [id number]  Primary Care Provider: No primary care provider on file  Date and time admitted to hospital: 4/30/2022 12:28 AM    * Septic shock [facility])  Assessment & Plan  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  · Cultures pending: blood, urine, sputum, MRSA  · Urine strep and legionella negative  · PCT elevated at 3 86 - 5 95  · Started ceftriaxone at [facility] on 04/29  Initially placed on cefepime and doxycycline in ED  Changed to cefepime and vancomycin at time of admission  · Intubated ED for respiratory distress and worsening respiratory failure  · CT chest/abd/pelvis - Extensive bilateral pneumonia, left worse than right  There is mild mediastinal lymphadenopathy  Both brenna appear full, left more so than right, and there is suggestion of some extrinsic compression upon the bronchi as they pass through the brenna, left more so than right  Adenopathy and/or mass should be excluded  Short-term follow-up recommended  Pulmonology consultation and follow-up recommended  Small bilateral pleural effusions, right slightly larger than left  The right main pulmonary artery measures 3 7 cm diameter; this raises concern for pulmonary hypertension  There is an approximately 3 9 x 3 1 cm cystic structure containing internal septations arising from the lateral aspect lower pole right kidney  This appears similar in size to July 22, 2021  · UA suspicious UTI:  Nitrite positive, small amount of leukocytes, Innumerable wbc's, moderate bacteria  Culture pending  The 30ml/kg fluid bolus was not given to the patient despite hypotension and/or significantly elevated lactate of ? 4 and/or presence of septic shock due to: Concern for fluid/volume overload and severe aortic stenosis   The patient may receive additional colloid or crystalloid fluids thereafter based on clinical condition  Home lasix dose 60 mg PO BID  Given additional 80 mg IV in ED with 125 ml UOP  Bumex 2 mg IV ordered  Arterial line inserted with good pleth, reading 20 points SBP lower then cuff  Once MAP >65 on art line, patient began voiding  Norepinephrine started for hypotension  Currently off  Maintain goal MAP > 65  HCAP (healthcare-associated pneumonia)  Assessment & Plan  · See plan under sepsis    UTI (urinary tract infection)  Assessment & Plan  · See plan under sepsis    Acute on chronic respiratory failure with hypoxia (HCC)  Assessment & Plan  · Likely multifactorial with bilateral pneumonia, bilateral pleural effusions and fluid overload  · At baseline patient wears 4 L nasal cannula  · Intubated in ED  · Mechanical vent bundle ordered  · Fentanyl and propofol for sedation  · SAT with SBT daily  · Continue treatment for healthcare associated pneumonia and fluid overload  · Consider pulmonary consult for CT results suggestive of extrinsic compression  upon the bronchi as they pass through the brenna, left more so than right  Adenopathy and/or mass should be excluded  Short-term follow-up recommended  Acute on chronic diastolic heart failure [facility])  Assessment & Plan  Wt Readings from Last 3 Encounters:   04/30/22 98 7 kg (217 lb 9 5 oz)   01/27/22 101 kg (223 lb 3 2 oz)   07/22/21 116 kg (254 lb 13 6 oz)     · Last echo completed 01/24:  · Left Ventricle: Mildly increased left ventricular wall thickness with normal LV cavity size and systolic function  There is [id number] variability in contractility due to rhythm  EF 65%  No RWMA  Diastolic characterization was not assessed  · Aortic Valve: Aortic valve not well seen  Aortic calcification is noted  The measured trans aortic velocity suggests severe aortic stenosis  Trace insufficiency seen    · Mitral Valve: Mitral annular sclerosis, particularly involving the posterior aspect of the annulus with preserved leaflet excursion and mild insufficiency  · Tricuspid Valve: Grossly normal-appearing tricuspid leaflets with mild to moderate insufficiency  Measured trans tricuspid regurgitant velocity of 3 7 m/sec corresponds to a gradient of 56 mmHg  With addition of 10 mmHg presumed atrial pressure, estimated pulmonary pressure is increased at 66 mmHg  · Formal TTE ordered  · Elevated BNP 6272 with elevated HS troponin of 131  · Duiretic plan: Home dose Lasix 60 mg p o  BID on hold  Little response to IV lasix and bumex IV  · Daily weight    Renal lesion  Assessment & Plan  · There is an approximately 3 9 x 3 1 cm cystic structure containing internal septations arising from the lateral aspect lower pole right kidney  This appears similar in size to July 22, 2021  · Outpatient follow-up recommended       Elevated troponin level not due myocardial infarction  Assessment & Plan  · Likely secondary to fluid overload  · Peaked at 151  · EKG without ischemic changes    Pleural effusion, bilateral  Assessment & Plan  · Likely secondary to acute on chronic Heart Failure  · Seen on CT scan of chest  · Continue diuresis under acute on chronic heart failure plan    Pressure ulcer, stage 4 (HCC)  Assessment & Plan  · Stage IV sacral pressure ulcer present on admission  · Consider as possible source for sepsis however does not appear infected  · Wound care consult  · Consider General surgery consult    Paroxysmal atrial fibrillation (HCC)  Assessment & Plan  · History paroxysmal AFib on Eliquis  · Last dose of Eliquis was given 4/29  · Start heparin gtt  · Currently AFib rate controlled in 70-80s    ANSELMO (acute kidney injury) ([facility] [address] )  Assessment & Plan  · Baseline creatinine 0 7-0 8  · Creatinine on presentation 1 43  · Holding home Lasix  · Avoid nephrotoxins  · Avoid hypotension  · Strict I&O, Barnett for accurate I&O  · Trend BUN and creatinine  · FEUrea 10 3%, suggests pre-renal etiology    Hypertension  Assessment & Plan  · History of hypertension however currently hypotensive due to sepsis  · Hold home antihypertensives      ----------------------------------------------------------------------------------------  HPI/24hr events: Intubated   Currently vent settings: APVcmv 400/16/+8/50%  Norepinephrine remains off since   Continued on Propofol and fentanyl gtts with RASS -1  Heparin gtt therapeutic  Received lasix 40 mg IV at 1739 and is currently fluid balance negative  Patient appropriate for transfer out of the ICU today?: No  Disposition: Continue Critical Care   Code Status: Level 1 - Full Code  ---------------------------------------------------------------------------------------  SUBJECTIVE    Review of Systems  Review of systems was unable to be performed secondary to intubated and sedated  ---------------------------------------------------------------------------------------  OBJECTIVE    Vitals   Vitals:    22 0100 22 0200 22   BP: 115/60 135/67     BP Location:       Pulse: 94 90     Resp: 19 18     Temp: 99 7 °F (37 6 °C) 99 3 °F (37 4 °C)     TempSrc:       SpO2: 99% 99% 99% 99%   Weight:       Height:         Temp (24hrs), Av °F (37 8 °C), Min:99 3 °F (37 4 °C), Max:101 5 °F (38 6 °C)  Current: Temperature: 99 3 °F (37 4 °C)  Arterial Line BP: 110/62  Arterial Line MAP (mmHg): 80 mmHg    Respiratory:  SpO2: SpO2: 99 %, SpO2 Activity: SpO2 Activity: At Rest, SpO2 Device: O2 Device: Ventilator       Invasive/non-invasive ventilation settings   Respiratory  Report   Lab Data (Last 4 hours)    None         O2/Vent Data (Last 4 hours)    None                Physical Exam  Vitals reviewed  Constitutional:       General: She is not in acute distress  Appearance: She is ill-appearing  She is not toxic-appearing  HENT:      Head: Normocephalic and atraumatic        Mouth/Throat:      Mouth: Mucous membranes are moist  Pharynx: Oropharynx is clear  No oropharyngeal exudate  Eyes:      Conjunctiva/sclera: Conjunctivae normal       Pupils: Pupils are equal, round, and reactive to light  Cardiovascular:      Rate and Rhythm: Rhythm regularly irregular  Pulses: Normal pulses  Heart sounds: Murmur heard  Pulmonary:      Effort: Pulmonary effort is normal  No respiratory distress  Breath sounds: Rhonchi present  Abdominal:      General: Bowel sounds are normal  There is no distension  Palpations: Abdomen is soft  Tenderness: There is no abdominal tenderness  Hernia: A hernia is present  Genitourinary:     Comments: Barnett  Musculoskeletal:      Cervical back: Rigidity present  Right lower leg: Edema present  Left lower leg: Edema present  Skin:     General: Skin is warm and dry  Capillary Refill: Capillary refill takes less than 2 seconds  Comments: Pressure ulcer to sacrum   Neurological:      General: No focal deficit present  GCS: GCS eye subscore is 3  GCS verbal subscore is 1  GCS motor subscore is 6  Comments: GCS 10T   Psychiatric:         Behavior: Behavior is cooperative           Laboratory and Diagnostics:  Results from last 7 days   Lab Units 04/30/22  0610 04/30/22  0041   WBC Thousand/uL 13 50* 20 46*   HEMOGLOBIN g/dL 10 6* 12 2   HEMATOCRIT % 33 0* 39 7   PLATELETS Thousands/uL 225 275   NEUTROS PCT %  --  88*   MONOS PCT %  --  9     Results from last 7 days   Lab Units 04/30/22  1219 04/30/22  0610 04/30/22  0041   SODIUM mmol/L 141 141 143   POTASSIUM mmol/L 3 6 3 0* 3 4*   CHLORIDE mmol/L 103 101 100   CO2 mmol/L 34* 32 37*   ANION GAP mmol/L 4 8 6   BUN mg/dL 46* 43* 36*   CREATININE mg/dL 1 33* 1 42* 1 43*   CALCIUM mg/dL 8 5 8 9 9 5   GLUCOSE RANDOM mg/dL 130 195* 134   ALT U/L  --   --  23   AST U/L  --   --  24   ALK PHOS U/L  --   --  83   ALBUMIN g/dL  --   --  2 9*   TOTAL BILIRUBIN mg/dL  --   --  0 79     Results from last 7 days Lab Units 04/30/22  0610 04/30/22  0041   MAGNESIUM mg/dL 1 7 1 7      Results from last 7 days   Lab Units 04/30/22  2320 04/30/22  1608 04/30/22  0825 04/30/22  0041   INR   --   --  2 61* 1 93*   PTT seconds 61* 48* 79*  --           Results from last 7 days   Lab Units 04/30/22  0041   LACTIC ACID mmol/L 1 8     ABG:  Results from last 7 days   Lab Units 04/30/22  1609   PH ART  7 536*   PCO2 ART mm Hg 39 9   PO2 ART mm Hg 108 2   HCO3 ART mmol/L 33 0*   BASE EXC ART mmol/L 9 7   ABG SOURCE  Line, Arterial     VBG:  Results from last 7 days   Lab Units 04/30/22  1609 04/30/22  0935 04/30/22  0825   PH IRENE   --  7 509*  --    PCO2 IRENE mm Hg  --  39 6*  --    PO2 IRENE mm Hg  --  42 6  --    HCO3 IRENE mmol/L  --  30 8*  --    BASE EXC IRENE mmol/L  --  7 3  --    ABG SOURCE  Line, Arterial  --    < >    < > = values in this interval not displayed  Results from last 7 days   Lab Units 04/30/22  0610 04/30/22  0041   PROCALCITONIN ng/ml 5 95* 3 86*       Micro  Results from last 7 days   Lab Units 04/30/22  0404 04/30/22  0403 04/30/22  0044 04/30/22  0041   BLOOD CULTURE   --   --  Received in Microbiology Lab  Culture in Progress  Received in Microbiology Lab  Culture in Progress  LEGIONELLA URINARY ANTIGEN   --  Negative  --   --    STREP PNEUMONIAE ANTIGEN, URINE  Negative  --   --   --        EKG:   Imaging: I have personally reviewed pertinent reports  and I have personally reviewed pertinent films in PACS    Intake and Output  I/O       04/29 0701 04/30 0700 04/30 0701 05/01 0700    P  O   0    I V  (mL/kg) 93 9 (1) 470 6 (4 8)    NG/ 200    IV Piggyback 450 200    Total Intake(mL/kg) 643 9 (6 5) 870 6 (8 8)    Urine (mL/kg/hr) 190 1745 (0 7)    Total Output 190 1745    Net +453 9 -874 4                Height and Weights   Height: 5' 2" (157 5 cm)     Body mass index is 39 8 kg/m²    Weight (last 2 days)     Date/Time Weight    04/30/22 0330 98 7 (217 59)    04/30/22 0048 102 (223 99)    04/30/22 0032 102 (020 75)            Nutrition       Diet Orders   (From admission, onward)             Start     Ordered    04/30/22 0317  Diet NPO  Diet effective now        References:    Nutrtion Support Algorithm Enteral vs  Parenteral   Question Answer Comment   Diet Type NPO    RD to adjust diet per protocol?  Yes        04/30/22 0318                  Active Medications  Scheduled Meds:  Current Facility-Administered Medications   Medication Dose Route Frequency Provider Last Rate    acetaminophen  650 mg Per NG Tube Q6H PRN JONO Dias      busPIRone  5 mg Oral TID JONO Dias      cefepime  2,000 mg Intravenous Q12H JONO Dias 2,000 mg (04/30/22 1604)    chlorhexidine  15 mL Mouth/Throat Q12H [facility] JONO Dias      fentaNYL  75 mcg/hr Intravenous Continuous JONO Dias 75 mcg/hr (04/30/22 1906)    guaiFENesin  200 mg Per NG Tube Q6H [facility] JONO Dias      heparin (porcine)  3-20 Units/kg/hr (Order-Specific) Intravenous Titrated Prabhdeep Hehar, DO 11 1 Units/kg/hr (04/30/22 1730)    heparin (porcine)  2,000 Units Intravenous Q1H PRN Prabhdeep Hehar, DO      heparin (porcine)  4,000 Units Intravenous Q1H PRN Prabrooksdeep Hehar, DO      insulin lispro  1-6 Units Subcutaneous Q6H [facility] Magdalene Alarcon PA-C      ipratropium  0 5 mg Nebulization Q6H JONO Dias      levalbuterol  1 25 mg Nebulization Q6H JONO Dias      norepinephrine  1-30 mcg/min Intravenous Titrated JONO Dias Stopped (04/30/22 1334)    pantoprazole  40 mg Intravenous Daily JONO Dias      pneumococcal 13-valent conjugate vaccine  0 5 mL Intramuscular Prior to discharge Prabrooksdeep Manolo, DO      polyethylene glycol  17 g Per NG Tube Daily JONO Dias      propofol  5-50 mcg/kg/min Intravenous Titrated JONO Dias 25 mcg/kg/min (04/30/22 9429)    senna  8 8 mg Per NG Tube HS Josette Parnell, CRNP      vancomycin  15 mg/kg (Adjusted) Intravenous Q24H Josette Parnell, CRNP 1,000 mg (04/30/22 0421)     Continuous Infusions:  fentaNYL, 75 mcg/hr, Last Rate: 75 mcg/hr (04/30/22 1906)  heparin (porcine), 3-20 Units/kg/hr (Order-Specific), Last Rate: 11 1 Units/kg/hr (04/30/22 1730)  norepinephrine, 1-30 mcg/min, Last Rate: Stopped (04/30/22 1334)  propofol, 5-50 mcg/kg/min, Last Rate: 25 mcg/kg/min (04/30/22 2303)      PRN Meds:   acetaminophen, 650 mg, Q6H PRN  heparin (porcine), 2,000 Units, Q1H PRN  heparin (porcine), 4,000 Units, Q1H PRN  pneumococcal 13-valent conjugate vaccine, 0 5 mL, Prior to discharge        Invasive Devices Review  Invasive Devices  Report    Central Venous Catheter Line            CVC Central Lines 04/30/22 Triple 16cm <1 day          Peripheral Intravenous Line            Peripheral IV 04/30/22 Left;Dorsal (posterior) Hand 1 day    Peripheral IV 04/30/22 Left;Ventral (anterior) Forearm 1 day    Peripheral IV 04/30/22 Right;Dorsal (posterior) Hand 1 day          Arterial Line            Arterial Line 04/30/22 Radial <1 day          Drain            NG/OG/Enteral Tube 18 Fr Right mouth 1 day    Urethral Catheter 16 Fr  1 day          Airway            ETT  7 5 mm 1 day                Rationale for remaining devices: CVC and arterial lines for hemodynamic monitoring  Anibal for accurate I&O with diuresis   ---------------------------------------------------------------------------------------  Advance Directive and Living Will:      Power of :    POLST:    ---------------------------------------------------------------------------------------  Care Time Delivered:   Upon my evaluation, this patient had a high probability of imminent or life-threatening deterioration due to acute on chronic hypoxemic respiratory failure, which required my direct attention, intervention, and personal management    I have personally provided 30 minutes (0200 to 0230) of critical care time, exclusive of procedures, teaching, family meetings, and any prior time recorded by providers other than myself  Antonieta Patient CRNP      Portions of the record may have been created with voice recognition software  Occasional wrong word or "sound a like" substitutions may have occurred due to the inherent limitations of voice recognition software    Read the chart carefully and recognize, using context, where substitutions have occurred

## 2022-05-01 NOTE — RESPIRATORY THERAPY NOTE
RT Ventilator Management Note  Harpreet Grayson 80 y o  female MRN: [id number]  Unit/Bed#: -01 Encounter: [id number]      Daily Screen       4/30/2022  0843             Patient safety screen outcome[de-identified] Failed    Not Ready for Weaning due to[de-identified] Underline problem not resolved            Physical Exam:   Assessment Type: During-treatment  General Appearance: Sleeping,Sedated  Respiratory Pattern: Assisted  Chest Assessment: Chest expansion symmetrical  Bilateral Breath Sounds: Diminished,Coarse  O2 Device: mechanical vent      Resp Comments: 80 yr old pt, S/p respiratory distress with hypoxia and hypercapnia, pt recieved on vent in APVcmv 400/ 16/ +8/ 50%, (7 5cc/kg) pt breathing inline with vent settings, pattern unlabored, Sp02 ~ 97% ,  no vent changes at this time  04:30  Morning abg results, fio2 titrated to 40%, vent rate decreased to 13      Results for Gavi Pal (MRN [id number]) as of 5/1/2022 04:34   5/1/2022 04:05   pH, Arterial 7 521 (H)   pCO2, Arterial 38 4   pO2, Arterial 127 5   HCO3, Arterial 30 7 (H)   Base Excess, Arterial 7 5   O2 Content, Arterial 22 7   O2 HGB,Arterial 97 9 (H)

## 2022-05-02 PROBLEM — F99 PSYCHIATRIC DISORDER: Status: ACTIVE | Noted: 2022-01-01

## 2022-05-02 NOTE — ASSESSMENT & PLAN NOTE
Wt Readings from Last 3 Encounters:   05/02/22 100 kg (220 lb 7 4 oz)   01/27/22 101 kg (223 lb 3 2 oz)   07/22/21 116 kg (254 lb 13 6 oz)     · Last echo completed 01/24:  · Left Ventricle: Mildly increased left ventricular wall thickness with normal LV cavity size and systolic function  There is [id number]-pz-ucdd variability in contractility due to rhythm  EF 65%  No RWMA  Diastolic characterization was not assessed  · Aortic Valve: Aortic valve not well seen  Aortic calcification is noted  The measured trans aortic velocity suggests severe aortic stenosis  Trace insufficiency seen  · Mitral Valve: Mitral annular sclerosis, particularly involving the posterior aspect of the annulus with preserved leaflet excursion and mild insufficiency  · Tricuspid Valve: Grossly normal-appearing tricuspid leaflets with mild to moderate insufficiency  Measured trans tricuspid regurgitant velocity of 3 7 m/sec corresponds to a gradient of 56 mmHg  With addition of 10 mmHg presumed atrial pressure, estimated pulmonary pressure is increased at 66 mmHg  · Formal TTE ordered  · Elevated BNP 6272 with elevated HS troponin of 131  · Duiretic plan: Home dose Lasix 60 mg p o  BID on hold  Cont gentle diuresis with IV lasix PRN    · Daily weight

## 2022-05-02 NOTE — ASSESSMENT & PLAN NOTE
· Likely multifactorial with bilateral pneumonia, bilateral pleural effusions and fluid overload  · At baseline patient wears 4 L nasal cannula  · Intubated in ED  · Mechanical vent bundle ordered  · Fentanyl and propofol for sedation  · SAT with SBT daily  · Continue treatment for healthcare associated pneumonia and fluid overload  · CXR this AM pending, thick copious secretions  · Start airway clearance protocol  · Consider bronch today  · Pulmonary consult for CT results suggestive of extrinsic compression upon the bronchi as they pass through the brenna, left more so than right  Adenopathy and/or mass should be excluded  Short-term follow-up recommended

## 2022-05-02 NOTE — PLAN OF CARE
Problem: Potential for Falls  Goal: Patient will remain free of falls  Description: INTERVENTIONS:  - Educate patient/family on patient safety including physical limitations  - Instruct patient to call for assistance with activity   - Consult OT/PT to assist with strengthening/mobility   - Keep Call bell within reach  - Keep bed low and locked with side rails adjusted as appropriate  - Keep care items and personal belongings within reach  - Initiate and maintain comfort rounds  - Make Fall Risk Sign visible to staff  - Offer Toileting every 2 Hours, in advance of need  - Initiate/Maintain bed/chair alarm  - Apply yellow socks and bracelet for high fall risk patients  - Consider moving patient to room near nurses station  Outcome: Progressing     Problem: PAIN - ADULT  Goal: Verbalizes/displays adequate comfort level or baseline comfort level  Description: Interventions:  - Encourage patient to monitor pain and request assistance  - Assess pain using appropriate pain scale  - Administer analgesics based on type and severity of pain and evaluate response  - Implement non-pharmacological measures as appropriate and evaluate response  - Consider cultural and social influences on pain and pain management  - Notify physician/advanced practitioner if interventions unsuccessful or patient reports new pain  Outcome: Progressing     Problem: INFECTION - ADULT  Goal: Absence or prevention of progression during hospitalization  Description: INTERVENTIONS:  - Assess and monitor for signs and symptoms of infection  - Monitor lab/diagnostic results  - Monitor all insertion sites, i e  indwelling lines, tubes, and drains  - Monitor endotracheal if appropriate and nasal secretions for changes in amount and color  - [place] appropriate cooling/warming therapies per order  - Administer medications as ordered  - Instruct and encourage patient and family to use good hand hygiene technique  - Identify and instruct in appropriate isolation precautions for identified infection/condition  Outcome: Progressing     Problem: SAFETY ADULT  Goal: Patient will remain free of falls  Description: INTERVENTIONS:  - Educate patient/family on patient safety including physical limitations  - Instruct patient to call for assistance with activity   - Consult OT/PT to assist with strengthening/mobility   - Keep Call bell within reach  - Keep bed low and locked with side rails adjusted as appropriate  - Keep care items and personal belongings within reach  - Initiate and maintain comfort rounds  - Make Fall Risk Sign visible to staff  - Offer Toileting every 2 Hours, in advance of need  - Initiate/Maintain bed/chair alarm  - Apply yellow socks and bracelet for high fall risk patients  - Consider moving patient to room near nurses station  Outcome: Progressing  Goal: Maintain or return to baseline ADL function  Description: INTERVENTIONS:  -  Assess patient's ability to carry out ADLs; assess patient's baseline for ADL function and identify physical deficits which impact ability to perform ADLs (bathing, care of mouth/teeth, toileting, grooming, dressing, etc )  - Assess/evaluate cause of self-care deficits   - Assess range of motion  - Assess patient's mobility; develop plan if impaired  - Assess patient's need for assistive devices and provide as appropriate  - Encourage maximum independence but intervene and supervise when necessary  - Involve family in performance of ADLs  - Assess for home care needs following discharge   - Consider OT consult to assist with ADL evaluation and planning for discharge  - Provide patient education as appropriate  Outcome: Progressing  Goal: Maintains/Returns to pre admission functional level  Description: INTERVENTIONS:  - Perform BMAT or MOVE assessment daily    - Set and communicate daily mobility goal to care team and patient/family/caregiver     - Collaborate with rehabilitation services on mobility goals if consulted  - Perform Range of Motion 3 times a day  - Reposition patient every 2 hours  - Out of bed for toileting  - Record patient progress and toleration of activity level   Outcome: Progressing     Problem: DISCHARGE PLANNING  Goal: Discharge to home or other facility with appropriate resources  Description: INTERVENTIONS:  - Identify barriers to discharge w/patient and caregiver  - Arrange for needed discharge resources and transportation as appropriate  - Identify discharge learning needs (meds, wound care, etc )  - Arrange for interpretive services to assist at discharge as needed  - Refer to Case Management Department for coordinating discharge planning if the patient needs post-hospital services based on physician/advanced practitioner order or complex needs related to functional status, cognitive ability, or social support system  Outcome: Progressing     Problem: Knowledge Deficit  Goal: Patient/family/caregiver demonstrates understanding of disease process, treatment plan, medications, and discharge instructions  Description: Complete learning assessment and assess knowledge base    Interventions:  - Provide teaching at level of understanding  - Provide teaching via preferred learning methods  Outcome: Progressing     Problem: CARDIOVASCULAR - ADULT  Goal: Maintains optimal cardiac output and hemodynamic stability  Description: INTERVENTIONS:  - Monitor I/O, vital signs and rhythm  - Monitor for S/S and trends of decreased cardiac output  - Administer and titrate ordered vasoactive medications to optimize hemodynamic stability  - Assess quality of pulses, skin color and temperature  - Assess for signs of decreased coronary artery perfusion  - Instruct patient to report change in severity of symptoms  Outcome: Progressing  Goal: Absence of cardiac dysrhythmias or at baseline rhythm  Description: INTERVENTIONS:  - Continuous cardiac monitoring, vital signs, obtain 12 lead EKG if ordered  - Administer antiarrhythmic and heart rate control medications as ordered  - Monitor electrolytes and administer replacement therapy as ordered  Outcome: Progressing     Problem: RESPIRATORY - ADULT  Goal: Achieves optimal ventilation and oxygenation  Description: INTERVENTIONS:  - Assess for changes in respiratory status  - Assess for changes in mentation and behavior  - Position to facilitate oxygenation and minimize respiratory effort  - Oxygen administered by appropriate delivery if ordered  - Initiate smoking cessation education as indicated  - Encourage broncho-pulmonary hygiene including cough, deep breathe, Incentive Spirometry  - Assess the need for suctioning and aspirate as needed  - Assess and instruct to report SOB or any respiratory difficulty  - Respiratory Therapy support as indicated  Outcome: Progressing     Problem: GENITOURINARY - ADULT  Goal: Maintains or returns to baseline urinary function  Description: INTERVENTIONS:  - Assess urinary function  - Encourage oral fluids to ensure adequate hydration if ordered  - Administer IV fluids as ordered to ensure adequate hydration  - Administer ordered medications as needed  - Offer frequent toileting  - Follow urinary retention protocol if ordered  Outcome: Progressing  Goal: Absence of urinary retention  Description: INTERVENTIONS:  - Assess patients ability to void and empty bladder  - Monitor I/O  - Bladder scan as needed  - Discuss with physician/AP medications to alleviate retention as needed  - Discuss catheterization for long term situations as appropriate  Outcome: Progressing  Goal: Urinary catheter remains patent  Description: INTERVENTIONS:  - Assess patency of urinary catheter  - If patient has a chronic castelan, consider changing catheter if non-functioning  - Follow guidelines for intermittent irrigation of non-functioning urinary catheter  Outcome: Progressing     Problem: METABOLIC, FLUID AND ELECTROLYTES - ADULT  Goal: Electrolytes maintained within normal limits  Description: INTERVENTIONS:  - Monitor labs and assess patient for signs and symptoms of electrolyte imbalances  - Administer electrolyte replacement as ordered  - Monitor response to electrolyte replacements, including repeat lab results as appropriate  - Instruct patient on fluid and nutrition as appropriate  Outcome: Progressing  Goal: Fluid balance maintained  Description: INTERVENTIONS:  - Monitor labs   - Monitor I/O and WT  - Instruct patient on fluid and nutrition as appropriate  - Assess for signs & symptoms of volume excess or deficit  Outcome: Progressing  Goal: Glucose maintained within target range  Description: INTERVENTIONS:  - Monitor Blood Glucose as ordered  - Assess for signs and symptoms of hyperglycemia and hypoglycemia  - Administer ordered medications to maintain glucose within target range  - Assess nutritional intake and initiate nutrition service referral as needed  Outcome: Progressing     Problem: SKIN/TISSUE INTEGRITY - ADULT  Goal: Pressure injury heals and does not worsen  Description: Interventions:  - Implement low air loss mattress or specialty surface (Criteria met)  - Apply silicone foam dressing  - Instruct/assist with weight shifting every 15 minutes when in chair   - Limit chair time to 2 hour intervals  - Use special pressure reducing interventions such as waffle cushion when in chair   - Apply fecal or urinary incontinence containment device   - Perform passive or active ROM every 4 hours  - Turn and reposition patient & offload bony prominences every 4 hours   - Utilize friction reducing device or surface for transfers   - Consider consults to  interdisciplinary teams such as wound nurse  - Use incontinent care products after each incontinent episode such as blue pad  - Consider nutrition services referral as needed  Outcome: Progressing

## 2022-05-02 NOTE — RESPIRATORY THERAPY NOTE
RT Ventilator Management Note  Hoa Baker 80 y o  female MRN: [id number]  Unit/Bed#: -01 Encounter: [id number]      Daily Screen       4/30/2022  0843 5/1/2022  0731          Patient safety screen outcome[de-identified] Failed Failed      Not Ready for Weaning due to[de-identified] Underline problem not resolved Underline problem not resolved              Physical Exam:   Assessment Type: During-treatment  General Appearance: Sedated  Respiratory Pattern: Assisted  Chest Assessment: Chest expansion symmetrical  Bilateral Breath Sounds: Diminished,Coarse      Resp Comments: 80 yr old pt, S/p respiratory failure with hypoxia and hypercapnia, pt recieved on vent in APVcmv 400/13/+8/40%, sync with vent settings, breathing pattern unlabored, Sp02 ~ 98%, BS equl and sligjhtly coarse, no vent changes at this time  05:30  Morning abg results, PEEP decreased to 6, vent rate decreased to 12, pt continues to add breaths above set rate

## 2022-05-02 NOTE — PROGRESS NOTES
Pt intubated/sedated, propofol infusing at 15 2 ml/h during time of visit which provides 401 kcal daily  Jevity 1 2 infusing at 40 ml/h during time of visit  BM not documented since admission  Current EN order exceeding estimated kcal needs  Would recommend decreasing Jevity 1 2 to goal rate of 23 ml/h  Increase prosource to TID  TF + prosource + propofol to provide 1243 kcal, 76 g PRO, 94 g CHO, 445 ml free water  Water flush per MD given CHF and critical care

## 2022-05-02 NOTE — ACP (ADVANCE CARE PLANNING)
There was a family meeting with the patient's daughter and son-in-law to discuss goals of care  , Hugh Torrez, and, AP, Bhavin Clark were present  I explained to them that the patient is critically likely from both pneumonia and her severe aortic stenosis  I further explained that the patient has a hilar mass and thick pulmonary secretions that require a bronchoscopy to suction  The patient's daughter explained that the patient has been bed bound and sick for some time  She stated that she new about the AS but the patient likely did not want any aggressive interventions at the time of diagnosis  Loulou Nogueira further went on to stated that the patient would not want any of the current aggressive measures but would want to be comfortable  We explained what focusing goals of care to comfort measures only would mean and how we would change our focus  I explained that once the family is ready we will first start comfort medications for pain and anxiety to ensure that the patient dies not suffer  I explained that we would stop all other medications including antibiotics and cardiovascular medications and then remove the patient from the ventilator  We will wait for the patient's family to arrive prior to starting comfort but will not escalate care  The family, although understandably tearful, understands the above  All questions were answered and concerns addressed

## 2022-05-02 NOTE — ASSESSMENT & PLAN NOTE
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  · Cultures pending: blood, urine, sputum, MRSA  · Urine strep and legionella negative  · PCT elevated: 3 86 - 5 95 - 3 89  · Started ceftriaxone at [facility] on 04/29  Initially placed on cefepime and doxycycline in ED  Changed to cefepime and vancomycin at time of admission  · Day #3 cefepime  · vanc d/c 2/2 negative MRSA  · Continue cefepime x7 days  · Consider bronch and BAL today  · Intubated ED for respiratory distress and worsening respiratory failure  · CT chest/abd/pelvis - Extensive bilateral pneumonia, left worse than right  There is mild mediastinal lymphadenopathy  Both brenna appear full, left more so than right, and there is suggestion of some extrinsic compression upon the bronchi as they pass through the brenna, left more so than right  Adenopathy and/or mass should be excluded  Short-term follow-up recommended  Pulmonology consultation and follow-up recommended  Small bilateral pleural effusions, right slightly larger than left  The right main pulmonary artery measures 3 7 cm diameter; this raises concern for pulmonary hypertension  There is an approximately 3 9 x 3 1 cm cystic structure containing internal septations arising from the lateral aspect lower pole right kidney  This appears similar in size to July 22, 2021  · UA:  Nitrite positive, small amount of leukocytes, Innumerable wbc's, moderate bacteria  Culture negative  The 30ml/kg fluid bolus was not given to the patient despite hypotension and/or significantly elevated lactate of ? 4 and/or presence of septic shock due to: Concern for fluid/volume overload and severe aortic stenosis  Norepinephrine for hypotension  Currently off  Maintain goal MAP > 65

## 2022-05-02 NOTE — ASSESSMENT & PLAN NOTE
· Baseline creatinine 0 7-0 8  · Creatinine on presentation 1 43 now 1 0  · Avoid nephrotoxins  · Avoid hypotension  · Strict I&O, [place] for accurate I&O  · Trend BUN and creatinine  · FEUrea 10 3%, suggests pre-renal etiology

## 2022-05-02 NOTE — PROGRESS NOTES
[address] Pierce  Progress Note - Perfecto Books 1933, 80 y o  female MRN: [id number]  Unit/Bed#: -01 Encounter: [id number]  Primary Care Provider: No primary care provider on file  Date and time admitted to hospital: 4/30/2022 12:28 AM    Acute on chronic respiratory failure with hypoxia (HCC)  Assessment & Plan  · Likely multifactorial with bilateral pneumonia, bilateral pleural effusions and fluid overload  · At baseline patient wears 4 L nasal cannula  · Intubated in ED  · Mechanical vent bundle ordered  · Fentanyl and propofol for sedation  · SAT with SBT daily  · Continue treatment for healthcare associated pneumonia and fluid overload  · CXR this AM pending, thick copious secretions  · Start airway clearance protocol  · Consider bronch today  · Pulmonary consult for CT results suggestive of extrinsic compression upon the bronchi as they pass through the [place], left more so than right  Adenopathy and/or mass should be excluded  Short-term follow-up recommended  HCAP (healthcare-associated pneumonia)  Assessment & Plan  · See plan under sepsis    * Septic shock ([address] )  Assessment & Plan  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  · Cultures pending: blood, urine, sputum, MRSA  · Urine strep and legionella negative  · PCT elevated: 3 86 - 5 95 - 3 89  · Started ceftriaxone at [facility] on 04/29  Initially placed on cefepime and doxycycline in ED  Changed to cefepime and vancomycin at time of admission  · Day #3 cefepime  · vanc d/c 2/2 negative MRSA  · Continue cefepime x7 days  · Consider bronch and BAL today  · Intubated ED for respiratory distress and worsening respiratory failure  · CT chest/abd/pelvis - Extensive bilateral pneumonia, left worse than right  There is mild mediastinal lymphadenopathy    Both brenna appear full, left more so than right, and there is suggestion of some extrinsic compression upon the bronchi as they pass through the bren[place], left more so than right  Adenopathy and/or mass should be excluded  Short-term follow-up recommended  Pulmonology consultation and follow-up recommended  Small bilateral pleural effusions, right slightly larger than left  The right main pulmonary artery measures 3 7 cm diameter; this raises concern for pulmonary hypertension  There is an approximately 3 9 x 3 1 cm cystic structure containing internal septations arising from the lateral aspect lower pole right kidney  This appears similar in size to July 22, 2021  · UA:  Nitrite positive, small amount of leukocytes, Innumerable wbc's, moderate bacteria  Culture negative  The 30ml/kg fluid bolus was not given to the patient despite hypotension and/or significantly elevated lactate of ? 4 and/or presence of septic shock due to: Concern for fluid/volume overload and severe aortic stenosis  Norepinephrine for hypotension  Currently off  Maintain goal MAP > 65  Acute on chronic diastolic heart failure [facility])  Assessment & Plan  Wt Readings from Last 3 Encounters:   05/02/22 100 kg (220 lb 7 4 oz)   01/27/22 101 kg (223 lb 3 2 oz)   07/22/21 116 kg (254 lb 13 6 oz)     · Last echo completed 01/24:  · Left Ventricle: Mildly increased left ventricular wall thickness with normal LV cavity size and systolic function  There is [id number] variability in contractility due to rhythm  EF 65%  No RWMA  Diastolic characterization was not assessed  · Aortic Valve: Aortic valve not well seen  Aortic calcification is noted  The measured trans aortic velocity suggests severe aortic stenosis  Trace insufficiency seen  · Mitral Valve: Mitral annular sclerosis, particularly involving the posterior aspect of the annulus with preserved leaflet excursion and mild insufficiency  · Tricuspid Valve: Grossly normal-appearing tricuspid leaflets with mild to moderate insufficiency    Measured trans tricuspid regurgitant velocity of 3 7 m/sec corresponds to a gradient of 56 mmHg  With addition of 10 mmHg presumed atrial pressure, estimated pulmonary pressure is increased at 66 mmHg  · Formal TTE ordered  · Elevated BNP 6272 with elevated HS troponin of 131  · Duiretic plan: Home dose Lasix 60 mg p o  BID on hold  Cont gentle diuresis with IV lasix PRN  · Daily weight    Paroxysmal atrial fibrillation (HCC)  Assessment & Plan  · History paroxysmal AFib on Eliquis  · Last dose of Eliquis was given 4/29, started on heparin gtt on admission  · Cont heparin gtt  · Currently AFib rate controlled in 70-80s    ANSELMO (acute kidney injury) ([facility] [address] )  Assessment & Plan  · Baseline creatinine 0 7-0 8  · Creatinine on presentation 1 43 now 1 0  · Avoid nephrotoxins  · Avoid hypotension  · Strict I&O, [place] for accurate I&O  · Trend BUN and creatinine  · FEUrea 10 3%, suggests pre-renal etiology    Pleural effusion, bilateral  Assessment & Plan  · Likely secondary to acute on chronic Heart Failure  · Seen on CT scan of chest  · Continue diuresis under acute on chronic heart failure plan    Hypertension  Assessment & Plan  · History of hypertension  · Hold home antihypertensives    Pressure ulcer, stage 4 (HCC)  Assessment & Plan  · Stage IV sacral pressure ulcer present on admission  · Consider as possible source for sepsis however does not appear infected  · Wound care consult  · Consider General surgery consult    Renal lesion  Assessment & Plan  · There is an approximately 3 9 x 3 1 cm cystic structure containing internal septations arising from the lateral aspect lower pole right kidney  This appears similar in size to July 22, 2021  · Outpatient follow-up recommended       Elevated troponin level not due myocardial infarction  Assessment & Plan  · Likely secondary to fluid overload  · Peaked at 151  · EKG without ischemic changes    Psychiatric disorder  Assessment & Plan  · Continue PTA buspar ----------------------------------------------------------------------------------------  HPI/24hr events: 81 y/o female with PMHx AF on eliquis, CHF, AS, HLD, HPTN, and HLD who now presents with acute on chronic respiratory failure with hypoxia, septic shock, B/L pleural effusions, and pneumonia   ETT    Vanc d/nathanael  negative MRSA swab  Continued to diurese well following 40mg IV lasix, currently negative 301cc  PEEP weaned to 6      Patient appropriate for transfer out of the ICU today?: No  Disposition: Continue Critical Care   Code Status: Level 1 - Full Code  ---------------------------------------------------------------------------------------  SUBJECTIVE    Review of Systems   Unable to perform ROS: Intubated     Review of systems was reviewed and negative unless stated above in HPI/24-hour events   ---------------------------------------------------------------------------------------  OBJECTIVE    Vitals   Vitals:    22 0300 22 0400 22 0500 22 0600   BP: 113/60 120/63 124/64 107/55   Pulse: 83 83 81 79   Resp: 16 15 20 17   Temp: 99 1 °F (37 3 °C) 99 °F (37 2 °C) 99 °F (37 2 °C) 99 1 °F (37 3 °C)   TempSrc:       SpO2: 99% 100% 100% 100%   Weight:    100 kg (220 lb 7 4 oz)   Height:         Temp (24hrs), Av °F (37 8 °C), Min:99 °F (37 2 °C), Max:100 8 °F (38 2 °C)  Current: Temperature: 99 1 °F (37 3 °C)  Arterial Line BP: 130/64  Arterial Line MAP (mmHg): 88 mmHg    Respiratory:  SpO2: SpO2: 100 %       Invasive/non-invasive ventilation settings   Respiratory  Report   Lab Data (Last 4 hours)       0446            pH, Arterial       7 509             pCO2, Arterial       41 0             pO2, Arterial       128 2             HCO3, Arterial       31 9             Base Excess, Arterial       8 2                  O2/Vent Data     None                Physical Exam  Constitutional:       Appearance: She is obese  HENT:      Head: Normocephalic  Mouth/Throat:      Mouth: Mucous membranes are moist    Eyes:      Pupils: Pupils are equal, round, and reactive to light  Neck:      Comments: [facility] CV  Cardiovascular:      Rate and Rhythm: Normal rate  Rhythm irregular  Pulmonary:      Breath sounds: Rhonchi present  Comments: Thick copious tan secretions  Abdominal:      General: There is distension  Tenderness: There is no abdominal tenderness  Musculoskeletal:      Cervical back: Normal range of motion  Comments: Stage 4 sacral decubitus ulcer   Skin:     General: Skin is warm  Capillary Refill: Capillary refill takes less than 2 seconds  Neurological:      General: No focal deficit present  Mental Status: She is alert               Laboratory and Diagnostics:  Results from last 7 days   Lab Units 05/02/22  0435 05/01/22  0404 04/30/22  0610 04/30/22  0041   WBC Thousand/uL 10 33* 12 24* 13 50* 20 46*   HEMOGLOBIN g/dL 9 6* 9 6* 10 6* 12 2   HEMATOCRIT % 30 6* 30 2* 33 0* 39 7   PLATELETS Thousands/uL 245 222 225 275   NEUTROS PCT %  --   --   --  88*   MONOS PCT %  --   --   --  9     Results from last 7 days   Lab Units 05/02/22  0435 05/01/22  1220 05/01/22  0404 04/30/22  1219 04/30/22  0610 04/30/22  0041   SODIUM mmol/L 141 144 140 141 141 143   POTASSIUM mmol/L 3 4* 4 5 2 8* 3 6 3 0* 3 4*   CHLORIDE mmol/L 99* 103 101 103 101 100   CO2 mmol/L 33* 34* 34* 34* 32 37*   ANION GAP mmol/L 9 7 5 4 8 6   BUN mg/dL 34* 42* 44* 46* 43* 36*   CREATININE mg/dL 1 00 1 13 1 24 1 33* 1 42* 1 43*   CALCIUM mg/dL 8 3 8 4 8 5 8 5 8 9 9 5   GLUCOSE RANDOM mg/dL 133 109 137 130 195* 134   ALT U/L 27  --   --   --   --  23   AST U/L 33  --   --   --   --  24   ALK PHOS U/L 76  --   --   --   --  83   ALBUMIN g/dL 2 3*  --   --   --   --  2 9*   TOTAL BILIRUBIN mg/dL 0 64  --   --   --   --  0 79     Results from last 7 days   Lab Units 05/02/22  0435 05/01/22  1220 05/01/22  0404 04/30/22  0610 04/30/22  0041   MAGNESIUM mg/dL 2 3  --  2 0 1  7 1 7   PHOSPHORUS mg/dL 4 3* 1 7*  --   --   --       Results from last 7 days   Lab Units 05/02/22  0435 05/01/22  0648 04/30/22  2320 04/30/22  1608 04/30/22  0825 04/30/22  0041   INR   --   --   --   --  2 61* 1 93*   PTT seconds 60* 64* 61* 48* 79*  --           Results from last 7 days   Lab Units 04/30/22  0041   LACTIC ACID mmol/L 1 8     ABG:  Results from last 7 days   Lab Units 05/02/22  0446 05/01/22  0405 05/01/22  0405   PH ART  7 509*   < > 7 521*   PCO2 ART mm Hg 41 0   < > 38 4   PO2 ART mm Hg 128 2   < > 127 5   HCO3 ART mmol/L 31 9*   < > 30 7*   BASE EXC ART mmol/L 8 2   < > 7 5   ABG SOURCE   --   --  Line, Arterial    < > = values in this interval not displayed  VBG:  Results from last 7 days   Lab Units 05/01/22  0405 04/30/22  1609 04/30/22  0935   PH IRENE   --   --  7 509*   PCO2 IRENE mm Hg  --   --  39 6*   PO2 IRENE mm Hg  --   --  42 6   HCO3 IRENE mmol/L  --   --  30 8*   BASE EXC IRENE mmol/L  --   --  7 3   ABG SOURCE  Line, Arterial   < >  --     < > = values in this interval not displayed  Results from last 7 days   Lab Units 05/02/22  0435 05/01/22  0648 04/30/22  0610 04/30/22  0041   PROCALCITONIN ng/ml 2 49* 3 89* 5 95* 3 86*       Micro  Results from last 7 days   Lab Units 04/30/22  0427 04/30/22  0404 04/30/22  0403 04/30/22  0355 04/30/22  0129 04/30/22  0044 04/30/22  0041   BLOOD CULTURE   --   --   --   --   --  No Growth at 24 hrs  No Growth at 24 hrs     SPUTUM CULTURE  No growth  --   --   --   --   --   --    GRAM STAIN RESULT  Rare Polys  No organisms seen  --   --   --   --   --   --    URINE CULTURE   --   --   --   --  No Growth <1000 cfu/mL  --   --    MRSA CULTURE ONLY   --   --   --  No Methicillin Resistant Staphlyococcus aureus (MRSA) isolated  --   --   --    LEGIONELLA URINARY ANTIGEN   --   --  Negative  --   --   --   --    STREP PNEUMONIAE ANTIGEN, URINE   --  Negative  --   --   --   --   --          Intake and Output  I/O       04/30 0701 05/01 0700 05/01 0701  05/02 0700    P  O  0 0    I V  (mL/kg) 722 (7 4) 865 3 (8 7)    NG/ 250    IV Piggyback 450 630    Feedings  258    Total Intake(mL/kg) 1372 (14) 2003 2 (20)    Urine (mL/kg/hr) 2225 (0 9) 2205 (0 9)    Emesis/NG output 300 100    Total Output 2525 2305    Net -1153 -301 8          Unmeasured Urine Occurrence  1 x          Height and Weights   Height: 5' 2" (157 5 cm)     Body mass index is 40 32 kg/m²  Weight (last 2 days)     Date/Time Weight    05/02/22 0600 100 (220 46)    05/01/22 0544 98 2 (216 49)    04/30/22 0330 98 7 (217 59)    04/30/22 0048 102 (223 99)    04/30/22 0032 102 (225 75)            Nutrition       Diet Orders   (From admission, onward)             Start     Ordered    05/01/22 1342  Diet Enteral/Parenteral; Tube Feeding No Oral Diet; Jevity 1 2 Codey; Continuous; 40; Prosource Protein Liquid - Two Packets Daily  Diet effective now        Comments: Start at 10 ml/hr and increase by 10 ml every 4 hours to goal as tolerated   References:    Nutrtion Support Algorithm Enteral vs  Parenteral   Question Answer Comment   Diet Type Enteral/Parenteral    Enteral/Parenteral Tube Feeding No Oral Diet    Tube Feeding Formula: Jevity 1 2 Codey    Bolus/Cyclic/Continuous Continuous    Tube Feeding Goal Rate (mL/hr): 40    Prosource Protein Liquid - No Carb Prosource Protein Liquid - Two Packets Daily    RD to adjust diet per protocol?  Yes        05/01/22 1345                  Active Medications  Scheduled Meds:  Current Facility-Administered Medications   Medication Dose Route Frequency Provider Last Rate    acetaminophen  650 mg Per NG Tube Q6H PRN JONO Dias      bisacodyl  10 mg Rectal Daily Tom Ballesteros PA-C      busPIRone  5 mg Oral TID JONO Dias      cefepime  2,000 mg Intravenous Q12H JONO Dias Stopped (05/02/22 0340)    chlorhexidine  15 mL Mouth/Throat Q12H [address] JONO Dias      fentaNYL  75 mcg/hr Intravenous Continuous JONO Hernandez 75 mcg/hr (05/01/22 2131)    guaiFENesin  200 mg Per NG Tube Q6H [facility] JONO Dias      heparin (porcine)  3-20 Units/kg/hr (Order-Specific) Intravenous Titrated Prabhdeep Hehar, DO 11 1 Units/kg/hr (05/01/22 1713)    heparin (porcine)  2,000 Units Intravenous Q1H PRN Prabhdeep Hehar, DO      heparin (porcine)  4,000 Units Intravenous Q1H PRN Prabrooksdeep Hehar, DO      insulin lispro  1-6 Units Subcutaneous Q6H [facility] Magdalene Alarcon PA-C      ipratropium  0 5 mg Nebulization Q6H JONO Dias      levalbuterol  1 25 mg Nebulization Q6H JONO Dias      pantoprazole  40 mg Intravenous Daily JONO Dias      pneumococcal 13-valent conjugate vaccine  0 5 mL Intramuscular Prior to discharge Luis French, DO      polyethylene glycol  17 g Per NG Tube BID Magdalene Alarcon PA-C      potassium chloride  20 mEq Intravenous Once JONO Hernandez 20 mEq (05/02/22 0531)    propofol  5-50 mcg/kg/min Intravenous Titrated JONO Dias 25 mcg/kg/min (05/02/22 0508)    senna  17 6 mg Per NG Tube HS Magdalene Alarcon PA-C       Continuous Infusions:  fentaNYL, 75 mcg/hr, Last Rate: 75 mcg/hr (05/01/22 2131)  heparin (porcine), 3-20 Units/kg/hr (Order-Specific), Last Rate: 11 1 Units/kg/hr (05/01/22 1713)  propofol, 5-50 mcg/kg/min, Last Rate: 25 mcg/kg/min (05/02/22 0508)      PRN Meds:   acetaminophen, 650 mg, Q6H PRN  heparin (porcine), 2,000 Units, Q1H PRN  heparin (porcine), 4,000 Units, Q1H PRN  pneumococcal 13-valent conjugate vaccine, 0 5 mL, Prior to discharge        Invasive Devices Review  Invasive Devices  Report    Central Venous Catheter Line            CVC Central Lines 04/30/22 Triple 16cm 1 day          Peripheral Intravenous Line            Peripheral IV 04/30/22 Left;Dorsal (posterior) Hand 2 days    Peripheral IV 04/30/22 Left;Ventral (anterior) Forearm 2 days    Peripheral IV 04/30/22 Right;Dorsal (posterior) Hand 2 days          Arterial Line            Arterial Line 04/30/22 Radial 1 day          Drain            NG/OG/Enteral Tube 18 Fr Right mouth 2 days    Urethral Catheter 16 Fr  2 days          Airway            ETT  7 5 mm 2 days                Rationale for remaining devices: d/c arterial line   Consider d/c CVC  ---------------------------------------------------------------------------------------  Advance Directive and Living Will:      Power of :    POLST:    ---------------------------------------------------------------------------------------  Care Time Delivered:   No Critical Care time spent       Iglesia Schafer PA-C      Portions of the record may have been created with voice recognition software  Occasional wrong word or "sound a like" substitutions may have occurred due to the inherent limitations of voice recognition software    Read the chart carefully and recognize, using context, where substitutions have occurred

## 2022-05-02 NOTE — CONSULTS
Consultation - Pulmonary Medicine   Cait Son 80 y o  female MRN: [id number]  Unit/Bed#: -01 Encounter: [id number]      Assessment/Plan:    1  Acute on chronic hypoxic respiratory failure likely multifaceted as listed below       -  Currently AC- wean per tolerated, home O2 requirements 3 L       -  maintain saturations greater than 89%       -  pulmonary toileting:  Deep breathing cough, OOB as tolerated, IS q 1 hr       2  L > R PNA       -  procalcitonin- 3 86-- 5 95-- 3 89-- 2 49       -   Negative:  Sputum, BS, urine antigens, MRSA       -  Day #4- Rocephin    3  Acute on chronic diastolic CHF w/ moderate PHTN 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       -  5/2/2022-  EF 60%      PA pressure 52 mmHg    proBNP 6272       -  recommend keeping patient on drier side       -  diuresing per primary team       -  currently on heparin GTT previously on Eliquis    4  Hilum mediastinal lymphadenopathy       -  several prominent mediastinal nodes       -  some compression upon bronchi       -  there remains some significant concern for malignancy however given acute infection it is difficult to make a diagnosis without bronchoscopy/tissue sampling       -  would recommend goals of care discussion prior to bronchoscopy       -  would recommend at least repeat imaging in 6 weeks        History of Present Illness   Physician Requesting Consult: Brooklynn Charles DO  Reason for Consult / Principal Problem:  Mediastinal mass  Hx and PE limited by:  Nothing  Chief Complaint: "I still feel little short of breath"  HPI: Cait Son is a 80 y o   female who presented to [facility] with complaints of shortness of breath  Patient has past medical history of AFib on Eliquis, CHF, hypertension, chronic respiratory failure  Patient presents to ED on 4/30 [facility] after patient was reporting symptoms of shortness of breath for approximately 24 hours    Upon EMS arrival patient was noted to be hypoxic at 84% on her baseline of 3 L  Upon ED admission patient met septic shock criteria as she was tachypneic, lethargic, and required BiPAP therapy eventually requiring intubation  Patient was transferred to ICU and currently being treated for Hcap and UTI  Pulmonary was consulted for hilar adenopathy compressing both left and right bronchus  Given that patient is currently intubated I do feel that the goals of care discussion need to determine whether or not we want to do any invasive procedures to determine etiology of adenopathy  From a pulmonary standpoint, patient does not follow with a pulmonologist   Patient has been a lifelong nonsmoker and has never been diagnosed with COPD or asthma  Patient is currently maintained at home on 3 L continuous nasal cannula  Patient reports home regiment include Xopenex and Atrovent t i d   Patient does report history of postnasal drip in which she is maintained on sodium nasal spray  Patient denies any symptoms of GERD, OCTAVIO, or seasonal allergies  Patient denies any recent acute exposures to dust, mold, asbestos, or silica  Inpatient consult to Pulmonology  Consult performed by: JONO Myers  Consult ordered by: Rolanda Granados PA-C          Review of Systems   Constitutional: Positive for activity change and appetite change  Negative for chills and fever  HENT: Negative for ear pain and sore throat  Eyes: Negative for pain and visual disturbance  Respiratory: Positive for cough and shortness of breath  Negative for apnea, choking and chest tightness  Cardiovascular: Negative for chest pain and palpitations  Gastrointestinal: Negative for abdominal pain and vomiting  Genitourinary: Negative for dysuria and hematuria  Musculoskeletal: Negative for arthralgias and back pain  Skin: Negative for color change and rash  Neurological: Negative for dizziness, seizures, syncope and facial asymmetry     Psychiatric/Behavioral: Negative for agitation and behavioral problems  All other systems reviewed and are negative        Historical Information   Past Medical History:   Diagnosis Date    Arthritis     Atrial fibrillation ([address] )     CHF (congestive heart failure) (HCC)     Hyperlipidemia     Hypertension     Pneumonia     PONV (postoperative nausea and vomiting)     Renal disorder      Past Surgical History:   Procedure Laterality Date    ANKLE FRACTURE SURGERY Left     CHOLECYSTECTOMY      JOINT REPLACEMENT Bilateral     TKR    REPLACEMENT TOTAL KNEE BILATERAL Bilateral      Social History   Social History     Substance and Sexual Activity   Alcohol Use Never     Social History     Substance and Sexual Activity   Drug Use Never     Social History     Tobacco Use   Smoking Status Never Smoker   Smokeless Tobacco Never Used     E-Cigarette/Vaping    E-Cigarette Use Never User      E-Cigarette/Vaping Substances     Occupational History:  Noncontributory    Family History:   Family History   Problem Relation Age of Onset    Alcohol abuse Neg Hx     Arthritis Neg Hx     Asthma Neg Hx     Birth defects Neg Hx     Cancer Neg Hx     COPD Neg Hx     Depression Neg Hx     Diabetes Neg Hx     Drug abuse Neg Hx     Early death Neg Hx     Hearing loss Neg Hx     Heart disease Neg Hx     Hyperlipidemia Neg Hx     Hypertension Neg Hx     Kidney disease Neg Hx     Learning disabilities Neg Hx     Mental illness Neg Hx     Mental retardation Neg Hx     Miscarriages / Stillbirths Neg Hx     Stroke Neg Hx     Vision loss Neg Hx        Meds/Allergies   pertinent pulmonary meds have been reviewed    Allergies   Allergen Reactions    Azithromycin Other (See Comments)     unknown    Cephalexin Other (See Comments)     unknown    Ciprofloxacin Other (See Comments)     unknown    Sulfa Antibiotics Other (See Comments)     unknown    Vancomycin Rash       Objective   Vitals: Blood pressure 122/61, pulse 77, temperature 99 3 °F (37 4 °C), temperature source Bladder, resp  rate 16, height 5' 2" (1 575 m), weight 99 8 kg (220 lb), SpO2 100 %  AC,Body mass index is 40 24 kg/m²  Intake/Output Summary (Last 24 hours) at 5/2/2022 1055  Last data filed at 5/2/2022 1034  Gross per 24 hour   Intake 2383 51 ml   Output 2500 ml   Net -116 49 ml     Invasive Devices  Report    Central Venous Catheter Line            CVC Central Lines 04/30/22 Triple 16cm 2 days          Peripheral Intravenous Line            Peripheral IV 04/30/22 Left;Dorsal (posterior) Hand 2 days    Peripheral IV 04/30/22 Left;Ventral (anterior) Forearm 2 days    Peripheral IV 04/30/22 Right;Dorsal (posterior) Hand 2 days          Arterial Line            Arterial Line 04/30/22 Radial 2 days          Drain            NG/OG/Enteral Tube 18 Fr Right mouth 2 days    Urethral Catheter 16 Fr  2 days          Airway            ETT  7 5 mm 2 days                Physical Exam  Constitutional:       General: She is not in acute distress  Appearance: Normal appearance  She is normal weight  She is not ill-appearing  HENT:      Head: Normocephalic and atraumatic  Nose: Nose normal  No congestion or rhinorrhea  Mouth/Throat:      Mouth: Mucous membranes are dry  Pharynx: No oropharyngeal exudate or posterior oropharyngeal erythema  Cardiovascular:      Rate and Rhythm: Normal rate and regular rhythm  Pulses: Normal pulses  Heart sounds: Normal heart sounds  No murmur heard  No friction rub  No gallop  Pulmonary:      Effort: Pulmonary effort is normal  No tachypnea, bradypnea, accessory muscle usage or respiratory distress  Breath sounds: Decreased air movement present  No stridor  Decreased breath sounds and rhonchi present  No wheezing or rales  Comments: Scattered rhonchi  Chest:      Chest wall: No tenderness  Abdominal:      General: Abdomen is flat  Bowel sounds are normal  There is no distension  Palpations: Abdomen is soft   There is no mass    Musculoskeletal:         General: No swelling or tenderness  Normal range of motion  Cervical back: Normal range of motion  No rigidity or tenderness  Skin:     General: Skin is warm and dry  Coloration: Skin is not jaundiced or pale  Neurological:      General: No focal deficit present  Mental Status: She is alert and oriented to person, place, and time  Mental status is at baseline  Psychiatric:         Mood and Affect: Mood normal          Behavior: Behavior normal          Lab Results:   I have personally reviewed pertinent lab results  , ABG:   Lab Results   Component Value Date    PHART 7 509 (H) 05/02/2022    HCW1LZB 41 0 05/02/2022    PO2ART 128 2 05/02/2022    QEX5FTR 31 9 (H) 05/02/2022    BEART 8 2 05/02/2022   , BNP: No results found for: BNP, CBC:   Lab Results   Component Value Date    WBC 10 33 (H) 05/02/2022    HGB 9 6 (L) 05/02/2022    HCT 30 6 (L) 05/02/2022    MCV 91 05/02/2022     05/02/2022    MCH 28 7 05/02/2022    MCHC 31 4 05/02/2022    RDW 15 6 (H) 05/02/2022    MPV 9 8 05/02/2022   , CMP:   Lab Results   Component Value Date    SODIUM 141 05/02/2022    K 3 4 (L) 05/02/2022    CL 99 (L) 05/02/2022    CO2 33 (H) 05/02/2022    BUN 34 (H) 05/02/2022    CREATININE 1 00 05/02/2022    CALCIUM 8 3 05/02/2022    AST 33 05/02/2022    ALT 27 05/02/2022    ALKPHOS 76 05/02/2022    EGFR 50 05/02/2022       Imaging Studies: I have personally reviewed pertinent films in PACS     Chest CT- 4/30/2022 left greater than right pneumonia with mediastinal lymphadenopathy    EKG, Pathology, and Other Studies: I have personally reviewed pertinent films in PACS     Echo- 5/1/2022- EF 60% PA pressure 52 mmHg    Pulmonary Results (PFTs, PSG): I have personally reviewed pertinent films in PACS     No PFTs record    VTE Prophylaxis: Sequential compression device (Venodyne)     Code Status: Level 1 - Full Code    None    Portions of the record may have been created with voice recognition software  Occasional wrong word or "sound a like" substitutions may have occurred due to the inherent limitations of voice recognition software  Read the chart carefully and recognize, using context, where substitutions have occurred

## 2022-05-02 NOTE — WOUND OSTOMY CARE
Consult Note - Wound   Romana Mingle 80 y o  female MRN: [id number]  Unit/Bed#: -01 Encounter: [id number]      History and Present Illness:80year old female, for initial wound consult for stage 4 pressure injury  MD had Family meeting this afternoon, with patient's daughter and son in law  Patient will be transitioning to comfort care measures                   Wound 04/30/22 Pressure Injury Sacrum Mid (Active)   Wound Image   05/02/22 0931       Wound Care will sign off  Call or Tigertext with any questions    Kenisha Jc

## 2022-05-02 NOTE — CASE MANAGEMENT
Case Management Discharge Planning Note    Patient name Manuel Precise  Location /-01 MRN [id number]  : 1933 Date 2022       Current Admission Date: 2022  Current Admission Diagnosis:Septic shock [facility])   Patient Active Problem List    Diagnosis Date Noted    Psychiatric disorder 2022    HCAP (healthcare-associated pneumonia) 2022    Pleural effusion, bilateral 2022    Elevated troponin level not due myocardial infarction 2022    Renal lesion 2022    Pressure ulcer, stage 4 ([address] ) 2022    Positive blood culture 10/11/2020    Numbness 10/10/2020    Nonrheumatic aortic valve stenosis 2018    Acute on chronic diastolic heart failure ([facility] ) 2018    Paroxysmal atrial fibrillation ([facility] ) 2018    Acute on chronic respiratory failure with hypoxia ([facility] ) 2018    CAP (community acquired pneumonia) 2018    Osteoarthritis of both hips 2018    Acute retention of urine 2018    ANSELMO (acute kidney injury) ([facility] ) 2018    Hyperlipidemia 2018    Morbid (severe) obesity due to excess calories ([facility] ) 2018    Septic shock ([facility] ) 2018    Hypertension 2018      LOS (days): 2  Geometric Mean LOS (GMLOS) (days):   Days to GMLOS:     OBJECTIVE:  Risk of Unplanned Readmission Score: 27         Current admission status: Inpatient   Preferred Pharmacy:   [address]   [place] [address]  Phone: [phone] Fax: [phone] [facility] [address] ,  [address] [place] [address]  Phone: [phone] Fax: [phone]    Primary Care Provider: No primary care provider on file  Primary Insurance: [address] [place] [address],[address] [facility]  Secondary Insurance:     Chart reviewed aware of medical management   Case was discussed in multidisciplinary discharge meeting    Clinical information supporting hospitalization:   - intubated since 4/30  - Admit with septic shock  Scheduled IV Meds: Cefepime, IV Lasix BID  IV - fentanyl, IV heparin infusion - IV propofol   Barriers to discharge:  - medical management  Discharge plan:  Patient is from [facility] been there since 8/2021      CM met with daughter and IRLANDA with providers, goals of care discussion was initiated and plan at this time is to transition to comfort care, once other family are notified of poor prognosis and provided family the opportunity to come to the hospital   CM will continue to follow

## 2022-05-02 NOTE — PROGRESS NOTES
[address]  Progress Note - James Sat 1933, 80 y o  female MRN: [id number]  Unit/Bed#: -01 Encounter: [id number]  Primary Care Provider: No primary care provider on file  Date and time admitted to hospital: 2022 12:28 AM    No new Assessment & Plan notes have been filed under this hospital service since the last note was generated  Service: Critical Care/ICU    ----------------------------------------------------------------------------------------  HPI/24hr events: Remains intubated since   Propofol and fentanyl gtts with RASS -1  Heparin gtt per protocol  Diuresed with lasix 40 mg IV at 1212  and currently fluid balance negative  MRSA culture negative, urine culture no growth and blood cultures no growth to date  Vancomycin discontinued   Received cathflow via medial port of CVC - success results  Thick tan endotracheal sections this am  CXR pending  PEEP decreased to 6 from 8 based on abg      Patient appropriate for transfer out of the ICU today?: No  Disposition: Continue Critical Care   Code Status: Level 1 - Full Code  ---------------------------------------------------------------------------------------  SUBJECTIVE    Review of Systems  Review of systems was unable to be performed secondary to intubated/sedated  ---------------------------------------------------------------------------------------  OBJECTIVE    Vitals   Vitals:    22 0200 22 0220 22 0223 22 0300   BP: 120/59   113/60   BP Location:       Pulse: 81   83   Resp: 15   16   Temp: 99 3 °F (37 4 °C)   99 1 °F (37 3 °C)   TempSrc:       SpO2: 98% 98% 98% 99%   Weight:       Height:         Temp (24hrs), Av 1 °F (37 8 °C), Min:99 1 °F (37 3 °C), Max:100 8 °F (38 2 °C)  Current: Temperature: 99 1 °F (37 3 °C)  Arterial Line BP: 123/65  Arterial Line MAP (mmHg): 86 mmHg    Respiratory:  SpO2: SpO2: 99 %, SpO2 Activity: SpO2 Activity: At Rest, SpO2 Device: O2 Device: Ventilator       Invasive/non-invasive ventilation settings   Respiratory  Report   Lab Data (Last 4 hours)      05/02 0446            pH, Arterial       7 509             pCO2, Arterial       41 0             pO2, Arterial       128 2             HCO3, Arterial       31 9             Base Excess, Arterial       8 2                  O2/Vent Data     None                Physical Exam  Vitals reviewed  Constitutional:       General: She is not in acute distress  Appearance: She is ill-appearing  She is not toxic-appearing  Interventions: She is sedated, intubated and restrained  HENT:      Head: Normocephalic and atraumatic  Mouth/Throat:      Mouth: Mucous membranes are moist       Pharynx: Oropharynx is clear  Eyes:      Conjunctiva/sclera: Conjunctivae normal       Pupils: Pupils are equal, round, and reactive to light  Cardiovascular:      Rate and Rhythm: Rhythm regularly irregular  Pulses: Normal pulses  Heart sounds: Murmur heard  Pulmonary:      Effort: No respiratory distress  She is intubated  Comments: Diminished breath sounds bilaterally  Abdominal:      General: Bowel sounds are normal  There is no distension  Palpations: Abdomen is soft  Hernia: A hernia is present  Genitourinary:     Comments: Barnett  Musculoskeletal:      Right lower leg: Edema present  Left lower leg: Edema present  Skin:     General: Skin is warm and dry  Capillary Refill: Capillary refill takes less than 2 seconds  Comments: Sacral pressure ulcer   Neurological:      General: No focal deficit present  GCS: GCS eye subscore is 3  GCS verbal subscore is 1  GCS motor subscore is 6  Comments: GCS 10T   Psychiatric:         Behavior: Behavior is cooperative               Laboratory and Diagnostics:  Results from last 7 days   Lab Units 05/02/22  0435 05/01/22  0404 04/30/22  0610 04/30/22  0041   WBC Thousand/uL 10 33* 12 24* 13 50* 20 46* HEMOGLOBIN g/dL 9 6* 9 6* 10 6* 12 2   HEMATOCRIT % 30 6* 30 2* 33 0* 39 7   PLATELETS Thousands/uL 245 222 225 275   NEUTROS PCT %  --   --   --  88*   MONOS PCT %  --   --   --  9     Results from last 7 days   Lab Units 05/02/22  0435 05/01/22  1220 05/01/22  0404 04/30/22  1219 04/30/22  0610 04/30/22  0041   SODIUM mmol/L 141 144 140 141 141 143   POTASSIUM mmol/L 3 4* 4 5 2 8* 3 6 3 0* 3 4*   CHLORIDE mmol/L 99* 103 101 103 101 100   CO2 mmol/L 33* 34* 34* 34* 32 37*   ANION GAP mmol/L 9 7 5 4 8 6   BUN mg/dL 34* 42* 44* 46* 43* 36*   CREATININE mg/dL 1 00 1 13 1 24 1 33* 1 42* 1 43*   CALCIUM mg/dL 8 3 8 4 8 5 8 5 8 9 9 5   GLUCOSE RANDOM mg/dL 133 109 137 130 195* 134   ALT U/L 27  --   --   --   --  23   AST U/L 33  --   --   --   --  24   ALK PHOS U/L 76  --   --   --   --  83   ALBUMIN g/dL 2 3*  --   --   --   --  2 9*   TOTAL BILIRUBIN mg/dL 0 64  --   --   --   --  0 79     Results from last 7 days   Lab Units 05/02/22 0435 05/01/22  1220 05/01/22  0404 04/30/22  0610 04/30/22  0041   MAGNESIUM mg/dL 2 3  --  2 0 1 7 1 7   PHOSPHORUS mg/dL 4 3* 1 7*  --   --   --       Results from last 7 days   Lab Units 05/02/22  0435 05/01/22  0648 04/30/22  2320 04/30/22  1608 04/30/22  0825 04/30/22  0041   INR   --   --   --   --  2 61* 1 93*   PTT seconds 60* 64* 61* 48* 79*  --           Results from last 7 days   Lab Units 04/30/22  0041   LACTIC ACID mmol/L 1 8     ABG:  Results from last 7 days   Lab Units 05/02/22  0446 05/01/22  0405 05/01/22  0405   PH ART  7 509*   < > 7 521*   PCO2 ART mm Hg 41 0   < > 38 4   PO2 ART mm Hg 128 2   < > 127 5   HCO3 ART mmol/L 31 9*   < > 30 7*   BASE EXC ART mmol/L 8 2   < > 7 5   ABG SOURCE   --   --  Line, Arterial    < > = values in this interval not displayed       VBG:  Results from last 7 days   Lab Units 05/01/22  0405 04/30/22  1609 04/30/22  0935   PH IRENE   --   --  7 509*   PCO2 IRENE mm Hg  --   --  39 6*   PO2 IRENE mm Hg  --   --  42 6   HCO3 IRENE mmol/L  -- --  30 8*   BASE EXC IRENE mmol/L  --   --  7 3   ABG SOURCE  Line, Arterial   < >  --     < > = values in this interval not displayed  Results from last 7 days   Lab Units 05/02/22  0435 05/01/22  0648 04/30/22  0610 04/30/22  0041   PROCALCITONIN ng/ml 2 49* 3 89* 5 95* 3 86*       Micro  Results from last 7 days   Lab Units 04/30/22  0427 04/30/22  0404 04/30/22  0403 04/30/22  0355 04/30/22  0129 04/30/22  0044 04/30/22  0041   BLOOD CULTURE   --   --   --   --   --  No Growth at 24 hrs  No Growth at 24 hrs  SPUTUM CULTURE  No growth  --   --   --   --   --   --    GRAM STAIN RESULT  Rare Polys  No organisms seen  --   --   --   --   --   --    URINE CULTURE   --   --   --   --  No Growth <1000 cfu/mL  --   --    MRSA CULTURE ONLY   --   --   --  No Methicillin Resistant Staphlyococcus aureus (MRSA) isolated  --   --   --    LEGIONELLA URINARY ANTIGEN   --   --  Negative  --   --   --   --    STREP PNEUMONIAE ANTIGEN, URINE   --  Negative  --   --   --   --   --        Imaging: I have personally reviewed pertinent reports  and I have personally reviewed pertinent films in PACS    Intake and Output  I/O       04/30 0701 05/01 0700 05/01 0701 05/02 0700    P  O  0 0    I V  (mL/kg) 722 (7 4) 564 6 (5 7)    NG/ 250    IV Piggyback 450 200    Total Intake(mL/kg) 1372 (14) 1014 6 (10 3)    Urine (mL/kg/hr) 2225 (0 9) 1960 (0 8)    Emesis/NG output 300 100    Total Output 2525 2060    Net -1153 -1045  4          Unmeasured Urine Occurrence  1 x          Height and Weights   Height: 5' 2" (157 5 cm)     Body mass index is 39 6 kg/m²    Weight (last 2 days)     Date/Time Weight    05/01/22 0544 98 2 (216 49)    04/30/22 0330 98 7 (217 59)    04/30/22 0048 102 (223 99)    04/30/22 0032 102 (225 75)            Nutrition       Diet Orders   (From admission, onward)             Start     Ordered    05/01/22 1342  Diet Enteral/Parenteral; Tube Feeding No Oral Diet; Jevity 1 2 Codey; Continuous; 40; Prosource Protein Liquid - Two Packets Daily  Diet effective now        Comments: Start at 10 ml/hr and increase by 10 ml every 4 hours to goal as tolerated   References:    Nutrtion Support Algorithm Enteral vs  Parenteral   Question Answer Comment   Diet Type Enteral/Parenteral    Enteral/Parenteral Tube Feeding No Oral Diet    Tube Feeding Formula: Jevity 1 2 Codey    Bolus/Cyclic/Continuous Continuous    Tube Feeding Goal Rate (mL/hr): 40    Prosource Protein Liquid - No Carb Prosource Protein Liquid - Two Packets Daily    RD to adjust diet per protocol?  Yes        05/01/22 1345                  Active Medications  Scheduled Meds:  Current Facility-Administered Medications   Medication Dose Route Frequency Provider Last Rate    acetaminophen  650 mg Per NG Tube Q6H PRN JONO Dias      busPIRone  5 mg Oral TID JONO Dias      cefepime  2,000 mg Intravenous Q12H JONO Dias 2,000 mg (05/02/22 0309)    chlorhexidine  15 mL Mouth/Throat Q12H [address] JONO Dias      fentaNYL  75 mcg/hr Intravenous Continuous JONO Dias 75 mcg/hr (05/01/22 2131)    guaiFENesin  200 mg Per NG Tube Q6H [address] JOON Dias      heparin (porcine)  3-20 Units/kg/hr (Order-Specific) Intravenous Titrated Prabhdeep Hehar, DO 11 1 Units/kg/hr (05/01/22 1713)    heparin (porcine)  2,000 Units Intravenous Q1H PRN Prabhdeep Hehar, DO      heparin (porcine)  4,000 Units Intravenous Q1H PRN Prabhdeep Hehar, DO      insulin lispro  1-6 Units Subcutaneous Q6H [address] Magdalene Alarcon PA-C      ipratropium  0 5 mg Nebulization Q6H JONO Dias      levalbuterol  1 25 mg Nebulization Q6H JONO Dias      norepinephrine  1-30 mcg/min Intravenous Titrated JONO Dias Stopped (04/30/22 1334)    pantoprazole  40 mg Intravenous Daily JONO Dias      pneumococcal 13-valent conjugate vaccine  0 5 mL Intramuscular Prior to discharge Luis Manolo,       polyethylene glycol  17 g Per NG Tube BID Magdalene Alarcon PA-C      potassium chloride  40 mEq Oral Once Alikaylene Sables, CRNP      potassium chloride  20 mEq Intravenous Once Aliene Sables, CRNP      propofol  5-50 mcg/kg/min Intravenous Titrated GAGE DiasNP 25 mcg/kg/min (05/02/22 0508)    senna  17 6 mg Per NG Tube HS Micky MORIS [place]       Continuous Infusions:  fentaNYL, 75 mcg/hr, Last Rate: 75 mcg/hr (05/01/22 2131)  heparin (porcine), 3-20 Units/kg/hr (Order-Specific), Last Rate: 11 1 Units/kg/hr (05/01/22 1713)  norepinephrine, 1-30 mcg/min, Last Rate: Stopped (04/30/22 1334)  propofol, 5-50 mcg/kg/min, Last Rate: 25 mcg/kg/min (05/02/22 0508)      PRN Meds:   acetaminophen, 650 mg, Q6H PRN  heparin (porcine), 2,000 Units, Q1H PRN  heparin (porcine), 4,000 Units, Q1H PRN  pneumococcal 13-valent conjugate vaccine, 0 5 mL, Prior to discharge        Invasive Devices Review  Invasive Devices  Report    Central Venous Catheter Line            CVC Central Lines 04/30/22 Triple 16cm 1 day          Peripheral Intravenous Line            Peripheral IV 04/30/22 Left;Dorsal (posterior) Hand 2 days    Peripheral IV 04/30/22 Left;Ventral (anterior) Forearm 2 days    Peripheral IV 04/30/22 Right;Dorsal (posterior) Hand 2 days          Arterial Line            Arterial Line 04/30/22 Radial 1 day          Drain            NG/OG/Enteral Tube 18 Fr Right mouth 2 days    Urethral Catheter 16 Fr  2 days          Airway            ETT  7 5 mm 2 days                Rationale for remaining devices: Maintain CVC for lack of peripheral access  Maintain a-line for frequent blood work   Maintain Barnett for accurate UOP with diuresis   ---------------------------------------------------------------------------------------  Advance Directive and Living Will:      Power of :    POLST:    ---------------------------------------------------------------------------------------  Care Time Delivered:   Upon my evaluation, this patient had a high probability of imminent or life-threatening deterioration due to 30, which required my direct attention, intervention, and personal management  I have personally provided acute on chronic hypoxemic respiratory failure minutes (0200 to 0230) of critical care time, exclusive of procedures, teaching, family meetings, and any prior time recorded by providers other than myself  JONO Lei      Portions of the record may have been created with voice recognition software  Occasional wrong word or "sound a like" substitutions may have occurred due to the inherent limitations of voice recognition software    Read the chart carefully and recognize, using context, where substitutions have occurred

## 2022-05-03 NOTE — ASSESSMENT & PLAN NOTE
· Stage IV sacral pressure ulcer present on admission  · Consider as possible source for sepsis however does not appear infected  · Wound care consult

## 2022-05-03 NOTE — RESPIRATORY THERAPY NOTE
RT Ventilator Management Note  Esperanza Magdaleno 80 y o  female MRN: [id number]  Unit/Bed#: - Encounter: [id number]      Daily Screen       5/2/2022  0732 5/2/2022 1945          Patient safety screen outcome[de-identified] Failed Failed      Not Ready for Weaning due to[de-identified] Underline problem not resolved Underline problem not resolved              Physical Exam:          RT assumed care of 89yo pt on full vent support for Sepsis/PNA/resp failure/UTI with underlying chronic complications  Pt received on /12 +6 @ 40% - tolerating well with added breaths  Mechanics adequate  VSS/SpO2 97%   Plan to maintain support HS with likely transition to comfort care  Upon family arrival

## 2022-05-03 NOTE — PLAN OF CARE
Problem: Potential for Falls  Goal: Patient will remain free of falls  Description: INTERVENTIONS:  - Educate patient/family on patient safety including physical limitations  - Instruct patient to call for assistance with activity   - Consult OT/PT to assist with strengthening/mobility   - Keep Call bell within reach  - Keep bed low and locked with side rails adjusted as appropriate  - Keep care items and personal belongings within reach  - Initiate and maintain comfort rounds  - Make Fall Risk Sign visible to staff  - Offer Toileting every 2 Hours, in advance of need  - Initiate/Maintain bed/chair alarm  - Apply yellow socks and bracelet for high fall risk patients  - Consider moving patient to room near nurses station  Outcome: Progressing     Problem: PAIN - ADULT  Goal: Verbalizes/displays adequate comfort level or baseline comfort level  Description: Interventions:  - Encourage patient to monitor pain and request assistance  - Assess pain using appropriate pain scale  - Administer analgesics based on type and severity of pain and evaluate response  - Implement non-pharmacological measures as appropriate and evaluate response  - Consider cultural and social influences on pain and pain management  - Notify physician/advanced practitioner if interventions unsuccessful or patient reports new pain  Outcome: Progressing     Problem: INFECTION - ADULT  Goal: Absence or prevention of progression during hospitalization  Description: INTERVENTIONS:  - Assess and monitor for signs and symptoms of infection  - Monitor lab/diagnostic results  - Monitor all insertion sites, i e  indwelling lines, tubes, and drains  - Monitor endotracheal if appropriate and nasal secretions for changes in amount and color  - Galvin appropriate cooling/warming therapies per order  - Administer medications as ordered  - Instruct and encourage patient and family to use good hand hygiene technique  - Identify and instruct in appropriate isolation precautions for identified infection/condition  Outcome: Progressing

## 2022-05-03 NOTE — ASSESSMENT & PLAN NOTE
Wt Readings from Last 3 Encounters:   05/02/22 99 8 kg (220 lb)   01/27/22 101 kg (223 lb 3 2 oz)   07/22/21 116 kg (254 lb 13 6 oz)     · Last echo completed 01/24:  · Left Ventricle: Mildly increased left ventricular wall thickness with normal LV cavity size and systolic function  There is [id number] variability in contractility due to rhythm  EF 65%  No RWMA  Diastolic characterization was not assessed  · Aortic Valve: Aortic valve not well seen  Aortic calcification is noted  The measured trans aortic velocity suggests severe aortic stenosis  Trace insufficiency seen  · Mitral Valve: Mitral annular sclerosis, particularly involving the posterior aspect of the annulus with preserved leaflet excursion and mild insufficiency  · Tricuspid Valve: Grossly normal-appearing tricuspid leaflets with mild to moderate insufficiency  Measured trans tricuspid regurgitant velocity of 3 7 m/sec corresponds to a gradient of 56 mmHg  With addition of 10 mmHg presumed atrial pressure, estimated pulmonary pressure is increased at 66 mmHg  · Formal TTE 5/2 Left ventricle demonstrates mild concentric left ventricular hypertrophy with normal LV cavity size and preserved function  Estimated ejection fraction range of 60%  No obvious segmental wall motion abnormality seen  Diastolic characterization was not performed  Right Ventricle: The right ventricle appears top-normal in size with low-normal function  Aortic Valve: Trileaflet, calcific aortic valve with reduced excursion  Measure transaortic velocity of 5 9 m/sec suggests severe aortic stenosis  In review of the Doppler envelope, this measured peak velocity may overestimate the true value although peak transaortic velocity remains significantly increased and is consisitent with severe aortic stenosis  No clear aortic insufficiency seen  Mitral Valve: Mitral annular sclerosis with preserved overall leaflet excursion and mild to moderate insufficiency    · Elevated BNP 6272 with elevated HS troponin of 131  · Duiretic plan: Home dose Lasix 60 mg p o  BID on hold  Cont gentle diuresis with IV lasix PRN    · Daily weight

## 2022-05-03 NOTE — PROGRESS NOTES
[address]  Progress Note - [place] 1933, 80 y o  female MRN: [id number]  Unit/Bed#: -01 Encounter: [id number]  Primary Care Provider: No primary care provider on file  Date and time admitted to hospital: 4/30/2022 12:28 AM    Acute on chronic respiratory failure with hypoxia (HCC)  Assessment & Plan  · Likely multifactorial with bilateral pneumonia, bilateral pleural effusions and fluid overload  · At baseline patient wears 4 L nasal cannula  · Intubated in ED  · Mechanical vent bundle ordered  · Fentanyl and propofol for sedation  · SAT with SBT daily  · Continue treatment for healthcare associated pneumonia and fluid overload  · CXR 5/2 with persistent diffuse groundglass density with focal consolidation in the left upper lobe compatible with diffuse pneumonia  · Discussed option of bronch 5/2  · Pulmonary consult for CT results suggestive of extrinsic compression upon the bronchi as they pass through the brenna, left more so than right  Adenopathy and/or mass should be excluded  Short-term follow-up recommended  · [address] discussion with family who decided due to poor quality of life, patient would not want aggressive measures  They have decided to pursue comfort care when family comes to bedside 5/3  HCAP (healthcare-associated pneumonia)  Assessment & Plan  · See plan under sepsis    * Septic shock ([address] )  Assessment & Plan  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  · Cultures: blood, urine, sputum, MRSA NGTD  · Urine strep and legionella negative  · PCT elevated: 3 86 - 5 95 - 3 89  · Started ceftriaxone at [facility] on 04/29  Initially placed on cefepime and doxycycline in ED  Changed to cefepime and vancomycin at time of admission    · Day #4 cefepime  · Vanc d/c 2/2 negative MRSA  · Continue cefepime x 7 days  · Bronch and BAL discussed, but family opted to not escalate care and will transition to comfort care on 5/3  · Intubated ED for respiratory distress and worsening respiratory failure  · CT chest/abd/pelvis - Extensive bilateral pneumonia, left worse than right  There is mild mediastinal lymphadenopathy  Both brenna appear full, left more so than right, and there is suggestion of some extrinsic compression upon the bronchi as they pass through the brenna, left more so than right  Adenopathy and/or mass should be excluded  Short-term follow-up recommended  Pulmonology consultation and follow-up recommended  Small bilateral pleural effusions, right slightly larger than left  The right main pulmonary artery measures 3 7 cm diameter; this raises concern for pulmonary hypertension  There is an approximately 3 9 x 3 1 cm cystic structure containing internal septations arising from the lateral aspect lower pole right kidney  This appears similar in size to July 22, 2021  · UA:  Nitrite positive, small amount of leukocytes, Innumerable wbc's, moderate bacteria  Culture negative  The 30ml/kg fluid bolus was not given to the patient despite hypotension and/or significantly elevated lactate of ? 4 and/or presence of septic shock due to: Concern for fluid/volume overload and severe aortic stenosis  Norepinephrine for hypotension  Currently off  Maintain goal MAP > 65  Acute on chronic diastolic heart failure [facility])  Assessment & Plan  Wt Readings from Last 3 Encounters:   05/02/22 99 8 kg (220 lb)   01/27/22 101 kg (223 lb 3 2 oz)   07/22/21 116 kg (254 lb 13 6 oz)     · Last echo completed 01/24:  · Left Ventricle: Mildly increased left ventricular wall thickness with normal LV cavity size and systolic function  There is ijgg-bj-ftca variability in contractility due to rhythm  EF 65%  No RWMA  Diastolic characterization was not assessed  · Aortic Valve: Aortic valve not well seen  Aortic calcification is noted    The measured trans aortic velocity suggests severe aortic stenosis  Trace insufficiency seen  · Mitral Valve: Mitral annular sclerosis, particularly involving the posterior aspect of the annulus with preserved leaflet excursion and mild insufficiency  · Tricuspid Valve: Grossly normal-appearing tricuspid leaflets with mild to moderate insufficiency  Measured trans tricuspid regurgitant velocity of 3 7 m/sec corresponds to a gradient of 56 mmHg  With addition of 10 mmHg presumed atrial pressure, estimated pulmonary pressure is increased at 66 mmHg  · Formal TTE 5/2 Left ventricle demonstrates mild concentric left ventricular hypertrophy with normal LV cavity size and preserved function  Estimated ejection fraction range of 60%  No obvious segmental wall motion abnormality seen  Diastolic characterization was not performed  Right Ventricle: The right ventricle appears top-normal in size with low-normal function  Aortic Valve: Trileaflet, calcific aortic valve with reduced excursion  Measure transaortic velocity of 5 9 m/sec suggests severe aortic stenosis  In review of the Doppler envelope, this measured peak velocity may overestimate the true value although peak transaortic velocity remains significantly increased and is consisitent with severe aortic stenosis  No clear aortic insufficiency seen  Mitral Valve: Mitral annular sclerosis with preserved overall leaflet excursion and mild to moderate insufficiency  · Elevated BNP 6272 with elevated HS troponin of 131  · Duiretic plan: Home dose Lasix 60 mg p o  BID on hold  Cont gentle diuresis with IV lasix PRN    · Daily weight    Paroxysmal atrial fibrillation (HCC)  Assessment & Plan  · History paroxysmal AFib on Eliquis  · Last dose of Eliquis was given 4/29, started on heparin gtt on admission  · Cont heparin gtt- discontinued 5/2 after [address] discussion with family with plans for comfort care 5/3  · Currently AFib rate controlled in 70-80s    ANSELMO (acute kidney injury) ([facility] )  Assessment & Plan  · Baseline creatinine 0 7-0 8  · Creatinine on presentation 1 43 now 1 0  · Avoid nephrotoxins  · Avoid hypotension  · Strict I&O, Barnett for accurate I&O  · Trend BUN and creatinine  · FEUrea 10 3%, suggests pre-renal etiology    Pleural effusion, bilateral  Assessment & Plan  · Likely secondary to acute on chronic Heart Failure  · Seen on CT scan of chest  · Continue diuresis under acute on chronic heart failure plan    Hypertension  Assessment & Plan  · History of hypertension  · Hold home antihypertensives    Pressure ulcer, stage 4 (HCC)  Assessment & Plan  · Stage IV sacral pressure ulcer present on admission  · Consider as possible source for sepsis however does not appear infected  · Wound care consult      Renal lesion  Assessment & Plan  · There is an approximately 3 9 x 3 1 cm cystic structure containing internal septations arising from the lateral aspect lower pole right kidney  This appears similar in size to July 22, 2021  · Outpatient follow-up recommended  Elevated troponin level not due myocardial infarction  Assessment & Plan  · Likely secondary to fluid overload  · Peaked at 151  · EKG without ischemic changes    Psychiatric disorder  Assessment & Plan  · Continue PTA buspar       ----------------------------------------------------------------------------------------  HPI/24hr events: Daughter called last night and stated that her son will not be coming up from [facility]  It will just be her and her  coming in today to transition to comfort care  We discussed the process of terminal extubation  She stated this is the right choice and what her mother would want      Patient appropriate for transfer out of the ICU today?: No  Disposition: Comfort Care  Code Status: Level 3 - DNAR and DNI  ---------------------------------------------------------------------------------------  SUBJECTIVE    Review of Systems  Review of systems was unable to be performed secondary to Intubated  ---------------------------------------------------------------------------------------  OBJECTIVE    Vitals   Vitals:    22 2000 22 2100 22 22022 2300   BP: 117/64 116/54 117/58 110/59   BP Location: Left arm   Left arm   Pulse: 77 81 83 78   Resp: 17 21 16 15   Temp: (!) 100 8 °F (38 2 °C) (!) 100 9 °F (38 3 °C) (!) 100 8 °F (38 2 °C) (!) 100 6 °F (38 1 °C)   TempSrc: Bladder   Bladder   SpO2: 98% 97% 98% 98%   Weight:       Height:         Temp (24hrs), Av 9 °F (37 7 °C), Min:99 °F (37 2 °C), Max:100 9 °F (38 3 °C)  Current: Temperature: (!) 100 6 °F (38 1 °C)  Arterial Line BP: 92/51  Arterial Line MAP (mmHg): 66 mmHg    Respiratory:  SpO2: SpO2: 98 %       Invasive/non-invasive ventilation settings   Respiratory  Report   Lab Data (Last 4 hours)    None         O2/Vent Data (Last 4 hours)    None                Physical Exam  Vitals and nursing note reviewed  Constitutional:       Appearance: She is ill-appearing  HENT:      Head: Normocephalic  Mouth/Throat:      Mouth: Mucous membranes are moist    Eyes:      Conjunctiva/sclera: Conjunctivae normal       Pupils: Pupils are equal, round, and reactive to light  Cardiovascular:      Rate and Rhythm: Normal rate  Heart sounds: Murmur heard  Pulmonary:      Breath sounds: Rhonchi and rales present  Abdominal:      General: Bowel sounds are normal  There is no distension  Palpations: Abdomen is soft  Tenderness: There is no abdominal tenderness  There is no guarding  Musculoskeletal:      Right lower leg: Edema present  Left lower leg: Edema present  Skin:     General: Skin is warm     Neurological:      Comments: Intubated and sedated         Laboratory and Diagnostics:  Results from last 7 days   Lab Units 22  0435 22  0404 22  0610 22  0041   WBC Thousand/uL 10 33* 12 24* 13 50* 20 46*   HEMOGLOBIN g/dL 9 6* 9 6* 10 6* 12 2   HEMATOCRIT % 30 6* 30 2* 33 0* 39 7   PLATELETS Thousands/uL 245 222 225 275   NEUTROS PCT %  --   --   --  88*   MONOS PCT %  --   --   --  9     Results from last 7 days   Lab Units 05/02/22  0435 05/01/22  1220 05/01/22  0404 04/30/22  1219 04/30/22  0610 04/30/22  0041   SODIUM mmol/L 141 144 140 141 141 143   POTASSIUM mmol/L 3 4* 4 5 2 8* 3 6 3 0* 3 4*   CHLORIDE mmol/L 99* 103 101 103 101 100   CO2 mmol/L 33* 34* 34* 34* 32 37*   ANION GAP mmol/L 9 7 5 4 8 6   BUN mg/dL 34* 42* 44* 46* 43* 36*   CREATININE mg/dL 1 00 1 13 1 24 1 33* 1 42* 1 43*   CALCIUM mg/dL 8 3 8 4 8 5 8 5 8 9 9 5   GLUCOSE RANDOM mg/dL 133 109 137 130 195* 134   ALT U/L 27  --   --   --   --  23   AST U/L 33  --   --   --   --  24   ALK PHOS U/L 76  --   --   --   --  83   ALBUMIN g/dL 2 3*  --   --   --   --  2 9*   TOTAL BILIRUBIN mg/dL 0 64  --   --   --   --  0 79     Results from last 7 days   Lab Units 05/02/22 0435 05/01/22  1220 05/01/22  0404 04/30/22  0610 04/30/22  0041   MAGNESIUM mg/dL 2 3  --  2 0 1 7 1 7   PHOSPHORUS mg/dL 4 3* 1 7*  --   --   --       Results from last 7 days   Lab Units 05/02/22  0435 05/01/22  0648 04/30/22  2320 04/30/22  1608 04/30/22  0825 04/30/22  0041   INR   --   --   --   --  2 61* 1 93*   PTT seconds 60* 64* 61* 48* 79*  --           Results from last 7 days   Lab Units 04/30/22  0041   LACTIC ACID mmol/L 1 8     ABG:  Results from last 7 days   Lab Units 05/02/22  0446 05/01/22  0405 05/01/22  0405   PH ART  7 509*   < > 7 521*   PCO2 ART mm Hg 41 0   < > 38 4   PO2 ART mm Hg 128 2   < > 127 5   HCO3 ART mmol/L 31 9*   < > 30 7*   BASE EXC ART mmol/L 8 2   < > 7 5   ABG SOURCE   --   --  Line, Arterial    < > = values in this interval not displayed       VBG:  Results from last 7 days   Lab Units 05/01/22  0405 04/30/22  1609 04/30/22  0935   PH IRENE   --   --  7 509*   PCO2 IRENE mm Hg  --   --  39 6*   PO2 IRENE mm Hg  --   --  42 6   HCO3 IRENE mmol/L  --   --  30 8*   BASE EXC IRENE mmol/L  --   --  7 3   ABG SOURCE  Line, Arterial   < >  --     < > = values in this interval not displayed  Results from last 7 days   Lab Units 05/02/22  0435 05/01/22  0648 04/30/22  0610 04/30/22  0041   PROCALCITONIN ng/ml 2 49* 3 89* 5 95* 3 86*       Micro  Results from last 7 days   Lab Units 04/30/22  0427 04/30/22  0404 04/30/22  0403 04/30/22  0355 04/30/22  0129 04/30/22  0044 04/30/22  0041   BLOOD CULTURE   --   --   --   --   --  No Growth at 48 hrs  No Growth at 48 hrs  SPUTUM CULTURE  Few Colonies of   --   --   --   --   --   --    GRAM STAIN RESULT  Rare Polys  No organisms seen  --   --   --   --   --   --    URINE CULTURE   --   --   --   --  No Growth <1000 cfu/mL  --   --    MRSA CULTURE ONLY   --   --   --  No Methicillin Resistant Staphlyococcus aureus (MRSA) isolated  --   --   --    LEGIONELLA URINARY ANTIGEN   --   --  Negative  --   --   --   --    STREP PNEUMONIAE ANTIGEN, URINE   --  Negative  --   --   --   --   --        EKG:   Imaging: I have personally reviewed pertinent reports  Intake and Output  I/O       05/01 0701 05/02 0700 05/02 0701 05/03 0700    P  O  0     I V  (mL/kg) 865 3 (8 7) 580 7 (5 8)    NG/ 1064    IV Piggyback 630 50    Feedings 258 428    Total Intake(mL/kg) 2003 2 (20) 2122 7 (21 3)    Urine (mL/kg/hr) 2205 (0 9) 2925 (1 2)    Emesis/NG output 100     Total Output 2305 2925    Net -301 8 -802 3          Unmeasured Urine Occurrence 1 x           Height and Weights   Height: 5' 2" (157 5 cm)     Body mass index is 40 24 kg/m²    Weight (last 2 days)     Date/Time Weight    05/02/22 0830 99 8 (220)    05/02/22 0600 100 (220 46)    05/01/22 0544 98 2 (216 49)            Nutrition       Diet Orders   (From admission, onward)             Start     Ordered    05/02/22 1137  Diet Enteral/Parenteral; Tube Feeding No Oral Diet; Jevity 1 2 Codey; Continuous; 23; Prosource Protein Liquid - Three Packets Daily  Diet effective now        Comments: Start at 10 ml/hr and increase by 10 ml every 4 hours to goal as tolerated   References:    Nutrtion Support Algorithm Enteral vs  Parenteral   Question Answer Comment   Diet Type Enteral/Parenteral    Enteral/Parenteral Tube Feeding No Oral Diet    Tube Feeding Formula: Jevity 1 2 Codey    Bolus/Cyclic/Continuous Continuous    Tube Feeding Goal Rate (mL/hr): 23    Prosource Protein Liquid - No Carb Prosource Protein Liquid - Three Packets Daily    RD to adjust diet per protocol?  Yes        05/02/22 1137                  Active Medications  Scheduled Meds:  Current Facility-Administered Medications   Medication Dose Route Frequency Provider Last Rate    acetaminophen  650 mg Per NG Tube Q6H PRN JONO Dias      bisacodyl  10 mg Rectal Daily Dante Ballesteros PA-C      busPIRone  5 mg Oral TID JONO Dias      cefepime  2,000 mg Intravenous Q12H JONO Dias Stopped (05/02/22 1703)    chlorhexidine  15 mL Mouth/Throat Q12H [facility] JONO Dias      fentaNYL  75 mcg/hr Intravenous Continuous JONO Dias 75 mcg/hr (05/02/22 1115)    fentanyl citrate (PF)  50 mcg Intravenous Q1H PRN Lucas Hickman PA-C      furosemide  40 mg Intravenous BID (diuretic) Lucas Hickman PA-C      guaiFENesin  200 mg Per NG Tube Q6H [facility] JONO Dias      insulin lispro  1-6 Units Subcutaneous Q6H [facility] Magdalene Alarcon PA-C      ipratropium  0 5 mg Nebulization Q6H JONO Dias      levalbuterol  1 25 mg Nebulization Q6H JONO Dias      pantoprazole  40 mg Intravenous Daily JONO Dias      pneumococcal 13-valent conjugate vaccine  0 5 mL Intramuscular Prior to discharge Luis French DO      polyethylene glycol  17 g Per NG Tube BID Magdalene Alarcon PA-C      propofol  5-50 mcg/kg/min Intravenous Titrated JONO Dias 25 mcg/kg/min (05/02/22 0032)    senna  17 6 mg Per NG Tube HS Magdalene Alarcon PA-C       Continuous Infusions:  fentaNYL, 75 mcg/hr, Last Rate: 75 mcg/hr (05/02/22 1115)  propofol, 5-50 mcg/kg/min, Last Rate: 25 mcg/kg/min (05/02/22 4702)      PRN Meds:   acetaminophen, 650 mg, Q6H PRN  fentanyl citrate (PF), 50 mcg, Q1H PRN  pneumococcal 13-valent conjugate vaccine, 0 5 mL, Prior to discharge        Invasive Devices Review  Invasive Devices  Report    Central Venous Catheter Line            CVC Central Lines 04/30/22 Triple 16cm 2 days          Peripheral Intravenous Line            Peripheral IV 04/30/22 Left;Dorsal (posterior) Hand 2 days    Peripheral IV 04/30/22 Right;Dorsal (posterior) Hand 2 days          Arterial Line            Arterial Line 04/30/22 Radial 2 days          Drain            NG/OG/Enteral Tube 18 Fr Right mouth 2 days    Urethral Catheter 16 Fr  2 days          Airway            ETT  7 5 mm 2 days                Rationale for remaining devices: critical illness  ---------------------------------------------------------------------------------------  Advance Directive and Living Will:      Power of :    POLST:    ---------------------------------------------------------------------------------------  Care Time Delivered:   Upon my evaluation, this patient had a high probability of imminent or life-threatening deterioration due to respiratory failure, which required my direct attention, intervention, and personal management  I have personally provided 30 minutes (0000 to 0210) of critical care time, exclusive of procedures, teaching, family meetings, and any prior time recorded by providers other than myself  Keanu Matos PA-C      Portions of the record may have been created with voice recognition software  Occasional wrong word or "sound a like" substitutions may have occurred due to the inherent limitations of voice recognition software    Read the chart carefully and recognize, using context, where substitutions have occurred

## 2022-05-03 NOTE — ACP (ADVANCE CARE PLANNING)
Patient's daughter and son in law present at bedside  They state that they are ready to proceed with compassionate extubation and comfort measures only  Orders placed

## 2022-05-03 NOTE — DISCHARGE SUMMARY
Discharge Summary - Romana Mingle 80 y o  female MRN: [id number]    Unit/Bed#: -01 Encounter: [id number] PCP: No primary care provider on file  Admission Date:   Admission Orders (From admission, onward)     Ordered        04/30/22 0205  Inpatient Admission  Once                        Admitting Diagnosis: Shortness of breath [R06 02]  UTI (urinary tract infection) [N39 0]  Bilateral pneumonia [J18 9]  ANSELMO (acute kidney injury) ([address] ) [N17 9]  Septic shock ([address] ) [A41 9, R65 21]  Acute decompensated heart failure ([address] ) [I50 9]  Acute on chronic respiratory failure with hypoxia and hypercapnia ([facility] ) [J96 21, J96 22]    HPI: 80 y o  female with PMHx of chronic respiratory failure on 4L NC at baseline, Afib on eliquis, HTN, HLD, sacral decubiti, who presents from [facility] with respiratory distress  Pt's daughter states she was having difficulty breathing the past 2 days and appeared to have worsening SOB yesterday  She was also coughing pink tinged sputum  [facility] started the pt on ceftriaxone on 04/29  EMS was called and pt was hypoxic with SPO2 84% on her baseline 4L NC  Pt was placed on CPAP and then transitioned to BIPAP once in ED  Pt was given solumedrol and lasix 80 mg IV  Severe Sepsis criteria met in ED  Pt was started on ceftriaxone and doxycycline  IV fluids were not administered due to fluid overload  Pt continue experience respiratory distress was intubated  Post intubation patient became hypotensive and patient then met septic shock criteria  Pt was initiated on norepinephrine and antibiotics were changed to cefepime and vancomycin  CT of the chest showed extensive bilateral pneumonia and bilateral pleural effusions  Further workup revealed urinalysis consistent with UTI, ANSELMO, elevated BNP and troponin  Pt admitted to [address] [place] [address] for continued work-up      Procedures Performed:   Orders Placed This Encounter   Procedures   [facility] ED ECG Documentation Only    Intubation       Summary of Hospital Course: Patient was admitted to the ICU and treated for her septic shock and pneumonia  Unfortunately patient failed multiple SBT due to thick tenacious sputum  CT from admission with hilar "fullness" concerning for possible malignancy  Family meeting held on   Please see ACP note from that date for further details  Patient's daughter states that patient would not want prolonged life support measures  In light of her profound critical illness on her chronic severe AS and possible new lung cancer decision was made to pursue comfort measures only  Patient was compassionately extubated this AM and  this evening    Significant Findings, Care, Treatment and Services Provided:   Intubation   Vasopressors  IV abx    Complications: NA    Disposition:      Final Diagnosis: Septic shock, pneumonia    Medical Problems             Resolved Problems  Date Reviewed: 5/3/2022    None                  Date, Time and Cause of Death    Date of Death: 5/3/22  Time of Death:  5:52 PM  Preliminary Cause of Death: Pneumonia  Entered by: Jw Ballesteros[RR1 1]     Attribution     RR1  [id number] [place], [place] 22 17:55          Death Note:    INPATIENT DEATH NOTE  Romana Mingle 80 y o  female MRN: [id number]  Unit/Bed#: -01 Encounter: [id number]    Date, Time and Cause of Death    Date of Death: 5/3/22  Time of Death:  5:52 PM  Preliminary Cause of Death: Pneumonia  Entered by: Jw Ballesteros[RR1 1]     Attribution     RR1  [id number] [facility] [place] 22 17:55           Patient's Information  Pronounced by: Deysi Emanuel  Did the patient's death occur in the ED?: No  Did the patient's death occur in the OR?: No  Did the patient's death occur less than 10 days post-op?: No  Did the patient's death occur within 24 hours of admission?: No  Was code status DNR at the time of death?: Yes    PHYSICAL EXAM:  Unresponsive to noxious stimuli, Spontaneous respirations absent, Breath sounds absent, Carotid pulse absent and Heart sounds absent    Medical Examiner notification criteria:  NONE APPLICABLE   Medical Examiner's office notified?:  No, does not meet ME notification criteria       Family Notification  Was the family notified?: Yes  Date Notified: 22  Time Notified:   Notified by: Sirena Parker  Name of Family Notified of Death: [address] 70 Person Relationship to Patient: Daughter  Family Notification Route: Telephone  Was the family told to contact a  home?: Yes  Name of [facility][de-identified] Bridgette Walker and Lani in [place] ([phone])    Autopsy Options:  Post-mortem examination declined by next of kin    Primary Service Attending Physician notified?:  yes - Attending:  Sadia Shell DO    Physician/Resident responsible for completing Discharge Summary:  Sirena Parker PA-C

## 2022-05-03 NOTE — RESPIRATORY THERAPY NOTE
RT Ventilator Management Note  Xander Lyles 80 y o  female MRN: [id number]  Unit/Bed#: - Encounter: [id number]      Daily Screen       5/3/2022  0747 5/3/2022  1200          Patient safety screen outcome[de-identified] Failed Failed (P)       Not Ready for Weaning due to[de-identified] Underline problem not resolved --      Spont breathing trial outcome[de-identified] -- Failed (P)       Spont breathing trial reason failed: -- --      Extubation order obtained: -- Yes (P)       Patient extubated: -- Yes (P)               Physical Exam:   O2 Device: (P) room air      Resp Comments: (P) Extubation order obtained  Pt airway suctioned, nurse at bedside    Pt extubated to room air

## 2022-05-03 NOTE — ASSESSMENT & PLAN NOTE
· History paroxysmal AFib on Eliquis  · Last dose of Eliquis was given 4/29, started on heparin gtt on admission  · Cont heparin gtt- discontinued 5/2 after [address] discussion with family with plans for comfort care 5/3  · Currently AFib rate controlled in 70-80s

## 2022-05-03 NOTE — ASSESSMENT & PLAN NOTE
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  · Cultures: blood, urine, sputum, MRSA NGTD  · Urine strep and legionella negative  · PCT elevated: 3 86 - 5 95 - 3 89  · Started ceftriaxone at [facility] on 04/29  Initially placed on cefepime and doxycycline in ED  Changed to cefepime and vancomycin at time of admission  · Day #4 cefepime  · Vanc d/c 2/2 negative MRSA  · Continue cefepime x 7 days  · Bronch and BAL discussed, but family opted to not escalate care and will transition to comfort care on 5/3  · Intubated ED for respiratory distress and worsening respiratory failure  · CT chest/abd/pelvis - Extensive bilateral pneumonia, left worse than right  There is mild mediastinal lymphadenopathy  Both brenna appear full, left more so than right, and there is suggestion of some extrinsic compression upon the bronchi as they pass through the brenna, left more so than right  Adenopathy and/or mass should be excluded  Short-term follow-up recommended  Pulmonology consultation and follow-up recommended  Small bilateral pleural effusions, right slightly larger than left  The right main pulmonary artery measures 3 7 cm diameter; this raises concern for pulmonary hypertension  There is an approximately 3 9 x 3 1 cm cystic structure containing internal septations arising from the lateral aspect lower pole right kidney  This appears similar in size to July 22, 2021  · UA:  Nitrite positive, small amount of leukocytes, Innumerable wbc's, moderate bacteria  Culture negative  The 30ml/kg fluid bolus was not given to the patient despite hypotension and/or significantly elevated lactate of ? 4 and/or presence of septic shock due to: Concern for fluid/volume overload and severe aortic stenosis  Norepinephrine for hypotension  Currently off  Maintain goal MAP > 65

## 2022-05-03 NOTE — ASSESSMENT & PLAN NOTE
· Likely multifactorial with bilateral pneumonia, bilateral pleural effusions and fluid overload  · At baseline patient wears 4 L nasal cannula  · Intubated in ED  · Mechanical vent bundle ordered  · Fentanyl and propofol for sedation  · SAT with SBT daily  · Continue treatment for healthcare associated pneumonia and fluid overload  · CXR 5/2 with persistent diffuse groundglass density with focal consolidation in the left upper lobe compatible with diffuse pneumonia  · Discussed option of bronch 5/2  · Pulmonary consult for CT results suggestive of extrinsic compression upon the bronchi as they pass through the brenna, left more so than right  Adenopathy and/or mass should be excluded  Short-term follow-up recommended  · [address] discussion with family who decided due to poor quality of life, patient would not want aggressive measures  They have decided to pursue comfort care when family comes to bedside 5/3

## 2022-05-04 NOTE — CLINICAL RISK MANAGEMENT
Progress Note - Death in Restraints   Isamar Patel 80 y o  female MRN: [id number]  Unit/Bed#: -01 Encounter: [id number]      Patient  within 24 hours of restraint  with Soft restraint right wrist and Soft restraint left wrist  Death unrelated to use of restraints  This situation was tracked internally  CMS and PA-BRANDEN  notification not required

## 2022-05-04 NOTE — UTILIZATION REVIEW
Notification of Discharge   This is a Notification of Discharge from our facility [address]  Please be advised that this patient has been discharge from our facility  Below you will find the admission and discharge date and time including the patients disposition  UTILIZATION REVIEW CONTACT:  [address] [place]  Utilization   Network Utilization Review Department  Phone: [phone] x carefully listen to the prompts  All voicemails are confidential   Email: [email or web address]     PHYSICIAN ADVISORY SERVICES:  FOR HWYS-RY-HJBQ REVIEW - MEDICAL NECESSITY DENIAL  Phone: [phone]  Fax: [phone]  Email: [email or web address]     PRESENTATION DATE: 2022 12:28 AM  OBERVATION ADMISSION DATE:   INPATIENT ADMISSION DATE: 22  2:05 AM   DISCHARGE DATE: 5/3/2022  8:09 PM  DISPOSITION:        IMPORTANT INFORMATION:  Send all requests for admission clinical reviews, approved or denied determinations and any other requests to dedicated fax number below belonging to the campus where the patient is receiving treatment   List of dedicated fax numbers:  [address] DENIALS (Administrative/Medical Necessity) [phone]   [address] (Maternity/NICU/Pediatrics) [phone]   [facility] [phone]   [address] [facility] [phone]   [address] [place] [address] [phone]    [address] [phone]   [facility]  [phone]   [address] [facility], [facility]  [address] [facility] [address] [facility] [phone]

## 2022-05-05 LAB
BACTERIA BLD CULT: NORMAL
BACTERIA BLD CULT: NORMAL

## 2024-12-02 NOTE — ASSESSMENT & PLAN NOTE
· Accelerated in Acute chf controlled now  · Continue home antihypertensives  · IV metoprolol prn sbp > 170 mmhg children/spouse